# Patient Record
Sex: FEMALE | Race: WHITE | NOT HISPANIC OR LATINO | Employment: OTHER | URBAN - METROPOLITAN AREA
[De-identification: names, ages, dates, MRNs, and addresses within clinical notes are randomized per-mention and may not be internally consistent; named-entity substitution may affect disease eponyms.]

---

## 2020-12-17 DIAGNOSIS — Z01.818 PRE-OP EXAM: Primary | ICD-10-CM

## 2021-01-08 PROBLEM — N39.46 URGE AND STRESS INCONTINENCE: Status: ACTIVE | Noted: 2021-01-08

## 2021-02-23 PROBLEM — N39.41 URGE INCONTINENCE: Status: ACTIVE | Noted: 2021-02-23

## 2021-09-13 ENCOUNTER — HOSPITAL ENCOUNTER (OUTPATIENT)
Dept: RADIOLOGY | Facility: HOSPITAL | Age: 71
Discharge: HOME | End: 2021-09-13
Attending: FAMILY MEDICINE
Payer: MEDICARE

## 2021-09-13 ENCOUNTER — OFFICE VISIT (OUTPATIENT)
Dept: FAMILY MEDICINE | Facility: CLINIC | Age: 71
End: 2021-09-13
Payer: MEDICARE

## 2021-09-13 VITALS — DIASTOLIC BLOOD PRESSURE: 90 MMHG | SYSTOLIC BLOOD PRESSURE: 166 MMHG

## 2021-09-13 DIAGNOSIS — I10 ESSENTIAL HYPERTENSION: ICD-10-CM

## 2021-09-13 DIAGNOSIS — M25.572 ACUTE LEFT ANKLE PAIN: Primary | ICD-10-CM

## 2021-09-13 DIAGNOSIS — M25.572 ACUTE LEFT ANKLE PAIN: ICD-10-CM

## 2021-09-13 DIAGNOSIS — R73.03 PREDIABETES: ICD-10-CM

## 2021-09-13 DIAGNOSIS — G89.29 CHRONIC NECK PAIN: ICD-10-CM

## 2021-09-13 DIAGNOSIS — N39.46 URGE AND STRESS INCONTINENCE: ICD-10-CM

## 2021-09-13 DIAGNOSIS — Z11.59 NEED FOR HEPATITIS C SCREENING TEST: ICD-10-CM

## 2021-09-13 DIAGNOSIS — Z12.31 ENCOUNTER FOR SCREENING MAMMOGRAM FOR MALIGNANT NEOPLASM OF BREAST: ICD-10-CM

## 2021-09-13 DIAGNOSIS — Z78.0 POSTMENOPAUSAL: ICD-10-CM

## 2021-09-13 DIAGNOSIS — E03.9 HYPOTHYROIDISM, UNSPECIFIED TYPE: ICD-10-CM

## 2021-09-13 DIAGNOSIS — Z12.39 ENCOUNTER FOR SCREENING FOR MALIGNANT NEOPLASM OF BREAST, UNSPECIFIED SCREENING MODALITY: ICD-10-CM

## 2021-09-13 DIAGNOSIS — Z12.11 SCREENING FOR COLON CANCER: ICD-10-CM

## 2021-09-13 DIAGNOSIS — M54.2 CHRONIC NECK PAIN: ICD-10-CM

## 2021-09-13 DIAGNOSIS — G25.81 RLS (RESTLESS LEGS SYNDROME): ICD-10-CM

## 2021-09-13 DIAGNOSIS — J45.909 ASTHMA, UNSPECIFIED ASTHMA SEVERITY, UNSPECIFIED WHETHER COMPLICATED, UNSPECIFIED WHETHER PERSISTENT: ICD-10-CM

## 2021-09-13 PROCEDURE — 99204 PR OFFICE/OUTPT VISIT, NEW, LEVL IV, 45-59 MIN: ICD-10-PCS | Mod: S$PBB,,, | Performed by: FAMILY MEDICINE

## 2021-09-13 PROCEDURE — 73600 XR ANKLE 2 VIEW LEFT: ICD-10-PCS | Mod: 26,LT,, | Performed by: RADIOLOGY

## 2021-09-13 PROCEDURE — 72040 X-RAY EXAM NECK SPINE 2-3 VW: CPT | Mod: TC,PO

## 2021-09-13 PROCEDURE — 73620 X-RAY EXAM OF FOOT: CPT | Mod: 26,LT,, | Performed by: RADIOLOGY

## 2021-09-13 PROCEDURE — 73600 X-RAY EXAM OF ANKLE: CPT | Mod: TC,PO,LT

## 2021-09-13 PROCEDURE — 72040 XR CERVICAL SPINE AP LATERAL: ICD-10-PCS | Mod: 26,,, | Performed by: RADIOLOGY

## 2021-09-13 PROCEDURE — 99999 PR PBB SHADOW E&M-EST. PATIENT-LVL III: ICD-10-PCS | Mod: PBBFAC,,, | Performed by: FAMILY MEDICINE

## 2021-09-13 PROCEDURE — 73620 XR FOOT 2 VIEW LEFT: ICD-10-PCS | Mod: 26,LT,, | Performed by: RADIOLOGY

## 2021-09-13 PROCEDURE — 73600 X-RAY EXAM OF ANKLE: CPT | Mod: 26,LT,, | Performed by: RADIOLOGY

## 2021-09-13 PROCEDURE — 99213 OFFICE O/P EST LOW 20 MIN: CPT | Mod: PBBFAC,PO | Performed by: FAMILY MEDICINE

## 2021-09-13 PROCEDURE — 99999 PR PBB SHADOW E&M-EST. PATIENT-LVL III: CPT | Mod: PBBFAC,,, | Performed by: FAMILY MEDICINE

## 2021-09-13 PROCEDURE — 99204 OFFICE O/P NEW MOD 45 MIN: CPT | Mod: S$PBB,,, | Performed by: FAMILY MEDICINE

## 2021-09-13 PROCEDURE — 73620 X-RAY EXAM OF FOOT: CPT | Mod: TC,PO,LT

## 2021-09-13 PROCEDURE — 72040 X-RAY EXAM NECK SPINE 2-3 VW: CPT | Mod: 26,,, | Performed by: RADIOLOGY

## 2021-09-13 RX ORDER — ROPINIROLE 1 MG/1
1 TABLET, FILM COATED ORAL NIGHTLY
Qty: 90 TABLET | Refills: 1 | Status: SHIPPED | OUTPATIENT
Start: 2021-09-13 | End: 2022-01-09

## 2021-09-13 RX ORDER — LEVOTHYROXINE SODIUM 50 UG/1
50 TABLET ORAL
Qty: 90 TABLET | Refills: 1 | Status: SHIPPED | OUTPATIENT
Start: 2021-09-13 | End: 2022-01-08

## 2021-09-13 RX ORDER — METOPROLOL SUCCINATE 100 MG/1
100 TABLET, EXTENDED RELEASE ORAL DAILY
Qty: 90 TABLET | Refills: 1 | Status: SHIPPED | OUTPATIENT
Start: 2021-09-13 | End: 2022-01-08

## 2021-09-13 RX ORDER — OMEPRAZOLE 20 MG/1
20 CAPSULE, DELAYED RELEASE ORAL 2 TIMES DAILY
Qty: 180 CAPSULE | Refills: 3 | Status: SHIPPED | OUTPATIENT
Start: 2021-09-13 | End: 2022-06-10 | Stop reason: SDUPTHER

## 2021-09-13 RX ORDER — OLMESARTAN MEDOXOMIL 20 MG/1
20 TABLET ORAL DAILY
Qty: 30 TABLET | Refills: 1 | Status: SHIPPED | OUTPATIENT
Start: 2021-09-13 | End: 2021-10-15

## 2021-09-14 ENCOUNTER — PATIENT MESSAGE (OUTPATIENT)
Dept: FAMILY MEDICINE | Facility: CLINIC | Age: 71
End: 2021-09-14

## 2021-09-16 ENCOUNTER — HOSPITAL ENCOUNTER (OUTPATIENT)
Dept: RADIOLOGY | Facility: HOSPITAL | Age: 71
Discharge: HOME | End: 2021-09-16
Attending: FAMILY MEDICINE
Payer: MEDICARE

## 2021-09-16 VITALS — BODY MASS INDEX: 42.49 KG/M2 | WEIGHT: 225.06 LBS | HEIGHT: 61 IN

## 2021-09-16 DIAGNOSIS — Z12.31 ENCOUNTER FOR SCREENING MAMMOGRAM FOR MALIGNANT NEOPLASM OF BREAST: ICD-10-CM

## 2021-09-16 DIAGNOSIS — Z12.39 ENCOUNTER FOR SCREENING FOR MALIGNANT NEOPLASM OF BREAST, UNSPECIFIED SCREENING MODALITY: ICD-10-CM

## 2021-09-16 PROCEDURE — 77063 MAMMO DIGITAL SCREENING BILAT WITH TOMO: ICD-10-PCS | Mod: 26,,, | Performed by: RADIOLOGY

## 2021-09-16 PROCEDURE — 77067 SCR MAMMO BI INCL CAD: CPT | Mod: TC

## 2021-09-16 PROCEDURE — 77063 BREAST TOMOSYNTHESIS BI: CPT | Mod: 26,,, | Performed by: RADIOLOGY

## 2021-09-16 PROCEDURE — 77067 MAMMO DIGITAL SCREENING BILAT WITH TOMO: ICD-10-PCS | Mod: 26,,, | Performed by: RADIOLOGY

## 2021-09-16 PROCEDURE — 77067 SCR MAMMO BI INCL CAD: CPT | Mod: 26,,, | Performed by: RADIOLOGY

## 2021-09-23 ENCOUNTER — OFFICE VISIT (OUTPATIENT)
Dept: PODIATRY | Facility: CLINIC | Age: 71
End: 2021-09-23
Payer: MEDICARE

## 2021-09-23 VITALS — BODY MASS INDEX: 42.49 KG/M2 | HEIGHT: 61 IN | WEIGHT: 225.06 LBS

## 2021-09-23 DIAGNOSIS — R29.898 WEAKNESS OF LEFT LOWER EXTREMITY: ICD-10-CM

## 2021-09-23 DIAGNOSIS — S86.012A TRAUMATIC RUPTURE OF LEFT ACHILLES TENDON, INITIAL ENCOUNTER: Primary | ICD-10-CM

## 2021-09-23 PROCEDURE — 99999 PR PBB SHADOW E&M-EST. PATIENT-LVL III: CPT | Mod: PBBFAC,,, | Performed by: PODIATRIST

## 2021-09-23 PROCEDURE — 99999 PR PBB SHADOW E&M-EST. PATIENT-LVL III: ICD-10-PCS | Mod: PBBFAC,,, | Performed by: PODIATRIST

## 2021-09-23 PROCEDURE — 99204 PR OFFICE/OUTPT VISIT, NEW, LEVL IV, 45-59 MIN: ICD-10-PCS | Mod: S$PBB,,, | Performed by: PODIATRIST

## 2021-09-23 PROCEDURE — 99213 OFFICE O/P EST LOW 20 MIN: CPT | Mod: PBBFAC | Performed by: PODIATRIST

## 2021-09-23 PROCEDURE — 99204 OFFICE O/P NEW MOD 45 MIN: CPT | Mod: S$PBB,,, | Performed by: PODIATRIST

## 2021-09-24 ENCOUNTER — OFFICE VISIT (OUTPATIENT)
Dept: PAIN MEDICINE | Facility: CLINIC | Age: 71
End: 2021-09-24
Payer: MEDICARE

## 2021-09-24 VITALS — BODY MASS INDEX: 43.74 KG/M2 | WEIGHT: 231.5 LBS

## 2021-09-24 DIAGNOSIS — M47.812 CERVICAL SPONDYLOSIS: Primary | ICD-10-CM

## 2021-09-24 DIAGNOSIS — M79.18 MYOFASCIAL PAIN: ICD-10-CM

## 2021-09-24 DIAGNOSIS — M54.12 RADICULOPATHY, CERVICAL REGION: ICD-10-CM

## 2021-09-24 DIAGNOSIS — M79.2 NEUROPATHIC PAIN: ICD-10-CM

## 2021-09-24 DIAGNOSIS — M54.2 CHRONIC NECK PAIN: ICD-10-CM

## 2021-09-24 DIAGNOSIS — M47.812 FACET ARTHROPATHY, CERVICAL: ICD-10-CM

## 2021-09-24 DIAGNOSIS — G89.29 CHRONIC NECK PAIN: ICD-10-CM

## 2021-09-24 PROCEDURE — 99204 OFFICE O/P NEW MOD 45 MIN: CPT | Mod: S$PBB,,, | Performed by: ANESTHESIOLOGY

## 2021-09-24 PROCEDURE — 99213 OFFICE O/P EST LOW 20 MIN: CPT | Mod: PBBFAC | Performed by: ANESTHESIOLOGY

## 2021-09-24 PROCEDURE — 99999 PR PBB SHADOW E&M-EST. PATIENT-LVL III: ICD-10-PCS | Mod: PBBFAC,,, | Performed by: ANESTHESIOLOGY

## 2021-09-24 PROCEDURE — 99999 PR PBB SHADOW E&M-EST. PATIENT-LVL III: CPT | Mod: PBBFAC,,, | Performed by: ANESTHESIOLOGY

## 2021-09-24 PROCEDURE — 99204 PR OFFICE/OUTPT VISIT, NEW, LEVL IV, 45-59 MIN: ICD-10-PCS | Mod: S$PBB,,, | Performed by: ANESTHESIOLOGY

## 2021-09-24 RX ORDER — VALSARTAN 80 MG/1
80 TABLET ORAL DAILY
COMMUNITY
End: 2021-10-15

## 2021-10-05 ENCOUNTER — PATIENT MESSAGE (OUTPATIENT)
Dept: FAMILY MEDICINE | Facility: CLINIC | Age: 71
End: 2021-10-05

## 2021-10-15 RX ORDER — LISINOPRIL 20 MG/1
20 TABLET ORAL DAILY
Qty: 90 TABLET | Refills: 0 | Status: SHIPPED | OUTPATIENT
Start: 2021-10-15 | End: 2021-10-28

## 2021-10-19 ENCOUNTER — TELEPHONE (OUTPATIENT)
Dept: PAIN MEDICINE | Facility: CLINIC | Age: 71
End: 2021-10-19

## 2021-10-19 DIAGNOSIS — M54.12 RADICULOPATHY, CERVICAL REGION: Primary | ICD-10-CM

## 2021-10-20 ENCOUNTER — TELEPHONE (OUTPATIENT)
Dept: PAIN MEDICINE | Facility: CLINIC | Age: 71
End: 2021-10-20

## 2021-10-21 ENCOUNTER — TELEPHONE (OUTPATIENT)
Dept: PAIN MEDICINE | Facility: CLINIC | Age: 71
End: 2021-10-21

## 2021-10-21 ENCOUNTER — HOSPITAL ENCOUNTER (OUTPATIENT)
Dept: RADIOLOGY | Facility: HOSPITAL | Age: 71
Discharge: HOME | End: 2021-10-21
Attending: ANESTHESIOLOGY
Payer: MEDICARE

## 2021-10-21 DIAGNOSIS — M54.12 RADICULOPATHY, CERVICAL REGION: ICD-10-CM

## 2021-10-21 PROCEDURE — 74018 XR KUB: ICD-10-PCS | Mod: 26,,, | Performed by: RADIOLOGY

## 2021-10-21 PROCEDURE — 74018 RADEX ABDOMEN 1 VIEW: CPT | Mod: 26,,, | Performed by: RADIOLOGY

## 2021-10-21 PROCEDURE — 74018 RADEX ABDOMEN 1 VIEW: CPT | Mod: TC,PO

## 2021-10-22 ENCOUNTER — TELEPHONE (OUTPATIENT)
Dept: PAIN MEDICINE | Facility: CLINIC | Age: 71
End: 2021-10-22

## 2021-10-25 ENCOUNTER — HOSPITAL ENCOUNTER (OUTPATIENT)
Dept: RADIOLOGY | Facility: HOSPITAL | Age: 71
Discharge: HOME | End: 2021-10-25
Attending: ANESTHESIOLOGY
Payer: MEDICARE

## 2021-10-25 DIAGNOSIS — M54.12 RADICULOPATHY, CERVICAL REGION: ICD-10-CM

## 2021-10-25 PROCEDURE — 72141 MRI NECK SPINE W/O DYE: CPT | Mod: TC

## 2021-10-25 PROCEDURE — 72141 MRI CERVICAL SPINE WITHOUT CONTRAST: ICD-10-PCS | Mod: 26,,, | Performed by: RADIOLOGY

## 2021-10-25 PROCEDURE — 72141 MRI NECK SPINE W/O DYE: CPT | Mod: 26,,, | Performed by: RADIOLOGY

## 2021-10-28 ENCOUNTER — OFFICE VISIT (OUTPATIENT)
Dept: FAMILY MEDICINE | Facility: CLINIC | Age: 71
End: 2021-10-28
Payer: MEDICARE

## 2021-10-28 VITALS
BODY MASS INDEX: 43.19 KG/M2 | RESPIRATION RATE: 16 BRPM | HEART RATE: 80 BPM | SYSTOLIC BLOOD PRESSURE: 140 MMHG | WEIGHT: 228.75 LBS | OXYGEN SATURATION: 98 % | HEIGHT: 61 IN | TEMPERATURE: 98 F | DIASTOLIC BLOOD PRESSURE: 80 MMHG

## 2021-10-28 DIAGNOSIS — M85.80 OSTEOPENIA, UNSPECIFIED LOCATION: ICD-10-CM

## 2021-10-28 DIAGNOSIS — I10 ESSENTIAL HYPERTENSION: Primary | ICD-10-CM

## 2021-10-28 DIAGNOSIS — M54.2 CHRONIC NECK PAIN: ICD-10-CM

## 2021-10-28 DIAGNOSIS — G89.29 CHRONIC NECK PAIN: ICD-10-CM

## 2021-10-28 DIAGNOSIS — S86.012D TRAUMATIC RUPTURE OF LEFT ACHILLES TENDON, SUBSEQUENT ENCOUNTER: ICD-10-CM

## 2021-10-28 DIAGNOSIS — E03.9 HYPOTHYROIDISM, UNSPECIFIED TYPE: ICD-10-CM

## 2021-10-28 PROCEDURE — 99999 PR PBB SHADOW E&M-EST. PATIENT-LVL III: CPT | Mod: PBBFAC,,, | Performed by: FAMILY MEDICINE

## 2021-10-28 PROCEDURE — 99999 PR PBB SHADOW E&M-EST. PATIENT-LVL III: ICD-10-PCS | Mod: PBBFAC,,, | Performed by: FAMILY MEDICINE

## 2021-10-28 PROCEDURE — 99214 OFFICE O/P EST MOD 30 MIN: CPT | Mod: S$PBB,,, | Performed by: FAMILY MEDICINE

## 2021-10-28 PROCEDURE — 99213 OFFICE O/P EST LOW 20 MIN: CPT | Mod: PBBFAC,PO | Performed by: FAMILY MEDICINE

## 2021-10-28 PROCEDURE — 99214 PR OFFICE/OUTPT VISIT, EST, LEVL IV, 30-39 MIN: ICD-10-PCS | Mod: S$PBB,,, | Performed by: FAMILY MEDICINE

## 2021-10-28 RX ORDER — PROMETHAZINE HYDROCHLORIDE AND DEXTROMETHORPHAN HYDROBROMIDE 6.25; 15 MG/5ML; MG/5ML
5 SYRUP ORAL 3 TIMES DAILY PRN
Qty: 118 ML | Refills: 0 | Status: SHIPPED | OUTPATIENT
Start: 2021-10-28 | End: 2021-11-07

## 2021-10-28 RX ORDER — BENZONATATE 200 MG/1
200 CAPSULE ORAL 3 TIMES DAILY PRN
Qty: 20 CAPSULE | Refills: 0 | Status: SHIPPED | OUTPATIENT
Start: 2021-10-28 | End: 2021-11-07

## 2021-10-28 RX ORDER — OXYBUTYNIN CHLORIDE 10 MG/1
10 TABLET, EXTENDED RELEASE ORAL DAILY
COMMUNITY
Start: 2021-10-13

## 2021-11-26 ENCOUNTER — PATIENT OUTREACH (OUTPATIENT)
Dept: ADMINISTRATIVE | Facility: OTHER | Age: 71
End: 2021-11-26
Payer: MEDICARE

## 2021-12-01 ENCOUNTER — OFFICE VISIT (OUTPATIENT)
Dept: FAMILY MEDICINE | Facility: CLINIC | Age: 71
End: 2021-12-01
Payer: MEDICARE

## 2021-12-01 ENCOUNTER — TELEPHONE (OUTPATIENT)
Dept: RADIOLOGY | Facility: HOSPITAL | Age: 71
End: 2021-12-01
Payer: MEDICARE

## 2021-12-01 ENCOUNTER — OFFICE VISIT (OUTPATIENT)
Dept: PODIATRY | Facility: CLINIC | Age: 71
End: 2021-12-01
Payer: MEDICARE

## 2021-12-01 VITALS
RESPIRATION RATE: 16 BRPM | TEMPERATURE: 97 F | HEART RATE: 72 BPM | OXYGEN SATURATION: 97 % | BODY MASS INDEX: 42.75 KG/M2 | DIASTOLIC BLOOD PRESSURE: 100 MMHG | WEIGHT: 226.44 LBS | HEIGHT: 61 IN | SYSTOLIC BLOOD PRESSURE: 168 MMHG

## 2021-12-01 DIAGNOSIS — I10 ESSENTIAL HYPERTENSION: Primary | ICD-10-CM

## 2021-12-01 DIAGNOSIS — Z23 NEED FOR VACCINATION: ICD-10-CM

## 2021-12-01 DIAGNOSIS — R29.898 WEAKNESS OF LEFT LOWER EXTREMITY: ICD-10-CM

## 2021-12-01 DIAGNOSIS — M54.41 ACUTE RIGHT-SIDED LOW BACK PAIN WITH RIGHT-SIDED SCIATICA: ICD-10-CM

## 2021-12-01 DIAGNOSIS — S86.012A TRAUMATIC RUPTURE OF LEFT ACHILLES TENDON, INITIAL ENCOUNTER: Primary | ICD-10-CM

## 2021-12-01 PROCEDURE — 99999 PR PBB SHADOW E&M-EST. PATIENT-LVL III: CPT | Mod: PBBFAC,,, | Performed by: FAMILY MEDICINE

## 2021-12-01 PROCEDURE — 99999 PR PBB SHADOW E&M-EST. PATIENT-LVL III: ICD-10-PCS | Mod: PBBFAC,,, | Performed by: FAMILY MEDICINE

## 2021-12-01 PROCEDURE — 99214 PR OFFICE/OUTPT VISIT, EST, LEVL IV, 30-39 MIN: ICD-10-PCS | Mod: S$PBB,,, | Performed by: FAMILY MEDICINE

## 2021-12-01 PROCEDURE — 99214 PR OFFICE/OUTPT VISIT, EST, LEVL IV, 30-39 MIN: ICD-10-PCS | Mod: S$PBB,,, | Performed by: PODIATRIST

## 2021-12-01 PROCEDURE — 99999 PR PBB SHADOW E&M-EST. PATIENT-LVL III: ICD-10-PCS | Mod: PBBFAC,,, | Performed by: PODIATRIST

## 2021-12-01 PROCEDURE — G0008 ADMIN INFLUENZA VIRUS VAC: HCPCS | Mod: PBBFAC,PO

## 2021-12-01 PROCEDURE — 99213 OFFICE O/P EST LOW 20 MIN: CPT | Mod: PBBFAC,27,25 | Performed by: PODIATRIST

## 2021-12-01 PROCEDURE — 90732 PPSV23 VACC 2 YRS+ SUBQ/IM: CPT | Mod: PBBFAC,PO

## 2021-12-01 PROCEDURE — 99214 OFFICE O/P EST MOD 30 MIN: CPT | Mod: S$PBB,,, | Performed by: PODIATRIST

## 2021-12-01 PROCEDURE — 99999 PR PBB SHADOW E&M-EST. PATIENT-LVL III: CPT | Mod: PBBFAC,,, | Performed by: PODIATRIST

## 2021-12-01 PROCEDURE — 90694 VACC AIIV4 NO PRSRV 0.5ML IM: CPT | Mod: PBBFAC,PO

## 2021-12-01 PROCEDURE — 99214 OFFICE O/P EST MOD 30 MIN: CPT | Mod: S$PBB,,, | Performed by: FAMILY MEDICINE

## 2021-12-01 PROCEDURE — 99213 OFFICE O/P EST LOW 20 MIN: CPT | Mod: PBBFAC,PO,25 | Performed by: FAMILY MEDICINE

## 2021-12-01 RX ORDER — AMLODIPINE BESYLATE 5 MG/1
5 TABLET ORAL DAILY
Qty: 90 TABLET | Refills: 1 | Status: SHIPPED | OUTPATIENT
Start: 2021-12-01 | End: 2022-03-29

## 2021-12-01 RX ORDER — GABAPENTIN 300 MG/1
300 CAPSULE ORAL 3 TIMES DAILY
Qty: 90 CAPSULE | Refills: 1 | Status: SHIPPED | OUTPATIENT
Start: 2021-12-01 | End: 2021-12-15

## 2021-12-10 ENCOUNTER — HOSPITAL ENCOUNTER (OUTPATIENT)
Dept: RADIOLOGY | Facility: HOSPITAL | Age: 71
Discharge: HOME | End: 2021-12-10
Attending: PODIATRIST
Payer: MEDICARE

## 2021-12-10 DIAGNOSIS — S86.012A TRAUMATIC RUPTURE OF LEFT ACHILLES TENDON, INITIAL ENCOUNTER: ICD-10-CM

## 2021-12-10 PROCEDURE — 73721 MRI JNT OF LWR EXTRE W/O DYE: CPT | Mod: TC,LT

## 2021-12-13 ENCOUNTER — OFFICE VISIT (OUTPATIENT)
Dept: PODIATRY | Facility: CLINIC | Age: 71
End: 2021-12-13
Payer: MEDICARE

## 2021-12-13 DIAGNOSIS — S86.012D TRAUMATIC RUPTURE OF LEFT ACHILLES TENDON, SUBSEQUENT ENCOUNTER: Primary | ICD-10-CM

## 2021-12-13 DIAGNOSIS — R29.898 WEAKNESS OF LEFT LOWER EXTREMITY: ICD-10-CM

## 2021-12-13 PROCEDURE — 99213 PR OFFICE/OUTPT VISIT, EST, LEVL III, 20-29 MIN: ICD-10-PCS | Mod: S$PBB,,, | Performed by: PODIATRIST

## 2021-12-13 PROCEDURE — 99213 OFFICE O/P EST LOW 20 MIN: CPT | Mod: S$PBB,,, | Performed by: PODIATRIST

## 2021-12-13 PROCEDURE — 99999 PR PBB SHADOW E&M-EST. PATIENT-LVL II: ICD-10-PCS | Mod: PBBFAC,,, | Performed by: PODIATRIST

## 2021-12-13 PROCEDURE — 99212 OFFICE O/P EST SF 10 MIN: CPT | Mod: PBBFAC | Performed by: PODIATRIST

## 2021-12-13 PROCEDURE — 99999 PR PBB SHADOW E&M-EST. PATIENT-LVL II: CPT | Mod: PBBFAC,,, | Performed by: PODIATRIST

## 2021-12-14 ENCOUNTER — TELEPHONE (OUTPATIENT)
Dept: PAIN MEDICINE | Facility: CLINIC | Age: 71
End: 2021-12-14
Payer: MEDICARE
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
## 2021-12-15 ENCOUNTER — PATIENT MESSAGE (OUTPATIENT)
Dept: PAIN MEDICINE | Facility: CLINIC | Age: 71
End: 2021-12-15

## 2021-12-15 ENCOUNTER — OFFICE VISIT (OUTPATIENT)
Dept: PAIN MEDICINE | Facility: CLINIC | Age: 71
End: 2021-12-15
Payer: MEDICARE

## 2021-12-15 VITALS
BODY MASS INDEX: 42.16 KG/M2 | DIASTOLIC BLOOD PRESSURE: 80 MMHG | RESPIRATION RATE: 17 BRPM | HEIGHT: 61 IN | WEIGHT: 223.31 LBS | SYSTOLIC BLOOD PRESSURE: 183 MMHG | HEART RATE: 82 BPM

## 2021-12-15 DIAGNOSIS — M46.1 SACROILIITIS: ICD-10-CM

## 2021-12-15 DIAGNOSIS — M54.16 LUMBAR RADICULOPATHY: ICD-10-CM

## 2021-12-15 DIAGNOSIS — M54.12 CERVICAL RADICULOPATHY: Primary | ICD-10-CM

## 2021-12-15 DIAGNOSIS — M70.61 GREATER TROCHANTERIC BURSITIS OF RIGHT HIP: ICD-10-CM

## 2021-12-15 PROCEDURE — 99214 OFFICE O/P EST MOD 30 MIN: CPT | Mod: PBBFAC | Performed by: ANESTHESIOLOGY

## 2021-12-15 PROCEDURE — 99999 PR PBB SHADOW E&M-EST. PATIENT-LVL IV: ICD-10-PCS | Mod: PBBFAC,,, | Performed by: ANESTHESIOLOGY

## 2021-12-15 PROCEDURE — 99214 OFFICE O/P EST MOD 30 MIN: CPT | Mod: S$PBB,,, | Performed by: ANESTHESIOLOGY

## 2021-12-15 PROCEDURE — 99214 PR OFFICE/OUTPT VISIT, EST, LEVL IV, 30-39 MIN: ICD-10-PCS | Mod: S$PBB,,, | Performed by: ANESTHESIOLOGY

## 2021-12-15 PROCEDURE — 99999 PR PBB SHADOW E&M-EST. PATIENT-LVL IV: CPT | Mod: PBBFAC,,, | Performed by: ANESTHESIOLOGY

## 2021-12-15 RX ORDER — DULOXETIN HYDROCHLORIDE 30 MG/1
CAPSULE, DELAYED RELEASE ORAL
Qty: 53 CAPSULE | Refills: 1 | Status: SHIPPED | OUTPATIENT
Start: 2021-12-15 | End: 2022-04-12

## 2021-12-21 ENCOUNTER — PATIENT MESSAGE (OUTPATIENT)
Dept: PODIATRY | Facility: CLINIC | Age: 71
End: 2021-12-21
Payer: MEDICARE

## 2021-12-27 ENCOUNTER — CLINICAL SUPPORT (OUTPATIENT)
Dept: REHABILITATION | Facility: HOSPITAL | Age: 71
End: 2021-12-27
Attending: ANESTHESIOLOGY
Payer: MEDICARE

## 2021-12-27 DIAGNOSIS — M54.2 NECK PAIN: ICD-10-CM

## 2021-12-27 DIAGNOSIS — M54.12 CERVICAL RADICULOPATHY: ICD-10-CM

## 2021-12-27 PROCEDURE — 97161 PT EVAL LOW COMPLEX 20 MIN: CPT

## 2021-12-27 PROCEDURE — 97110 THERAPEUTIC EXERCISES: CPT

## 2021-12-28 ENCOUNTER — HOSPITAL ENCOUNTER (OUTPATIENT)
Facility: HOSPITAL | Age: 71
Discharge: HOME | End: 2021-12-28
Attending: ANESTHESIOLOGY | Admitting: ANESTHESIOLOGY
Payer: MEDICARE

## 2021-12-28 VITALS
HEART RATE: 54 BPM | TEMPERATURE: 98 F | RESPIRATION RATE: 18 BRPM | HEIGHT: 61 IN | DIASTOLIC BLOOD PRESSURE: 63 MMHG | BODY MASS INDEX: 41.37 KG/M2 | WEIGHT: 219.13 LBS | SYSTOLIC BLOOD PRESSURE: 132 MMHG | OXYGEN SATURATION: 96 %

## 2021-12-28 DIAGNOSIS — M54.12 CERVICAL RADICULOPATHY: ICD-10-CM

## 2021-12-28 PROCEDURE — 62321 PR INJ CERV/THORAC, W/GUIDANCE: ICD-10-PCS | Mod: ,,, | Performed by: ANESTHESIOLOGY

## 2021-12-28 PROCEDURE — 62321 NJX INTERLAMINAR CRV/THRC: CPT | Mod: ,,, | Performed by: ANESTHESIOLOGY

## 2021-12-28 PROCEDURE — 25500020 PHARM REV CODE 255: Performed by: ANESTHESIOLOGY

## 2021-12-28 PROCEDURE — 25000003 PHARM REV CODE 250: Performed by: ANESTHESIOLOGY

## 2021-12-28 PROCEDURE — 62321 NJX INTERLAMINAR CRV/THRC: CPT | Performed by: ANESTHESIOLOGY

## 2021-12-28 PROCEDURE — 63600175 PHARM REV CODE 636 W HCPCS: Performed by: ANESTHESIOLOGY

## 2021-12-28 RX ORDER — DEXAMETHASONE SODIUM PHOSPHATE 10 MG/ML
INJECTION INTRAMUSCULAR; INTRAVENOUS
Status: DISCONTINUED | OUTPATIENT
Start: 2021-12-28 | End: 2021-12-28 | Stop reason: HOSPADM

## 2021-12-28 RX ORDER — BUPIVACAINE HYDROCHLORIDE 2.5 MG/ML
INJECTION, SOLUTION EPIDURAL; INFILTRATION; INTRACAUDAL
Status: DISCONTINUED | OUTPATIENT
Start: 2021-12-28 | End: 2021-12-28 | Stop reason: HOSPADM

## 2021-12-28 RX ORDER — INDOMETHACIN 25 MG/1
CAPSULE ORAL
Status: DISCONTINUED | OUTPATIENT
Start: 2021-12-28 | End: 2021-12-28 | Stop reason: HOSPADM

## 2021-12-28 RX ORDER — MIDAZOLAM HYDROCHLORIDE 1 MG/ML
INJECTION, SOLUTION INTRAMUSCULAR; INTRAVENOUS
Status: DISCONTINUED | OUTPATIENT
Start: 2021-12-28 | End: 2021-12-28 | Stop reason: HOSPADM

## 2021-12-28 RX ORDER — FENTANYL CITRATE 50 UG/ML
INJECTION, SOLUTION INTRAMUSCULAR; INTRAVENOUS
Status: DISCONTINUED | OUTPATIENT
Start: 2021-12-28 | End: 2021-12-28 | Stop reason: HOSPADM

## 2022-01-06 ENCOUNTER — CLINICAL SUPPORT (OUTPATIENT)
Dept: REHABILITATION | Facility: HOSPITAL | Age: 72
End: 2022-01-06
Payer: MEDICARE

## 2022-01-06 DIAGNOSIS — M54.2 NECK PAIN: ICD-10-CM

## 2022-01-06 PROCEDURE — 97140 MANUAL THERAPY 1/> REGIONS: CPT | Mod: CQ

## 2022-01-06 PROCEDURE — 97110 THERAPEUTIC EXERCISES: CPT | Mod: CQ

## 2022-01-06 NOTE — TELEPHONE ENCOUNTER
No new care gaps identified.  Powered by [employer]. Reference number: [id number].   1/05/2022 11:35:40 PM CST

## 2022-01-06 NOTE — PROGRESS NOTES
[facility]   Physical Therapist Assistant Treatment Note     Name: Sana Crenshaw  Clinic Number: [id number]    Therapy Diagnosis:   Encounter Diagnosis   Name Primary?    Neck pain      Physician: Satish Segura MD    Visit Date: 1/6/2022   Physician Orders: PT Eval and Treat   Medical Diagnosis from Referral: M54.12 (ICD-10-CM) - Cervical radiculopathy  Evaluation Date: 12/27/2021  Authorization Period Expiration: 12/31/22  Plan of Care Expiration: 3/11/22    Visit # / Visits authorized: 1/20    FOTO: 1/3    PTA Visit #: 1/5     Time In: 4:00  Time Out: 4:55  Total Billable Time: 42 minutes    SUBJECTIVE     Pt reports: pain in the left side of her neck  She was compliant with home exercise program.  Response to previous treatment: no issues  Functional change: na at this time    Pain: 5/10  Location: left neck      OBJECTIVE     Objective Measures updated at progress report unless specified.     Treatment     Sana received the treatments listed below:      therapeutic exercises to develop strength, ROM, flexibility, posture and core stabilization for 29 minutes including:  Supine alt arms x20  Supine chin tuck w/cervical retraction over towel roll 2x10  Supine nodding over noodle x20  Seated UT/Levator stretches bilat---very small range noted  Standing rows red resistive cord #3 2x10  Standing shoulder extension YTB 2x10    manual therapy techniques:  for 13 minutes, including:  Suboccipital releases  STM to cervical paraspinals, UT, Levator, SCM  Cervical distraction    supervised modalities after being cleared for contradictions: --    hot pack for 10 minutes to neck.      Patient Education and Home Exercises     Home Exercises Provided and Patient Education Provided     Education provided:   - HEP review  - Postural alignment    Written Home Exercises Provided: Patient instructed to cont prior HEP. Exercises were reviewed and Sana was able to demonstrate them prior to the end of the  session.  Sana demonstrated good  understanding of the education provided. See EMR under Patient Instructions for exercises provided during therapy sessions    ASSESSMENT     Sana presented today with 5/10 neck pain predominantly on the left. She had moderate tension with moderate depth of palpation in the her cervical paraspinals, UT, levator and SCM mm. Pain is predominantly on the left. Her posture is with a forward head, forward shoulders, and thoracic kyphosis. Today focus was on neck mobility and upper quadrant opening. Sana demonstrated a positive response to her first session after eval as evidenced with her activity tolerance and no increased pain.     Sana Is progressing well towards her goals.   Pt prognosis is Fair.     Pt will continue to benefit from skilled outpatient physical therapy to address the deficits listed in the problem list box on initial evaluation, provide pt/family education and to maximize pt's level of independence in the home and community environment.     Pt's spiritual, cultural and educational needs considered and pt agreeable to plan of care and goals.     Anticipated barriers to physical therapy: multiple joint restrictions/impairments from previous surgeries    Goals:   STG's 2 weeks  1. Patient will be independent with 50% of HEP     LTG's 10 weeks  1. Patient will improve FOTO disability score  to 46 %  disability or less in order to improve overall QOL & return to PLOF   2. Patient will report an overall decrease in pain with ADL's and functional mobility  3.  Patient will increase strength by at least 1/2 muscle grade in affected musculature to improve functional mobility  4. Patient will improve L  cervical rotation ROM by at least 10 degrees to improve functional mobility and ADL's  5. Patient will be more aware of posture throughout the day to reduce stress  and maintain optimal alignment of the spine    PLAN   Plan of care Certification: 12/27/2021 to  3/11/22     Outpatient Physical Therapy 2 times weekly for 10 weeks to include the following interventions: Cervical/Lumbar Traction, Electrical Stimulation PRN, Manual Therapy, Moist Heat/ Ice, Patient Education, Self Care, Therapeutic Activities, Therapeutic Exercise and Ultrasound,       Mariah Hunt PTA

## 2022-01-06 NOTE — PRE-PROCEDURE INSTRUCTIONS
Spoke with patient regarding procedure scheduled on 1.12     Arrival time 0730     Has patient been sick with fever or on antibiotics within the last 7 days? No     Does the patient have any open wounds, sores or rashes? No     Does the patient have any recent fractures? no     Has patient received a vaccination within the last 7 days? No     Received the COVID vaccination? yes     Has the patient stopped all medications as directed? Na     Does patient have a pacemaker and or defibrillator? no     Does the patient have a ride to and from procedure and someone reliable to remain with patient? betzy      Is the patient diabetic? no     Does the patient have sleep apnea? Or use O2 at home? No and no      Is the patient receiving sedation? yes     Is the patient instructed to remain NPO beginning at midnight the night before their procedure? yes     Procedure location confirmed with patient? Yes     Covid- Denies signs/symptoms. Instructed to notify PAT/MD if any changes.

## 2022-01-07 ENCOUNTER — CLINICAL SUPPORT (OUTPATIENT)
Dept: REHABILITATION | Facility: HOSPITAL | Age: 72
End: 2022-01-07
Payer: MEDICARE

## 2022-01-07 DIAGNOSIS — M54.2 NECK PAIN: ICD-10-CM

## 2022-01-07 PROCEDURE — 97140 MANUAL THERAPY 1/> REGIONS: CPT | Mod: CQ

## 2022-01-07 PROCEDURE — 97110 THERAPEUTIC EXERCISES: CPT | Mod: CQ

## 2022-01-07 NOTE — PROGRESS NOTES
[facility] OUTPATIENT THERAPY AND WELLNESS   Physical Therapist Assistant Treatment Note     Name: Sana Crenshaw  Clinic Number: [id number]    Therapy Diagnosis:   Encounter Diagnosis   Name Primary?    Neck pain      Physician: Satish Segura MD    Visit Date: 1/7/2022   Physician Orders: PT Eval and Treat   Medical Diagnosis from Referral: M54.12 (ICD-10-CM) - Cervical radiculopathy  Evaluation Date: 12/27/2021  Authorization Period Expiration: 12/31/22  Plan of Care Expiration: 3/11/22    Visit # / Visits authorized: 2/20    FOTO: 1/3    PTA Visit #: 2/5     Time In: 3:45  Time Out: 4:35  Total Billable Time: 45 minutes    SUBJECTIVE     Pt reports: pain in the left side of her neck  She was compliant with home exercise program.  Response to previous treatment: no issues  Functional change: na at this time    Pain: 3-4/10  Location: left neck      OBJECTIVE     Objective Measures updated at progress report unless specified.     Treatment     Sana received the treatments listed below:      therapeutic exercises to develop strength, ROM, flexibility, posture and core stabilization for 30 minutes including:    Seated UBE 2/2  FW/BW for mobility  Supine alt arms x20  Supine chin tuck w/cervical retraction over towel roll 2x10  Supine nodding over noodle x20  Supine horizontal abd YTB 2x10  Side lying open books x10 ea; modified on the left due to shoulder limitations  Seated UT/Levator stretches bilat---very small range noted  Standing rows red resistive cord #3 2x10  Standing shoulder extension YTB 2x10    manual therapy techniques:  for 15 minutes, including:  Suboccipital releases  STM/IASTM to cervical paraspinals, UT, Levator, SCM  Cervical distraction    Patient provided verbal consent to IASTM with myofascial cupping   Static myofascial cupping followed by cupping w/gliding   Patient demonstrated appropriate response to IASTM with cupping. Mild to moderate erythema w/partial dissipation. Skin intact.        supervised modalities after being cleared for contradictions: --    hot pack for 0 minutes       Patient Education and Home Exercises     Home Exercises Provided and Patient Education Provided     Education provided:   - HEP review  - Postural alignment  - neck alignment while sleeping    Written Home Exercises Provided: Patient instructed to cont prior HEP. Exercises were reviewed and Sana was able to demonstrate them prior to the end of the session.  Sana demonstrated good  understanding of the education provided. See EMR under Patient Instructions for exercises provided during therapy sessions    ASSESSMENT     Sana presented today with 3-4/10 neck pain predominantly on the left. She had moderate tension with moderate depth of palpation in the her cervical paraspinals, UT, levator and SCM mm; L>R.  Point tenderness at the left sub occiptal region. Her posture is with a forward head, forward shoulders, and thoracic kyphosis. Education provided regarding neck alignment with sleeping as she is presently using 2 pillows which may be resulting in increased lateral flexion. Also she spends hours at a time working on her hobby with a forward flexed neck. These faulty positions are contributing to her neck pain. Today we again focused on neck mobility and upper quadrant opening. Addition of open books with modification on the left due to her shoulder restrictions. Will progress strengthening as tolerated going forward, however we need to continue with her overall mobility/flexibility as well. Sana demonstrated a positive response to today's session as evidenced with her activity tolerance and no increased pain.     Sana Is progressing well towards her goals.   Pt prognosis is Fair.     Pt will continue to benefit from skilled outpatient physical therapy to address the deficits listed in the problem list box on initial evaluation, provide pt/family education and to maximize pt's level of independence in the  home and community environment.     Pt's spiritual, cultural and educational needs considered and pt agreeable to plan of care and goals.     Anticipated barriers to physical therapy: multiple joint restrictions/impairments from previous surgeries    Goals:   STG's 2 weeks  1. Patient will be independent with 50% of HEP     LTG's 10 weeks  1. Patient will improve FOTO disability score  to 46 %  disability or less in order to improve overall QOL & return to PLOF   2. Patient will report an overall decrease in pain with ADL's and functional mobility  3.  Patient will increase strength by at least 1/2 muscle grade in affected musculature to improve functional mobility  4. Patient will improve L  cervical rotation ROM by at least 10 degrees to improve functional mobility and ADL's  5. Patient will be more aware of posture throughout the day to reduce stress  and maintain optimal alignment of the spine    PLAN   Plan of care Certification: 12/27/2021 to 3/11/22     Outpatient Physical Therapy 2 times weekly for 10 weeks to include the following interventions: Cervical/Lumbar Traction, Electrical Stimulation PRN, Manual Therapy, Moist Heat/ Ice, Patient Education, Self Care, Therapeutic Activities, Therapeutic Exercise and Ultrasound,       Mariah Hunt PTA

## 2022-01-08 RX ORDER — LEVOTHYROXINE SODIUM 50 UG/1
TABLET ORAL
Qty: 90 TABLET | Refills: 2 | Status: SHIPPED | OUTPATIENT
Start: 2022-01-08 | End: 2022-07-20

## 2022-01-08 RX ORDER — METOPROLOL SUCCINATE 100 MG/1
TABLET, EXTENDED RELEASE ORAL
Qty: 90 TABLET | Refills: 3 | Status: SHIPPED | OUTPATIENT
Start: 2022-01-08 | End: 2022-10-24

## 2022-01-08 NOTE — TELEPHONE ENCOUNTER
Refill Routing Note   Medication(s) are not appropriate for processing by [facility] for the following reason(s):      - Outside of protocol    [facility] action(s):  Approve  Route          --->Care Gap information included in message below if applicable.   Medication reconciliation completed: No   Automatic Epic Generated Protocol Data:    Orders Placed This Encounter    metoprolol succinate (TOPROL-XL) 100 MG 24 hr tablet    levothyroxine (SYNTHROID) 50 MCG tablet      Requested Prescriptions   Pending Prescriptions Disp Refills    rOPINIRole (REQUIP) 1 MG tablet [Pharmacy Med Name: ROPINIROLE  1MG  TAB] 90 tablet 3     Sig: TAKE 1 TABLET BY MOUTH IN  THE EVENING       There is no refill protocol information for this order      Signed Prescriptions Disp Refills    metoprolol succinate (TOPROL-XL) 100 MG 24 hr tablet 90 tablet 3     Sig: TAKE 1 TABLET BY MOUTH ONCE DAILY       Cardiovascular:  Beta Blockers Passed - 1/5/2022 11:35 PM        Passed - Patient is at least 18 years old        Passed - Last BP in normal range within 360 days     BP Readings from Last 1 Encounters:   12/28/21 132/63               Passed - Last Heart Rate in normal range within 360 days     Pulse Readings from Last 1 Encounters:   12/28/21 (!) 54              Passed - Valid encounter within last 15 months     Recent Visits  Date Type Provider Dept   12/01/21 Office Visit Stuart Gage MD [facility]   10/28/21 Office Visit Stuart Gage MD [facility]   Showing recent visits within past 720 days and meeting all other requirements  Future Appointments  No visits were found meeting these conditions.  Showing future appointments within next 150 days and meeting all other requirements      Future Appointments              In 5 days [facility] - Lab & Imaging, [place]    In 6 days YOAN Go[facility] - Therapy & Wellness, [place]    In 1 week YOAN Swain - Therapy &  Wellness, [place]    In 1 week Stuart Gage MD [facility], [facility]    In 1 week Mariah Hunt PTA [facility] - Therapy & Wellness, [place]    In 1 month MD Nenita Borden - Interventional Pain, [facility]                  levothyroxine (SYNTHROID) 50 MCG tablet 90 tablet 2     Sig: TAKE 1 TABLET BY MOUTH  BEFORE BREAKFAST       Endocrinology:  Hypothyroid Agents Failed - 1/5/2022 11:35 PM        Failed - T4 free within 1080 days     No results found for: EXTFREET4, T4FREE, FREET4, Q2OEGSGJZOFL, T4FT4           Passed - Patient is at least 18 years old        Passed - Valid encounter within last 15 months     Recent Visits  Date Type Provider Dept   12/01/21 Office Visit Stuart Gage MD [facility]   10/28/21 Office Visit Stuart Gage MD [facility]   Showing recent visits within past 720 days and meeting all other requirements  Future Appointments  No visits were found meeting these conditions.  Showing future appointments within next 150 days and meeting all other requirements      Future Appointments              In 5 days [facility] - Lab & Imaging, [place]    In 6 days Mariah Hunt PTA [facility] - Therapy & Wellness, [place]    In 1 week Amberly Whitlock PTA [facility] - Therapy [facility] Wellness, [place]    In 1 week Stuart Gage MD [facility], [facility]    In 1 week Mariah Hunt PTA [facility] - Therapy & Wellness, [place]    In 1 month MD Nenita Borden - Interventional Pain, [facility]                Passed - Manual Review: FT4 is not required if last TSH is WNL.        Passed - TSH in normal range and within 360 days     TSH   Date Value Ref Range Status   09/13/2021 2.169 0.400 - 4.000 uIU/mL Final                    Appointments  past 12m or future 3m with PCP    Date Provider   Last Visit   12/1/2021 Stuart Gage MD   Next Visit   1/20/2022 Stuart Gage MD   ED visits in  past 90 days: 0        Note composed:7:20 AM 01/08/2022

## 2022-01-09 RX ORDER — ROPINIROLE 1 MG/1
TABLET, FILM COATED ORAL
Qty: 90 TABLET | Refills: 0 | Status: SHIPPED | OUTPATIENT
Start: 2022-01-09 | End: 2022-03-29

## 2022-01-12 ENCOUNTER — PATIENT MESSAGE (OUTPATIENT)
Dept: FAMILY MEDICINE | Facility: CLINIC | Age: 72
End: 2022-01-12
Payer: MEDICARE

## 2022-01-12 ENCOUNTER — HOSPITAL ENCOUNTER (OUTPATIENT)
Facility: HOSPITAL | Age: 72
Discharge: HOME | End: 2022-01-12
Attending: ANESTHESIOLOGY | Admitting: ANESTHESIOLOGY
Payer: MEDICARE

## 2022-01-12 VITALS
BODY MASS INDEX: 40.99 KG/M2 | WEIGHT: 217.13 LBS | SYSTOLIC BLOOD PRESSURE: 164 MMHG | TEMPERATURE: 99 F | HEIGHT: 61 IN | DIASTOLIC BLOOD PRESSURE: 79 MMHG | HEART RATE: 70 BPM | RESPIRATION RATE: 15 BRPM | OXYGEN SATURATION: 97 %

## 2022-01-12 DIAGNOSIS — M70.61 GREATER TROCHANTERIC BURSITIS OF RIGHT HIP: ICD-10-CM

## 2022-01-12 DIAGNOSIS — M46.1 SACROILIITIS: ICD-10-CM

## 2022-01-12 DIAGNOSIS — M70.60 GREATER TROCHANTERIC BURSITIS: ICD-10-CM

## 2022-01-12 PROCEDURE — 27096 PR INJECTION,SACROILIAC JOINT: ICD-10-PCS | Mod: RT,,, | Performed by: ANESTHESIOLOGY

## 2022-01-12 PROCEDURE — 20610 DRAIN/INJ JOINT/BURSA W/O US: CPT | Mod: 59,RT,, | Performed by: ANESTHESIOLOGY

## 2022-01-12 PROCEDURE — 20610 DRAIN/INJ JOINT/BURSA W/O US: CPT | Performed by: ANESTHESIOLOGY

## 2022-01-12 PROCEDURE — 27096 INJECT SACROILIAC JOINT: CPT | Performed by: ANESTHESIOLOGY

## 2022-01-12 PROCEDURE — 25500020 PHARM REV CODE 255: Performed by: ANESTHESIOLOGY

## 2022-01-12 PROCEDURE — 63600175 PHARM REV CODE 636 W HCPCS: Performed by: ANESTHESIOLOGY

## 2022-01-12 PROCEDURE — 27096 INJECT SACROILIAC JOINT: CPT | Mod: RT,,, | Performed by: ANESTHESIOLOGY

## 2022-01-12 PROCEDURE — 20610 PR DRAIN/INJECT LARGE JOINT/BURSA: ICD-10-PCS | Mod: 59,RT,, | Performed by: ANESTHESIOLOGY

## 2022-01-12 PROCEDURE — 25000003 PHARM REV CODE 250: Performed by: ANESTHESIOLOGY

## 2022-01-12 RX ORDER — INDOMETHACIN 25 MG/1
CAPSULE ORAL
Status: DISCONTINUED | OUTPATIENT
Start: 2022-01-12 | End: 2022-01-12 | Stop reason: HOSPADM

## 2022-01-12 RX ORDER — ASPIRIN 81 MG/1
81 TABLET ORAL DAILY
COMMUNITY

## 2022-01-12 RX ORDER — TRIAMCINOLONE ACETONIDE 40 MG/ML
INJECTION, SUSPENSION INTRA-ARTICULAR; INTRAMUSCULAR
Status: DISCONTINUED | OUTPATIENT
Start: 2022-01-12 | End: 2022-01-12 | Stop reason: HOSPADM

## 2022-01-12 RX ORDER — BUPIVACAINE HYDROCHLORIDE 2.5 MG/ML
INJECTION, SOLUTION EPIDURAL; INFILTRATION; INTRACAUDAL
Status: DISCONTINUED | OUTPATIENT
Start: 2022-01-12 | End: 2022-01-12 | Stop reason: HOSPADM

## 2022-01-12 RX ORDER — FENTANYL CITRATE 50 UG/ML
INJECTION, SOLUTION INTRAMUSCULAR; INTRAVENOUS
Status: DISCONTINUED | OUTPATIENT
Start: 2022-01-12 | End: 2022-01-12 | Stop reason: HOSPADM

## 2022-01-12 RX ORDER — MIDAZOLAM HYDROCHLORIDE 1 MG/ML
INJECTION, SOLUTION INTRAMUSCULAR; INTRAVENOUS
Status: DISCONTINUED | OUTPATIENT
Start: 2022-01-12 | End: 2022-01-12 | Stop reason: HOSPADM

## 2022-01-12 NOTE — DISCHARGE INSTRUCTIONS

## 2022-01-12 NOTE — OP NOTE
Sana Crenshaw  71 y.o. female      Vitals:    01/12/22 0815   BP: 134/69   Pulse: 62   Resp: 16   Temp:        Procedure Date:@      INFORMED CONSENT: The procedure, risks, benefits and options were discussed with patient. There are no contraindications to the procedure. The patient expressed understanding and agreed to proceed. The personnel performing the procedure was discussed. I verify that I personally obtained consent prior to the start of the procedure and the signed consent can be found on the patient's chart.       Anesthesia:   Conscious sedation provided by M.D    The patient was monitored with continuous pulse oximetry, EKG, and intermittent blood pressure monitors.  The patient was hemodynamically stable throughout the entire process was responsive to voice, and breathing spontaneously.  Supplemental O2 was provided at 2L/min via nasal cannula.  Patient was comfortable for the duration of the procedure. (See nurse documentation and case log for sedation time)    There was a total of 1mg IV Midazolam and 50mcg Fentanyl titrated for the procedure    Pre Procedure diagnosis: Sacroiliitis [M46.1]  Greater trochanteric bursitis of right hip [M70.61]  Post-Procedure diagnosis: SAME      PROCEDURE:  1) Right greater trochanteric bursa injection    2) Right sacroiliac joint injection                            REASON FOR PROCEDURE:   Sacroiliitis [M46.1]  Greater trochanteric bursitis[M70.61]      MEDICATIONS INJECTED: 1mL 40mg/ml Kenalog and 4mL Bupivacaine 0.25% into each site    LOCAL ANESTHETIC USED: Xylocaine 1% 6ml     ESTIMATED BLOOD LOSS: None.   COMPLICATIONS: None.     TECHNIQUE:   Greater trochanteric bursa injection:  The area overlying the greater trochanteric bursa was identified using fluoroscopy, and the area overlying the skin was prepped and draped in usual sterile fashion. Local Xylocaine was injected by raising a wheel and going down to the periosteum using a 27-gauge hypodermic needle. A  5 inch 22-gauge spinal needle was introduce into the Right greater trochanteric bursa. Negative pressure applied to confirm no intravascular placement. Omnipaque was injected to confirm placement and to confirm that there was no vascular runoff. The medication was then injected slowly.  Displacement of the contrast after injection of the medication confirmed that the medication went into the area of the greater trochanteric bursa    Sacroiliac joint injection:   Laying in the prone position, the patient was prepped and draped in the usual sterile fashion using ChloraPrep and fenestrated drape.  The area was determined under fluoroscopy.  Local Xylocaine was injected by raising a wheel and going down to the periosteum using a 27-gauge hypodermic needle.  The 3.5 inch 22-gauge spinal needle was introduce into the Right sacroiliac joint.  Negative pressure applied to confirm no intravascular placement.  Omnipaque was injected to confirm placement and to confirm that there was no vascular runoff.  The medication was then injected slowly.  The patient tolerated the procedure well.                       The patient was monitored for approximately 30 minutes after the procedure. Patient was given post procedure and discharge instructions to follow at home. We will see the patient back in two weeks or the patient may call to inform of status. The patient was discharged in a stable condition

## 2022-01-12 NOTE — PLAN OF CARE
Rn entered the room with a wheelchair. Pt standing up in between the bed and the wall.. Pt started to lean to her right and the wall and eventually lowered herself to the floor. Pt denied any dizziness or concerns. Pt reports baseline R sided weakness and brace on her R leg. MD and Rns at bedside assisted pt to the wheelchair. VS checked and stable. No concerns

## 2022-01-12 NOTE — DISCHARGE SUMMARY
[facility] - Pain Mgmt 1st Fl  Discharge Note  Short Stay    Procedure(s) (LRB):  right GT bursa injection with RN IV sedation (Right)  right Sacroiliac Joint Injection with RN IV sedation (Right)    OUTCOME: Patient tolerated treatment/procedure well without complication and is now ready for discharge.    DISPOSITION: Home or Self Care    FINAL DIAGNOSIS:  <principal problem not specified>    FOLLOWUP: In clinic    DISCHARGE INSTRUCTIONS:  No discharge procedures on file.     TIME SPENT ON DISCHARGE: 15 minutes

## 2022-01-13 ENCOUNTER — HOSPITAL ENCOUNTER (OUTPATIENT)
Dept: RADIOLOGY | Facility: HOSPITAL | Age: 72
Discharge: HOME | End: 2022-01-13
Attending: ANESTHESIOLOGY
Payer: MEDICARE

## 2022-01-13 DIAGNOSIS — M54.16 LUMBAR RADICULOPATHY: ICD-10-CM

## 2022-01-13 PROCEDURE — 72148 MRI LUMBAR SPINE W/O DYE: CPT | Mod: TC

## 2022-01-14 ENCOUNTER — CLINICAL SUPPORT (OUTPATIENT)
Dept: REHABILITATION | Facility: HOSPITAL | Age: 72
End: 2022-01-14
Payer: MEDICARE

## 2022-01-14 DIAGNOSIS — M54.12 CERVICAL RADICULOPATHY: ICD-10-CM

## 2022-01-14 DIAGNOSIS — M54.2 NECK PAIN: Primary | ICD-10-CM

## 2022-01-14 PROCEDURE — 97140 MANUAL THERAPY 1/> REGIONS: CPT | Mod: CQ

## 2022-01-14 PROCEDURE — 97110 THERAPEUTIC EXERCISES: CPT | Mod: CQ

## 2022-01-14 NOTE — PROGRESS NOTES
[facility]   Physical Therapist Assistant Treatment Note     Name: Sana Crenshaw  Clinic Number: [id number]    Therapy Diagnosis:   Encounter Diagnoses   Name Primary?    Neck pain Yes    Cervical radiculopathy      Physician: Satish Segura MD    Visit Date: 1/14/2022   Physician Orders: PT Eval and Treat   Medical Diagnosis from Referral: M54.12 (ICD-10-CM) - Cervical radiculopathy  Evaluation Date: 12/27/2021  Authorization Period Expiration: 12/31/22  Plan of Care Expiration: 3/11/22    Visit # / Visits authorized: 3/20    FOTO: 1/3    PTA Visit #: 3/5     Time In: 3:45  Time Out: 4:55  Total Billable Time: 53 minutes    SUBJECTIVE     Pt reports: pain in the left side of her neck but a bit better  She was compliant with home exercise program.  Response to previous treatment: no issues  Functional change: na at this time    Pain: 3/10  Location: left neck      OBJECTIVE     Objective Measures updated at progress report unless specified.     Treatment     Sana received the treatments listed below:      therapeutic exercises to develop strength, ROM, flexibility, posture and core stabilization for 30 minutes including:    Seated UBE 5 min BW for mobility  Prone cervical extension 2x10  Prone I's, T's (unilateral)  x10 ea  Supine alt arms x20  Supine chin tuck w/cervical retraction over towel roll 2x10  Supine nodding over noodle x20  Supine horizontal shoulder abd RTB 2x10  Side lying open books x10 ea; modified on the left due to shoulder limitations--NP  MedX trunk rotations w/NO weight for range of motion today alternating x20  Seated UT/Levator stretches bilat---very small range noted x5 ea  Standing rows YTB 2x10  Standing shoulder extension for chest opening YTB 2x10    manual therapy techniques:  for 23 minutes, including:  Suboccipital releases  STM to cervical paraspinals, UT, Levator, SCM, pect, teres , subscapularis  Cervical distraction      supervised modalities after  being cleared for contradictions: --    hot pack for 10 minutes cervical/thoracic areas supine      Patient Education and Home Exercises     Home Exercises Provided and Patient Education Provided     Education provided:   - HEP review  - Postural alignment  - neck alignment while sleeping    Written Home Exercises Provided: Patient instructed to cont prior HEP. Exercises were reviewed and Sana was able to demonstrate them prior to the end of the session.  Sana demonstrated good  understanding of the education provided. See EMR under Patient Instructions for exercises provided during therapy sessions    ASSESSMENT     Sana presented today with 3/10 neck pain predominantly on the left. She had moderate tension with moderate depth of palpation in the her cervical paraspinals, UT, levator ,SCM and periscapular mm; L>R.  Point tenderness at the left sub occiptal region. Her posture is with a forward head, forward shoulders, and thoracic kyphosis. Today we again focused on neck mobility and upper quadrant opening, and more strengthening in her traps. Her left middle traps are very weak.  Will progress strengthening as tolerated going forward, however we need to continue with her overall mobility/flexibility as well. Sana demonstrated a positive response to today's session as evidenced with her activity tolerance and no increased pain.     Sana Is progressing well towards her goals.   Pt prognosis is Fair.     Pt will continue to benefit from skilled outpatient physical therapy to address the deficits listed in the problem list box on initial evaluation, provide pt/family education and to maximize pt's level of independence in the home and community environment.     Pt's spiritual, cultural and educational needs considered and pt agreeable to plan of care and goals.     Anticipated barriers to physical therapy: multiple joint restrictions/impairments from previous surgeries    Goals:   STG's 2 weeks  1. Patient will  be independent with 50% of HEP     LTG's 10 weeks  1. Patient will improve FOTO disability score  to 46 %  disability or less in order to improve overall QOL & return to PLOF   2. Patient will report an overall decrease in pain with ADL's and functional mobility  3.  Patient will increase strength by at least 1/2 muscle grade in affected musculature to improve functional mobility  4. Patient will improve L  cervical rotation ROM by at least 10 degrees to improve functional mobility and ADL's  5. Patient will be more aware of posture throughout the day to reduce stress  and maintain optimal alignment of the spine    PLAN   Plan of care Certification: 12/27/2021 to 3/11/22     Outpatient Physical Therapy 2 times weekly for 10 weeks to include the following interventions: Cervical/Lumbar Traction, Electrical Stimulation PRN, Manual Therapy, Moist Heat/ Ice, Patient Education, Self Care, Therapeutic Activities, Therapeutic Exercise and Ultrasound,       Mariah Hunt PTA

## 2022-01-21 ENCOUNTER — OFFICE VISIT (OUTPATIENT)
Dept: FAMILY MEDICINE | Facility: CLINIC | Age: 72
End: 2022-01-21
Payer: MEDICARE

## 2022-01-21 ENCOUNTER — CLINICAL SUPPORT (OUTPATIENT)
Dept: REHABILITATION | Facility: HOSPITAL | Age: 72
End: 2022-01-21
Payer: MEDICARE

## 2022-01-21 VITALS
HEART RATE: 80 BPM | WEIGHT: 213.38 LBS | OXYGEN SATURATION: 96 % | BODY MASS INDEX: 40.29 KG/M2 | SYSTOLIC BLOOD PRESSURE: 138 MMHG | TEMPERATURE: 99 F | HEIGHT: 61 IN | DIASTOLIC BLOOD PRESSURE: 83 MMHG

## 2022-01-21 DIAGNOSIS — M70.71 BURSITIS OF OTHER BURSA OF RIGHT HIP: ICD-10-CM

## 2022-01-21 DIAGNOSIS — M54.2 NECK PAIN: Primary | ICD-10-CM

## 2022-01-21 DIAGNOSIS — E03.9 HYPOTHYROIDISM, UNSPECIFIED TYPE: ICD-10-CM

## 2022-01-21 DIAGNOSIS — M46.1 SACROILIITIS: ICD-10-CM

## 2022-01-21 DIAGNOSIS — M54.16 LUMBAR RADICULOPATHY: ICD-10-CM

## 2022-01-21 DIAGNOSIS — S86.012D TRAUMATIC RUPTURE OF LEFT ACHILLES TENDON, SUBSEQUENT ENCOUNTER: ICD-10-CM

## 2022-01-21 DIAGNOSIS — M54.12 CERVICAL RADICULOPATHY: ICD-10-CM

## 2022-01-21 DIAGNOSIS — R73.03 PREDIABETES: ICD-10-CM

## 2022-01-21 DIAGNOSIS — I10 ESSENTIAL HYPERTENSION: ICD-10-CM

## 2022-01-21 DIAGNOSIS — Z48.02 ENCOUNTER FOR REMOVAL OF SUTURES: Primary | ICD-10-CM

## 2022-01-21 PROCEDURE — 99214 PR OFFICE/OUTPT VISIT, EST, LEVL IV, 30-39 MIN: ICD-10-PCS | Mod: S$PBB,,, | Performed by: FAMILY MEDICINE

## 2022-01-21 PROCEDURE — 99214 OFFICE O/P EST MOD 30 MIN: CPT | Mod: PBBFAC,PO | Performed by: FAMILY MEDICINE

## 2022-01-21 PROCEDURE — 99999 PR PBB SHADOW E&M-EST. PATIENT-LVL IV: ICD-10-PCS | Mod: PBBFAC,,, | Performed by: FAMILY MEDICINE

## 2022-01-21 PROCEDURE — 99999 PR PBB SHADOW E&M-EST. PATIENT-LVL IV: CPT | Mod: PBBFAC,,, | Performed by: FAMILY MEDICINE

## 2022-01-21 PROCEDURE — 97110 THERAPEUTIC EXERCISES: CPT | Mod: CQ

## 2022-01-21 PROCEDURE — 99214 OFFICE O/P EST MOD 30 MIN: CPT | Mod: S$PBB,,, | Performed by: FAMILY MEDICINE

## 2022-01-21 NOTE — PROGRESS NOTES
Subjective:       Patient ID: Sana Crenshaw is a 71 y.o. female.    Chief Complaint: Suture / Staple Removal      HPI Comments:       Current Outpatient Medications:     amLODIPine (NORVASC) 5 MG tablet, Take 1 tablet (5 mg total) by mouth once daily., Disp: 90 tablet, Rfl: 1    aspirin (ECOTRIN) 81 MG EC tablet, Take 81 mg by mouth once daily., Disp: , Rfl:     DULoxetine (CYMBALTA) 30 MG capsule, Take 1 capsule (30 mg total) by mouth once daily for 7 days, THEN 2 capsules (60 mg total) once daily for 23 days., Disp: 53 capsule, Rfl: 1    levothyroxine (SYNTHROID) 50 MCG tablet, TAKE 1 TABLET BY MOUTH  BEFORE BREAKFAST, Disp: 90 tablet, Rfl: 2    lisinopriL (PRINIVIL,ZESTRIL) 20 MG tablet, TAKE 1 TABLET BY MOUTH ONCE DAILY, Disp: 90 tablet, Rfl: 3    metoprolol succinate (TOPROL-XL) 100 MG 24 hr tablet, TAKE 1 TABLET BY MOUTH ONCE DAILY, Disp: 90 tablet, Rfl: 3    omeprazole (PRILOSEC) 20 MG capsule, Take 1 capsule (20 mg total) by mouth 2 (two) times a day., Disp: 180 capsule, Rfl: 3    oxybutynin (DITROPAN-XL) 10 MG 24 hr tablet, Take 10 mg by mouth once daily., Disp: , Rfl:     rOPINIRole (REQUIP) 1 MG tablet, TAKE 1 TABLET BY MOUTH IN  THE EVENING, Disp: 90 tablet, Rfl: 0  No current facility-administered medications for this visit.    Facility-Administered Medications Ordered in Other Visits:     lactated ringers infusion, , Intravenous, Continuous, Franky Hartman MD, Last Rate: 20 mL/hr at 01/08/21 0731, New Bag at 01/08/21 0925    lactated ringers infusion, , Intravenous, Continuous, Estevan Rodriguez MD, Stopped at 02/23/21 1645      Early follow-up.  Needs 3 stable removed from her scalp on the right side.  Got her feet tangled up and fell several days ago.  No loss of consciousness.  Small laceration in the right parietal region.  [facility]:  The  Three staples.  Was told to have them removed in 4-5 days.  Head CT was okay.  No other injuries then or now    Also follow-up for blood  "pressure medication adjustment.  Amlodipine was added.  Tolerating well.  Blood pressure readings improved.    SI joints and hip joint better after injections in both areas.  Finishing physical therapy which is also helping.    Significant left Achilles tendon rupture.  Wearing a brace.  Potential for gait interference.  She ambulates cautiously    Review of Systems   Constitutional: Negative for activity change, appetite change and fever.   HENT: Negative for sore throat.    Respiratory: Negative for cough and shortness of breath.    Cardiovascular: Negative for chest pain.   Gastrointestinal: Negative for abdominal pain, diarrhea and nausea.   Genitourinary: Negative for difficulty urinating.   Musculoskeletal: Negative for arthralgias and myalgias.   Neurological: Negative for dizziness and headaches.       Objective:      Vitals:    01/21/22 1105   BP: 138/83   Pulse: 80   Temp: 98.6 °F (37 °C)   TempSrc: Temporal   SpO2: 96%   Weight: 96.8 kg (213 lb 6.5 oz)   Height: 5' 1" (1.549 m)   PainSc:   3   PainLoc: Head     Physical Exam  Vitals and nursing note reviewed.   Constitutional:       General: She is not in acute distress.     Appearance: She is well-developed and well-nourished. She is not diaphoretic.   HENT:      Head: Normocephalic.     Neck:      Thyroid: No thyromegaly.   Cardiovascular:      Rate and Rhythm: Normal rate and regular rhythm.      Heart sounds: Normal heart sounds. No murmur heard.      Pulmonary:      Effort: Pulmonary effort is normal.      Breath sounds: Normal breath sounds. No wheezing or rales.   Abdominal:      General: There is no distension.      Palpations: Abdomen is soft.   Musculoskeletal:         General: No edema.      Cervical back: Neck supple.   Lymphadenopathy:      Cervical: No cervical adenopathy.   Skin:     General: Skin is warm and dry.   Neurological:      Mental Status: She is alert and oriented to person, place, and time.   Psychiatric:         Mood and " Affect: Mood and affect normal.         Behavior: Behavior normal.         Thought Content: Thought content normal.         Judgment: Judgment normal.         Assessment:       1. Encounter for removal of sutures    2. Essential hypertension    3. Prediabetes    4. Cervical radiculopathy    5. Lumbar radiculopathy    6. Bursitis of other bursa of right hip    7. Sacroiliitis    8. Hypothyroidism, unspecified type    9. Traumatic rupture of left Achilles tendon, subsequent encounter        Plan:   Encounter for removal of sutures  Comments:  Removal of 3 staples without complication.  Wound edges well approximated.  Care instructions given    Essential hypertension  Comments:  Controlled on current medications.  Tolerating amlodipine.  Follow-up in June    Prediabetes  Comments:  improved    Cervical radiculopathy  Comments:  Did not tolerate gabapentin.  Getting PT.  Status post FLORA C5/6    Lumbar radiculopathy  Comments:  Now on Cymbalta    Bursitis of other bursa of right hip  Comments:  Status post steroid injection    Sacroiliitis  Comments:  Status post steroid injection    Hypothyroidism, unspecified type  Comments:  Controlled on current medication    Traumatic rupture of left Achilles tendon, subsequent encounter  Comments:  Wearing brace.  Ambulates with some cautiousness

## 2022-01-21 NOTE — PROGRESS NOTES
[facility]   Physical Therapist Assistant Treatment Note     Name: Sana Crenshaw  Clinic Number: [id number]    Therapy Diagnosis:   Encounter Diagnosis   Name Primary?    Neck pain Yes     Physician: Satish Segura MD    Visit Date: 1/21/2022   Physician Orders: PT Eval and Treat   Medical Diagnosis from Referral: M54.12 (ICD-10-CM) - Cervical radiculopathy  Evaluation Date: 12/27/2021  Authorization Period Expiration: 12/31/22  Plan of Care Expiration: 3/11/22    Visit # / Visits authorized: 4/20    FOTO: 1/3    PTA Visit #: 4/5     Time In: 3:45  Time Out: 4:45  Total Billable Time: 53 minutes    SUBJECTIVE     Pt reports: a slight headache today as she had a fall Monday night and received staples in the back of her head. Her staples were just removed.   She was compliant with home exercise program.  Response to previous treatment: no issues  Functional change: increased cervical range of motion w/activities    Pain: 2/10  Location: left neck      OBJECTIVE     Objective Measures updated at progress report unless specified.     Treatment     Sana received the treatments listed below:      therapeutic exercises to develop strength, ROM, flexibility, posture and core stabilization for 39 minutes including:    Seated UBE 5 min BW for mobility  Supine alt arms x20  Supine chin tuck w/cervical retraction over towel roll 2x10  Supine nodding over noodle x20  Supine horizontal shoulder abd RTB 2x10  Supine w/neck in mild extension into chin tucks 2x10  Prone on forearms for cervical chin tuck/retraction  2x10  Prone I's, T's (unilateral for T's)  x10 ea  MedX trunk rotations w/NO weight for range of motion today alternating x20  Seated UT/Levator stretches L  / UT stretches on R---very small range noted x5 ea  Standing rows (high to low to engage more rhomboid) YTB 2x10  Standing shoulder extension for chest opening YTB 2x10    manual therapy techniques:  for 14 minutes,  including:  Suboccipital releases  STM to cervical paraspinals, UT, Levator, SCM, Scalenes  Cervical distraction      supervised modalities after being cleared for contradictions: --    hot pack for 0 minutes       Patient Education and Home Exercises     Home Exercises Provided and Patient Education Provided     Education provided:   - HEP review  - Postural alignment  - neck alignment while sleeping    Written Home Exercises Provided: Patient instructed to cont prior HEP. Exercises were reviewed and Sana was able to demonstrate them prior to the end of the session.  Sana demonstrated good  understanding of the education provided. See EMR under Patient Instructions for exercises provided during therapy sessions    ASSESSMENT     Sana presented today with 2/10 neck pain predominantly on the left which is a little improved from last week. She had a little less tension with moderate depth of palpation in the her cervical paraspinals,  levator ,SCM however her upper traps are with moderate tension.   Point tenderness continued at the left sub occiptal region. Her cervical posture is a bit improved overall. Today we again focused on neck mobility and upper quadrant opening, and strengthening in her traps, rhomboids, deep neck flexors.  Sana demonstrated a positive response to today's session as evidenced with her activity tolerance and no increased pain.     Sana Is progressing well towards her goals.   Pt prognosis is Fair.     Pt will continue to benefit from skilled outpatient physical therapy to address the deficits listed in the problem list box on initial evaluation, provide pt/family education and to maximize pt's level of independence in the home and community environment.     Pt's spiritual, cultural and educational needs considered and pt agreeable to plan of care and goals.     Anticipated barriers to physical therapy: multiple joint restrictions/impairments from previous surgeries    Goals:   STG's 2  weeks  1. Patient will be independent with 50% of HEP     LTG's 10 weeks  1. Patient will improve FOTO disability score  to 46 %  disability or less in order to improve overall QOL & return to PLOF   2. Patient will report an overall decrease in pain with ADL's and functional mobility  3.  Patient will increase strength by at least 1/2 muscle grade in affected musculature to improve functional mobility  4. Patient will improve L  cervical rotation ROM by at least 10 degrees to improve functional mobility and ADL's  5. Patient will be more aware of posture throughout the day to reduce stress  and maintain optimal alignment of the spine    PLAN   Plan of care Certification: 12/27/2021 to 3/11/22     Outpatient Physical Therapy 2 times weekly for 10 weeks to include the following interventions: Cervical/Lumbar Traction, Electrical Stimulation PRN, Manual Therapy, Moist Heat/ Ice, Patient Education, Self Care, Therapeutic Activities, Therapeutic Exercise and Ultrasound,       Mariah Hunt, YOAN

## 2022-01-26 ENCOUNTER — TELEPHONE (OUTPATIENT)
Dept: PAIN MEDICINE | Facility: CLINIC | Age: 72
End: 2022-01-26
Payer: MEDICARE

## 2022-01-26 NOTE — TELEPHONE ENCOUNTER
Reached out to patient to reschedule appointment because the provider will be out of clinic. Apt has been made . All questions answered.     Abdiel Sparks  Medical

## 2022-01-27 ENCOUNTER — CLINICAL SUPPORT (OUTPATIENT)
Dept: REHABILITATION | Facility: HOSPITAL | Age: 72
End: 2022-01-27
Payer: MEDICARE

## 2022-01-27 DIAGNOSIS — M54.2 NECK PAIN: ICD-10-CM

## 2022-01-27 PROCEDURE — 97110 THERAPEUTIC EXERCISES: CPT

## 2022-01-27 NOTE — PROGRESS NOTES
[facility]   Physical Therapist  Treatment Note     Name: Sana Crenshaw  Clinic Number: [id number]    Therapy Diagnosis:   Encounter Diagnosis   Name Primary?    Neck pain      Physician: Satish Segura MD    Visit Date: 1/27/2022   Physician Orders: PT Eval and Treat   Medical Diagnosis from Referral: M54.12 (ICD-10-CM) - Cervical radiculopathy  Evaluation Date: 12/27/2021  Authorization Period Expiration: 12/31/22  Plan of Care Expiration: 3/11/22    Visit # / Visits authorized: 5/20 + eval    FOTO: 2/3    PTA Visit #: 4/5     Time In: 3:15  Time Out: 4:00  Total Billable Time:40 minutes    SUBJECTIVE     Pt reports: neck injections has helped with neck pain/ she also had injections to back and R hip which have helped reduce her pain. Patient reports she is now able to sleep through the night. Patient reports she is feeling better overall.   She was compliant with home exercise program.  Response to previous treatment: no issues  Functional change: increased cervical range of motion w/activities    Pain: 3/10  Location: left neck      OBJECTIVE     L cervical improved to 45 degrees/ no change in strength in B UE's  Treatment     Sana received the treatments listed below:      therapeutic exercises to develop strength, ROM, flexibility, posture and core stabilization for 40 minutes including:  Posture education in sitting/ standing  Seated UBE 5 min BW for mobility  Rows 20# 2 x 15 reps  Wall slides with towel  Shoulder isometrics with small ball in all directions x 10 reps B  Seated UT/Levator stretches L  / UT stretches on R---very small range noted x5 ea  MMT of B shoulders/ ROM measurements      Continue to work on isometrics for shoulders and issue HEP    Deferred     Supine alt arms x20  Supine chin tuck w/cervical retraction over towel roll 2x10  Supine nodding over noodle x20  Supine horizontal shoulder abd RTB 2x10  Supine w/neck in mild extension into chin tucks 2x10  Prone  on forearms for cervical chin tuck/retraction  2x10  Prone I's, T's (unilateral for T's)  x10 ea  MedX trunk rotations w/NO weight for range of motion today alternating x20  Seated UT/Levator stretches L  / UT stretches on R---very small range noted x5 ea  Standing rows (high to low to engage more rhomboid) YTB 2x10  Standing shoulder extension for chest opening YTB 2x10    manual therapy techniques:  for 14 minutes, including:  Suboccipital releases  STM to cervical paraspinals, UT, Levator, SCM, Scalenes  Cervical distraction      supervised modalities after being cleared for contradictions: --    hot pack for 0 minutes       Patient Education and Home Exercises     Home Exercises Provided and Patient Education Provided     Education provided:   - HEP review  - Postural alignment  - neck alignment while sleeping    Written Home Exercises Provided: Patient instructed to cont prior HEP. Exercises were reviewed and Sana was able to demonstrate them prior to the end of the session.  Sana demonstrated good  understanding of the education provided. See EMR under Patient Instructions for exercises provided during therapy sessions    ASSESSMENT     Patient is responding well to treatment. She reports an overall decrease in pain and is now able to sleep through the night. Her cervical rotation to the L has improved to 45 degrees from 18 degrees on IE. Her foto score has improved as well. No change in shoulder strength. Patient has an old injury to the L shoulder. Patient has met STG #1, and LTG's #1,#2, #4 set on IE.    Sana Is progressing well towards her goals.   Pt prognosis is Fair.     Pt will continue to benefit from skilled outpatient physical therapy to address the deficits listed in the problem list box on initial evaluation, provide pt/family education and to maximize pt's level of independence in the home and community environment.     Pt's spiritual, cultural and educational needs considered and pt agreeable  to plan of care and goals.     Anticipated barriers to physical therapy: multiple joint restrictions/impairments from previous surgeries    Goals:   STG's 2 weeks   1. Patient will be independent with 50% of HEP MET 1/27/22     LTG's 10 weeks  1. Patient will improve FOTO disability score  to 46 %  disability or less in order to improve overall QOL & return to PLOF MET 1/27/22 37%  2. Patient will report an overall decrease in pain with ADL's and functional mobility Met 1/27/22  3.  Patient will increase strength by at least 1/2 muscle grade in affected musculature to improve functional mobility  4. Patient will improve L  cervical rotation ROM by at least 10 degrees to improve functional mobility and ADL's MET 1/27/22 45 degrees L  5. Patient will be more aware of posture throughout the day to reduce stress  and maintain optimal alignment of the spine    PLAN   Plan of care Certification: 12/27/2021 to 3/11/22     Outpatient Physical Therapy 2 times weekly for 10 weeks to include the following interventions: Cervical/Lumbar Traction, Electrical Stimulation PRN, Manual Therapy, Moist Heat/ Ice, Patient Education, Self Care, Therapeutic Activities, Therapeutic Exercise and Ultrasound,       Damaris Joyce, PT

## 2022-01-28 ENCOUNTER — CLINICAL SUPPORT (OUTPATIENT)
Dept: REHABILITATION | Facility: HOSPITAL | Age: 72
End: 2022-01-28
Payer: MEDICARE

## 2022-01-28 ENCOUNTER — DOCUMENTATION ONLY (OUTPATIENT)
Dept: REHABILITATION | Facility: HOSPITAL | Age: 72
End: 2022-01-28

## 2022-01-28 DIAGNOSIS — M54.2 NECK PAIN: ICD-10-CM

## 2022-01-28 PROCEDURE — 97110 THERAPEUTIC EXERCISES: CPT | Mod: KX,CQ

## 2022-01-28 PROCEDURE — 97140 MANUAL THERAPY 1/> REGIONS: CPT | Mod: KX,CQ

## 2022-01-28 NOTE — PROGRESS NOTES
PT/PTA met face to face to discuss pt's treatment plan and progress towards established goals. Pt will be seen by a physical therapist minimally every 6th visit or every 30 days.    Mariah Hunt PTA

## 2022-01-28 NOTE — PROGRESS NOTES
"[facility]   Physical Therapist Assistant Treatment Note     Name: Sana Crenshaw  Clinic Number: [id number]    Therapy Diagnosis:   Encounter Diagnosis   Name Primary?    Neck pain      Physician: Satish Segura MD    Visit Date: 1/28/2022   Physician Orders: PT Eval and Treat   Medical Diagnosis from Referral: M54.12 (ICD-10-CM) - Cervical radiculopathy  Evaluation Date: 12/27/2021  Authorization Period Expiration: 12/31/22  Plan of Care Expiration: 3/11/22    Visit # / Visits authorized: 6/20 + eval    FOTO: 2/3    PTA Visit #: 1/5     Time In: 3:50  Time Out: 5:00  Total Billable Time:53 minutes    SUBJECTIVE     Pt reports: "my left shoulder hurts from yesterday's exercises"   She was compliant with home exercise program.  Response to previous treatment: left shoulder pain  Functional change: increased cervical range of motion w/activities    Pain: 3/10 neck  2/10 right shoulder  6/10 left shoulder  Location: left neck  And shoulders    OBJECTIVE       Treatment     Sana received the treatments listed below:      therapeutic exercises to develop strength, ROM, flexibility, posture and core stabilization for 40 minutes including:    Seated UBE 5 min BW for mobility  Rows 20# x10  Rows 20# set high to low x10  High rows 20# x10  Shoulder extensions 10# 2x10  LUE slides on elevated back to high plinth 2x10  Shoulder isometrics with small ball in all directions x 10 reps B--moderate cues!  Seated UT/Levator stretches L  / UT stretches on R---very small range noted x5 ea  Supine cervical noodle nodding x20          Deferred:   Supine alt arms x20  Supine chin tuck w/cervical retraction over towel roll 2x10  Supine horizontal shoulder abd RTB 2x10  Supine w/neck in mild extension into chin tucks 2x10  Prone on forearms for cervical chin tuck/retraction  2x10  Prone I's, T's (unilateral for T's)  x10 ea  MedX trunk rotations w/NO weight for range of motion today alternating x20    manual " therapy techniques:  for 13 minutes, including:  Suboccipital releases  STM to cervical paraspinals, UT, Levator, SCM, Scalenes  Cervical distraction      supervised modalities after being cleared for contradictions: --    hot pack for 15 minutes to neck/left shoulder      Patient Education and Home Exercises     Home Exercises Provided and Patient Education Provided     Education provided:   - HEP review  - Postural alignment  - neck alignment while sleeping    Written Home Exercises Provided: Patient instructed to cont prior HEP. Exercises were reviewed and Sana was able to demonstrate them prior to the end of the session.  Sana demonstrated good  understanding of the education provided. See EMR under Patient Instructions for exercises provided during therapy sessions    ASSESSMENT     Sana presented to therapy today reporting left shoulder pain from isometric exercises performed last session. Noted patient requires moderate cueing with these exercises as she heavily engages her upper traps; she was able to self correct with the cueing both verbal and tactile. Increased tenderness in her left upper traps with minimal depth of palpation. Overall, Sana demonstrated a positive response to today's session as evidenced with her exercise performance and reported pain relief end of session.    Sana Is progressing well towards her goals.   Pt prognosis is Fair.     Pt will continue to benefit from skilled outpatient physical therapy to address the deficits listed in the problem list box on initial evaluation, provide pt/family education and to maximize pt's level of independence in the home and community environment.     Pt's spiritual, cultural and educational needs considered and pt agreeable to plan of care and goals.     Anticipated barriers to physical therapy: multiple joint restrictions/impairments from previous surgeries    Goals:   STG's 2 weeks   1. Patient will be independent with 50% of HEP MET  1/27/22     LTG's 10 weeks  1. Patient will improve FOTO disability score  to 46 %  disability or less in order to improve overall QOL & return to PLOF MET 1/27/22 37%  2. Patient will report an overall decrease in pain with ADL's and functional mobility Met 1/27/22  3.  Patient will increase strength by at least 1/2 muscle grade in affected musculature to improve functional mobility  4. Patient will improve L  cervical rotation ROM by at least 10 degrees to improve functional mobility and ADL's MET 1/27/22 45 degrees L  5. Patient will be more aware of posture throughout the day to reduce stress  and maintain optimal alignment of the spine    PLAN   Plan of care Certification: 12/27/2021 to 3/11/22     Outpatient Physical Therapy 2 times weekly for 10 weeks to include the following interventions: Cervical/Lumbar Traction, Electrical Stimulation PRN, Manual Therapy, Moist Heat/ Ice, Patient Education, Self Care, Therapeutic Activities, Therapeutic Exercise and Ultrasound,       Mariah Hunt, YOAN

## 2022-01-31 ENCOUNTER — CLINICAL SUPPORT (OUTPATIENT)
Dept: REHABILITATION | Facility: HOSPITAL | Age: 72
End: 2022-01-31
Payer: MEDICARE

## 2022-01-31 DIAGNOSIS — M54.2 NECK PAIN: ICD-10-CM

## 2022-01-31 PROCEDURE — 97140 MANUAL THERAPY 1/> REGIONS: CPT | Mod: KX,CQ

## 2022-01-31 PROCEDURE — 97110 THERAPEUTIC EXERCISES: CPT | Mod: KX,CQ

## 2022-01-31 NOTE — PROGRESS NOTES
"[facility]   Physical Therapist Assistant Treatment Note     Name: Sana Crenshaw  Clinic Number: [id number]    Therapy Diagnosis:   Encounter Diagnosis   Name Primary?    Neck pain      Physician: Satish Segura MD    Visit Date: 1/31/2022   Physician Orders: PT Eval and Treat   Medical Diagnosis from Referral: M54.12 (ICD-10-CM) - Cervical radiculopathy  Evaluation Date: 12/27/2021  Authorization Period Expiration: 12/31/22  Plan of Care Expiration: 3/11/22    Visit # / Visits authorized: 7/20 + eval    FOTO: 2/3    PTA Visit #: 2/5     Time In: 1:00  Time Out: 2:00  Total Billable Time: 45 minutes    SUBJECTIVE     Pt reports: shoulder is better and less sore but still present   She was compliant with home exercise program.  Response to previous treatment: pain relief   Functional change: increased cervical range of motion w/activities    Pain: 2/10 neck  1/10 right shoulder  3/10 left shoulder  Location: left neck / shoulders    OBJECTIVE       Treatment     Sana received the treatments listed below:      therapeutic exercises to develop strength, ROM, flexibility, posture and core stabilization for 36 minutes including:    Seated UBE 5 min BW for mobility  Supine cervical noodle nodding x20  Side lying L shoulder abduction to 90 2# 2x10  Side lying L screw drivers 2x5  Prone I's 1#, T's   2x10 ea  MedX trunk rotations w/NO weight for range of motion today alternating x20  "W" rows 20#  2x10  Shoulder extensions 10# 2x10      Deferred:   Supine alt arms x20  Supine chin tuck w/cervical retraction over towel roll 2x10  Supine horizontal shoulder abd RTB 2x10  Supine w/neck in mild extension into chin tucks 2x10  Prone on forearms for cervical chin tuck/retraction  2x10  Shoulder isometrics with small ball in all directions x 10 reps B--moderate cues!  Seated UT/Levator stretches L  / UT stretches on R---very small range noted x5 ea        manual therapy techniques:  For 9 minutes, " including:  Suboccipital releases  STM to cervical paraspinals, UT, Levator, SCM, Scalenes  Cervical distraction      supervised modalities after being cleared for contradictions: --    hot pack for 15 minutes to neck/left shoulder      Patient Education and Home Exercises     Home Exercises Provided and Patient Education Provided     Education provided:   - HEP review  - Postural alignment  - neck alignment while sleeping    Written Home Exercises Provided: Patient instructed to cont prior HEP. Exercises were reviewed and Sana was able to demonstrate them prior to the end of the session.  Sana demonstrated good  understanding of the education provided. See EMR under Patient Instructions for exercises provided during therapy sessions    ASSESSMENT     Sana presented to therapy today reporting improved pain from previous session. Increased tenderness in her left upper traps with minimal depth of palpation. Continued with strengthening exercises with min to moderate cueing as she tends to fire her upper traps frequently to compensate.  Overall, Sana demonstrated a positive response to today's session as evidenced with her exercise performance and reported pain relief end of session.    Sana Is progressing well towards her goals.   Pt prognosis is Fair.     Pt will continue to benefit from skilled outpatient physical therapy to address the deficits listed in the problem list box on initial evaluation, provide pt/family education and to maximize pt's level of independence in the home and community environment.     Pt's spiritual, cultural and educational needs considered and pt agreeable to plan of care and goals.     Anticipated barriers to physical therapy: multiple joint restrictions/impairments from previous surgeries    Goals:   STG's 2 weeks   1. Patient will be independent with 50% of HEP MET 1/27/22     LTG's 10 weeks  1. Patient will improve FOTO disability score  to 46 %  disability or less in order to  improve overall QOL & return to PLOF MET 1/27/22 37%  2. Patient will report an overall decrease in pain with ADL's and functional mobility Met 1/27/22  3.  Patient will increase strength by at least 1/2 muscle grade in affected musculature to improve functional mobility  4. Patient will improve L  cervical rotation ROM by at least 10 degrees to improve functional mobility and ADL's MET 1/27/22 45 degrees L  5. Patient will be more aware of posture throughout the day to reduce stress  and maintain optimal alignment of the spine    PLAN   Plan of care Certification: 12/27/2021 to 3/11/22     Outpatient Physical Therapy 2 times weekly for 10 weeks to include the following interventions: Cervical/Lumbar Traction, Electrical Stimulation PRN, Manual Therapy, Moist Heat/ Ice, Patient Education, Self Care, Therapeutic Activities, Therapeutic Exercise and Ultrasound,       Mariah Hunt PTA

## 2022-02-04 ENCOUNTER — CLINICAL SUPPORT (OUTPATIENT)
Dept: REHABILITATION | Facility: HOSPITAL | Age: 72
End: 2022-02-04
Payer: MEDICARE

## 2022-02-04 DIAGNOSIS — M54.2 NECK PAIN: ICD-10-CM

## 2022-02-04 PROCEDURE — 97110 THERAPEUTIC EXERCISES: CPT | Mod: KX,CQ

## 2022-02-04 NOTE — PROGRESS NOTES
"[facility]   Physical Therapist Assistant Treatment Note     Name: Sana Crenshaw  Clinic Number: [id number]    Therapy Diagnosis:   Encounter Diagnosis   Name Primary?    Neck pain      Physician: Satish Segura MD    Visit Date: 2/4/2022   Physician Orders: PT Eval and Treat   Medical Diagnosis from Referral: M54.12 (ICD-10-CM) - Cervical radiculopathy  Evaluation Date: 12/27/2021  Authorization Period Expiration: 12/31/22  Plan of Care Expiration: 3/11/22    Visit # / Visits authorized: 8/20 + eval    FOTO: 2/3    PTA Visit #: 3/5     Time In: 3:00  Time Out: 3:53  Total Billable Time: 53 minutes    SUBJECTIVE     Pt reports: feeling good today; having no pain   She was compliant with home exercise program.  Response to previous treatment: pain relief   Functional change: increased cervical range of motion w/activities    Pain: 0/10 neck  0/10 right shoulder  0/10 left shoulder      OBJECTIVE       Treatment     Sana received the treatments listed below:      therapeutic exercises to develop strength, ROM, flexibility, posture and core stabilization for 53 minutes including:    Seated UBE 5 min BW for mobility  Supine cervical noodle nodding x20  Supine alternating arms x20  Side lying L shoulder abduction to 90 2# 2x10  Side lying L screw drivers x10  B Supine arm circles at 90 CW and CCW x10 ea direction:  L 0# /  R 2#  Prone I's 1# 2x10  Prone T's 1#  3x5  MedX trunk rotations 20# x10 ea side  "W" rows 20#  2x10  Shoulder extensions from mid range of flexion 10# 2x10  Standing bicep curls 2# 2x10      Deferred:     Supine chin tuck w/cervical retraction over towel roll 2x10  Supine horizontal shoulder abd RTB 2x10  Supine w/neck in mild extension into chin tucks 2x10  Prone on forearms for cervical chin tuck/retraction  2x10  Shoulder isometrics with small ball in all directions x 10 reps B--moderate cues!  Seated UT/Levator stretches L  / UT stretches on R---very small range " noted x5 ea    manual therapy techniques:  For 0 minutes, including: --    supervised modalities after being cleared for contradictions: --    hot pack for 0 minutes to --      Patient Education and Home Exercises     Home Exercises Provided and Patient Education Provided     Education provided:   - HEP review  - Postural alignment  - neck alignment while sleeping    Written Home Exercises Provided: Patient instructed to cont prior HEP. Exercises were reviewed and Sana was able to demonstrate them prior to the end of the session.  Sana demonstrated good  understanding of the education provided. See EMR under Patient Instructions for exercises provided during therapy sessions    ASSESSMENT     Sana presented to therapy today without complaints of pain. Continued with strengthening exercises with min to moderate cueing as she tends to fire her upper traps frequently to compensate. Sana was able to progress some of her activities today due to increased strength in UE's.  Overall, Sana demonstrated a positive response to today's session as evidenced with her exercise performance/progression and no reports of pain.     Sana Is progressing well towards her goals.   Pt prognosis is Fair.     Pt will continue to benefit from skilled outpatient physical therapy to address the deficits listed in the problem list box on initial evaluation, provide pt/family education and to maximize pt's level of independence in the home and community environment.     Pt's spiritual, cultural and educational needs considered and pt agreeable to plan of care and goals.     Anticipated barriers to physical therapy: multiple joint restrictions/impairments from previous surgeries    Goals:   STG's 2 weeks   1. Patient will be independent with 50% of HEP MET 1/27/22     LTG's 10 weeks  1. Patient will improve FOTO disability score  to 46 %  disability or less in order to improve overall QOL & return to PLOF MET 1/27/22 37%  2. Patient will  report an overall decrease in pain with ADL's and functional mobility Met 1/27/22  3.  Patient will increase strength by at least 1/2 muscle grade in affected musculature to improve functional mobility  4. Patient will improve L  cervical rotation ROM by at least 10 degrees to improve functional mobility and ADL's MET 1/27/22 45 degrees L  5. Patient will be more aware of posture throughout the day to reduce stress  and maintain optimal alignment of the spine    PLAN   Plan of care Certification: 12/27/2021 to 3/11/22     Outpatient Physical Therapy 2 times weekly for 10 weeks to include the following interventions: Cervical/Lumbar Traction, Electrical Stimulation PRN, Manual Therapy, Moist Heat/ Ice, Patient Education, Self Care, Therapeutic Activities, Therapeutic Exercise and Ultrasound,       Mariah Hunt, YOAN

## 2022-02-07 ENCOUNTER — CLINICAL SUPPORT (OUTPATIENT)
Dept: REHABILITATION | Facility: HOSPITAL | Age: 72
End: 2022-02-07
Payer: MEDICARE

## 2022-02-07 ENCOUNTER — DOCUMENTATION ONLY (OUTPATIENT)
Dept: REHABILITATION | Facility: HOSPITAL | Age: 72
End: 2022-02-07
Payer: MEDICARE

## 2022-02-07 DIAGNOSIS — M54.2 NECK PAIN: ICD-10-CM

## 2022-02-07 PROCEDURE — 97110 THERAPEUTIC EXERCISES: CPT | Mod: CQ

## 2022-02-07 NOTE — PROGRESS NOTES
"[facility]   Physical Therapist Assistant Treatment Note     Name: Sana Crenshaw  Clinic Number: [id number]    Therapy Diagnosis:   Encounter Diagnosis   Name Primary?    Neck pain      Physician: Satish Segura MD    Visit Date: 2/7/2022   Physician Orders: PT Eval and Treat   Medical Diagnosis from Referral: M54.12 (ICD-10-CM) - Cervical radiculopathy  Evaluation Date: 12/27/2021  Authorization Period Expiration: 12/31/22  Plan of Care Expiration: 3/11/22    Visit # / Visits authorized: 9/20 + eval    FOTO: 2/3    PTA Visit #: 4/5     Time In: 5:25  Time Out: 6:10  Total Billable Time: 45 minutes    SUBJECTIVE     Pt reports: she has constant pain in left shoulder but very mild. It has decreased since beginning therapy.   She was compliant with home exercise program.  Response to previous treatment: pain relief   Functional change: increased cervical range of motion w/activities    Pain: 0/10 neck  0/10 right shoulder  3/10 left shoulder    OBJECTIVE       Treatment     Sana received the treatments listed below:      therapeutic exercises to develop strength, ROM, flexibility, posture and core stabilization for 40 minutes including:    Seated UBE 5 min BW for mobility  Supine cervical noodle nodding x20  Supine alternating arms x20  Side lying L shoulder abduction to 90 2# 2x10  Side lying L screw drivers x20  Open books x10  Prone T's 1#  2x5 fatigued quickly  Prone I's 1# 2x10  B Supine arm circles at 90 CW and CCW x10 ea direction:  L 1# /  R 2#  Supine horizontal shoulder abd RTB 2x10  "W" rows 20#  2x10  Shoulder extensions from mid range of flexion 20# 2x10    Deferred:   Standing bicep curls 2# 2x10  MedX trunk rotations 20# x10 ea side  Supine chin tuck w/cervical retraction over towel roll 2x10  Supine w/neck in mild extension into chin tucks 2x10  Prone on forearms for cervical chin tuck/retraction  2x10  Shoulder isometrics with small ball in all directions x 10 reps " B--moderate cues!  Seated UT/Levator stretches L  / UT stretches on R---very small range noted x5 ea    manual therapy techniques:  For 5 minutes, including: STM to right deltoid    supervised modalities after being cleared for contradictions: --    hot pack for 0 minutes to --      Patient Education and Home Exercises     Home Exercises Provided and Patient Education Provided     Education provided:   - HEP review  - Postural alignment    Written Home Exercises Provided: Patient instructed to cont prior HEP. Exercises were reviewed and Sana was able to demonstrate them prior to the end of the session.  Sana demonstrated good  understanding of the education provided. See EMR under Patient Instructions for exercises provided during therapy sessions    ASSESSMENT     Sana presented to therapy today with mild pain in left shoulder. Patient has mild muscle tension noted in left shoulder - STM with good tolerance. Patient continued shoulder strengthening and postural stabilization exercises with mild tactile cuing for proper technique. Patient fatigued quickly with prone horizontal abduction therefore limited on reps due to upper trap compensation. She will benefit from continued skilled therapy to improve scapular stability, posture and functional mobility    Sana Is progressing well towards her goals.   Pt prognosis is Fair.     Pt will continue to benefit from skilled outpatient physical therapy to address the deficits listed in the problem list box on initial evaluation, provide pt/family education and to maximize pt's level of independence in the home and community environment.     Pt's spiritual, cultural and educational needs considered and pt agreeable to plan of care and goals.     Anticipated barriers to physical therapy: multiple joint restrictions/impairments from previous surgeries    Goals:   STG's 2 weeks   1. Patient will be independent with 50% of HEP MET 1/27/22     LTG's 10 weeks  1. Patient will  improve FOTO disability score  to 46 %  disability or less in order to improve overall QOL & return to PLOF MET 1/27/22 37%  2. Patient will report an overall decrease in pain with ADL's and functional mobility Met 1/27/22  3.  Patient will increase strength by at least 1/2 muscle grade in affected musculature to improve functional mobility  4. Patient will improve L  cervical rotation ROM by at least 10 degrees to improve functional mobility and ADL's MET 1/27/22 45 degrees L  5. Patient will be more aware of posture throughout the day to reduce stress  and maintain optimal alignment of the spine    PLAN   Plan of care Certification: 12/27/2021 to 3/11/22     Outpatient Physical Therapy 2 times weekly for 10 weeks to include the following interventions: Cervical/Lumbar Traction, Electrical Stimulation PRN, Manual Therapy, Moist Heat/ Ice, Patient Education, Self Care, Therapeutic Activities, Therapeutic Exercise and Ultrasound,       Amberly Whitlock, PTA

## 2022-02-07 NOTE — PROGRESS NOTES
PT/PTA met face to face to discuss pt's treatment plan and progress towards established goals. Pt will be seen by a physical therapist minimally every 6th visit or every 30 days.    Amberly Whitlock PTA

## 2022-02-11 ENCOUNTER — CLINICAL SUPPORT (OUTPATIENT)
Dept: REHABILITATION | Facility: HOSPITAL | Age: 72
End: 2022-02-11
Payer: MEDICARE

## 2022-02-11 DIAGNOSIS — M54.2 NECK PAIN: Primary | ICD-10-CM

## 2022-02-11 PROCEDURE — 97140 MANUAL THERAPY 1/> REGIONS: CPT | Mod: CQ

## 2022-02-11 PROCEDURE — 97110 THERAPEUTIC EXERCISES: CPT | Mod: CQ

## 2022-02-11 NOTE — PROGRESS NOTES
"[facility]   Physical Therapist Assistant Treatment Note     Name: Sana Crenshaw  Clinic Number: [id number]    Therapy Diagnosis:   Encounter Diagnosis   Name Primary?    Neck pain Yes     Physician: Satish Segura MD    Visit Date: 2/11/2022   Physician Orders: PT Eval and Treat   Medical Diagnosis from Referral: M54.12 (ICD-10-CM) - Cervical radiculopathy  Evaluation Date: 12/27/2021  Authorization Period Expiration: 12/31/22  Plan of Care Expiration: 3/11/22    Visit # / Visits authorized: 10/20 + eval    FOTO: 2/3    PTA Visit #: 5/5     Time In: 12:45  Time Out: 11:55  Total Billable Time: 54 minutes    SUBJECTIVE     Pt reports: low pain today   She was compliant with home exercise program.  Response to previous treatment: pain relief   Functional change: increased cervical range of motion w/activities    Pain: 2/10 neck  0/10 right shoulder  3/10 left shoulder    OBJECTIVE       Treatment     Sana received the treatments listed below:      therapeutic exercises to develop strength, ROM, flexibility, posture and core stabilization for 44 minutes including:    Seated UBE 5 min BW for mobility  Supine cervical noodle nodding x20  Supine alternating arms 1# 2x10--cues on the right  Side lying shoulder abduction to 90 degrees 2# x10 on L / 2x5 on R  B Supine ER w/arm at 45 degrees of abduction 1# 2x10  Prone T's 1#  2x10  Prone I's 1# 2x10  B Supine arm circles at 90 CW and CCW x10 ea direction:  L 1# /  R 2#  Supine horizontal shoulder abd RTB 2x10  "W" rows 20#  2x10  Pulley shoulder extensions from mid range of flexion to hips 20# 2x10      manual therapy techniques:  For 10 minutes, including:  Cervical distraction  STM to L pects, cervical paraspinals    supervised modalities after being cleared for contradictions: --    hot pack for 10 minutes to neck and left anterior shoulder/pect      Patient Education and Home Exercises     Home Exercises Provided and Patient Education " Provided     Education provided:   - HEP review  - Postural alignment    Written Home Exercises Provided: Patient instructed to cont prior HEP. Exercises were reviewed and Sana was able to demonstrate them prior to the end of the session.  Sana demonstrated good  understanding of the education provided. See EMR under Patient Instructions for exercises provided during therapy sessions    ASSESSMENT     Sana presented to therapy today with reports of  mild pain in left shoulder and neck. She actually was quite tender in her left pects rather than the shoulder joint.   Patient continued shoulder strengthening and postural stabilization exercises with mild tactile cuing for proper technique.She continues to have a tendency to compensate with her upper traps . Her left shoulder will have limitations due to 20 yr history of impairment / poor scapulohumeral rhythm however we are getting some strength in her available range . She will benefit from continued skilled therapy to improve scapular stability, posture and functional mobility    Sana Is progressing well towards her goals.   Pt prognosis is Fair.     Pt will continue to benefit from skilled outpatient physical therapy to address the deficits listed in the problem list box on initial evaluation, provide pt/family education and to maximize pt's level of independence in the home and community environment.     Pt's spiritual, cultural and educational needs considered and pt agreeable to plan of care and goals.     Anticipated barriers to physical therapy: multiple joint restrictions/impairments from previous surgeries    Goals:   STG's 2 weeks   1. Patient will be independent with 50% of HEP MET 1/27/22     LTG's 10 weeks  1. Patient will improve FOTO disability score  to 46 %  disability or less in order to improve overall QOL & return to PLOF MET 1/27/22 37%  2. Patient will report an overall decrease in pain with ADL's and functional mobility Met 1/27/22  3.   Patient will increase strength by at least 1/2 muscle grade in affected musculature to improve functional mobility  4. Patient will improve L  cervical rotation ROM by at least 10 degrees to improve functional mobility and ADL's MET 1/27/22 45 degrees L  5. Patient will be more aware of posture throughout the day to reduce stress  and maintain optimal alignment of the spine    PLAN   Plan of care Certification: 12/27/2021 to 3/11/22     Outpatient Physical Therapy 2 times weekly for 10 weeks to include the following interventions: Cervical/Lumbar Traction, Electrical Stimulation PRN, Manual Therapy, Moist Heat/ Ice, Patient Education, Self Care, Therapeutic Activities, Therapeutic Exercise and Ultrasound,       Mariah Hunt, YAON

## 2022-02-15 ENCOUNTER — CLINICAL SUPPORT (OUTPATIENT)
Dept: REHABILITATION | Facility: HOSPITAL | Age: 72
End: 2022-02-15
Payer: MEDICARE

## 2022-02-15 DIAGNOSIS — M54.2 NECK PAIN: ICD-10-CM

## 2022-02-15 PROCEDURE — 97110 THERAPEUTIC EXERCISES: CPT

## 2022-02-15 NOTE — PROGRESS NOTES
[facility]   Physical Therapist  Treatment Note   Progress note    Name: Sana Crenshaw  Clinic Number: [id number]    Therapy Diagnosis:   Encounter Diagnosis   Name Primary?    Neck pain      Physician: Satish Segura MD    Visit Date: 2/15/2022   Physician Orders: PT Eval and Treat   Medical Diagnosis from Referral: M54.12 (ICD-10-CM) - Cervical radiculopathy  Evaluation Date: 12/27/2021  Authorization Period Expiration: 12/31/22  Plan of Care Expiration: 3/11/22    Visit # / Visits authorized: 11/20 + eval    FOTO: 3/3    PTA Visit #: 0/5    Time In: 11:47  Time Out: 12:31  Total Billable Time:44 minutes    SUBJECTIVE     Pt reports: low pain today   She was compliant with home exercise program.  Response to previous treatment: pain relief   Functional change: increased cervical range of motion w/activities    Pain: 3/10 neck  0/10 right shoulder  2/10 left shoulder    OBJECTIVE   MMT 2/15/22 shoulder flexion R 3/5 (no change since IE), L 2+/5 (no change), shoulder abduction  R 3/5 (no change), L2+/5 - 3-/5 in available ROM/ can not hold position at 90 degrees (2+/5 on IE), R IR 3+/ 5 (3/5 on IE), L IR 3+/5 (no change), ER R 3/5 (no change), L 3+/5 ( no change)    Treatment     Sana received the treatments listed below:      therapeutic exercises to develop strength, ROM, flexibility, posture and core stabilization for 44 minutes including:    Seated UBE 5 min BW for mobility  MMT/ goals and progress discuss and POC  Shoulder isometrics with ball in all directions 1 x 20 reps B/ Handout issued 2/15/22  Scapula retraction G TB 2 x 15 reps  Modify wall push ups 4 x 5 reps  Wall slides 2 x 10 reps      Deferred    Supine cervical noodle nodding x20  Supine alternating arms 1# 2x10--cues on the right  Side lying shoulder abduction to 90 degrees 2# x10 on L / 2x5 on R  B Supine ER w/arm at 45 degrees of abduction 1# 2x10  Prone T's 1#  2x10  Prone I's 1# 2x10  B Supine arm circles at 90  "CW and CCW x10 ea direction:  L 1# /  R 2#  Supine horizontal shoulder abd RTB 2x10  "W" rows 20#  2x10  Pulley shoulder extensions from mid range of flexion to hips 20# 2x10      manual therapy techniques:  For 0 minutes, including:  Cervical distraction  STM to L pects, cervical paraspinals    supervised modalities after being cleared for contradictions: --    hot pack for 0 minutes to neck and left anterior shoulder/pect      Patient Education and Home Exercises     Home Exercises Provided and Patient Education Provided     Education provided:   - HEP review  - Postural alignment  -Shoulder isometrics with ball issued    Written Home Exercises Provided: Patient instructed to cont prior HEP. Exercises were reviewed and Sana was able to demonstrate them prior to the end of the session.  Sana demonstrated good  understanding of the education provided. See EMR under Patient Instructions for exercises provided during therapy sessions    ASSESSMENT   MMT 2/15/22 shoulder flexion R 3/5 (no change since IE), L 2+/5 (no change), shoulder abduction  R 3/5 (no change), L2+/5 - 3-/5 in available ROM/ can not hold position at 90 degrees (2+/5 on IE), R IR 3+/ 5 (3/5 on IE), L IR 3+/5 (no change), ER R 3/5 (no change), L 3+/5 ( no change).   Patient continued shoulder strengthening and postural stabilization exercises with mild tactile cuing for proper technique.She continues to have a tendency to compensate with her upper traps . Her left shoulder will have limitations due to 20 yr history of impairment / poor scapulohumeral rhythm however we are getting some strength in her available range. PT discussed with patient goals, POC, and progress. Recommend continued therapy for 1 -2 weeks to prepare patient for d/c home with HEP. Patient was in agreement of POC and happy with her progress in therapy.     Sana Is progressing well towards her goals.   Pt prognosis is Fair.     Pt will continue to benefit from skilled outpatient " physical therapy to address the deficits listed in the problem list box on initial evaluation, provide pt/family education and to maximize pt's level of independence in the home and community environment.     Pt's spiritual, cultural and educational needs considered and pt agreeable to plan of care and goals.     Anticipated barriers to physical therapy: multiple joint restrictions/impairments from previous surgeries    Goals:   STG's 2 weeks   1. Patient will be independent with 50% of HEP MET 1/27/22     LTG's 10 weeks  1. Patient will improve FOTO disability score  to 46 %  disability or less in order to improve overall QOL & return to PLOF MET 1/27/22 37%/  44% 2/15/22  2. Patient will report an overall decrease in pain with ADL's and functional mobility Met 1/27/22  3.  Patient will increase strength by at least 1/2 muscle grade in affected musculature to improve functional mobility Ongoing  4. Patient will improve L  cervical rotation ROM by at least 10 degrees to improve functional mobility and ADL's MET 1/27/22 45 degrees L  5. Patient will be more aware of posture throughout the day to reduce stress  and maintain optimal alignment of the spine Progressing    PLAN   Plan of care Certification: 12/27/2021 to 3/11/22     Outpatient Physical Therapy 2 times weekly for 10 weeks to include the following interventions: Cervical/Lumbar Traction, Electrical Stimulation PRN, Manual Therapy, Moist Heat/ Ice, Patient Education, Self Care, Therapeutic Activities, Therapeutic Exercise and Ultrasound,       Damaris Joyce, PT

## 2022-02-18 ENCOUNTER — DOCUMENTATION ONLY (OUTPATIENT)
Dept: REHABILITATION | Facility: HOSPITAL | Age: 72
End: 2022-02-18

## 2022-02-18 ENCOUNTER — CLINICAL SUPPORT (OUTPATIENT)
Dept: REHABILITATION | Facility: HOSPITAL | Age: 72
End: 2022-02-18
Payer: MEDICARE

## 2022-02-18 DIAGNOSIS — M54.2 NECK PAIN: Primary | ICD-10-CM

## 2022-02-18 PROCEDURE — 97140 MANUAL THERAPY 1/> REGIONS: CPT | Mod: CQ

## 2022-02-18 PROCEDURE — 97110 THERAPEUTIC EXERCISES: CPT | Mod: CQ

## 2022-02-18 NOTE — PROGRESS NOTES
[facility]   Physical Therapist Assistant Treatment Note       Name: Sana Crenshaw  Clinic Number: [id number]    Therapy Diagnosis:   Encounter Diagnosis   Name Primary?    Neck pain Yes     Physician: Satish Segura MD    Visit Date: 2/18/2022   Physician Orders: PT Eval and Treat   Medical Diagnosis from Referral: M54.12 (ICD-10-CM) - Cervical radiculopathy  Evaluation Date: 12/27/2021  Authorization Period Expiration: 12/31/22  Plan of Care Expiration: 3/11/22    Visit # / Visits authorized: 12/20 + eval    FOTO: 3/3    PTA Visit #: 1/5    Time In: 12:45  Time Out: 1:51  Total Billable Time:45 minutes    SUBJECTIVE     Pt reports: neck feels sore from picking up a heavy box this morning   She was compliant with home exercise program.  Response to previous treatment: no issues  Functional change: increased cervical range of motion w/activities    Pain: 4/10 neck  0/10 right shoulder  3/10 left shoulder    OBJECTIVE   2/15/22:  MMT 2/15/22 shoulder flexion R 3/5 (no change since IE), L 2+/5 (no change), shoulder abduction  R 3/5 (no change), L2+/5 - 3-/5 in available ROM/ can not hold position at 90 degrees (2+/5 on IE), R IR 3+/ 5 (3/5 on IE), L IR 3+/5 (no change), ER R 3/5 (no change), L 3+/5 ( no change)    Treatment     Sana received the treatments listed below:      therapeutic exercises to develop strength, ROM, flexibility, posture and core stabilization for 35 minutes including:    Seated UBE 5 min BW for mobility  Prone T's 1#  2x10  Prone I's 1# 2x10  Side lying shoulder abduction to 90 degrees 3# 2x5 on L / 1# 2x5 on R  Supine arm circles at 90 CW and CCW x10 ea direction:  L 2# /  R 2#  Supine 90 shoulder flexion position for horizontal abd isometrics YTB looped around wrists 2x10  Seated assisted UT stretches B x5 ea side  Seated cervical rotation w/assist of hand at jaw to increase range x5 ea side  Wall slides in pillow case (limited range due to L shoulder  "impairment) x 10 reps      Deferred:  Supine cervical noodle nodding x20  Supine alternating arms 2# 2x10 L  0# R  B Supine ER w/arm at 45 degrees of abduction 1# 2x10  Supine horizontal shoulder abd RTB 2x10  "W" rows 20#  2x10  Pulley shoulder extensions from mid range of flexion to hips 20# 2x10      manual therapy techniques:  For 10 minutes, including:  STM to cervical paraspinals, L upper traps and levator with METS    supervised modalities after being cleared for contradictions: --    hot pack for 10 minutes to neck      Patient Education and Home Exercises     Home Exercises Provided and Patient Education Provided     Education provided:   - HEP review  - Postural alignment      Written Home Exercises Provided: Patient instructed to cont prior HEP. Exercises were reviewed and Sana was able to demonstrate them prior to the end of the session.  Sana demonstrated good  understanding of the education provided. See EMR under Patient Instructions for exercises provided during therapy sessions    ASSESSMENT   MMT 2/15/22 shoulder flexion R 3/5 (no change since IE), L 2+/5 (no change), shoulder abduction  R 3/5 (no change), L2+/5 - 3-/5 in available ROM/ can not hold position at 90 degrees (2+/5 on IE), R IR 3+/ 5 (3/5 on IE), L IR 3+/5 (no change), ER R 3/5 (no change), L 3+/5 ( no change).        Sana presented to therapy today with reports of some increased pain in her neck on the left side. She reported lifting a heavy box this morning and that is when the pain increased. There is no change in her left shoulder today with the pain about the same at a 3/10.  Patient continued shoulder strengthening and postural stabilization exercises with mild tactile cuing for proper technique.She continues to have a tendency to compensate with her upper traps . Her left shoulder will have limitations due to 20 yr history of impairment / poor scapulohumeral rhythm however we are getting some strength in her available range . " She will benefit from continued skilled therapy to improve scapular stability, posture and functional mobility     Sana Is progressing well towards her goals.   Pt prognosis is Fair.     Pt will continue to benefit from skilled outpatient physical therapy to address the deficits listed in the problem list box on initial evaluation, provide pt/family education and to maximize pt's level of independence in the home and community environment.     Pt's spiritual, cultural and educational needs considered and pt agreeable to plan of care and goals.     Anticipated barriers to physical therapy: multiple joint restrictions/impairments from previous surgeries    Goals:   STG's 2 weeks   1. Patient will be independent with 50% of HEP MET 1/27/22     LTG's 10 weeks  1. Patient will improve FOTO disability score  to 46 %  disability or less in order to improve overall QOL & return to PLOF MET 1/27/22 37%/  44% 2/15/22  2. Patient will report an overall decrease in pain with ADL's and functional mobility Met 1/27/22  3.  Patient will increase strength by at least 1/2 muscle grade in affected musculature to improve functional mobility Ongoing  4. Patient will improve L  cervical rotation ROM by at least 10 degrees to improve functional mobility and ADL's MET 1/27/22 45 degrees L  5. Patient will be more aware of posture throughout the day to reduce stress  and maintain optimal alignment of the spine Progressing    PLAN   Plan of care Certification: 12/27/2021 to 3/11/22     Outpatient Physical Therapy 2 times weekly for 10 weeks to include the following interventions: Cervical/Lumbar Traction, Electrical Stimulation PRN, Manual Therapy, Moist Heat/ Ice, Patient Education, Self Care, Therapeutic Activities, Therapeutic Exercise and Ultrasound,       Mariah Hunt, YOAN

## 2022-02-21 ENCOUNTER — CLINICAL SUPPORT (OUTPATIENT)
Dept: REHABILITATION | Facility: HOSPITAL | Age: 72
End: 2022-02-21
Payer: MEDICARE

## 2022-02-21 DIAGNOSIS — M54.2 NECK PAIN: Primary | ICD-10-CM

## 2022-02-21 PROCEDURE — 97140 MANUAL THERAPY 1/> REGIONS: CPT | Mod: CQ

## 2022-02-21 PROCEDURE — 97110 THERAPEUTIC EXERCISES: CPT | Mod: CQ

## 2022-02-21 NOTE — PROGRESS NOTES
[facility]   Physical Therapist Assistant Treatment Note       Name: Sana Crenshaw  Clinic Number: [id number]    Therapy Diagnosis:   Encounter Diagnosis   Name Primary?    Neck pain Yes     Physician: Satish Segura MD    Visit Date: 2/21/2022   Physician Orders: PT Eval and Treat   Medical Diagnosis from Referral: M54.12 (ICD-10-CM) - Cervical radiculopathy  Evaluation Date: 12/27/2021  Authorization Period Expiration: 12/31/22  Plan of Care Expiration: 3/11/22    Visit # / Visits authorized: 13/20 + eval    FOTO: 3/3    PTA Visit #: 2/5    Time In: 4:00  Time Out: 4:59  Total Billable Time: 55 minutes    SUBJECTIVE     Pt reports: no shoulder pain today and mild neck pain   She was compliant with home exercise program.  Response to previous treatment: no issues  Functional change: increased cervical range of motion w/activities    Pain: 2/10 neck  0/10 right shoulder  0/10 left shoulder    OBJECTIVE   2/15/22:  MMT 2/15/22 shoulder flexion R 3/5 (no change since IE), L 2+/5 (no change), shoulder abduction  R 3/5 (no change), L2+/5 - 3-/5 in available ROM/ can not hold position at 90 degrees (2+/5 on IE), R IR 3+/ 5 (3/5 on IE), L IR 3+/5 (no change), ER R 3/5 (no change), L 3+/5 ( no change)    Treatment     Sana received the treatments listed below:      therapeutic exercises to develop strength, ROM, flexibility, posture and core stabilization for 43 minutes including:    Seated UBE 5 min BW for mobility  MedX trunk rotation 20# x10 ea side  Pulley shoulder extensions from mid range of flexion to hips 20# 2x10  Prone unilat T's 2# 3x5 R  / 1# 2x10 L  Prone unilat  I's 2# 3x5 ea arm  Side lying shoulder abduction to 90 degrees 3# 3x5 on L / 2# 3x5 on R  Supine arm circles at 90 CW and CCW x10 ea direction:  L 2# /  R 2#  Supine 90 shoulder flexion position for horizontal abd isometrics YTB looped around wrists 2x10  Seated lateral neck flexion B x5 ea side  Seated cervical  "rotation w/assist of hand at jaw to increase range x5 ea side        Deferred:  Supine cervical noodle nodding x20  Supine alternating arms 2# 2x10 L  0# R  B Supine ER w/arm at 45 degrees of abduction 1# 2x10  Supine horizontal shoulder abd RTB 2x10  "W" rows 20#  2x10  Wall slides in pillow case (limited range due to L shoulder impairment) x 10 reps      manual therapy techniques:  For 12 minutes, including:  IASTM w/myofascial cupping to L cervical paraspinals, upper traps, levator    Patient provided verbal consent to IASTM with myofascial cupping   Static myofascial cupping followed by cupping w/gliding   Patient demonstrated appropriate response to IASTM with cupping. Moderate erythema w/dissipation. Skin intact.       supervised modalities after being cleared for contradictions: --    hot pack for 0 minutes       Patient Education and Home Exercises     Home Exercises Provided and Patient Education Provided     Education provided:   - HEP review  - Postural alignment      Written Home Exercises Provided: Patient instructed to cont prior HEP. Exercises were reviewed and Sana was able to demonstrate them prior to the end of the session.  Sana demonstrated good  understanding of the education provided. See EMR under Patient Instructions for exercises provided during therapy sessions    ASSESSMENT   MMT 2/15/22 shoulder flexion R 3/5 (no change since IE), L 2+/5 (no change), shoulder abduction  R 3/5 (no change), L2+/5 - 3-/5 in available ROM/ can not hold position at 90 degrees (2+/5 on IE), R IR 3+/ 5 (3/5 on IE), L IR 3+/5 (no change), ER R 3/5 (no change), L 3+/5 ( no change).        Sana presented to therapy today reporting no shoulder pain and minimal neck pain. She had some increased tension in her left upper traps and levator; myofascial cupping w/gliding performed to decrease the tension.  Patient continued shoulder strengthening and postural stabilization exercises with mild tactile cuing for proper " technique.She continues to have a tendency to compensate with her upper traps but this has improved. Her left shoulder will have limitations due to 20 yr history of impairment / poor scapulohumeral rhythm. Today she was able to increase her resistance with some of her exercises with improved stability, however she had to perform them supine and sidelying for added stabilzation . She will benefit from continued skilled therapy to improve scapular stability, posture and functional mobility     Sana Is progressing well towards her goals.   Pt prognosis is Fair.     Pt will continue to benefit from skilled outpatient physical therapy to address the deficits listed in the problem list box on initial evaluation, provide pt/family education and to maximize pt's level of independence in the home and community environment.     Pt's spiritual, cultural and educational needs considered and pt agreeable to plan of care and goals.     Anticipated barriers to physical therapy: multiple joint restrictions/impairments from previous surgeries    Goals:   STG's 2 weeks   1. Patient will be independent with 50% of HEP MET 1/27/22     LTG's 10 weeks  1. Patient will improve FOTO disability score  to 46 %  disability or less in order to improve overall QOL & return to PLOF MET 1/27/22 37%/  44% 2/15/22  2. Patient will report an overall decrease in pain with ADL's and functional mobility Met 1/27/22  3.  Patient will increase strength by at least 1/2 muscle grade in affected musculature to improve functional mobility Ongoing  4. Patient will improve L  cervical rotation ROM by at least 10 degrees to improve functional mobility and ADL's MET 1/27/22 45 degrees L  5. Patient will be more aware of posture throughout the day to reduce stress  and maintain optimal alignment of the spine Progressing    PLAN   Plan of care Certification: 12/27/2021 to 3/11/22     Outpatient Physical Therapy 2 times weekly for 10 weeks to include the following  interventions: Cervical/Lumbar Traction, Electrical Stimulation PRN, Manual Therapy, Moist Heat/ Ice, Patient Education, Self Care, Therapeutic Activities, Therapeutic Exercise and Ultrasound,       Mariah Hunt PTA

## 2022-02-25 ENCOUNTER — CLINICAL SUPPORT (OUTPATIENT)
Dept: REHABILITATION | Facility: HOSPITAL | Age: 72
End: 2022-02-25
Payer: MEDICARE

## 2022-02-25 DIAGNOSIS — M54.2 NECK PAIN: Primary | ICD-10-CM

## 2022-02-25 PROCEDURE — 97110 THERAPEUTIC EXERCISES: CPT

## 2022-02-25 NOTE — PROGRESS NOTES
[facility]   Physical Therapist  Treatment Note       Name: Sana Crenshaw  Clinic Number: [id number]    Therapy Diagnosis:   Encounter Diagnosis   Name Primary?    Neck pain Yes     Physician: Satish Segura MD    Visit Date: 2/25/2022   Physician Orders: PT Eval and Treat   Medical Diagnosis from Referral: M54.12 (ICD-10-CM) - Cervical radiculopathy  Evaluation Date: 12/27/2021  Authorization Period Expiration: 12/31/22  Plan of Care Expiration: 3/11/22    Visit # / Visits authorized: 14/20 + eval    FOTO: 3/3    PTA Visit #: 0/5    Time In: 11:01  Time Out: 11:43  Total Billable Time: 42 minutes    SUBJECTIVE     Pt reports: no shoulder pain today and mild neck pain   She was compliant with home exercise program.  Response to previous treatment: no issues  Functional change: increased cervical range of motion w/activities    Pain: 2/10 neck  0/10 right shoulder  1/10 left shoulder    OBJECTIVE   2/15/22:  MMT 2/15/22 shoulder flexion R 3/5 (no change since IE), L 2+/5 (no change), shoulder abduction  R 3/5 (no change), L2+/5 - 3-/5 in available ROM/ can not hold position at 90 degrees (2+/5 on IE), R IR 3+/ 5 (3/5 on IE), L IR 3+/5 (no change), ER R 3/5 (no change), L 3+/5 ( no change)    Treatment     Sana received the treatments listed below:      therapeutic exercises to develop strength, ROM, flexibility, posture and core stabilization for 42 minutes including:  Reviewed HEP shoulder isometrics  Seated UBE 5 min BW for mobility  Scapula clocks with yellow TB side to side B/ diagonals L UE only  Scapula retraction G TB 2 x 15 reps (G TB issued for home use today)  Modify wall push ups 4 x 5 reps  Wall slides 2 x 10 reps  Sitting shoulder flexion with cane 2 x 10 reps with cuing for upright posture and decrease shoulder hiking/ shoulder abduction 2 x 10 reps B  Sitting shoulder ER/ windshield wipers with sliders  Standing shoulder rocking/ EMIGDIO 2 x 10 reps for scapula  "activation  Supine stretch 3 x 30 secs        deferred    MedX trunk rotation 20# x10 ea side  Pulley shoulder extensions from mid range of flexion to hips 20# 2x10  Prone unilat T's 2# 3x5 R  / 1# 2x10 L  Prone unilat  I's 2# 3x5 ea arm  Side lying shoulder abduction to 90 degrees 3# 3x5 on L / 2# 3x5 on R  Supine arm circles at 90 CW and CCW x10 ea direction:  L 2# /  R 2#  Supine 90 shoulder flexion position for horizontal abd isometrics YTB looped around wrists 2x10  Seated lateral neck flexion B x5 ea side  Seated cervical rotation w/assist of hand at jaw to increase range x5 ea side        Deferred:  Supine cervical noodle nodding x20  Supine alternating arms 2# 2x10 L  0# R  B Supine ER w/arm at 45 degrees of abduction 1# 2x10  Supine horizontal shoulder abd RTB 2x10  "W" rows 20#  2x10  Wall slides in pillow case (limited range due to L shoulder impairment) x 10 reps      manual therapy techniques:  For 0 minutes, including:  IASTM w/myofascial cupping to L cervical paraspinals, upper traps, levator    Patient provided verbal consent to IASTM with myofascial cupping   Static myofascial cupping followed by cupping w/gliding   Patient demonstrated appropriate response to IASTM with cupping. Moderate erythema w/dissipation. Skin intact.       supervised modalities after being cleared for contradictions: --    hot pack for 0 minutes       Patient Education and Home Exercises     Home Exercises Provided and Patient Education Provided     Education provided:   - HEP review  - Postural alignment  -stretches for home  -G TB issued for scapula retraction 2/25/22      Written Home Exercises Provided: Patient instructed to cont prior HEP. Exercises were reviewed and Sana was able to demonstrate them prior to the end of the session.  Sana demonstrated good  understanding of the education provided. See EMR under Patient Instructions for exercises provided during therapy sessions    ASSESSMENT     Sana presented to " therapy today reporting no shoulder pain and minimal neck pain.  Patient continued shoulder strengthening and postural stabilization exercises with mild tactile cuing for proper technique.She continues to have a tendency to compensate with her upper traps but this has improved. Her left shoulder will have limitations due to 20 yr history of impairment / poor scapulohumeral rhythm. Scapula strengthening performed on wall and table today. She tolerated treatment well with no complaints of increase pain. She is progressing towards d/c and becoming independent with HEP. She will benefit from continued skilled therapy to improve scapular stability, posture, and functional mobility.      Snaa Is progressing well towards her goals.   Pt prognosis is Fair.     Pt will continue to benefit from skilled outpatient physical therapy to address the deficits listed in the problem list box on initial evaluation, provide pt/family education and to maximize pt's level of independence in the home and community environment.     Pt's spiritual, cultural and educational needs considered and pt agreeable to plan of care and goals.     Anticipated barriers to physical therapy: multiple joint restrictions/impairments from previous surgeries    Goals:   STG's 2 weeks   1. Patient will be independent with 50% of HEP MET 1/27/22     LTG's 10 weeks  1. Patient will improve FOTO disability score  to 46 %  disability or less in order to improve overall QOL & return to PLOF MET 1/27/22 37%/  44% 2/15/22  2. Patient will report an overall decrease in pain with ADL's and functional mobility Met 1/27/22  3.  Patient will increase strength by at least 1/2 muscle grade in affected musculature to improve functional mobility Ongoing  4. Patient will improve L  cervical rotation ROM by at least 10 degrees to improve functional mobility and ADL's MET 1/27/22 45 degrees L  5. Patient will be more aware of posture throughout the day to reduce stress  and  maintain optimal alignment of the spine Progressing    PLAN   Plan of care Certification: 12/27/2021 to 3/11/22     Outpatient Physical Therapy 2 times weekly for 10 weeks to include the following interventions: Cervical/Lumbar Traction, Electrical Stimulation PRN, Manual Therapy, Moist Heat/ Ice, Patient Education, Self Care, Therapeutic Activities, Therapeutic Exercise and Ultrasound,       Damaris Joyce, PT

## 2022-02-28 ENCOUNTER — CLINICAL SUPPORT (OUTPATIENT)
Dept: REHABILITATION | Facility: HOSPITAL | Age: 72
End: 2022-02-28
Payer: MEDICARE

## 2022-02-28 DIAGNOSIS — M54.2 NECK PAIN: Primary | ICD-10-CM

## 2022-02-28 PROCEDURE — 97110 THERAPEUTIC EXERCISES: CPT | Mod: CQ

## 2022-02-28 NOTE — PROGRESS NOTES
"[facility]   Physical Therapist Assistant Treatment Note       Name: Sana Crenshaw  Clinic Number: [id number]    Therapy Diagnosis:   Encounter Diagnosis   Name Primary?    Neck pain Yes     Physician: Satish Segura MD    Visit Date: 2/28/2022   Physician Orders: PT Eval and Treat   Medical Diagnosis from Referral: M54.12 (ICD-10-CM) - Cervical radiculopathy  Evaluation Date: 12/27/2021  Authorization Period Expiration: 12/31/22  Plan of Care Expiration: 3/11/22    Visit # / Visits authorized: 15/20 + eval    FOTO: 3/3    PTA Visit #: 1/5    Time In: 1:45  Time Out: 2:30  Total Billable Time: 45 minutes    SUBJECTIVE     Pt reports: no pain just soreness in L shoulder   She was compliant with home exercise program.  Response to previous treatment: sore  Functional change: increased cervical range of motion w/activities    Pain: 0/10 neck  0/10 right shoulder  1/10 left shoulder--soreness    OBJECTIVE   2/15/22:  MMT 2/15/22 shoulder flexion R 3/5 (no change since IE), L 2+/5 (no change), shoulder abduction  R 3/5 (no change), L2+/5 - 3-/5 in available ROM/ can not hold position at 90 degrees (2+/5 on IE), R IR 3+/ 5 (3/5 on IE), L IR 3+/5 (no change), ER R 3/5 (no change), L 3+/5 ( no change)    Treatment     Sana received the treatments listed below:      therapeutic exercises to develop strength, ROM, flexibility, posture and core stabilization for 45 minutes including:    Seated UBE 5 min BW for mobility  Standing lat stretch at bar 10" x5  Inclinde modified push ups 1/2 range 2x8  scap stabilization w/ball rolls on wall at approx 80 degrees CW/CCW x10 ea  MedX trunk rotation 20# x10 ea side  Shoulder hi ho YTB 2 way x8 ea/side  Supine shoulder flexion 2# L and 1# R -alternating 2x12  Supine 90 shoulder flexion position for horizontal abd isometrics YTB looped ---around wrists 2x10  Supine arm circles at 90 CW and CCW x10 ea direction:  L 2# /  R 2#  Matrix chest press 10# " "2x10      DEFERRED:  Pulley shoulder extensions from mid range of flexion to hips 20# 2x10  Prone unilat T's 2# 3x5 R  / 1# 2x10 L  Prone unilat  I's 2# 3x5 ea arm  Side lying shoulder abduction to 90 degrees 3# 3x5 on L / 2# 3x5 on R  Seated lateral neck flexion B x5 ea side  Seated cervical rotation w/assist of hand at jaw to increase range x5 ea side  Supine cervical noodle nodding x20  B Supine ER w/arm at 45 degrees of abduction 1# 2x10  Supine horizontal shoulder abd RTB 2x10  "W" rows 20#  2x10        manual therapy techniques:  For 0 minutes, including:  IASTM w/myofascial cupping to L cervical paraspinals, upper traps, levator    Patient provided verbal consent to IASTM with myofascial cupping   Static myofascial cupping followed by cupping w/gliding   Patient demonstrated appropriate response to IASTM with cupping. Moderate erythema w/dissipation. Skin intact.       supervised modalities after being cleared for contradictions: --    hot pack for 0 minutes       Patient Education and Home Exercises     Home Exercises Provided and Patient Education Provided     Education provided:   - HEP review  - Postural alignment  -stretches for home  -G TB issued for scapula retraction 2/25/22      Written Home Exercises Provided: Patient instructed to cont prior HEP. Exercises were reviewed and Sana was able to demonstrate them prior to the end of the session.  Sana demonstrated good  understanding of the education provided. See EMR under Patient Instructions for exercises provided during therapy sessions    ASSESSMENT     Sana presented to therapy today reporting no pain , just soreness.  Patient continued shoulder strengthening and postural stabilization exercises with verbal and tactile cues required due to significantly impaired scapulohumeral rhythm. She is doing well in supine and then in standing if range of motion is limited for muscle engagement without compensation. Sana demonstrated a positive response " to today's session as evidenced with her exercise tolerance and no reported pain.      Sana Is progressing well towards her goals.   Pt prognosis is Fair.     Pt will continue to benefit from skilled outpatient physical therapy to address the deficits listed in the problem list box on initial evaluation, provide pt/family education and to maximize pt's level of independence in the home and community environment.     Pt's spiritual, cultural and educational needs considered and pt agreeable to plan of care and goals.     Anticipated barriers to physical therapy: multiple joint restrictions/impairments from previous surgeries    Goals:   STG's 2 weeks   1. Patient will be independent with 50% of HEP MET 1/27/22     LTG's 10 weeks  1. Patient will improve FOTO disability score  to 46 %  disability or less in order to improve overall QOL & return to PLOF MET 1/27/22 37%/  44% 2/15/22  2. Patient will report an overall decrease in pain with ADL's and functional mobility Met 1/27/22  3.  Patient will increase strength by at least 1/2 muscle grade in affected musculature to improve functional mobility Ongoing  4. Patient will improve L  cervical rotation ROM by at least 10 degrees to improve functional mobility and ADL's MET 1/27/22 45 degrees L  5. Patient will be more aware of posture throughout the day to reduce stress  and maintain optimal alignment of the spine Progressing    PLAN   Plan of care Certification: 12/27/2021 to 3/11/22     Outpatient Physical Therapy 2 times weekly for 10 weeks to include the following interventions: Cervical/Lumbar Traction, Electrical Stimulation PRN, Manual Therapy, Moist Heat/ Ice, Patient Education, Self Care, Therapeutic Activities, Therapeutic Exercise and Ultrasound,       Mariah Hunt PTA

## 2022-03-04 ENCOUNTER — CLINICAL SUPPORT (OUTPATIENT)
Dept: REHABILITATION | Facility: HOSPITAL | Age: 72
End: 2022-03-04
Payer: MEDICARE

## 2022-03-04 DIAGNOSIS — M54.2 NECK PAIN: Primary | ICD-10-CM

## 2022-03-04 PROCEDURE — 97110 THERAPEUTIC EXERCISES: CPT | Mod: CQ

## 2022-03-04 NOTE — PROGRESS NOTES
"[facility]   Physical Therapist Assistant Treatment Note       Name: Sana Crenshaw  Clinic Number: [id number]    Therapy Diagnosis:   Encounter Diagnosis   Name Primary?    Neck pain Yes     Physician: Satish Segura MD    Visit Date: 3/4/2022   Physician Orders: PT Eval and Treat   Medical Diagnosis from Referral: M54.12 (ICD-10-CM) - Cervical radiculopathy  Evaluation Date: 12/27/2021  Authorization Period Expiration: 12/31/22  Plan of Care Expiration: 3/11/22    Visit # / Visits authorized: 16/20 + eval    FOTO: 3/3    PTA Visit #: 2/5    Time In: 12:45  Time Out: 1:32  Total Billable Time: 45 minutes    SUBJECTIVE     Pt reports: no pain just soreness in L shoulder   She was compliant with home exercise program.  Response to previous treatment: sore  Functional change: increased cervical range of motion and increased UE strength w/activities    Pain: 0/10 neck  0/10 right shoulder  1/10 left shoulder--soreness    OBJECTIVE   2/15/22:  MMT 2/15/22 shoulder flexion R 3/5 (no change since IE), L 2+/5 (no change), shoulder abduction  R 3/5 (no change), L2+/5 - 3-/5 in available ROM/ can not hold position at 90 degrees (2+/5 on IE), R IR 3+/ 5 (3/5 on IE), L IR 3+/5 (no change), ER R 3/5 (no change), L 3+/5 ( no change)    Treatment     Sana received the treatments listed below:      therapeutic exercises to develop strength, ROM, flexibility, posture and core stabilization for 45 minutes including:    Seated UBE 5 min BW for mobility  Standing lat stretch at bar 10" x5--NP  Inclinde modified push ups on the wall 2x10  scap stabilization w/ball on wall at approx 80 degrees CW/CCW x10 ea  MedX trunk rotation 20# x10 ea side  Shoulder hi ho YTB 2 way x8 ea/side  Supine shoulder flexion 2# L and 1# R -alternating 2x12  Supine 90 shoulder flexion position for horizontal abd isometrics YTB looped ---around wrists 2x10  Supine arm circles at 90 CW and CCW x10 ea direction:  L 2# /  R " "2#  Supine cervical noodle nodding x20  Matrix chest press 10# 2x10  Seated triceps press YTB 2x10 ea  Seated shoulder extension YTB 2x10   Seated modified face pulls YTB 2x10  (almost marker 18)      DEFERRED:  Pulley shoulder extensions from mid range of flexion to hips 20# 2x10  Prone unilat T's 2# 3x5 R  / 1# 2x10 L  Prone unilat  I's 2# 3x5 ea arm  Side lying shoulder abduction to 90 degrees 3# 3x5 on L / 2# 3x5 on R  Seated lateral neck flexion B x5 ea side  Seated cervical rotation w/assist of hand at jaw to increase range x5 ea side  Supine cervical noodle nodding x20  B Supine ER w/arm at 45 degrees of abduction 1# 2x10  Supine horizontal shoulder abd RTB 2x10  "W" rows 20#  2x10        manual therapy techniques:  For 0 minutes, including:  IASTM w/myofascial cupping to L cervical paraspinals, upper traps, levator    Patient provided verbal consent to IASTM with myofascial cupping   Static myofascial cupping followed by cupping w/gliding   Patient demonstrated appropriate response to IASTM with cupping. Moderate erythema w/dissipation. Skin intact.       supervised modalities after being cleared for contradictions: --    hot pack for 0 minutes       Patient Education and Home Exercises     Home Exercises Provided and Patient Education Provided     Education provided:   - HEP review  - Postural alignment          Written Home Exercises Provided: Patient instructed to cont prior HEP. Exercises were reviewed and Sana was able to demonstrate them prior to the end of the session.  Sana demonstrated good  understanding of the education provided. See EMR under Patient Instructions for exercises provided during therapy sessions    ASSESSMENT     Sana presented to therapy today reporting no pain , just soreness.  Patient continued shoulder strengthening and postural stabilization exercises with verbal and tactile cues required due to significantly impaired scapulohumeral rhythm. She is doing well in supine and " then in standing if range of motion is modified for muscle engagement without compensation. Addition of some new exercises with minimal tactile cueing required to inhibit upper trap activity.  Sana demonstrated a positive response to today's session as evidenced with her exercise tolerance and no reported pain.      Sana Is progressing well towards her goals.   Pt prognosis is Fair.     Pt will continue to benefit from skilled outpatient physical therapy to address the deficits listed in the problem list box on initial evaluation, provide pt/family education and to maximize pt's level of independence in the home and community environment.     Pt's spiritual, cultural and educational needs considered and pt agreeable to plan of care and goals.     Anticipated barriers to physical therapy: multiple joint restrictions/impairments from previous surgeries    Goals:   STG's 2 weeks   1. Patient will be independent with 50% of HEP MET 1/27/22     LTG's 10 weeks  1. Patient will improve FOTO disability score  to 46 %  disability or less in order to improve overall QOL & return to PLOF MET 1/27/22 37%/  44% 2/15/22  2. Patient will report an overall decrease in pain with ADL's and functional mobility Met 1/27/22  3.  Patient will increase strength by at least 1/2 muscle grade in affected musculature to improve functional mobility Ongoing  4. Patient will improve L  cervical rotation ROM by at least 10 degrees to improve functional mobility and ADL's MET 1/27/22 45 degrees L  5. Patient will be more aware of posture throughout the day to reduce stress  and maintain optimal alignment of the spine Progressing    PLAN   Plan of care Certification: 12/27/2021 to 3/11/22     Outpatient Physical Therapy 2 times weekly for 10 weeks to include the following interventions: Cervical/Lumbar Traction, Electrical Stimulation PRN, Manual Therapy, Moist Heat/ Ice, Patient Education, Self Care, Therapeutic Activities, Therapeutic Exercise  and Ultrasound,       Mariah Hunt, YOAN

## 2022-03-07 ENCOUNTER — CLINICAL SUPPORT (OUTPATIENT)
Dept: REHABILITATION | Facility: HOSPITAL | Age: 72
End: 2022-03-07
Payer: MEDICARE

## 2022-03-07 DIAGNOSIS — M54.2 NECK PAIN: Primary | ICD-10-CM

## 2022-03-07 PROCEDURE — 97110 THERAPEUTIC EXERCISES: CPT | Mod: CQ

## 2022-03-07 NOTE — PROGRESS NOTES
"[facility]   Physical Therapist Assistant Treatment Note       Name: Sana Crenshaw  Clinic Number: [id number]    Therapy Diagnosis:   Encounter Diagnosis   Name Primary?    Neck pain Yes     Physician: Satish Segura MD    Visit Date: 3/7/2022   Physician Orders: PT Eval and Treat   Medical Diagnosis from Referral: M54.12 (ICD-10-CM) - Cervical radiculopathy  Evaluation Date: 12/27/2021  Authorization Period Expiration: 12/31/22  Plan of Care Expiration: 3/11/22    Visit # / Visits authorized: 17/20 + eval    FOTO: 3/3    PTA Visit #: 3/5    Time In: 1:43  Time Out: 2:39  Total Billable Time: 53 minutes    SUBJECTIVE     Pt reports: no significant pain   She was compliant with home exercise program.  Response to previous treatment: sore  Functional change: increased cervical range of motion and increased UE strength w/activities    Pain: 0/10 neck  0/10 right shoulder  1/10 left shoulder--soreness    OBJECTIVE   2/15/22:  MMT 2/15/22 shoulder flexion R 3/5 (no change since IE), L 2+/5 (no change), shoulder abduction  R 3/5 (no change), L2+/5 - 3-/5 in available ROM/ can not hold position at 90 degrees (2+/5 on IE), R IR 3+/ 5 (3/5 on IE), L IR 3+/5 (no change), ER R 3/5 (no change), L 3+/5 ( no change)    Treatment     Sana received the treatments listed below:      therapeutic exercises to develop strength, ROM, flexibility, posture and core stabilization for 53 minutes including:    Seated UBE 5 min BW for mobility  Standing lat stretch at bar 10" x5--NP   Inclinde modified push ups on the wall 2x10  scap stabilization w/ball on wall at approx 80 degrees CW/CCW x10 ea  Seated triceps press YTB 2x10 ea--tactile assist  Seated shoulder extension YTB 2x10   Seated modified face pulls YTB 2x10  (set to almost marker 18)  MedX trunk rotation 20# x10 ea side  Matrix chest press 10# 2x10  Supine shoulder flexion 2# L and 1# R -alternating 2x12  Supine 90 shoulder flexion position for " "horizontal abd isometrics YTB looped ---around wrists 2x10  Supine arm circles at 90 CW and CCW x10 ea direction:  L 2# /  R 2#  Supine hug a tree 3x5 L 2# /  R 1#  Supine cervical noodle nodding x20  Supine DNF 6" x6      DEFERRED:  Pulley shoulder extensions from mid range of flexion to hips 20# 2x10   Prone unilat T's 2# 3x5 R  / 1# 2x10 L  Prone unilat  I's 2# 3x5 ea arm  Side lying shoulder abduction to 90 degrees 3# 3x5 on L / 2# 3x5 on R  Seated lateral neck flexion B x5 ea side  Seated cervical rotation w/assist of hand at jaw to increase range x5 ea side  B Supine ER w/arm at 45 degrees of abduction 1# 2x10  Supine horizontal shoulder abd RTB 2x10  "W" rows 20#  2x10  Shoulder hi ho YTB 2 way x8 ea/side      manual therapy techniques:  For 0 minutes, including:  ---    Patient provided verbal consent to IASTM with myofascial cupping   Static myofascial cupping followed by cupping w/gliding   Patient demonstrated appropriate response to IASTM with cupping. Moderate erythema w/dissipation. Skin intact.       supervised modalities after being cleared for contradictions: --    hot pack for 0 minutes       Patient Education and Home Exercises     Home Exercises Provided and Patient Education Provided     Education provided:   - HEP review for pending discharge; all bands issued  - Postural alignment      Written Home Exercises Provided: Patient instructed to cont prior HEP. Exercises were reviewed and Sana was able to demonstrate them prior to the end of the session.  Sana demonstrated good  understanding of the education provided. See EMR under Patient Instructions for exercises provided during therapy sessions    ASSESSMENT     Sana presented to therapy today reporting no pain , just soreness mainly in the left shoulder.  Patient continued shoulder strengthening and postural stabilization exercises with less verbal and tactile cues required due to increased strength and reduced compensation. Tactile cues " however remained for tricep work.  She is doing well in supine ;  in standing range of motion is modified for her wall work for muscle engagement without compensation.   Sana demonstrated a positive response to today's session as evidenced with her exercise tolerance/progression  and no reported pain.  Discharge pending next visit.     Sana Is progressing well towards her goals.   Pt prognosis is Fair.     Pt will continue to benefit from skilled outpatient physical therapy to address the deficits listed in the problem list box on initial evaluation, provide pt/family education and to maximize pt's level of independence in the home and community environment.     Pt's spiritual, cultural and educational needs considered and pt agreeable to plan of care and goals.     Anticipated barriers to physical therapy: multiple joint restrictions/impairments from previous surgeries    Goals:   STG's 2 weeks   1. Patient will be independent with 50% of HEP MET 1/27/22     LTG's 10 weeks  1. Patient will improve FOTO disability score  to 46 %  disability or less in order to improve overall QOL & return to PLOF MET 1/27/22 37%/  44% 2/15/22  2. Patient will report an overall decrease in pain with ADL's and functional mobility Met 1/27/22  3.  Patient will increase strength by at least 1/2 muscle grade in affected musculature to improve functional mobility Ongoing  4. Patient will improve L  cervical rotation ROM by at least 10 degrees to improve functional mobility and ADL's MET 1/27/22 45 degrees L  5. Patient will be more aware of posture throughout the day to reduce stress  and maintain optimal alignment of the spine Progressing    PLAN   Plan of care Certification: 12/27/2021 to 3/11/22     Outpatient Physical Therapy 2 times weekly for 10 weeks to include the following interventions: Cervical/Lumbar Traction, Electrical Stimulation PRN, Manual Therapy, Moist Heat/ Ice, Patient Education, Self Care, Therapeutic Activities,  Therapeutic Exercise and Ultrasound,       Mariah Hunt, PTA

## 2022-03-11 ENCOUNTER — CLINICAL SUPPORT (OUTPATIENT)
Dept: REHABILITATION | Facility: HOSPITAL | Age: 72
End: 2022-03-11
Payer: MEDICARE

## 2022-03-11 DIAGNOSIS — M54.2 NECK PAIN: Primary | ICD-10-CM

## 2022-03-11 PROCEDURE — 97110 THERAPEUTIC EXERCISES: CPT

## 2022-03-11 NOTE — PROGRESS NOTES
[facility]   Physical Therapist  Treatment Note / D/C summary      Name: Sana Crenshaw  Clinic Number: [id number]    Therapy Diagnosis:   Encounter Diagnosis   Name Primary?    Neck pain Yes     Physician: Satish Segura MD    Visit Date: 3/11/2022   Physician Orders: PT Eval and Treat   Medical Diagnosis from Referral: M54.12 (ICD-10-CM) - Cervical radiculopathy  Evaluation Date: 12/27/2021  Authorization Period Expiration: 12/31/22  Plan of Care Expiration: 3/11/22    Visit # / Visits authorized: 18/20 + eval    FOTO: 3/3    PTA Visit #: 3/5    Time In: 1:00  Time Out: 1:35  Total Billable Time: 35 minutes    SUBJECTIVE     Pt reports: no significant pain   She was compliant with home exercise program.  Response to previous treatment: sore  Functional change: increased cervical range of motion and increased UE strength w/activities    Pain: 0/10 neck  0/10 right shoulder  0/10 left shoulder--soreness    OBJECTIVE   3/11/22  MMT 2/15/22 shoulder flexion R 3/5 (no change since IE), L 2+/5 (no change), shoulder abduction  R 3/5 (no change), L2+/5 - 3-/5 in available ROM/ can not hold position at 90 degrees (2+/5 on IE), R IR 4/ 5 (3/5 on IE), L IR 4/5 (no change), ER R 3/5 (no change), L 3+/5 ( no change)    Treatment     Sana received the treatments listed below:      therapeutic exercises to develop strength, ROM, flexibility, posture and core stabilization for 35 minutes including:  MMT  Seated UBE 5 min BW for mobility  Reviewed HEP shoulder isometrics/ HEP overall reviewed  Seated UBE 5 min BW for mobility  Scapula clocks with yellow TB side to side B/ diagonals L UE only  Scapula retraction G TB 2 x 15 reps (G TB issued for home use today)   Shoulder extension 3# 2 x 15 reps  Modify wall push ups 4 x 5 reps  Wall slides 2 x 10 reps  Sitting shoulder flexion with cane 2 x 10 reps with cuing for upright posture and decrease shoulder hiking/ shoulder abduction 2 x 10 reps B  Sitting  "shoulder ER/ windshield wipers with sliders 2 x 15 reps  Standing shoulder rocking s/s & f/b for scapula activation 1' each way      deferred    Standing lat stretch at bar 10" x5--NP   Inclinde modified push ups on the wall 2x10  scap stabilization w/ball on wall at approx 80 degrees CW/CCW x10 ea  Seated triceps press YTB 2x10 ea--tactile assist  Seated shoulder extension YTB 2x10   Seated modified face pulls YTB 2x10  (set to almost marker 18)  MedX trunk rotation 20# x10 ea side  Matrix chest press 10# 2x10  Supine shoulder flexion 2# L and 1# R -alternating 2x12  Supine 90 shoulder flexion position for horizontal abd isometrics YTB looped ---around wrists 2x10  Supine arm circles at 90 CW and CCW x10 ea direction:  L 2# /  R 2#  Supine hug a tree 3x5 L 2# /  R 1#  Supine cervical noodle nodding x20  Supine DNF 6" x6      DEFERRED:  Pulley shoulder extensions from mid range of flexion to hips 20# 2x10   Prone unilat T's 2# 3x5 R  / 1# 2x10 L  Prone unilat  I's 2# 3x5 ea arm  Side lying shoulder abduction to 90 degrees 3# 3x5 on L / 2# 3x5 on R  Seated lateral neck flexion B x5 ea side  Seated cervical rotation w/assist of hand at jaw to increase range x5 ea side  B Supine ER w/arm at 45 degrees of abduction 1# 2x10  Supine horizontal shoulder abd RTB 2x10  "W" rows 20#  2x10  Shoulder hi ho YTB 2 way x8 ea/side      manual therapy techniques:  For 0 minutes, including:  ---    Patient provided verbal consent to IASTM with myofascial cupping   Static myofascial cupping followed by cupping w/gliding   Patient demonstrated appropriate response to IASTM with cupping. Moderate erythema w/dissipation. Skin intact.       supervised modalities after being cleared for contradictions: --    hot pack for 0 minutes       Patient Education and Home Exercises     Home Exercises Provided and Patient Education Provided     Education provided:   - HEP review for pending discharge; all bands issued  - Postural alignment  - " progress reviewed and the importance to continue with HEP/ G TB issued      Written Home Exercises Provided: Patient instructed to cont prior HEP. Exercises were reviewed and Sana was able to demonstrate them prior to the end of the session.  Sana demonstrated good  understanding of the education provided. See EMR under Patient Instructions for exercises provided during therapy sessions    ASSESSMENT     Sana presented to therapy today reporting no pain , just soreness mainly in the left shoulder.  Patient has responded well to treatment. She is no longer experiencing pain in neck. MMT 2/15/22 shoulder flexion R 3/5 (no change since IE), L 2+/5 (no change), shoulder abduction  R 3/5 (no change), L2+/5 - 3-/5 in available ROM/ can not hold position at 90 degrees (2+/5 on IE), R IR 4/ 5 (3/5 on IE), L IR 4/5 (no change), ER R 3/5 (no change), L 3+/5 ( no change).  Minimal strength changes due to multiple joint restrictions/impairments from previous surgeries.  She has met all goals set on IE except for LTG #3. She is independent with HEP. She is being d/c from PT services and patient is in agreement.       Goals:   STG's 2 weeks   1. Patient will be independent with 50% of HEP MET 1/27/22     LTG's 10 weeks  1. Patient will improve FOTO disability score  to 46 %  disability or less in order to improve overall QOL & return to PLOF MET 1/27/22 37%/  44% 2/15/22  2. Patient will report an overall decrease in pain with ADL's and functional mobility Met 1/27/22  3.  Patient will increase strength by at least 1/2 muscle grade in affected musculature to improve functional mobility Partially met  4. Patient will improve L  cervical rotation ROM by at least 10 degrees to improve functional mobility and ADL's MET 1/27/22 45 degrees L  5. Patient will be more aware of posture throughout the day to reduce stress  and maintain optimal alignment of the spine Met 3/11/22    PLAN   D/C from PT services      Damaris Joyce, PT

## 2022-03-27 DIAGNOSIS — I10 ESSENTIAL HYPERTENSION: ICD-10-CM

## 2022-03-28 NOTE — TELEPHONE ENCOUNTER
No new care gaps identified.  Powered by [employer]. Reference number: [id number].   3/27/2022 9:41:24 PM CDT

## 2022-03-29 RX ORDER — ROPINIROLE 1 MG/1
TABLET, FILM COATED ORAL
Qty: 90 TABLET | Refills: 0 | Status: SHIPPED | OUTPATIENT
Start: 2022-03-29 | End: 2022-06-10 | Stop reason: SDUPTHER

## 2022-03-29 RX ORDER — AMLODIPINE BESYLATE 5 MG/1
TABLET ORAL
Qty: 90 TABLET | Refills: 3 | Status: SHIPPED | OUTPATIENT
Start: 2022-03-29 | End: 2023-01-16

## 2022-04-05 DIAGNOSIS — Z71.89 COMPLEX CARE COORDINATION: ICD-10-CM

## 2022-04-11 ENCOUNTER — PATIENT OUTREACH (OUTPATIENT)
Dept: ADMINISTRATIVE | Facility: OTHER | Age: 72
End: 2022-04-11
Payer: MEDICARE

## 2022-04-11 ENCOUNTER — TELEPHONE (OUTPATIENT)
Dept: PAIN MEDICINE | Facility: CLINIC | Age: 72
End: 2022-04-11
Payer: MEDICARE

## 2022-04-11 NOTE — PROGRESS NOTES
Health Maintenance Due   Topic Date Due    Shingles Vaccine (1 of 2) Never done    COVID-19 Vaccine (3 - Booster for Pfizer series) 02/13/2022     Updates were requested from care everywhere.  Chart was reviewed for overdue Proactive [facility] Encounters (EMIGDIO) topics (CRS, Breast Cancer Screening, Eye exam)  Health Maintenance has been updated.  [facility] immunization registry triggered.  Immunizations were reconciled.

## 2022-04-12 ENCOUNTER — OFFICE VISIT (OUTPATIENT)
Dept: PAIN MEDICINE | Facility: CLINIC | Age: 72
End: 2022-04-12
Payer: MEDICARE

## 2022-04-12 ENCOUNTER — TELEPHONE (OUTPATIENT)
Dept: PAIN MEDICINE | Facility: CLINIC | Age: 72
End: 2022-04-12

## 2022-04-12 VITALS
DIASTOLIC BLOOD PRESSURE: 82 MMHG | SYSTOLIC BLOOD PRESSURE: 129 MMHG | WEIGHT: 218.69 LBS | BODY MASS INDEX: 41.29 KG/M2 | RESPIRATION RATE: 17 BRPM | HEART RATE: 86 BPM | HEIGHT: 61 IN

## 2022-04-12 DIAGNOSIS — M70.60 GREATER TROCHANTERIC BURSITIS, UNSPECIFIED LATERALITY: Primary | ICD-10-CM

## 2022-04-12 DIAGNOSIS — M47.812 CERVICAL SPONDYLOSIS: ICD-10-CM

## 2022-04-12 DIAGNOSIS — M47.812 FACET ARTHROPATHY, CERVICAL: ICD-10-CM

## 2022-04-12 DIAGNOSIS — M79.18 MYOFASCIAL PAIN: ICD-10-CM

## 2022-04-12 DIAGNOSIS — M54.12 CERVICAL RADICULOPATHY: ICD-10-CM

## 2022-04-12 DIAGNOSIS — M46.1 SACROILIITIS: ICD-10-CM

## 2022-04-12 PROCEDURE — 99999 PR PBB SHADOW E&M-EST. PATIENT-LVL IV: CPT | Mod: PBBFAC,,, | Performed by: ANESTHESIOLOGY

## 2022-04-12 PROCEDURE — 99999 PR PBB SHADOW E&M-EST. PATIENT-LVL IV: ICD-10-PCS | Mod: PBBFAC,,, | Performed by: ANESTHESIOLOGY

## 2022-04-12 PROCEDURE — 99214 PR OFFICE/OUTPT VISIT, EST, LEVL IV, 30-39 MIN: ICD-10-PCS | Mod: S$PBB,,, | Performed by: ANESTHESIOLOGY

## 2022-04-12 PROCEDURE — 99214 OFFICE O/P EST MOD 30 MIN: CPT | Mod: PBBFAC | Performed by: ANESTHESIOLOGY

## 2022-04-12 PROCEDURE — 99214 OFFICE O/P EST MOD 30 MIN: CPT | Mod: S$PBB,,, | Performed by: ANESTHESIOLOGY

## 2022-04-12 RX ORDER — DULOXETIN HYDROCHLORIDE 30 MG/1
60 CAPSULE, DELAYED RELEASE ORAL DAILY
Qty: 90 CAPSULE | Refills: 0 | Status: SHIPPED | OUTPATIENT
Start: 2022-04-12 | End: 2022-05-31 | Stop reason: SDUPTHER
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
## 2022-04-12 NOTE — PATIENT INSTRUCTIONS
General Neck and Back Pain    Both neck and back pain are usually caused by injury to the muscles or ligaments of the spine. Sometimes the disks that separate each bone of the spine may cause pain by pressing on a nearby nerve. Back and neck pain may appear after a sudden twisting or bending force (such as in a car accident), or sometimes after a simple awkward movement. In either case, muscle spasm is often present and adds to the pain.  Acute neck and back pain usually gets better in 1 to 2 weeks. Pain related to disk disease, arthritis in the spinal joints or spinal stenosis (narrowing of the spinal canal) can become chronic and last for months or years.  Back and neck pain are common problems. Most people feel better in 1 or 2 weeks, and most of the rest in 1 to 2 months. Most people can remain active.  People experience and describe pain differently.  Pain can be sharp, stabbing, shooting, aching, cramping, or burning  Movement, standing, bending, lifting, sitting, or walking may worsen the pain  Pain can be localized to one spot or area, or it can be more generalized  Pain can spread or radiate upwards, downwards, to the front, or go down your arms  Muscle spasm may occur.  Most of the time mechanical problems with the muscles or spine cause the pain. it is usually caused by an injury, whether known or not, to the muscles or ligaments. While illnesses can cause back pain, it is usually not caused by a serious illness. Pain is usually related to physical activity, whether sports, exercise, work, or normal activity. Sometimes it can occur without an identifiable cause. This can happen simply by stretching or moving wrong, without noting pain at the time. Other causes include:  Overexertion, lifting, pushing, pulling incorrectly or too aggressively.  Sudden twisting, bending or stretching from an accident (car or fall), or accidental movement.  Poor posture  Poor conditioning, lack of regular exercise  Spinal  disc disease or arthritis  Stress  Pregnancy, or illness like appendicitis, bladder or kidney infection, pelvic infections   Home care  For neck pain: Use a comfortable pillow that supports the head and keeps the spine in a neutral position. The position of the head should not be tilted forward or backward.  When in bed, try to find a position of comfort. A firm mattress is best. Try lying flat on your back with pillows under your knees. You can also try lying on your side with your knees bent up towards your chest and a pillow between your knees.  At first, do not try to stretch out the sore spots. If there is a strain, it is not like the good soreness you get after exercising without an injury. In this case, stretching may make it worse.  Avoid prolonged sitting, long car rides or travel. This puts more stress on the lower back than standing or walking.  During the first 24 to 72 hours after an injury, apply an ice pack to the painful area for 20 minutes and then remove it for 20 minutes over a period of 60 to 90 minutes or several times a day.   You can alternate ice and heat therapies. Talk with your healthcare provider about the best treatment for your back or neck pain. As a safety precaution, do not use a heating pad at bedtime. Sleeping with a heating pad can lead to skin burns or tissue damage.  Therapeutic massage can help relax the back and neck muscles without stretching them.  Be aware of safe lifting methods and do not lift anything over 15 pounds until all the pain is gone.  Medications  Talk to your healthcare provider before using medicine, especially if you have other medical problems or are taking other medicines.  You may use over-the-counter medicine to control pain, unless another pain medicine was prescribed. If you have chronic conditions like diabetes, liver or kidney disease, stomach ulcers,  gastrointestinal bleeding, or are taking blood thinner medicines.  Be careful if you are given pain  medicines, narcotics, or medicine for muscle spasm. They can cause drowsiness, and can affect your coordination, reflexes, and judgment. Do not drive or operate heavy machinery.  Follow-up care  Follow up with your healthcare provider, or as advised. Physical therapy or further tests may be needed.  If X-rays were taken, you will be notified of any new findings that may affect your care.  Call 911  Seek emergency medical care if any of the following occur:  Trouble breathing  Confusion  Very drowsy or trouble awakening  Fainting or loss of consciousness  Rapid or very slow heart rate  Loss of bowel or bladder control  When to seek medical advice  Call your healthcare provider right away if any of these occur:  Pain becomes worse or spreads into your arms or legs  Weakness, numbness or pain in one or both arms or legs  Numbness in the groin area  Difficulty walking  Fever of 100.4ºF (38ºC) or higher, or as directed by your healthcare provider  Date Last Reviewed: 7/1/2016  © 2693-5642 [employer]. [address] [place] [address]. All rights reserved. This information is not intended as a substitute for professional medical care. Always follow your healthcare professional's instructions.       Understanding Cervical Radiculopathy    Cervical radiculopathy is irritation or inflammation of a nerve root in the neck. It causes neck pain and other symptoms that may spread into the chest or down the arm. To understand this condition, it helps to understand the parts of the spine:  Vertebrae. These are bones that stack to form the spine. The cervical spine contains the 7 vertebrae in the neck.  Disks. These are soft pads of tissue between the vertebrae. They act as shock absorbers for the spine.  The spinal canal. This is a tunnel formed within the stacked vertebrae. The spinal cord runs through this canal.  Nerves. These branch off the spinal cord. As they leave the spinal canal, nerves pass through  openings between the vertebrae. The nerve root is the part of the nerve that is closest to the spinal cord.   With cervical radiculopathy, nerve roots in the neck become irritated. This leads to pain and symptoms that can travel to the nerves that go from the spinal cord down the arms and into the torso.  What causes cervical radiculopathy?  Aging, injury, poor posture, and other issues can lead to problems in the neck. These problems may then irritate nerve roots. These include:  Damage to a disk in the cervical spine. The damaged disk may then press on nearby nerve roots.  Degeneration from wear and tear, and aging. This can lead to narrowing (stenosis) of the openings between the vertebrae. The narrowed openings press on nerve roots as they leave the spinal canal.  An unstable spine. This is when a vertebra slips forward. It can then press on a nerve root.  There are other, less common causes of pressure on nerves in the neck. These include infection, cysts, and tumors.  Symptoms of cervical radiculopathy  These include:  Neck pain  Pain, numbness, tingling, or weakness that travels down the arm  Loss of neck movement  Muscle spasms  Treatment for cervical radiculopathy  In most cases, your healthcare provider will first try treatments that help relieve symptoms. These may include:  Prescription or over-the-counter pain medicines. These help relieve pain and swelling.  Cold packs. These help reduce pain.  Resting. This involves avoiding positions and activities that increase pain.  Neck brace (cervical collar). This can help relieve inflammation and pain.  Physical therapy, including exercises and stretches. This can help decrease pain and increase movement and function.  Shots of medicinesaround the nerve roots. This is done to help relieve symptoms for a time.  In some cases, your healthcare provider may advise surgery to fix the underlying problem. This depends on the cause, the symptoms, and how long the pain  has lasted.  Possible complications  Over time, an irritated and inflamed nerve may become damaged. This may lead to long-lasting (permanent) numbness or weakness. If symptoms change suddenly or get worse, be sure to let your healthcare provider know.     When to call your healthcare provider  Call your healthcare provider right away if you have any of these:  New pain or pain that gets worse  New or increasing weakness, numbness, or tingling in your arm or hand  Bowel or bladder changes   Date Last Reviewed: 3/10/2016  © 2409-1335 [employer]. [address] [place] [address]. All rights reserved. This information is not intended as a substitute for professional medical care. Always follow your healthcare professional's instructions.       Exercises: Neck Isometrics  To start, sit in a chair with your feet flat on the floor. Your weight should be slightly forward so that youre balanced evenly on your buttocks. Relax your shoulders and keep your head level. Using a chair with arms may help you keep your balance.       Press your palm against your forehead. Resist with your neck muscles. Hold for 10 seconds. Relax. Repeat 5 times.  Do the exercise again, pressing on the side of your head. Repeat 5 times. Switch sides.  Do the exercise again, pressing on the back of your head. Repeat 5 times.  For your safety, check with your healthcare provider before starting an exercise program.   Date Last Reviewed: 8/16/2015 © 2000-2017 [employer]. [address] [place] [address]. All rights reserved. This information is not intended as a substitute for professional medical care. Always follow your healthcare professional's instructions.

## 2022-04-12 NOTE — TELEPHONE ENCOUNTER
Dr. Too Crenshaw was seen in office today with complaints of severe neck pain. Dr. Segura would like to perform cervical medial branch block, and Sana Crenshaw would like to proceed. Dr. Segura is requesting for clearance to hold ASA 5 days prior to procedure. Patient Sana Crenshaw can resume medication following the procedure.     Please advise so we may proceed.     Thank you,   Ashlie GAO
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
## 2022-04-15 ENCOUNTER — PATIENT MESSAGE (OUTPATIENT)
Dept: PAIN MEDICINE | Facility: CLINIC | Age: 72
End: 2022-04-15
Payer: MEDICARE

## 2022-04-21 NOTE — PRE-PROCEDURE INSTRUCTIONS
Spoke with patient regarding procedure scheduled on 4.27    Arrival time 0720    Has patient been sick with fever or on antibiotics within the last 7 days? No    Does the patient have any open wounds, sores or rashes? No    Does the patient have any recent fractures? no    Has patient received a vaccination within the last 7 days? No    Received the COVID vaccination? yes    Has the patient stopped all medications as directed? Hold asa 5 days prior to procedure. Cardiac clearance obtained from dr roberts on 4.13    Does patient have a pacemaker and or defibrillator? no    Does the patient have a ride to and from procedure and someone reliable to remain with patient? betzy    Is the patient diabetic? no    Does the patient have sleep apnea? Or use O2 at home? No and no     Is the patient receiving sedation? yes    Is the patient instructed to remain NPO beginning at midnight the night before their procedure? yes    Procedure location confirmed with patient? Yes    Covid- Denies signs/symptoms. Instructed to notify PAT/MD if any changes.

## 2022-04-27 ENCOUNTER — HOSPITAL ENCOUNTER (OUTPATIENT)
Facility: HOSPITAL | Age: 72
Discharge: HOME | End: 2022-04-27
Attending: ANESTHESIOLOGY | Admitting: ANESTHESIOLOGY
Payer: MEDICARE

## 2022-04-27 VITALS
SYSTOLIC BLOOD PRESSURE: 120 MMHG | TEMPERATURE: 98 F | HEIGHT: 61 IN | DIASTOLIC BLOOD PRESSURE: 80 MMHG | OXYGEN SATURATION: 97 % | HEART RATE: 60 BPM | WEIGHT: 215.63 LBS | BODY MASS INDEX: 40.71 KG/M2 | RESPIRATION RATE: 16 BRPM

## 2022-04-27 DIAGNOSIS — M47.812 CERVICAL SPONDYLOSIS: ICD-10-CM

## 2022-04-27 PROCEDURE — 64490 INJ PARAVERT F JNT C/T 1 LEV: CPT | Performed by: ANESTHESIOLOGY

## 2022-04-27 PROCEDURE — 63600175 PHARM REV CODE 636 W HCPCS: Performed by: ANESTHESIOLOGY

## 2022-04-27 PROCEDURE — 64490 PR INJ DX/THER AGNT PARAVERT FACET JOINT,IMG GUIDE,CERV/THORAC, 1ST LEVEL: ICD-10-PCS | Mod: LT,,, | Performed by: ANESTHESIOLOGY

## 2022-04-27 PROCEDURE — 64490 INJ PARAVERT F JNT C/T 1 LEV: CPT | Mod: LT,,, | Performed by: ANESTHESIOLOGY

## 2022-04-27 PROCEDURE — 64491 INJ PARAVERT F JNT C/T 2 LEV: CPT | Mod: LT,,, | Performed by: ANESTHESIOLOGY

## 2022-04-27 PROCEDURE — 25000003 PHARM REV CODE 250: Performed by: ANESTHESIOLOGY

## 2022-04-27 PROCEDURE — 64491 INJ PARAVERT F JNT C/T 2 LEV: CPT | Performed by: ANESTHESIOLOGY

## 2022-04-27 PROCEDURE — 64491 PR INJ DX/THER AGNT PARAVERT FACET JOINT,IMG GUIDE,CERV/THORAC, 2ND LEVEL: ICD-10-PCS | Mod: LT,,, | Performed by: ANESTHESIOLOGY

## 2022-04-27 RX ORDER — DEXAMETHASONE SODIUM PHOSPHATE 10 MG/ML
INJECTION INTRAMUSCULAR; INTRAVENOUS
Status: DISCONTINUED | OUTPATIENT
Start: 2022-04-27 | End: 2022-04-27 | Stop reason: HOSPADM

## 2022-04-27 RX ORDER — BUPIVACAINE HYDROCHLORIDE 5 MG/ML
INJECTION, SOLUTION EPIDURAL; INTRACAUDAL
Status: DISCONTINUED | OUTPATIENT
Start: 2022-04-27 | End: 2022-04-27 | Stop reason: HOSPADM

## 2022-04-27 RX ORDER — MIDAZOLAM HYDROCHLORIDE 1 MG/ML
INJECTION, SOLUTION INTRAMUSCULAR; INTRAVENOUS
Status: DISCONTINUED | OUTPATIENT
Start: 2022-04-27 | End: 2022-04-27 | Stop reason: HOSPADM

## 2022-04-27 RX ORDER — FENTANYL CITRATE 50 UG/ML
INJECTION, SOLUTION INTRAMUSCULAR; INTRAVENOUS
Status: DISCONTINUED | OUTPATIENT
Start: 2022-04-27 | End: 2022-04-27 | Stop reason: HOSPADM

## 2022-04-27 RX ORDER — SODIUM BICARBONATE 1 MEQ/ML
SYRINGE (ML) INTRAVENOUS
Status: DISCONTINUED | OUTPATIENT
Start: 2022-04-27 | End: 2022-04-27 | Stop reason: HOSPADM

## 2022-04-27 NOTE — OP NOTE
CERVICAL Medial Branch Block Under Fluoroscopy  Time-out taken to identify patient and procedure side prior to starting the procedure.        Date of Procedure: 4/27/22                                                           PROCEDURE:  left medial branch block at the transverse processes of C4, C5, C6    Pre Procedure Diagnosis:  Cervical spondylosis [M47.812]  Post Procedure Diagnosis: Same    PHYSICIAN: Satish Segura MD    ASSISTANTS: None    Anesthesia:   Conscious sedation provided by M.D    The patient was monitored with continuous pulse oximetry, EKG, and intermittent blood pressure monitors.  The patient was hemodynamically stable throughout the entire process was responsive to voice, and breathing spontaneously.  Supplemental O2 was provided at 2L/min via nasal cannula.  Patient was comfortable for the duration of the procedure. (See nurse documentation and case log for sedation time)    There was a total of 1 mg IV Midazolam and 50mcg Fentanyl titrated for the procedure      MEDICATIONS INJECTED:  0.25% Bupivicaine total 1.5mL  LOCAL ANESTHETIC USED:   Xylocaine 1% 1mL / site    SEDATION MEDICATIONS: None    ESTIMATED BLOOD LOSS:  None.    COMPLICATIONS:  None.    TECHNIQUE:   Laying in a left lateral decubitus  position, the patient was prepped and draped in the usual sterile fashion using ChloraPrep and fenestrated drape.  The level was determined under fluoroscopic guidance.  Local anesthetic was given by going down to the hub of the 27-gauge 1.25in needle and raising a wheel.  A 22-gauge 3.5inch needle was introduced to the anatomic local of the medial branch at each of the stated above levels using fluoroscopy. Then after negative aspiration, a total of 1.5 cc of .25% bupivacaine and 10 mg dexamethasone was injected in divided doses at the three levels. The patient tolerated the procedure well.     The patient was monitored after the procedure.  Patient was given post procedure and discharge  instructions to follow at home.  We will see the patient back in two weeks or the patient may call to inform of status. The patient was discharged in a stable condition.    Satish Segura  2

## 2022-04-27 NOTE — DISCHARGE SUMMARY
[facility] - Pain Mgmt 1st Fl  Discharge Note  Short Stay    Procedure(s) (LRB):  Left C4-6 MBB with RN IV sedation (Left)    OUTCOME: Patient tolerated treatment/procedure well without complication and is now ready for discharge.    DISPOSITION: Home or Self Care    FINAL DIAGNOSIS:  <principal problem not specified>    FOLLOWUP: In clinic    DISCHARGE INSTRUCTIONS:  No discharge procedures on file.     TIME SPENT ON DISCHARGE: 15 minutes

## 2022-04-27 NOTE — DISCHARGE INSTRUCTIONS

## 2022-04-27 NOTE — INTERVAL H&P NOTE
The patient has been examined and the H&P has been reviewed:    I concur with the findings and no changes have occurred since H&P was written.    Procedure risks, benefits and alternative options discussed and understood by patient/family.          Active Hospital Problems    Diagnosis  POA    Cervical spondylosis [M47.812]  Yes      Resolved Hospital Problems   No resolved problems to display.

## 2022-04-30 ENCOUNTER — EXTERNAL CHRONIC CARE MANAGEMENT (OUTPATIENT)
Dept: PRIMARY CARE CLINIC | Facility: CLINIC | Age: 72
End: 2022-04-30
Payer: MEDICARE

## 2022-04-30 PROCEDURE — 99439 CHRNC CARE MGMT STAF EA ADDL: CPT | Mod: S$PBB,,, | Performed by: FAMILY MEDICINE

## 2022-04-30 PROCEDURE — 99490 CHRNC CARE MGMT STAFF 1ST 20: CPT | Mod: PBBFAC,PO | Performed by: FAMILY MEDICINE

## 2022-04-30 PROCEDURE — 99439 CHRNC CARE MGMT STAF EA ADDL: CPT | Mod: PBBFAC,PO | Performed by: FAMILY MEDICINE

## 2022-04-30 PROCEDURE — 99439 PR CHRONIC CARE MGMT, EA ADDTL 20 MIN: ICD-10-PCS | Mod: S$PBB,,, | Performed by: FAMILY MEDICINE

## 2022-04-30 PROCEDURE — 99490 CHRNC CARE MGMT STAFF 1ST 20: CPT | Mod: S$PBB,,, | Performed by: FAMILY MEDICINE

## 2022-04-30 PROCEDURE — 99490 PR CHRONIC CARE MGMT, 1ST 20 MIN: ICD-10-PCS | Mod: S$PBB,,, | Performed by: FAMILY MEDICINE

## 2022-05-31 ENCOUNTER — OFFICE VISIT (OUTPATIENT)
Dept: PAIN MEDICINE | Facility: CLINIC | Age: 72
End: 2022-05-31
Payer: MEDICARE

## 2022-05-31 ENCOUNTER — EXTERNAL CHRONIC CARE MANAGEMENT (OUTPATIENT)
Dept: PRIMARY CARE CLINIC | Facility: CLINIC | Age: 72
End: 2022-05-31
Payer: MEDICARE

## 2022-05-31 VITALS
HEART RATE: 66 BPM | SYSTOLIC BLOOD PRESSURE: 152 MMHG | BODY MASS INDEX: 41.12 KG/M2 | WEIGHT: 217.81 LBS | DIASTOLIC BLOOD PRESSURE: 84 MMHG | HEIGHT: 61 IN

## 2022-05-31 DIAGNOSIS — M54.12 CERVICAL RADICULOPATHY: Primary | ICD-10-CM

## 2022-05-31 PROCEDURE — 99213 OFFICE O/P EST LOW 20 MIN: CPT | Mod: PBBFAC | Performed by: PHYSICIAN ASSISTANT

## 2022-05-31 PROCEDURE — 99490 CHRNC CARE MGMT STAFF 1ST 20: CPT | Mod: S$PBB,,, | Performed by: FAMILY MEDICINE

## 2022-05-31 PROCEDURE — 99999 PR PBB SHADOW E&M-EST. PATIENT-LVL III: CPT | Mod: PBBFAC,,, | Performed by: PHYSICIAN ASSISTANT

## 2022-05-31 PROCEDURE — 99490 PR CHRONIC CARE MGMT, 1ST 20 MIN: ICD-10-PCS | Mod: S$PBB,,, | Performed by: FAMILY MEDICINE

## 2022-05-31 PROCEDURE — 99999 PR PBB SHADOW E&M-EST. PATIENT-LVL III: ICD-10-PCS | Mod: PBBFAC,,, | Performed by: PHYSICIAN ASSISTANT

## 2022-05-31 PROCEDURE — 99213 PR OFFICE/OUTPT VISIT, EST, LEVL III, 20-29 MIN: ICD-10-PCS | Mod: S$PBB,,, | Performed by: PHYSICIAN ASSISTANT

## 2022-05-31 PROCEDURE — 99490 CHRNC CARE MGMT STAFF 1ST 20: CPT | Mod: PBBFAC,25,PO | Performed by: FAMILY MEDICINE

## 2022-05-31 PROCEDURE — 99213 OFFICE O/P EST LOW 20 MIN: CPT | Mod: S$PBB,,, | Performed by: PHYSICIAN ASSISTANT

## 2022-05-31 RX ORDER — DULOXETIN HYDROCHLORIDE 30 MG/1
30 CAPSULE, DELAYED RELEASE ORAL DAILY
Qty: 90 CAPSULE | Refills: 1 | Status: SHIPPED | OUTPATIENT
Start: 2022-05-31 | End: 2022-10-13
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
## 2022-06-09 PROBLEM — E66.01 CLASS 2 SEVERE OBESITY DUE TO EXCESS CALORIES WITH SERIOUS COMORBIDITY AND BODY MASS INDEX (BMI) OF 38.0 TO 38.9 IN ADULT: Status: ACTIVE | Noted: 2022-06-09

## 2022-06-09 PROBLEM — E66.812 CLASS 2 SEVERE OBESITY DUE TO EXCESS CALORIES WITH SERIOUS COMORBIDITY AND BODY MASS INDEX (BMI) OF 38.0 TO 38.9 IN ADULT: Status: ACTIVE | Noted: 2022-06-09

## 2022-06-10 ENCOUNTER — OFFICE VISIT (OUTPATIENT)
Dept: FAMILY MEDICINE | Facility: CLINIC | Age: 72
End: 2022-06-10
Payer: MEDICARE

## 2022-06-10 ENCOUNTER — LAB VISIT (OUTPATIENT)
Dept: LAB | Facility: HOSPITAL | Age: 72
End: 2022-06-10
Attending: FAMILY MEDICINE
Payer: MEDICARE

## 2022-06-10 VITALS
HEIGHT: 61 IN | SYSTOLIC BLOOD PRESSURE: 136 MMHG | BODY MASS INDEX: 41.34 KG/M2 | WEIGHT: 218.94 LBS | OXYGEN SATURATION: 98 % | TEMPERATURE: 97 F | DIASTOLIC BLOOD PRESSURE: 84 MMHG | HEART RATE: 74 BPM

## 2022-06-10 DIAGNOSIS — R73.03 PREDIABETES: ICD-10-CM

## 2022-06-10 DIAGNOSIS — E03.9 HYPOTHYROIDISM, UNSPECIFIED TYPE: ICD-10-CM

## 2022-06-10 DIAGNOSIS — H35.9 RETINAL DISEASE: ICD-10-CM

## 2022-06-10 DIAGNOSIS — M54.12 CERVICAL RADICULOPATHY: ICD-10-CM

## 2022-06-10 DIAGNOSIS — I10 ESSENTIAL HYPERTENSION: ICD-10-CM

## 2022-06-10 DIAGNOSIS — K21.9 GASTROESOPHAGEAL REFLUX DISEASE, UNSPECIFIED WHETHER ESOPHAGITIS PRESENT: Primary | ICD-10-CM

## 2022-06-10 DIAGNOSIS — Z96.653 STATUS POST TOTAL BILATERAL KNEE REPLACEMENT: ICD-10-CM

## 2022-06-10 DIAGNOSIS — E66.01 SEVERE OBESITY WITH BODY MASS INDEX (BMI) OF 36.0 TO 36.9 WITH SERIOUS COMORBIDITY: ICD-10-CM

## 2022-06-10 DIAGNOSIS — Z13.6 SCREENING FOR ISCHEMIC HEART DISEASE: ICD-10-CM

## 2022-06-10 DIAGNOSIS — Z00.00 ANNUAL PHYSICAL EXAM: ICD-10-CM

## 2022-06-10 LAB
ALBUMIN SERPL BCP-MCNC: 3.6 G/DL (ref 3.5–5.2)
ALP SERPL-CCNC: 97 U/L (ref 55–135)
ALT SERPL W/O P-5'-P-CCNC: 19 U/L (ref 10–44)
ANION GAP SERPL CALC-SCNC: 14 MMOL/L (ref 8–16)
AST SERPL-CCNC: 26 U/L (ref 10–40)
BASOPHILS # BLD AUTO: 0.08 K/UL (ref 0–0.2)
BASOPHILS NFR BLD: 1.2 % (ref 0–1.9)
BILIRUB SERPL-MCNC: 0.6 MG/DL (ref 0.1–1)
BUN SERPL-MCNC: 12 MG/DL (ref 8–23)
CALCIUM SERPL-MCNC: 9.6 MG/DL (ref 8.7–10.5)
CHLORIDE SERPL-SCNC: 105 MMOL/L (ref 95–110)
CHOLEST SERPL-MCNC: 176 MG/DL (ref 120–199)
CHOLEST/HDLC SERPL: 3 {RATIO} (ref 2–5)
CO2 SERPL-SCNC: 23 MMOL/L (ref 23–29)
CREAT SERPL-MCNC: 0.8 MG/DL (ref 0.5–1.4)
DIFFERENTIAL METHOD: NORMAL
EOSINOPHIL # BLD AUTO: 0.3 K/UL (ref 0–0.5)
EOSINOPHIL NFR BLD: 5 % (ref 0–8)
ERYTHROCYTE [DISTWIDTH] IN BLOOD BY AUTOMATED COUNT: 12.5 % (ref 11.5–14.5)
EST. GFR  (AFRICAN AMERICAN): >60 ML/MIN/1.73 M^2
EST. GFR  (NON AFRICAN AMERICAN): >60 ML/MIN/1.73 M^2
ESTIMATED AVG GLUCOSE: 117 MG/DL (ref 68–131)
GLUCOSE SERPL-MCNC: 108 MG/DL (ref 70–110)
HBA1C MFR BLD: 5.7 % (ref 4–5.6)
HCT VFR BLD AUTO: 46.9 % (ref 37–48.5)
HDLC SERPL-MCNC: 58 MG/DL (ref 40–75)
HDLC SERPL: 33 % (ref 20–50)
HGB BLD-MCNC: 15.1 G/DL (ref 12–16)
IMM GRANULOCYTES # BLD AUTO: 0.02 K/UL (ref 0–0.04)
IMM GRANULOCYTES NFR BLD AUTO: 0.3 % (ref 0–0.5)
LDLC SERPL CALC-MCNC: 93.2 MG/DL (ref 63–159)
LYMPHOCYTES # BLD AUTO: 1.6 K/UL (ref 1–4.8)
LYMPHOCYTES NFR BLD: 23.4 % (ref 18–48)
MCH RBC QN AUTO: 29.7 PG (ref 27–31)
MCHC RBC AUTO-ENTMCNC: 32.2 G/DL (ref 32–36)
MCV RBC AUTO: 92 FL (ref 82–98)
MONOCYTES # BLD AUTO: 0.5 K/UL (ref 0.3–1)
MONOCYTES NFR BLD: 6.7 % (ref 4–15)
NEUTROPHILS # BLD AUTO: 4.3 K/UL (ref 1.8–7.7)
NEUTROPHILS NFR BLD: 63.4 % (ref 38–73)
NONHDLC SERPL-MCNC: 118 MG/DL
NRBC BLD-RTO: 0 /100 WBC
PLATELET # BLD AUTO: 227 K/UL (ref 150–450)
PMV BLD AUTO: 11.2 FL (ref 9.2–12.9)
POTASSIUM SERPL-SCNC: 3.8 MMOL/L (ref 3.5–5.1)
PROT SERPL-MCNC: 6.3 G/DL (ref 6–8.4)
RBC # BLD AUTO: 5.08 M/UL (ref 4–5.4)
SODIUM SERPL-SCNC: 142 MMOL/L (ref 136–145)
TRIGL SERPL-MCNC: 124 MG/DL (ref 30–150)
TSH SERPL DL<=0.005 MIU/L-ACNC: 2.44 UIU/ML (ref 0.4–4)
WBC # BLD AUTO: 6.84 K/UL (ref 3.9–12.7)

## 2022-06-10 PROCEDURE — 85025 COMPLETE CBC W/AUTO DIFF WBC: CPT | Performed by: FAMILY MEDICINE

## 2022-06-10 PROCEDURE — 99215 OFFICE O/P EST HI 40 MIN: CPT | Mod: S$PBB,,, | Performed by: FAMILY MEDICINE

## 2022-06-10 PROCEDURE — 99999 PR PBB SHADOW E&M-EST. PATIENT-LVL V: CPT | Mod: PBBFAC,,, | Performed by: FAMILY MEDICINE

## 2022-06-10 PROCEDURE — 84443 ASSAY THYROID STIM HORMONE: CPT | Performed by: FAMILY MEDICINE

## 2022-06-10 PROCEDURE — 80053 COMPREHEN METABOLIC PANEL: CPT | Performed by: FAMILY MEDICINE

## 2022-06-10 PROCEDURE — 99215 PR OFFICE/OUTPT VISIT, EST, LEVL V, 40-54 MIN: ICD-10-PCS | Mod: S$PBB,,, | Performed by: FAMILY MEDICINE

## 2022-06-10 PROCEDURE — 99999 PR PBB SHADOW E&M-EST. PATIENT-LVL V: ICD-10-PCS | Mod: PBBFAC,,, | Performed by: FAMILY MEDICINE

## 2022-06-10 PROCEDURE — 80061 LIPID PANEL: CPT | Performed by: FAMILY MEDICINE

## 2022-06-10 PROCEDURE — 83036 HEMOGLOBIN GLYCOSYLATED A1C: CPT | Performed by: FAMILY MEDICINE

## 2022-06-10 PROCEDURE — 99215 OFFICE O/P EST HI 40 MIN: CPT | Mod: PBBFAC,PO | Performed by: FAMILY MEDICINE

## 2022-06-10 PROCEDURE — 36415 COLL VENOUS BLD VENIPUNCTURE: CPT | Mod: PO | Performed by: FAMILY MEDICINE

## 2022-06-10 RX ORDER — ROPINIROLE 1 MG/1
1 TABLET, FILM COATED ORAL NIGHTLY
Qty: 90 TABLET | Refills: 0 | Status: SHIPPED | OUTPATIENT
Start: 2022-06-10 | End: 2022-08-12

## 2022-06-10 RX ORDER — OMEPRAZOLE 20 MG/1
20 CAPSULE, DELAYED RELEASE ORAL 2 TIMES DAILY
Qty: 180 CAPSULE | Refills: 3 | Status: SHIPPED | OUTPATIENT
Start: 2022-06-10 | End: 2023-04-10

## 2022-06-10 NOTE — PROGRESS NOTES
Subjective:       Patient ID: Sana Crenshaw is a 71 y.o. female.    Chief Complaint: Hypertension      HPI Comments:       Current Outpatient Medications:     amLODIPine (NORVASC) 5 MG tablet, TAKE 1 TABLET BY MOUTH ONCE DAILY, Disp: 90 tablet, Rfl: 3    aspirin (ECOTRIN) 81 MG EC tablet, Take 81 mg by mouth once daily., Disp: , Rfl:     DULoxetine (CYMBALTA) 30 MG capsule, Take 1 capsule (30 mg total) by mouth once daily., Disp: 90 capsule, Rfl: 1    levothyroxine (SYNTHROID) 50 MCG tablet, TAKE 1 TABLET BY MOUTH  BEFORE BREAKFAST, Disp: 90 tablet, Rfl: 2    lisinopriL (PRINIVIL,ZESTRIL) 20 MG tablet, TAKE 1 TABLET BY MOUTH ONCE DAILY, Disp: 90 tablet, Rfl: 3    metoprolol succinate (TOPROL-XL) 100 MG 24 hr tablet, TAKE 1 TABLET BY MOUTH ONCE DAILY, Disp: 90 tablet, Rfl: 3    omeprazole (PRILOSEC) 20 MG capsule, Take 1 capsule (20 mg total) by mouth 2 (two) times a day., Disp: 180 capsule, Rfl: 3    oxybutynin (DITROPAN-XL) 10 MG 24 hr tablet, Take 10 mg by mouth once daily., Disp: , Rfl:     rOPINIRole (REQUIP) 1 MG tablet, Take 1 tablet (1 mg total) by mouth every evening., Disp: 90 tablet, Rfl: 0  No current facility-administered medications for this visit.    Facility-Administered Medications Ordered in Other Visits:     lactated ringers infusion, , Intravenous, Continuous, Franky Hartman MD, Last Rate: 20 mL/hr at 01/08/21 0731, New Bag at 01/08/21 0925    lactated ringers infusion, , Intravenous, Continuous, Estevan Rodriguez MD, Stopped at 02/23/21 1641      Five month follow-up.    Still with fair amount of neck discomfort.  Shot did not help.  Cymbalta only tolerated at 30 mg a day.    Has been on omeprazole for years for GERD.  Has to take it b.i.d. oral she has breakthrough symptoms.  Does not recall ever having an EGD.  Will 7 to GI    Finished all her physical therapy    Never saw orthopedist in follow-up for knee replacements, bilateral, done in [place].  Doing well but would like  "to see Orthopedics    Tingling in her right 5th finger for 1 week.    History of retinal surgery.  Would like to see a retinal specialist.    Review of Systems   Constitutional: Negative for activity change, appetite change and fever.   HENT: Negative for sore throat.    Respiratory: Negative for cough and shortness of breath.    Cardiovascular: Negative for chest pain.   Gastrointestinal: Negative for abdominal pain, diarrhea and nausea.   Genitourinary: Negative for difficulty urinating.   Musculoskeletal: Positive for neck pain. Negative for arthralgias and myalgias.   Neurological: Positive for numbness. Negative for dizziness and headaches.       Objective:      Vitals:    06/10/22 1057   BP: 136/84   Pulse: 74   Temp: 97.4 °F (36.3 °C)   SpO2: 98%   Weight: 99.3 kg (218 lb 14.7 oz)   Height: 5' 1" (1.549 m)   PainSc: 10-Worst pain ever   PainLoc: Neck     Physical Exam  Vitals and nursing note reviewed.   Constitutional:       General: She is not in acute distress.     Appearance: She is well-developed. She is not diaphoretic.   HENT:      Head: Normocephalic.      Mouth/Throat:      Pharynx: No oropharyngeal exudate.   Neck:      Thyroid: No thyromegaly.   Cardiovascular:      Rate and Rhythm: Normal rate and regular rhythm.      Pulses:           Radial pulses are 2+ on the right side and 2+ on the left side.      Heart sounds: Normal heart sounds. No murmur heard.  Pulmonary:      Effort: Pulmonary effort is normal.      Breath sounds: Normal breath sounds. No wheezing or rales.   Abdominal:      General: There is no distension.      Palpations: Abdomen is soft.   Musculoskeletal:      Cervical back: Neck supple.   Lymphadenopathy:      Cervical: No cervical adenopathy.   Skin:     General: Skin is warm and dry.   Neurological:      Mental Status: She is alert and oriented to person, place, and time.      Comments: Right hand 5th digit neurovascular exam intact   Psychiatric:         Behavior: Behavior " normal.         Thought Content: Thought content normal.         Judgment: Judgment normal.         Assessment:       1. Gastroesophageal reflux disease, unspecified whether esophagitis present    2. Severe obesity with body mass index (BMI) of 36.0 to 36.9 with serious comorbidity    3. Essential hypertension    4. Prediabetes    5. Cervical radiculopathy    6. Status post total bilateral knee replacement    7. Hypothyroidism, unspecified type    8. Annual physical exam    9. Screening for ischemic heart disease    10. Retinal disease        Plan:   Gastroesophageal reflux disease, unspecified whether esophagitis present  Comments:  High-dose PPI therapy long-term.  No history of EGD.  GI consult  Orders:  -     Ambulatory referral/consult to Gastroenterology; Future; Expected date: 06/17/2022    Severe obesity with body mass index (BMI) of 36.0 to 36.9 with serious comorbidity  Comments:  Weight continues to increase    Essential hypertension  Comments:  Controlled  Orders:  -     Comprehensive Metabolic Panel; Future; Expected date: 06/10/2022  -     CBC Auto Differential; Future; Expected date: 06/10/2022    Prediabetes  Comments:  A1c  Orders:  -     Hemoglobin A1C; Future; Expected date: 06/10/2022    Cervical radiculopathy  Comments:  s/p Left MBB.  Not much improvement    Status post total bilateral knee replacement  Comments:  Orthopedic consult  Orders:  -     Ambulatory referral/consult to Orthopedics; Future; Expected date: 06/17/2022    Hypothyroidism, unspecified type  Comments:  TSH  Orders:  -     TSH; Future; Expected date: 06/10/2022    Annual physical exam    Screening for ischemic heart disease  Comments:  Lipid profile  Orders:  -     Lipid Panel; Future; Expected date: 06/10/2022    Retinal disease  Comments:  Consult retinal specialist  Orders:  -     Ambulatory referral/consult to Ophthalmology; Future; Expected date: 06/17/2022    Other orders  -     rOPINIRole (REQUIP) 1 MG tablet; Take 1  tablet (1 mg total) by mouth every evening.  Dispense: 90 tablet; Refill: 0  -     omeprazole (PRILOSEC) 20 MG capsule; Take 1 capsule (20 mg total) by mouth 2 (two) times a day.  Dispense: 180 capsule; Refill: 3

## 2022-06-30 ENCOUNTER — EXTERNAL CHRONIC CARE MANAGEMENT (OUTPATIENT)
Dept: PRIMARY CARE CLINIC | Facility: CLINIC | Age: 72
End: 2022-06-30
Payer: MEDICARE

## 2022-06-30 PROCEDURE — 99490 CHRNC CARE MGMT STAFF 1ST 20: CPT | Mod: PBBFAC,PO | Performed by: FAMILY MEDICINE

## 2022-06-30 PROCEDURE — 99490 PR CHRONIC CARE MGMT, 1ST 20 MIN: ICD-10-PCS | Mod: S$PBB,,, | Performed by: FAMILY MEDICINE

## 2022-06-30 PROCEDURE — 99490 CHRNC CARE MGMT STAFF 1ST 20: CPT | Mod: S$PBB,,, | Performed by: FAMILY MEDICINE

## 2022-07-15 DIAGNOSIS — Z96.653 HISTORY OF BILATERAL KNEE REPLACEMENT: Primary | ICD-10-CM

## 2022-07-20 ENCOUNTER — PES CALL (OUTPATIENT)
Dept: ADMINISTRATIVE | Facility: CLINIC | Age: 72
End: 2022-07-20
Payer: MEDICARE

## 2022-07-22 ENCOUNTER — OFFICE VISIT (OUTPATIENT)
Dept: INTERNAL MEDICINE | Facility: CLINIC | Age: 72
End: 2022-07-22
Payer: MEDICARE

## 2022-07-22 VITALS
WEIGHT: 216.69 LBS | HEART RATE: 79 BPM | DIASTOLIC BLOOD PRESSURE: 84 MMHG | TEMPERATURE: 97 F | OXYGEN SATURATION: 98 % | HEIGHT: 61 IN | RESPIRATION RATE: 16 BRPM | BODY MASS INDEX: 40.91 KG/M2 | SYSTOLIC BLOOD PRESSURE: 132 MMHG

## 2022-07-22 DIAGNOSIS — R11.0 NAUSEA: ICD-10-CM

## 2022-07-22 DIAGNOSIS — N39.0 URINARY TRACT INFECTION WITHOUT HEMATURIA, SITE UNSPECIFIED: Primary | ICD-10-CM

## 2022-07-22 LAB
BACTERIA #/AREA URNS HPF: ABNORMAL /HPF
BILIRUB UR QL STRIP: NEGATIVE
CLARITY UR: CLEAR
COLOR UR: YELLOW
GLUCOSE UR QL STRIP: NEGATIVE
HGB UR QL STRIP: ABNORMAL
KETONES UR QL STRIP: NEGATIVE
LEUKOCYTE ESTERASE UR QL STRIP: ABNORMAL
MICROSCOPIC COMMENT: ABNORMAL
NITRITE UR QL STRIP: NEGATIVE
PH UR STRIP: 6 [PH] (ref 5–8)
PROT UR QL STRIP: NEGATIVE
RBC #/AREA URNS HPF: 0 /HPF (ref 0–4)
SP GR UR STRIP: 1.02 (ref 1–1.03)
SQUAMOUS #/AREA URNS HPF: 2 /HPF
URN SPEC COLLECT METH UR: ABNORMAL
UROBILINOGEN UR STRIP-ACNC: NEGATIVE EU/DL
WBC #/AREA URNS HPF: 2 /HPF (ref 0–5)
WBC CLUMPS URNS QL MICRO: ABNORMAL

## 2022-07-22 PROCEDURE — 87086 URINE CULTURE/COLONY COUNT: CPT | Performed by: PHYSICIAN ASSISTANT

## 2022-07-22 PROCEDURE — 99999 PR PBB SHADOW E&M-EST. PATIENT-LVL V: ICD-10-PCS | Mod: PBBFAC,,, | Performed by: PHYSICIAN ASSISTANT

## 2022-07-22 PROCEDURE — 99215 OFFICE O/P EST HI 40 MIN: CPT | Mod: PBBFAC,25 | Performed by: PHYSICIAN ASSISTANT

## 2022-07-22 PROCEDURE — 87186 SC STD MICRODIL/AGAR DIL: CPT | Performed by: PHYSICIAN ASSISTANT

## 2022-07-22 PROCEDURE — 99214 PR OFFICE/OUTPT VISIT, EST, LEVL IV, 30-39 MIN: ICD-10-PCS | Mod: S$PBB,,, | Performed by: PHYSICIAN ASSISTANT

## 2022-07-22 PROCEDURE — 87088 URINE BACTERIA CULTURE: CPT | Performed by: PHYSICIAN ASSISTANT

## 2022-07-22 PROCEDURE — 99999 PR PBB SHADOW E&M-EST. PATIENT-LVL V: CPT | Mod: PBBFAC,,, | Performed by: PHYSICIAN ASSISTANT

## 2022-07-22 PROCEDURE — 99284 EMERGENCY DEPT VISIT MOD MDM: CPT | Mod: 25,27

## 2022-07-22 PROCEDURE — 81000 URINALYSIS NONAUTO W/SCOPE: CPT | Performed by: PHYSICIAN ASSISTANT

## 2022-07-22 PROCEDURE — 87077 CULTURE AEROBIC IDENTIFY: CPT | Performed by: PHYSICIAN ASSISTANT

## 2022-07-22 PROCEDURE — 99214 OFFICE O/P EST MOD 30 MIN: CPT | Mod: S$PBB,,, | Performed by: PHYSICIAN ASSISTANT

## 2022-07-22 PROCEDURE — 12013 RPR F/E/E/N/L/M 2.6-5.0 CM: CPT

## 2022-07-22 RX ORDER — SULFAMETHOXAZOLE AND TRIMETHOPRIM 800; 160 MG/1; MG/1
1 TABLET ORAL 2 TIMES DAILY
Qty: 14 TABLET | Refills: 0 | Status: SHIPPED | OUTPATIENT
Start: 2022-07-22 | End: 2022-07-29

## 2022-07-22 RX ORDER — ONDANSETRON 4 MG/1
4 TABLET, ORALLY DISINTEGRATING ORAL EVERY 8 HOURS PRN
Qty: 10 TABLET | Refills: 0 | Status: SHIPPED | OUTPATIENT
Start: 2022-07-22 | End: 2022-10-10

## 2022-07-22 NOTE — PROGRESS NOTES
"Subjective:      Patient ID: Sana Crenshaw is a 71 y.o. female.    Chief Complaint: Urinary Tract Infection and Emesis    Patient is new to me, being seen today for possible UTI.     Urinary Tract Infection   This is a new problem. The current episode started in the past 7 days (3 days). Associated symptoms include frequency and vomiting. Pertinent negatives include no chills, flank pain, hematuria, nausea, constipation or rash. Treatments tried: cranberry tablets. H/o kidney infection 4yrs ago     Review of Systems   Constitutional: Negative for chills, diaphoresis and fever.   HENT: Negative for congestion, rhinorrhea and sore throat.    Respiratory: Negative for cough, shortness of breath and wheezing.    Gastrointestinal: Positive for vomiting. Negative for abdominal pain, constipation, diarrhea and nausea.   Genitourinary: Positive for difficulty urinating, dysuria and frequency. Negative for flank pain and hematuria.        H/o OAB with nerve stimulator  Incontinence since UTI symptoms started  Followed by Urologist in [place] annually    Skin: Negative for rash.   Neurological: Negative for dizziness, light-headedness and headaches.       Objective:   /84   Pulse 79   Temp 97.3 °F (36.3 °C)   Resp 16   Ht 5' 1" (1.549 m)   Wt 98.3 kg (216 lb 11.4 oz)   SpO2 98%   BMI 40.95 kg/m²   Physical Exam  Constitutional:       General: She is not in acute distress.     Appearance: Normal appearance. She is well-developed. She is not ill-appearing.   HENT:      Head: Normocephalic and atraumatic.   Cardiovascular:      Rate and Rhythm: Normal rate and regular rhythm.      Heart sounds: Normal heart sounds. No murmur heard.  Pulmonary:      Effort: Pulmonary effort is normal. No respiratory distress.      Breath sounds: Normal breath sounds. No decreased breath sounds.   Abdominal:      General: Bowel sounds are normal.      Palpations: Abdomen is soft.      Tenderness: There is no abdominal tenderness. " There is no right CVA tenderness or left CVA tenderness.   Musculoskeletal:      Right lower leg: No edema.      Left lower leg: No edema.   Skin:     General: Skin is warm and dry.      Findings: No rash.   Psychiatric:         Speech: Speech normal.         Behavior: Behavior normal.         Thought Content: Thought content normal.       Assessment:      1. Urinary tract infection without hematuria, site unspecified    2. Nausea       Plan:   Urinary tract infection without hematuria, site unspecified  -     Urinalysis  -     Urine culture  -     ondansetron (ZOFRAN-ODT) 4 MG TbDL; Take 1 tablet (4 mg total) by mouth every 8 (eight) hours as needed (nausea).  Dispense: 10 tablet; Refill: 0    Nausea  -     sulfamethoxazole-trimethoprim 800-160mg (BACTRIM DS) 800-160 mg Tab; Take 1 tablet by mouth 2 (two) times daily. for 7 days  Dispense: 14 tablet; Refill: 0    Other orders  -     Urinalysis Microscopic      Discussed worsening signs/symptoms and when to return to clinic or go to ED.   Patient expresses understanding and agrees with treatment plan.

## 2022-07-23 ENCOUNTER — HOSPITAL ENCOUNTER (EMERGENCY)
Facility: HOSPITAL | Age: 72
Discharge: HOME | End: 2022-07-23
Attending: EMERGENCY MEDICINE
Payer: MEDICARE

## 2022-07-23 VITALS
BODY MASS INDEX: 41.16 KG/M2 | HEART RATE: 70 BPM | SYSTOLIC BLOOD PRESSURE: 130 MMHG | DIASTOLIC BLOOD PRESSURE: 79 MMHG | HEIGHT: 61 IN | TEMPERATURE: 98 F | WEIGHT: 218 LBS | OXYGEN SATURATION: 99 % | RESPIRATION RATE: 16 BRPM

## 2022-07-23 DIAGNOSIS — S01.81XA LACERATION OF FOREHEAD, INITIAL ENCOUNTER: Primary | ICD-10-CM

## 2022-07-23 DIAGNOSIS — S09.90XA TRAUMATIC INJURY OF HEAD, INITIAL ENCOUNTER: ICD-10-CM
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
## 2022-07-23 PROCEDURE — 63600175 PHARM REV CODE 636 W HCPCS: Performed by: EMERGENCY MEDICINE

## 2022-07-23 PROCEDURE — 12013 RPR F/E/E/N/L/M 2.6-5.0 CM: CPT

## 2022-07-23 PROCEDURE — 90715 TDAP VACCINE 7 YRS/> IM: CPT | Performed by: EMERGENCY MEDICINE

## 2022-07-23 PROCEDURE — 90471 IMMUNIZATION ADMIN: CPT | Performed by: EMERGENCY MEDICINE

## 2022-07-23 PROCEDURE — 99284 EMERGENCY DEPT VISIT MOD MDM: CPT | Mod: 25

## 2022-07-23 PROCEDURE — 25000003 PHARM REV CODE 250: Performed by: EMERGENCY MEDICINE

## 2022-07-23 RX ORDER — LIDOCAINE HYDROCHLORIDE AND EPINEPHRINE 20; 10 MG/ML; UG/ML
1 INJECTION, SOLUTION INFILTRATION; PERINEURAL
Status: COMPLETED | OUTPATIENT
Start: 2022-07-23 | End: 2022-07-23

## 2022-07-23 RX ORDER — MUPIROCIN 20 MG/G
OINTMENT TOPICAL
Status: COMPLETED | OUTPATIENT
Start: 2022-07-23 | End: 2022-07-23

## 2022-07-23 RX ADMIN — TETANUS TOXOID, REDUCED DIPHTHERIA TOXOID AND ACELLULAR PERTUSSIS VACCINE, ADSORBED 0.5 ML: 5; 2.5; 8; 8; 2.5 SUSPENSION INTRAMUSCULAR at 12:07

## 2022-07-23 RX ADMIN — MUPIROCIN: 20 OINTMENT TOPICAL at 12:07

## 2022-07-23 RX ADMIN — LIDOCAINE HYDROCHLORIDE AND EPINEPHRINE 1 ML: 20; 10 INJECTION, SOLUTION INFILTRATION; PERINEURAL at 12:07

## 2022-07-23 NOTE — FIRST PROVIDER EVALUATION
"Medical screening exam completed.  I have conducted a focused provider triage encounter, findings are as follows:    Brief history of present illness:  Patient complains of forehead laceration and head injury after falling tonight    Vitals:    07/22/22 2143   BP: 128/73   BP Location: Right arm   Patient Position: Sitting   Pulse: 78   Resp: 14   Temp: 98.7 °F (37.1 °C)   TempSrc: Oral   SpO2: 98%   Weight: 98.9 kg (218 lb)   Height: 5' 1" (1.549 m)       Pertinent physical exam:  Forehead lac    Brief workup plan:  Imaging, lac repair    Preliminary workup initiated; this workup will be continued and followed by the physician or advanced practice provider that is assigned to the patient when roomed.  "

## 2022-07-23 NOTE — ED PROVIDER NOTES
SCRIBE #1 NOTE: I, Kevin Martinez, am scribing for, and in the presence of, Margie Romo MD. I have scribed the entire note.       History     Chief Complaint   Patient presents with    Laceration     Pt saw PCP today and dx with UTI. On ABX and ASA. Pt fell at home, struck floor, and has 5 cm lac to forehead. Denies LOC. CO HA     Review of patient's allergies indicates:   Allergen Reactions    Gabapentin Nausea And Vomiting     Causes vertigo          History of Present Illness     HPI    7/23/2022, 12:29 AM  History obtained from the patient      History of Present Illness: Sana Crenshaw is a 71 y.o. female patient with a PMHx of HTN who presents to the Emergency Department for evaluation of a laceration which onset suddenly pta. Pt states he was dx with a UTI today and was Rx abx. She reports she fell at home and hit her head on her concrete floor. She denies any LOC and notes she takes ASA. Symptoms are constant and moderate in severity. No mitigating or exacerbating factors reported. No associated sxs reported. Patient denies any fever, chills, weakness, numbness, n/v/d, HA, dizziness, lightheadedness, and all other sxs at this time. No prior Tx reported. No further complaints or concerns at this time.       Arrival mode: Personal vehicle    PCP: Stuart Gage MD        Past Medical History:  Past Medical History:   Diagnosis Date    Asthma     Hypertension     Thyroid disease        Past Surgical History:  Past Surgical History:   Procedure Laterality Date    ANKLE FUSION Right     BREAST SURGERY      BUNIONECTOMY Right     CATARACT EXTRACTION W/  INTRAOCULAR LENS IMPLANT      EPIDURAL STEROID INJECTION INTO CERVICAL SPINE N/A 12/28/2021    Procedure: C5/6 IL FLORA with left paramedian approach with RN IV sedation;  Surgeon: Satish Segura MD;  [facility];  Service: Pain Management;  Laterality: N/A;    EYE SURGERY      HYSTERECTOMY      IMPLANTATION OF PERMANENT SACRAL NERVE  STIMULATOR N/A 1/8/2021    Procedure: INSERTION, NEUROSTIMULATOR, PERMANENT, SACRAL;  Surgeon: Onofre Fallon MD;  Location: [facility];  Service: Urology;  Laterality: N/A;    INJECTION OF ANESTHETIC AGENT AROUND MEDIAL BRANCH NERVES INNERVATING CERVICAL FACET JOINT Left 4/27/2022    Procedure: Left C4-6 MBB with RN IV sedation;  Surgeon: Satish Segura MD;  Location: [facility];  Service: Pain Management;  Laterality: Left;    INJECTION OF ANESTHETIC AGENT INTO SACROILIAC JOINT Right 1/12/2022    Procedure: right Sacroiliac Joint Injection with RN IV sedation;  Surgeon: Satish Segura MD;  Location: [facility];  Service: Pain Management;  Laterality: Right;    INJECTION OF JOINT Right 1/12/2022    Procedure: right GT bursa injection with RN IV sedation;  Surgeon: Satish Segura MD;  Location: [facility];  Service: Pain Management;  Laterality: Right;    JOINT REPLACEMENT Bilateral     knees    RETINAL DETACHMENT SURGERY      REVISION OF PROCEDURE INVOLVING SACRAL NEUROSTIMULATOR DEVICE N/A 2/23/2021    Procedure: REVISION, NEUROSTIMULATOR, SACRAL;  Surgeon: Onofre Fallon MD;  Location: [facility];  Service: Urology;  Laterality: N/A;    SHOULDER ARTHROSCOPY Right     SHOULDER ARTHROSCOPY W/ ROTATOR CUFF REPAIR Left     x 2    TOTAL REDUCTION MAMMOPLASTY Bilateral 2008         Family History:  Family History   Problem Relation Age of Onset    Hypertension Mother     Kidney disease Mother     Heart disease Father     Heart attack Father 80       Social History:  Social History     Tobacco Use    Smoking status: Never Smoker    Smokeless tobacco: Never Used   Substance and Sexual Activity    Alcohol use: Yes     Comment: Very rarely    Drug use: Never    Sexual activity: Not on file        Review of Systems     Review of Systems   Constitutional: Negative for chills and fever.   HENT: Negative for sore throat.    Respiratory: Negative for shortness of breath.    Cardiovascular: Negative  for chest pain.   Gastrointestinal: Negative for nausea.   Genitourinary: Negative for dysuria.   Musculoskeletal: Negative for back pain.   Skin: Negative for rash.        (+) Laceration to R forehead   Neurological: Negative for dizziness, syncope, weakness, numbness and headaches.   Hematological: Does not bruise/bleed easily.   Psychiatric/Behavioral: Negative for confusion.   All other systems reviewed and are negative.     Physical Exam     Initial Vitals [07/22/22 2143]   BP Pulse Resp Temp SpO2   128/73 78 14 98.7 °F (37.1 °C) 98 %      MAP       --          Physical Exam   Nursing Notes and Vital Signs Reviewed.  Constitutional: Patient is in no acute distress. Well-developed and well-nourished.  Head: 4 cm laceration on right forehead. Normocephalic.  Eyes: PERRL. EOM intact. Conjunctivae are not pale. No scleral icterus.  ENT: Mucous membranes are moist. Oropharynx is clear and symmetric.    Neck: Supple. Full ROM. No lymphadenopathy.  Cardiovascular: Regular rate. Regular rhythm. No murmurs, rubs, or gallops. Distal pulses are 2+ and symmetric.  Pulmonary/Chest: No respiratory distress. Clear to auscultation bilaterally. No wheezing or rales.  Abdominal: Soft and non-distended.  There is no tenderness.  No rebound, guarding, or rigidity. Good bowel sounds.  Genitourinary: No CVA tenderness  Musculoskeletal: Moves all extremities. No obvious deformities. No edema. Ankle brace present on L leg.  Skin: Warm and dry.  Neurological:  Alert, awake, and appropriate.  Normal speech.  No acute focal neurological deficits are appreciated.  Psychiatric: Normal affect. Good eye contact. Appropriate in content.     ED Course   Lac Repair    Date/Time: 7/23/2022 12:37 AM  Performed by: Margie Romo MD  Authorized by: Margie Romo MD     Consent:     Consent obtained:  Written    Consent given by:  Patient    Risks, benefits, and alternatives were discussed: yes      Risks discussed:  Poor cosmetic result and poor  "wound healing  Universal protocol:     Procedure explained and questions answered to patient or proxy's satisfaction: yes      Relevant documents present and verified: yes      Test results available: yes      Imaging studies available: yes      Required blood products, implants, devices, and special equipment available: yes      Patient identity confirmed:  Verbally with patient, hospital-assigned identification number and arm band  Anesthesia:     Anesthesia method:  Local infiltration    Local anesthetic:  Lidocaine 2% WITH epi  Laceration details:     Location:  Face    Face location:  Forehead    Length (cm):  4  Pre-procedure details:     Preparation:  Patient was prepped and draped in usual sterile fashion and imaging obtained to evaluate for foreign bodies  Exploration:     Limited defect created (wound extended): no      Hemostasis achieved with:  Epinephrine  Treatment:     Debridement:  None    Undermining:  None  Skin repair:     Repair method:  Sutures    Suture size:  4-0    Suture material:  Nylon    Suture technique:  Simple interrupted    Number of sutures:  9  Approximation:     Approximation:  Close  Repair type:     Repair type:  Simple  Post-procedure details:     Dressing:  Sterile dressing    Procedure completion:  Tolerated well, no immediate complications      ED Vital Signs:  Vitals:    07/22/22 2143 07/23/22 0130   BP: 128/73 130/79   Pulse: 78 70   Resp: 14 16   Temp: 98.7 °F (37.1 °C) 98.3 °F (36.8 °C)   TempSrc: Oral    SpO2: 98% 99%   Weight: 98.9 kg (218 lb)    Height: 5' 1" (1.549 m)        Abnormal Lab Results:  Labs Reviewed - No data to display     All Lab Results:  None    Imaging Results:  Imaging Results          CT Head Without Contrast (Final result)  Result time 07/22/22 22:35:43    Final result by Betzaida Martines MD (07/22/22 22:35:43)                 Impression:      No acute abnormality.    Atrophy and chronic white matter changes.    All CT scans   are performed using " dose optimization techniques including the following: automated exposure control; adjustment of the mA and/or kV; use of iterative reconstruction technique.  Dose modulation was employed for ALARA by means of: Automated exposure control; adjustment of the mA and/or kV according to patient size (this includes techniques or standardized protocols for targeted exams where dose is matched to indication/reason for exam; i.e. extremities or head); and/or use of iterative reconstructive technique.      Electronically signed by: Conrad Huston  Date:    07/22/2022  Time:    22:35             Narrative:    EXAMINATION:  CT HEAD WITHOUT CONTRAST    CLINICAL HISTORY:  Head trauma, coagulopathy (Age 19-64y);    TECHNIQUE:  Low dose axial CT images obtained throughout the head without intravenous contrast. Sagittal and coronal reconstructions were performed.    COMPARISON:  None.    FINDINGS:  Atrophy and chronic white matter changes.  No extra-axial blood or fluid collections.    No parenchymal mass, hemorrhage, edema or major vascular distribution infarct.    Skull/extracranial contents (limited evaluation): No fracture. Mastoid air cells and paranasal sinuses are essentially clear.                                 The Emergency Provider reviewed the vital signs and test results, which are outlined above.     ED Discussion     1:15 AM: Reassessed pt at this time. Discussed with pt all pertinent ED information and results. Discussed pt dx and plan of tx. Gave pt all f/u and return to the ED instructions. All questions and concerns were addressed at this time. Pt expresses understanding of information and instructions, and is comfortable with plan to discharge. Pt is stable for discharge.    I discussed with patient and/or family/caretaker that evaluation in the ED does not suggest any emergent or life threatening medical conditions requiring immediate intervention beyond what was provided in the ED, and I believe patient is safe  for discharge.  Regardless, an unremarkable evaluation in the ED does not preclude the development or presence of a serious of life threatening condition. As such, patient was instructed to return immediately for any worsening or change in current symptoms.         Medical Decision Making:   Clinical Tests:   Radiological Study: Ordered and Reviewed           ED Medication(s):  Medications   LIDOcaine 2%/EPINEPHrine 1:100,000 injection 1 mL (1 mL Subcutaneous Given 7/23/22 0046)   Tdap (BOOSTRIX) vaccine injection 0.5 mL (0.5 mLs Intramuscular Given 7/23/22 0044)   mupirocin 2 % ointment ( Topical (Top) Given 7/23/22 0045)       Discharge Medication List as of 7/23/2022  1:16 AM           Follow-up Information     Stuart Gage MD In 3 days.    Specialty: Family Medicine  Contact information:  [address] [place] [id number]  [phone]             [facility] - Emergency Dept..    Specialty: Emergency Medicine  Why: As needed, If symptoms worsen  Contact information:  [address] [place] [id number] [phone]                           Scribe Attestation:   Scribe #1: I performed the above scribed service and the documentation accurately describes the services I performed. I attest to the accuracy of the note.     Attending:   Physician Attestation Statement for Scribe #1: I, Margie Romo MD, personally performed the services described in this documentation, as scribed by Kevin Martinez, in my presence, and it is both accurate and complete.           Clinical Impression       ICD-10-CM ICD-9-CM   1. Laceration of forehead, initial encounter  S01.81XA 873.42   2. Traumatic injury of head, initial encounter  S09.90XA 959.01   3. Fall, initial encounter  W19.XXXA E888.9       Disposition:   Disposition: Discharged  Condition: Stable         Margie Romo MD  07/23/22 0516

## 2022-07-25 ENCOUNTER — TELEPHONE (OUTPATIENT)
Dept: PAIN MEDICINE | Facility: CLINIC | Age: 72
End: 2022-07-25
Payer: MEDICARE

## 2022-07-25 LAB — BACTERIA UR CULT: ABNORMAL

## 2022-07-26 ENCOUNTER — OFFICE VISIT (OUTPATIENT)
Dept: INTERNAL MEDICINE | Facility: CLINIC | Age: 72
End: 2022-07-26
Payer: MEDICARE

## 2022-07-26 VITALS
DIASTOLIC BLOOD PRESSURE: 62 MMHG | OXYGEN SATURATION: 95 % | HEART RATE: 83 BPM | WEIGHT: 216.94 LBS | BODY MASS INDEX: 39.92 KG/M2 | HEIGHT: 62 IN | SYSTOLIC BLOOD PRESSURE: 100 MMHG

## 2022-07-26 DIAGNOSIS — Z00.00 ENCOUNTER FOR PREVENTIVE HEALTH EXAMINATION: Primary | ICD-10-CM

## 2022-07-26 DIAGNOSIS — I10 HYPERTENSION, UNSPECIFIED TYPE: ICD-10-CM

## 2022-07-26 DIAGNOSIS — M85.80 OSTEOPENIA, UNSPECIFIED LOCATION: ICD-10-CM

## 2022-07-26 DIAGNOSIS — M46.1 SACROILIITIS: ICD-10-CM

## 2022-07-26 DIAGNOSIS — R73.03 PREDIABETES: ICD-10-CM

## 2022-07-26 DIAGNOSIS — E66.01 SEVERE OBESITY (BMI 35.0-39.9) WITH COMORBIDITY: ICD-10-CM

## 2022-07-26 DIAGNOSIS — R26.9 ABNORMALITY OF GAIT AND MOBILITY: ICD-10-CM

## 2022-07-26 DIAGNOSIS — E03.9 HYPOTHYROIDISM, UNSPECIFIED TYPE: ICD-10-CM

## 2022-07-26 DIAGNOSIS — R32 URINARY INCONTINENCE, UNSPECIFIED TYPE: ICD-10-CM

## 2022-07-26 PROCEDURE — 99499 UNLISTED E&M SERVICE: CPT | Mod: S$PBB,,, | Performed by: NURSE PRACTITIONER

## 2022-07-26 PROCEDURE — G0439 PR MEDICARE ANNUAL WELLNESS SUBSEQUENT VISIT: ICD-10-PCS | Mod: ,,, | Performed by: NURSE PRACTITIONER

## 2022-07-26 PROCEDURE — 99214 OFFICE O/P EST MOD 30 MIN: CPT | Mod: PBBFAC | Performed by: NURSE PRACTITIONER

## 2022-07-26 PROCEDURE — 99999 PR PBB SHADOW E&M-EST. PATIENT-LVL IV: CPT | Mod: PBBFAC,,, | Performed by: NURSE PRACTITIONER

## 2022-07-26 PROCEDURE — G0439 PPPS, SUBSEQ VISIT: HCPCS | Mod: ,,, | Performed by: NURSE PRACTITIONER

## 2022-07-26 PROCEDURE — 99499 RISK ADDL DX/OHS AUDIT: ICD-10-PCS | Mod: S$PBB,,, | Performed by: NURSE PRACTITIONER

## 2022-07-26 PROCEDURE — 99999 PR PBB SHADOW E&M-EST. PATIENT-LVL IV: ICD-10-PCS | Mod: PBBFAC,,, | Performed by: NURSE PRACTITIONER

## 2022-07-26 NOTE — PATIENT INSTRUCTIONS
Counseling and Referral of Other Preventative  (Italic type indicates deductible and co-insurance are waived)    Patient Name: Sana Crenshaw  Today's Date: 7/26/2022    Health Maintenance       Date Due Completion Date    Shingles Vaccine (1 of 2) Never done ---    COVID-19 Vaccine (3 - Booster for Pfizer series) 02/13/2022 9/13/2021    Mammogram 09/16/2022 9/16/2021    Influenza Vaccine (1) 09/01/2022 12/1/2021    DEXA Scan 09/16/2024 9/16/2021    Lipid Panel 06/10/2027 6/10/2022    Colorectal Cancer Screening 11/09/2030 11/9/2020    TETANUS VACCINE 07/23/2032 7/23/2022        No orders of the defined types were placed in this encounter.    The following information is provided to all patients.  This information is to help you find resources for any of the problems found today that may be affecting your health:                Living healthy guide: www.[facility].[place].[facility]      Understanding Diabetes: www.diabetes.org      Eating healthy: www.cdc.gov/healthyweight      [facility] home safety checklist: www.cdc.gov/steadi/patient.html      Agency on Aging: www.goea.[place].[facility]      Alcoholics anonymous (AA): www.aa.org      Physical Activity: www.reggie.nih.gov/gi4tjqe      Tobacco use: www.quitwithusla.org

## 2022-07-26 NOTE — PROGRESS NOTES
"  Sana Crenshaw presented for a  Medicare AWV and comprehensive Health Risk Assessment today. The following components were reviewed and updated:    · Medical history  · Family History  · Social history  · Allergies and Current Medications  · Health Risk Assessment  · Health Maintenance  · Care Team         ** See Completed Assessments for Annual Wellness Visit within the encounter summary.**         The following assessments were completed:  · Living Situation  · CAGE  · Depression Screening  · Timed Get Up and Go  · Whisper Test  · Cognitive Function Screening  · Nutrition Screening  · ADL Screening  · PAQ Screening        Vitals:    07/26/22 1351   BP: 100/62   BP Location: Left arm   Patient Position: Sitting   Pulse: 83   SpO2: 95%   Weight: 98.4 kg (216 lb 14.9 oz)   Height: 5' 2" (1.575 m)     Body mass index is 39.68 kg/m².  Physical Exam  Vitals and nursing note reviewed.   Constitutional:       Appearance: She is well-developed.   HENT:      Head: Normocephalic.   Cardiovascular:      Rate and Rhythm: Normal rate and regular rhythm.      Heart sounds: Normal heart sounds.   Pulmonary:      Effort: Pulmonary effort is normal. No respiratory distress.      Breath sounds: Normal breath sounds.   Abdominal:      Palpations: Abdomen is soft. There is no mass.      Tenderness: There is no abdominal tenderness.   Musculoskeletal:         General: Normal range of motion.   Skin:     General: Skin is warm and dry.   Neurological:      Mental Status: She is alert and oriented to person, place, and time.      Motor: No abnormal muscle tone.   Psychiatric:         Speech: Speech normal.         Behavior: Behavior normal.               Diagnoses and health risks identified today and associated recommendations/orders:    1. Encounter for preventive health examination  Reports she is at her respiratory baseline    Discussed locations to receive COVID booster  Discussed receiving Shingrix at pharmacy.      Reports head pain " is improving post fall. Has apt this Wednesday per pt for stitch removal.      2. Hypertension, unspecified type  Continue current treatment plan as previously prescribed with your  pcp     3. Hypothyroidism, unspecified type  Stable and controlled. Continue current treatment plan as previously prescribed with your PCP.     4. Prediabetes  a1c 5.7  Continue current treatment plan as previously prescribed with your  pcp     5. Osteopenia, unspecified location  dexa 9/21  Continue current treatment plan as previously prescribed with your  pcp     6. Sacroiliitis  Continue current treatment plan as previously prescribed with your  Pain management.     7. Severe obesity (BMI 35.0-39.9) with comorbidity  Encouraged healthy diet and exercise as tolerated to help bring BMI into normal range.    Continue current treatment plan as previously prescribed with your  pcp     8. Urinary incontinence, unspecified type  Continue current treatment plan as previously prescribed with your  pcp     9. Abnormality of gait and mobility  normal timed get up and go test. Reports 2+ falls in the last 12 months.    Fall precautions reviewed with patient. Advised to follow up with PCP for further recommendations. Patient expressed understanding.       10. Reports numbness in 5th digit of right hand  Has recently discussed with pcp per pt    Reports a decrease in food intake due to antibiotic use. She knows to follow up with PCP if does not return to baseline when antibiotics are completed.         Provided Sana with a 5-10 year written screening schedule and personal prevention plan. Recommendations were developed using the USPSTF age appropriate recommendations. Education, counseling, and referrals were provided as needed. After Visit Summary printed and given to patient which includes a list of additional screenings\tests needed.    Follow up in about 1 year (around 7/26/2023) for awv.    Elvia Lindsay NP  I offered to discuss advanced  care planning, including how to pick a person who would make decisions for you if you were unable to make them for yourself, called a health care power of , and what kind of decisions you might make such as use of life sustaining treatments such as ventilators and tube feeding when faced with a life limiting illness recorded on a living will that they will need to know. (How you want to be cared for as you near the end of your natural life)     X Patient is interested in learning more about how to make advanced directives.  I provided them paperwork and offered to discuss this with them.

## 2022-07-27 ENCOUNTER — OFFICE VISIT (OUTPATIENT)
Dept: FAMILY MEDICINE | Facility: CLINIC | Age: 72
End: 2022-07-27
Payer: MEDICARE

## 2022-07-27 VITALS
TEMPERATURE: 98 F | OXYGEN SATURATION: 97 % | BODY MASS INDEX: 39.84 KG/M2 | HEIGHT: 62 IN | HEART RATE: 71 BPM | SYSTOLIC BLOOD PRESSURE: 104 MMHG | WEIGHT: 216.5 LBS | DIASTOLIC BLOOD PRESSURE: 60 MMHG

## 2022-07-27 DIAGNOSIS — N30.00 ACUTE CYSTITIS WITHOUT HEMATURIA: ICD-10-CM

## 2022-07-27 DIAGNOSIS — E03.9 HYPOTHYROIDISM, UNSPECIFIED TYPE: ICD-10-CM

## 2022-07-27 DIAGNOSIS — E66.01 SEVERE OBESITY WITH BODY MASS INDEX (BMI) OF 36.0 TO 36.9 WITH SERIOUS COMORBIDITY: ICD-10-CM

## 2022-07-27 DIAGNOSIS — R20.0 FINGER NUMBNESS: ICD-10-CM

## 2022-07-27 DIAGNOSIS — R73.03 PREDIABETES: ICD-10-CM

## 2022-07-27 DIAGNOSIS — S01.81XD: Primary | ICD-10-CM

## 2022-07-27 DIAGNOSIS — I10 ESSENTIAL HYPERTENSION: ICD-10-CM

## 2022-07-27 PROCEDURE — 99214 OFFICE O/P EST MOD 30 MIN: CPT | Mod: S$PBB,,, | Performed by: FAMILY MEDICINE

## 2022-07-27 PROCEDURE — 99214 PR OFFICE/OUTPT VISIT, EST, LEVL IV, 30-39 MIN: ICD-10-PCS | Mod: S$PBB,,, | Performed by: FAMILY MEDICINE

## 2022-07-27 PROCEDURE — 99999 PR PBB SHADOW E&M-EST. PATIENT-LVL III: CPT | Mod: PBBFAC,,, | Performed by: FAMILY MEDICINE

## 2022-07-27 PROCEDURE — 99999 PR PBB SHADOW E&M-EST. PATIENT-LVL III: ICD-10-PCS | Mod: PBBFAC,,, | Performed by: FAMILY MEDICINE

## 2022-07-27 PROCEDURE — 99213 OFFICE O/P EST LOW 20 MIN: CPT | Mod: PBBFAC,PO | Performed by: FAMILY MEDICINE

## 2022-07-27 NOTE — PROGRESS NOTES
Subjective:       Patient ID: Sana Crenshaw is a 71 y.o. female.    Chief Complaint: No chief complaint on file.      HPI Comments:       Current Outpatient Medications:     amLODIPine (NORVASC) 5 MG tablet, TAKE 1 TABLET BY MOUTH ONCE DAILY, Disp: 90 tablet, Rfl: 3    aspirin (ECOTRIN) 81 MG EC tablet, Take 81 mg by mouth once daily., Disp: , Rfl:     DULoxetine (CYMBALTA) 30 MG capsule, Take 1 capsule (30 mg total) by mouth once daily., Disp: 90 capsule, Rfl: 1    levothyroxine (SYNTHROID) 50 MCG tablet, TAKE 1 TABLET BY MOUTH  BEFORE BREAKFAST, Disp: 90 tablet, Rfl: 3    lisinopriL (PRINIVIL,ZESTRIL) 20 MG tablet, TAKE 1 TABLET BY MOUTH ONCE DAILY, Disp: 90 tablet, Rfl: 3    metoprolol succinate (TOPROL-XL) 100 MG 24 hr tablet, TAKE 1 TABLET BY MOUTH ONCE DAILY, Disp: 90 tablet, Rfl: 3    omeprazole (PRILOSEC) 20 MG capsule, Take 1 capsule (20 mg total) by mouth 2 (two) times a day., Disp: 180 capsule, Rfl: 3    ondansetron (ZOFRAN-ODT) 4 MG TbDL, Take 1 tablet (4 mg total) by mouth every 8 (eight) hours as needed (nausea)., Disp: 10 tablet, Rfl: 0    oxybutynin (DITROPAN-XL) 10 MG 24 hr tablet, Take 10 mg by mouth once daily., Disp: , Rfl:     rOPINIRole (REQUIP) 1 MG tablet, Take 1 tablet (1 mg total) by mouth every evening., Disp: 90 tablet, Rfl: 0    sulfamethoxazole-trimethoprim 800-160mg (BACTRIM DS) 800-160 mg Tab, Take 1 tablet by mouth 2 (two) times daily. for 7 days, Disp: 14 tablet, Rfl: 0  No current facility-administered medications for this visit.    Facility-Administered Medications Ordered in Other Visits:     lactated ringers infusion, , Intravenous, Continuous, Franky Hartman MD, Last Rate: 20 mL/hr at 01/08/21 0731, New Bag at 01/08/21 0925    lactated ringers infusion, , Intravenous, Continuous, Estevan Rodriguez MD, Stopped at 02/23/21 1645      Legs gave out on her and she was making the bed 3 days ago.  Fell and hit her forehead, causing a laceration requiring 9  "sutures.  Was told to come back in 3 DAYS for suture removal.  No problems with the wound including bleeding or drainage.  Not much pain.  No headache.    Says her legs use to give out on her like this in the past.  Nothing recent.  Has not occurred since Sunday.  Does have chronic knee pain for which she is going to be seeing Orthopedics in the fall.  The    Also complains of ongoing numbness in the 5th finger on the right hand.  From the D IP joint out.  This is just started when I saw her last month.  Fairly constant    Treated for UTI recently.  E coli.  Pansensitive.  On Bactrim.  Seems to be getting better.  Thinks the antibiotics of affected appetite and may have we can her to cause her to fall cut her head.    A retinal specialist nearby will see soon.    [facility] for [facility].    Review of Systems   Constitutional: Negative for activity change, appetite change and fever.   HENT: Negative for sore throat.    Respiratory: Negative for cough and shortness of breath.    Cardiovascular: Negative for chest pain.   Gastrointestinal: Negative for abdominal pain, diarrhea and nausea.   Genitourinary: Negative for difficulty urinating.   Musculoskeletal: Negative for arthralgias and myalgias.   Skin: Positive for wound.   Neurological: Negative for dizziness and headaches.       Objective:      Vitals:    07/27/22 1259   BP: 104/60   Pulse: 71   Temp: 97.8 °F (36.6 °C)   SpO2: 97%   Weight: 98.2 kg (216 lb 7.9 oz)   Height: 5' 2" (1.575 m)     Physical Exam  Vitals and nursing note reviewed.   Constitutional:       General: She is not in acute distress.     Appearance: She is well-developed. She is not diaphoretic.   HENT:      Head: Normocephalic.      Comments: Right forehead laceration with 9 interrupted sutures.  Very little granulation tissue appreciated in wound edges.     Mouth/Throat:      Pharynx: No oropharyngeal exudate.   Neck:      Thyroid: No thyromegaly.   Cardiovascular:      Rate and Rhythm: " Normal rate and regular rhythm.      Heart sounds: Normal heart sounds. No murmur heard.  Pulmonary:      Effort: Pulmonary effort is normal.      Breath sounds: Normal breath sounds. No wheezing or rales.   Abdominal:      General: There is no distension.      Palpations: Abdomen is soft.   Musculoskeletal:      Cervical back: Neck supple.   Lymphadenopathy:      Cervical: No cervical adenopathy.   Skin:     General: Skin is warm and dry.   Neurological:      Mental Status: She is alert and oriented to person, place, and time.   Psychiatric:         Behavior: Behavior normal.         Thought Content: Thought content normal.         Judgment: Judgment normal.         Assessment:       1. Laceration of face with complication, subsequent encounter    2. Finger numbness    3. Severe obesity with body mass index (BMI) of 36.0 to 36.9 with serious comorbidity    4. Essential hypertension    5. Prediabetes    6. Hypothyroidism, unspecified type    7. Acute cystitis without hematuria        Plan:   Laceration of face with complication, subsequent encounter  Comments:  To soon for suture removal.  Follow-up for this on 08/01    Finger numbness  Comments:  Hand orthopedic consult  Orders:  -     Ambulatory referral/consult to Orthopedics; Future; Expected date: 08/03/2022    Severe obesity with body mass index (BMI) of 36.0 to 36.9 with serious comorbidity  Comments:  Lost several lb since last visit    Essential hypertension  Comments:  Controlled    Prediabetes  Comments:  A1c 5.7    Hypothyroidism, unspecified type  Comments:  Euthyroid at current dose    Acute cystitis without hematuria  Comments:  Symptoms improving on Bactrim

## 2022-07-31 ENCOUNTER — EXTERNAL CHRONIC CARE MANAGEMENT (OUTPATIENT)
Dept: PRIMARY CARE CLINIC | Facility: CLINIC | Age: 72
End: 2022-07-31
Payer: MEDICARE

## 2022-07-31 PROCEDURE — 99490 PR CHRONIC CARE MGMT, 1ST 20 MIN: ICD-10-PCS | Mod: S$PBB,,, | Performed by: FAMILY MEDICINE

## 2022-07-31 PROCEDURE — 99490 CHRNC CARE MGMT STAFF 1ST 20: CPT | Mod: S$PBB,,, | Performed by: FAMILY MEDICINE

## 2022-07-31 PROCEDURE — 99490 CHRNC CARE MGMT STAFF 1ST 20: CPT | Mod: PBBFAC,PO | Performed by: FAMILY MEDICINE

## 2022-08-01 ENCOUNTER — OFFICE VISIT (OUTPATIENT)
Dept: FAMILY MEDICINE | Facility: CLINIC | Age: 72
End: 2022-08-01
Payer: MEDICARE

## 2022-08-01 VITALS
HEIGHT: 62 IN | SYSTOLIC BLOOD PRESSURE: 102 MMHG | WEIGHT: 216.25 LBS | TEMPERATURE: 98 F | HEART RATE: 72 BPM | DIASTOLIC BLOOD PRESSURE: 66 MMHG | OXYGEN SATURATION: 98 % | BODY MASS INDEX: 39.79 KG/M2

## 2022-08-01 DIAGNOSIS — S01.81XD: ICD-10-CM

## 2022-08-01 DIAGNOSIS — I10 ESSENTIAL HYPERTENSION: ICD-10-CM

## 2022-08-01 DIAGNOSIS — K21.9 GASTROESOPHAGEAL REFLUX DISEASE, UNSPECIFIED WHETHER ESOPHAGITIS PRESENT: ICD-10-CM

## 2022-08-01 DIAGNOSIS — Z48.02 ENCOUNTER FOR REMOVAL OF SUTURES: Primary | ICD-10-CM

## 2022-08-01 DIAGNOSIS — Z12.31 ENCOUNTER FOR SCREENING MAMMOGRAM FOR MALIGNANT NEOPLASM OF BREAST: Chronic | ICD-10-CM

## 2022-08-01 PROCEDURE — 99213 OFFICE O/P EST LOW 20 MIN: CPT | Mod: S$PBB,,, | Performed by: FAMILY MEDICINE

## 2022-08-01 PROCEDURE — 99999 PR PBB SHADOW E&M-EST. PATIENT-LVL III: CPT | Mod: PBBFAC,,, | Performed by: FAMILY MEDICINE

## 2022-08-01 PROCEDURE — 99213 PR OFFICE/OUTPT VISIT, EST, LEVL III, 20-29 MIN: ICD-10-PCS | Mod: S$PBB,,, | Performed by: FAMILY MEDICINE

## 2022-08-01 PROCEDURE — 99999 PR PBB SHADOW E&M-EST. PATIENT-LVL III: ICD-10-PCS | Mod: PBBFAC,,, | Performed by: FAMILY MEDICINE

## 2022-08-01 PROCEDURE — 99213 OFFICE O/P EST LOW 20 MIN: CPT | Mod: PBBFAC,PO | Performed by: FAMILY MEDICINE

## 2022-08-01 NOTE — PROGRESS NOTES
Subjective:       Patient ID: Sana Crenshaw is a 71 y.o. female.    Chief Complaint: Suture / Staple Removal      HPI Comments:       Current Outpatient Medications:     amLODIPine (NORVASC) 5 MG tablet, TAKE 1 TABLET BY MOUTH ONCE DAILY, Disp: 90 tablet, Rfl: 3    aspirin (ECOTRIN) 81 MG EC tablet, Take 81 mg by mouth once daily., Disp: , Rfl:     DULoxetine (CYMBALTA) 30 MG capsule, Take 1 capsule (30 mg total) by mouth once daily., Disp: 90 capsule, Rfl: 1    levothyroxine (SYNTHROID) 50 MCG tablet, TAKE 1 TABLET BY MOUTH  BEFORE BREAKFAST, Disp: 90 tablet, Rfl: 3    metoprolol succinate (TOPROL-XL) 100 MG 24 hr tablet, TAKE 1 TABLET BY MOUTH ONCE DAILY, Disp: 90 tablet, Rfl: 3    omeprazole (PRILOSEC) 20 MG capsule, Take 1 capsule (20 mg total) by mouth 2 (two) times a day., Disp: 180 capsule, Rfl: 3    ondansetron (ZOFRAN-ODT) 4 MG TbDL, Take 1 tablet (4 mg total) by mouth every 8 (eight) hours as needed (nausea)., Disp: 10 tablet, Rfl: 0    oxybutynin (DITROPAN-XL) 10 MG 24 hr tablet, Take 10 mg by mouth once daily., Disp: , Rfl:     rOPINIRole (REQUIP) 1 MG tablet, Take 1 tablet (1 mg total) by mouth every evening., Disp: 90 tablet, Rfl: 0    lisinopriL (PRINIVIL,ZESTRIL) 20 MG tablet, Take 1 tablet (20 mg total) by mouth once daily., Disp: 90 tablet, Rfl: 3  No current facility-administered medications for this visit.    Facility-Administered Medications Ordered in Other Visits:     lactated ringers infusion, , Intravenous, Continuous, Franky Hartman MD, Last Rate: 20 mL/hr at 01/08/21 0731, New Bag at 01/08/21 0925    lactated ringers infusion, , Intravenous, Continuous, Estevan Rodriguez MD, Stopped at 02/23/21 1645      Here for sutures to be removed from forehead laceration.  No problems, including pain, bleeding, drainage    Review of Systems   Constitutional: Negative for activity change, appetite change and fever.   HENT: Negative for sore throat.    Respiratory: Negative for  "cough and shortness of breath.    Cardiovascular: Negative for chest pain.   Gastrointestinal: Negative for abdominal pain, diarrhea and nausea.   Genitourinary: Negative for difficulty urinating.   Musculoskeletal: Negative for arthralgias and myalgias.   Skin: Positive for wound.   Neurological: Negative for dizziness and headaches.       Objective:      Vitals:    08/01/22 1419   BP: 102/66   Pulse: 72   Temp: 98.1 °F (36.7 °C)   TempSrc: Temporal   SpO2: 98%   Weight: 98.1 kg (216 lb 4.3 oz)   Height: 5' 2" (1.575 m)   PainSc: 0-No pain     Physical Exam  Constitutional:       Appearance: She is not ill-appearing.   HENT:      Head: Normocephalic.      Comments: Ninety interrupted sutures removed without difficulty.  Good cosmetic result.  Wound edges well approximated.  Steri-Strips applied  Pulmonary:      Effort: Pulmonary effort is normal. No respiratory distress.   Musculoskeletal:      Cervical back: Neck supple.   Neurological:      Mental Status: She is alert and oriented to person, place, and time.   Psychiatric:         Mood and Affect: Mood normal.         Behavior: Behavior normal.         Thought Content: Thought content normal.         Judgment: Judgment normal.       she is   Assessment:       1. Encounter for removal of sutures    2. Laceration of face with complication, subsequent encounter    3. Essential hypertension    4. Encounter for screening mammogram for malignant neoplasm of breast     5. Gastroesophageal reflux disease, unspecified whether esophagitis present        Plan:   Encounter for removal of sutures  Comments:  removal without difficulty.  Good cosmetic result    Laceration of face with complication, subsequent encounter  Comments:  No complications    Essential hypertension  Comments:  Controlled    Encounter for screening mammogram for malignant neoplasm of breast   Comments:  Mammogram ordered  Orders:  -     Mammo Digital Screening Bilat w/ Mathieu; Future; Expected date: " 08/01/2022    Gastroesophageal reflux disease, unspecified whether esophagitis present  Comments:  sees GI in 2 weeks

## 2022-08-12 DIAGNOSIS — M79.641 BILATERAL HAND PAIN: Primary | ICD-10-CM

## 2022-08-12 DIAGNOSIS — M79.642 BILATERAL HAND PAIN: Primary | ICD-10-CM

## 2022-08-12 RX ORDER — ROPINIROLE 1 MG/1
TABLET, FILM COATED ORAL
Qty: 90 TABLET | Refills: 0 | Status: SHIPPED | OUTPATIENT
Start: 2022-08-12 | End: 2022-10-24

## 2022-08-12 RX ORDER — LISINOPRIL 20 MG/1
20 TABLET ORAL DAILY
Qty: 90 TABLET | Refills: 3 | Status: SHIPPED | OUTPATIENT
Start: 2022-08-12 | End: 2023-12-21 | Stop reason: SDUPTHER

## 2022-08-12 NOTE — TELEPHONE ENCOUNTER
No new care gaps identified.  [facility] Embedded Care Gaps. Reference number: [id number]. 8/12/2022   10:22:31 AM MONICAT

## 2022-08-12 NOTE — TELEPHONE ENCOUNTER
Refill Routing Note   Medication(s) are not appropriate for processing by [facility] for the following reason(s):      - Outside of protocol    [facility] action(s):  Route  Approve       Medication Therapy Plan: non-delegated  Medication reconciliation completed: No     Appointments  past 12m or future 3m with PCP    Date Provider   Last Visit   8/1/2022 Stuart Gage MD   Next Visit   12/12/2022 Stuart Gage MD   ED visits in past 90 days: 1        Note composed:12:25 PM 08/12/2022

## 2022-08-31 ENCOUNTER — EXTERNAL CHRONIC CARE MANAGEMENT (OUTPATIENT)
Dept: PRIMARY CARE CLINIC | Facility: CLINIC | Age: 72
End: 2022-08-31
Payer: MEDICARE

## 2022-08-31 PROCEDURE — 99439 PR CHRONIC CARE MGMT, EA ADDTL 20 MIN: ICD-10-PCS | Mod: S$PBB,,, | Performed by: FAMILY MEDICINE

## 2022-08-31 PROCEDURE — 99490 PR CHRONIC CARE MGMT, 1ST 20 MIN: ICD-10-PCS | Mod: S$PBB,,, | Performed by: FAMILY MEDICINE

## 2022-08-31 PROCEDURE — 99490 CHRNC CARE MGMT STAFF 1ST 20: CPT | Mod: PBBFAC,PO | Performed by: FAMILY MEDICINE

## 2022-08-31 PROCEDURE — 99439 CHRNC CARE MGMT STAF EA ADDL: CPT | Mod: S$PBB,,, | Performed by: FAMILY MEDICINE

## 2022-08-31 PROCEDURE — 99490 CHRNC CARE MGMT STAFF 1ST 20: CPT | Mod: S$PBB,,, | Performed by: FAMILY MEDICINE

## 2022-08-31 PROCEDURE — 99439 CHRNC CARE MGMT STAF EA ADDL: CPT | Mod: PBBFAC,PO | Performed by: FAMILY MEDICINE

## 2022-09-30 ENCOUNTER — EXTERNAL CHRONIC CARE MANAGEMENT (OUTPATIENT)
Dept: PRIMARY CARE CLINIC | Facility: CLINIC | Age: 72
End: 2022-09-30
Payer: MEDICARE

## 2022-09-30 PROCEDURE — 99490 CHRNC CARE MGMT STAFF 1ST 20: CPT | Mod: S$PBB,,, | Performed by: FAMILY MEDICINE

## 2022-09-30 PROCEDURE — 99490 CHRNC CARE MGMT STAFF 1ST 20: CPT | Mod: PBBFAC,PO | Performed by: FAMILY MEDICINE

## 2022-09-30 PROCEDURE — 99490 PR CHRONIC CARE MGMT, 1ST 20 MIN: ICD-10-PCS | Mod: S$PBB,,, | Performed by: FAMILY MEDICINE

## 2022-10-10 ENCOUNTER — OFFICE VISIT (OUTPATIENT)
Dept: ORTHOPEDICS | Facility: CLINIC | Age: 72
End: 2022-10-10
Payer: MEDICARE

## 2022-10-10 ENCOUNTER — HOSPITAL ENCOUNTER (OUTPATIENT)
Dept: RADIOLOGY | Facility: HOSPITAL | Age: 72
Discharge: HOME | End: 2022-10-10
Attending: ORTHOPAEDIC SURGERY
Payer: MEDICARE

## 2022-10-10 VITALS — BODY MASS INDEX: 39.75 KG/M2 | HEIGHT: 62 IN | WEIGHT: 216 LBS

## 2022-10-10 DIAGNOSIS — Z96.653 STATUS POST TOTAL BILATERAL KNEE REPLACEMENT: ICD-10-CM

## 2022-10-10 DIAGNOSIS — Z96.653 HISTORY OF BILATERAL KNEE REPLACEMENT: ICD-10-CM

## 2022-10-10 DIAGNOSIS — M23.52 CHRONIC KNEE INSTABILITY, LEFT: ICD-10-CM

## 2022-10-10 DIAGNOSIS — M23.51 CHRONIC KNEE INSTABILITY, RIGHT: ICD-10-CM

## 2022-10-10 DIAGNOSIS — M79.642 LEFT HAND PAIN: Primary | ICD-10-CM

## 2022-10-10 DIAGNOSIS — Z96.653 HISTORY OF TOTAL BILATERAL KNEE REPLACEMENT: Primary | ICD-10-CM

## 2022-10-10 PROCEDURE — 99999 PR PBB SHADOW E&M-EST. PATIENT-LVL III: CPT | Mod: PBBFAC,,, | Performed by: ORTHOPAEDIC SURGERY

## 2022-10-10 PROCEDURE — 73564 XR KNEE ORTHO BILAT WITH FLEXION: ICD-10-PCS | Mod: 26,50,, | Performed by: RADIOLOGY

## 2022-10-10 PROCEDURE — 99999 PR PBB SHADOW E&M-EST. PATIENT-LVL III: ICD-10-PCS | Mod: PBBFAC,,, | Performed by: ORTHOPAEDIC SURGERY

## 2022-10-10 PROCEDURE — 73564 X-RAY EXAM KNEE 4 OR MORE: CPT | Mod: TC,50

## 2022-10-10 PROCEDURE — 99204 PR OFFICE/OUTPT VISIT, NEW, LEVL IV, 45-59 MIN: ICD-10-PCS | Mod: S$PBB,,, | Performed by: ORTHOPAEDIC SURGERY

## 2022-10-10 PROCEDURE — 99213 OFFICE O/P EST LOW 20 MIN: CPT | Mod: PBBFAC | Performed by: ORTHOPAEDIC SURGERY

## 2022-10-10 PROCEDURE — 73564 X-RAY EXAM KNEE 4 OR MORE: CPT | Mod: 26,50,, | Performed by: RADIOLOGY

## 2022-10-10 PROCEDURE — 99204 OFFICE O/P NEW MOD 45 MIN: CPT | Mod: S$PBB,,, | Performed by: ORTHOPAEDIC SURGERY

## 2022-10-10 NOTE — PATIENT INSTRUCTIONS
X-rays of both of her total knees the right knee is basically a posterior cruciate ligament sacrificing knee and the metal is still attached to the bone without any problems and you kneecap is still doing well  The x-ray on the left knee I am not too sure if the posterior cruciate ligament is sec if I stone not however also the metal is not coming loose from the bones  There is a little widening on the x-ray when you stand telling me you have maybe ligament instability maybe early plastic wear  You not having too much problems in you knees they are not warm or hot , occasionally the right knee gives out on you   I would like to see you in 1 year with repeat x-ray and if you have more problems prior to that please call us to return sooner  In the future you may if you having more instability have what we call revision of the knees and that means if they still having that plastic for that knee then we can not change the plastic for you    As far as her shoulders you have inability to lift her left shoulder due to old rotator cuff tear and probably now you have what we call cuff arthropathy.  You could benefit in the future from what we call or reverse total shoulder replacement.  If you decide that you 1 a you shoulders evaluated will send you to see Dr. Galloway

## 2022-10-10 NOTE — PROGRESS NOTES
Subjective:     Patient ID: Sana Crenshaw is a 71 y.o. female.    Chief Complaint: Pain and Swelling of the Right Knee and Pain and Swelling of the Left Knee    HPI:  10/07/2022   Evaluation of bilateral total knee replacement   71-year-old stated that she had left TKA in 2001 followed by right TKA 2003 in [facility] by Dr. Mann.  She wants her knees evaluated because she has not had does evaluated in a while.  She just moved down from [place] to be with her family here we which moved down here for work.  She is living with her daughter and her grandchildren at this time.  She does have some shoulder issues but she wants to wait till later for have those evaluated.  Gives history that injured them at work working for the [employer] [place] and there was a delay in approval of surgery for rotator cuff tear on the left and when she went for the surgery was too late was not repairable.  She has hard time lifting her arm up.  I offered that she go see Dr. Johnson head she wants to hold off at this time   She is wearing a brace on the left foot given to her  Her pains are 3/10 occasional giving way on the right knee but otherwise she is ambulating without assistive devices.  She does have quite a bit of problems in the back    Denies any fever or chills or shortness of breath difficulty with chewing or swallowing loss of bowel bladder control blurry vision double vision loss sense smell taste    Past Medical History:   Diagnosis Date    Asthma     Hypertension     Thyroid disease      Past Surgical History:   Procedure Laterality Date    ANKLE FUSION Right     BREAST SURGERY      BUNIONECTOMY Right     CATARACT EXTRACTION W/  INTRAOCULAR LENS IMPLANT      EPIDURAL STEROID INJECTION INTO CERVICAL SPINE N/A 12/28/2021    Procedure: C5/6 IL FLORA with left paramedian approach with RN IV sedation;  Surgeon: Satish Segura MD;  Location: [facility];  Service: Pain Management;  Laterality: N/A;    EYE SURGERY       HYSTERECTOMY      IMPLANTATION OF PERMANENT SACRAL NERVE STIMULATOR N/A 1/8/2021    Procedure: INSERTION, NEUROSTIMULATOR, PERMANENT, SACRAL;  Surgeon: Onofre Fallon MD;  Location: [facility];  Service: Urology;  Laterality: N/A;    INJECTION OF ANESTHETIC AGENT AROUND MEDIAL BRANCH NERVES INNERVATING CERVICAL FACET JOINT Left 4/27/2022    Procedure: Left C4-6 MBB with RN IV sedation;  Surgeon: Satish Segura MD;  Location: [facility];  Service: Pain Management;  Laterality: Left;    INJECTION OF ANESTHETIC AGENT INTO SACROILIAC JOINT Right 1/12/2022    Procedure: right Sacroiliac Joint Injection with RN IV sedation;  Surgeon: Satish Segura MD;  Location: [facility];  Service: Pain Management;  Laterality: Right;    INJECTION OF JOINT Right 1/12/2022    Procedure: right GT bursa injection with RN IV sedation;  Surgeon: Satish Segura MD;  Location: [facility];  Service: Pain Management;  Laterality: Right;    JOINT REPLACEMENT Bilateral     knees    RETINAL DETACHMENT SURGERY      REVISION OF PROCEDURE INVOLVING SACRAL NEUROSTIMULATOR DEVICE N/A 2/23/2021    Procedure: REVISION, NEUROSTIMULATOR, SACRAL;  Surgeon: Onofre Fallon MD;  Location: [facility];  Service: Urology;  Laterality: N/A;    SHOULDER ARTHROSCOPY Right     SHOULDER ARTHROSCOPY W/ ROTATOR CUFF REPAIR Left     x 2    TOTAL REDUCTION MAMMOPLASTY Bilateral 2008     Family History   Problem Relation Age of Onset    Hypertension Mother     Kidney disease Mother     Heart disease Father     Heart attack Father 80     Social History     Socioeconomic History    Marital status:    Tobacco Use    Smoking status: Never    Smokeless tobacco: Never   Substance and Sexual Activity    Alcohol use: Not Currently     Comment: Very rarely    Drug use: Never     Social Determinants of Health     Financial Resource Strain: Low Risk     Difficulty of Paying Living Expenses: Not hard at all   Food Insecurity: No Food Insecurity    Worried About Running  Out of Food in the Last Year: Never true    Ran Out of Food in the Last Year: Never true   Transportation Needs: No Transportation Needs    Lack of Transportation (Medical): No    Lack of Transportation (Non-Medical): No   Physical Activity: Inactive    Days of Exercise per Week: 0 days    Minutes of Exercise per Session: 0 min   Stress: No Stress Concern Present    Feeling of Stress : Not at all   Social Connections: Socially Isolated    Frequency of Communication with Friends and Family: More than three times a week    Frequency of Social Gatherings with Friends and Family: More than three times a week    Attends Orthodox Services: Never    Active Member of Clubs or Organizations: No    Attends Club or Organization Meetings: Never    Marital Status:    Housing Stability: Low Risk     Unable to Pay for Housing in the Last Year: No    Number of Places Lived in the Last Year: 1    Unstable Housing in the Last Year: No     Medication List with Changes/Refills   Current Medications    AMLODIPINE (NORVASC) 5 MG TABLET    TAKE 1 TABLET BY MOUTH ONCE DAILY    ASPIRIN (ECOTRIN) 81 MG EC TABLET    Take 81 mg by mouth once daily.    DULOXETINE (CYMBALTA) 30 MG CAPSULE    Take 1 capsule (30 mg total) by mouth once daily.    LEVOTHYROXINE (SYNTHROID) 50 MCG TABLET    TAKE 1 TABLET BY MOUTH  BEFORE BREAKFAST    LISINOPRIL (PRINIVIL,ZESTRIL) 20 MG TABLET    Take 1 tablet (20 mg total) by mouth once daily.    METOPROLOL SUCCINATE (TOPROL-XL) 100 MG 24 HR TABLET    TAKE 1 TABLET BY MOUTH ONCE DAILY    OMEPRAZOLE (PRILOSEC) 20 MG CAPSULE    Take 1 capsule (20 mg total) by mouth 2 (two) times a day.    OXYBUTYNIN (DITROPAN-XL) 10 MG 24 HR TABLET    Take 10 mg by mouth once daily.    ROPINIROLE (REQUIP) 1 MG TABLET    TAKE 1 TABLET BY MOUTH IN  THE EVENING   Discontinued Medications    ONDANSETRON (ZOFRAN-ODT) 4 MG TBDL    Take 1 tablet (4 mg total) by mouth every 8 (eight) hours as needed (nausea).     Review of patient's  allergies indicates:   Allergen Reactions    Gabapentin Nausea And Vomiting     Causes vertigo      Review of Systems   Constitutional: Negative for decreased appetite.   HENT:  Negative for tinnitus.    Eyes:  Negative for double vision.   Cardiovascular:  Negative for chest pain.   Respiratory:  Negative for wheezing.    Hematologic/Lymphatic: Negative for bleeding problem.   Skin:  Negative for dry skin.   Musculoskeletal:  Positive for arthritis, back pain and muscle weakness. Negative for gout, neck pain and stiffness.   Gastrointestinal:  Negative for abdominal pain.   Genitourinary:  Negative for bladder incontinence.   Neurological:  Negative for numbness, paresthesias and sensory change.   Psychiatric/Behavioral:  Negative for altered mental status.      Objective:   Body mass index is 39.51 kg/m².  There were no vitals filed for this visit.       General    Constitutional: She is oriented to person, place, and time. She appears well-developed.   HENT:   Head: Atraumatic.   Eyes: EOM are normal.   Pulmonary/Chest: Effort normal.   Neurological: She is alert and oriented to person, place, and time.   Psychiatric: Judgment normal.         Ambulating without assistive devices   She has a waddling gait   Bilateral hips passive motion no pain in the groin good range of motion   Bilateral on hip flexors and abductors and adductors were slightly weak at 5-/5   Right knee surgical incision healed well.  Active motion 0-120 degrees.  Slightly unstable to valgus stressing in extension and 1+ Lachman.  No defect in the patella or quadriceps tendon.  No swelling.  Slight clicking noise from ballottement of the knee.  The knee is not warm and not erythematous  Left knee surgical incision healed well.  Active motion 0-125.  Slight instability to varus and valgus stressing in extension specially on the medial side.  She has 1+ Lachman.  No defect in the patella or quadriceps tendon.  Clicking and noise to range of motion  .  The knee is not warm or erythematous  Calves are soft   She has a brace around the left lower extremity and wearing shoes could not do further evaluation of the ankle and foot    Relevant imaging results reviewed and interpreted by me, discussed with the patient and / or family today     X-ray 10/07/2022 bilateral knees showing bilateral TKAs right side cruciate sacrificing/Biomet knee and the left knee cruciate sparing Biomet knee.  There is widening at the joint line consistent possible ligament instability versus poly wear.  Patella is midline bilaterally and resurfaced  Assessment:     Encounter Diagnoses   Name Primary?    Status post total bilateral knee replacement     History of total bilateral knee replacement Yes    Chronic knee instability, left     Chronic knee instability, right         Plan:   History of total bilateral knee replacement    Status post total bilateral knee replacement  Comments:  Orthopedic consult  Orders:  -     Ambulatory referral/consult to Orthopedics    Chronic knee instability, left    Chronic knee instability, right       Patient Instructions   X-rays of both of her total knees the right knee is basically a posterior cruciate ligament sacrificing knee and the metal is still attached to the bone without any problems and you kneecap is still doing well  The x-ray on the left knee I am not too sure if the posterior cruciate ligament is sec if I stone not however also the metal is not coming loose from the bones  There is a little widening on the x-ray when you stand telling me you have maybe ligament instability maybe early plastic wear  You not having too much problems in you knees they are not warm or hot , occasionally the right knee gives out on you   I would like to see you in 1 year with repeat x-ray and if you have more problems prior to that please call us to return sooner  In the future you may if you having more instability have what we call revision of the knees and  that means if they still having that plastic for that knee then we can not change the plastic for you    As far as her shoulders you have inability to lift her left shoulder due to old rotator cuff tear and probably now you have what we call cuff arthropathy.  You could benefit in the future from what we call or reverse total shoulder replacement.  If you decide that you 1 a you shoulders evaluated will send you to see Dr. Galloway        Disclaimer: This note was prepared using a voice recognition system and is likely to have sound alike errors within the text.

## 2022-10-19 ENCOUNTER — OFFICE VISIT (OUTPATIENT)
Dept: ORTHOPEDICS | Facility: CLINIC | Age: 72
End: 2022-10-19
Payer: MEDICARE

## 2022-10-19 ENCOUNTER — HOSPITAL ENCOUNTER (OUTPATIENT)
Dept: RADIOLOGY | Facility: HOSPITAL | Age: 72
Discharge: HOME | End: 2022-10-19
Attending: ORTHOPAEDIC SURGERY
Payer: MEDICARE

## 2022-10-19 VITALS — WEIGHT: 216.06 LBS | BODY MASS INDEX: 39.76 KG/M2 | HEIGHT: 62 IN

## 2022-10-19 DIAGNOSIS — G56.21 CUBITAL TUNNEL SYNDROME ON RIGHT: Primary | ICD-10-CM

## 2022-10-19 DIAGNOSIS — R20.0 NUMBNESS AND TINGLING IN BOTH HANDS: Primary | ICD-10-CM

## 2022-10-19 DIAGNOSIS — M79.641 RIGHT HAND PAIN: Primary | ICD-10-CM

## 2022-10-19 DIAGNOSIS — R20.2 NUMBNESS AND TINGLING IN BOTH HANDS: Primary | ICD-10-CM

## 2022-10-19 DIAGNOSIS — M79.641 RIGHT HAND PAIN: ICD-10-CM

## 2022-10-19 DIAGNOSIS — M79.642 LEFT HAND PAIN: ICD-10-CM

## 2022-10-19 PROCEDURE — 99999 PR PBB SHADOW E&M-EST. PATIENT-LVL III: ICD-10-PCS | Mod: PBBFAC,,, | Performed by: ORTHOPAEDIC SURGERY

## 2022-10-19 PROCEDURE — 99203 PR OFFICE/OUTPT VISIT, NEW, LEVL III, 30-44 MIN: ICD-10-PCS | Mod: S$PBB,,, | Performed by: ORTHOPAEDIC SURGERY

## 2022-10-19 PROCEDURE — 99999 PR PBB SHADOW E&M-EST. PATIENT-LVL III: CPT | Mod: PBBFAC,,, | Performed by: ORTHOPAEDIC SURGERY

## 2022-10-19 PROCEDURE — 73130 X-RAY EXAM OF HAND: CPT | Mod: 26,RT,, | Performed by: RADIOLOGY

## 2022-10-19 PROCEDURE — 99213 OFFICE O/P EST LOW 20 MIN: CPT | Mod: PBBFAC | Performed by: ORTHOPAEDIC SURGERY

## 2022-10-19 PROCEDURE — 99203 OFFICE O/P NEW LOW 30 MIN: CPT | Mod: S$PBB,,, | Performed by: ORTHOPAEDIC SURGERY

## 2022-10-19 PROCEDURE — 73130 XR HAND COMPLETE 3 VIEW RIGHT: ICD-10-PCS | Mod: 26,RT,, | Performed by: RADIOLOGY

## 2022-10-19 PROCEDURE — 73130 X-RAY EXAM OF HAND: CPT | Mod: TC,RT

## 2022-10-19 NOTE — PROGRESS NOTES
Subjective:     Patient ID: Sana Crenshaw is a 71 y.o. female.    Chief Complaint: Numbness of the Right Hand      HPI:  The patient is a 71-year-old female with numbness in the ulnar nerve distribution of the right hand.  She has had a previous carpal tunnel release about 15 years ago.  She has not had nerve conduction studies.    Past Medical History:   Diagnosis Date    Asthma     Hypertension     Thyroid disease      Past Surgical History:   Procedure Laterality Date    ANKLE FUSION Right     BREAST SURGERY      BUNIONECTOMY Right     CATARACT EXTRACTION W/  INTRAOCULAR LENS IMPLANT      EPIDURAL STEROID INJECTION INTO CERVICAL SPINE N/A 12/28/2021    Procedure: C5/6 IL FLORA with left paramedian approach with RN IV sedation;  Surgeon: Satish Segura MD;  Location: [facility];  Service: Pain Management;  Laterality: N/A;    EYE SURGERY      HYSTERECTOMY      IMPLANTATION OF PERMANENT SACRAL NERVE STIMULATOR N/A 1/8/2021    Procedure: INSERTION, NEUROSTIMULATOR, PERMANENT, SACRAL;  Surgeon: Onofre Fallon MD;  Location: [facility];  Service: Urology;  Laterality: N/A;    INJECTION OF ANESTHETIC AGENT AROUND MEDIAL BRANCH NERVES INNERVATING CERVICAL FACET JOINT Left 4/27/2022    Procedure: Left C4-6 MBB with RN IV sedation;  Surgeon: Satish Segura MD;  Location: [facility];  Service: Pain Management;  Laterality: Left;    INJECTION OF ANESTHETIC AGENT INTO SACROILIAC JOINT Right 1/12/2022    Procedure: right Sacroiliac Joint Injection with RN IV sedation;  Surgeon: Satish Segura MD;  Location: [facility];  Service: Pain Management;  Laterality: Right;    INJECTION OF JOINT Right 1/12/2022    Procedure: right GT bursa injection with RN IV sedation;  Surgeon: Satish Segura MD;  Location: [facility];  Service: Pain Management;  Laterality: Right;    JOINT REPLACEMENT Bilateral     knees    RETINAL DETACHMENT SURGERY      REVISION OF PROCEDURE INVOLVING SACRAL NEUROSTIMULATOR DEVICE N/A 2/23/2021     Procedure: REVISION, NEUROSTIMULATOR, SACRAL;  Surgeon: Onofre Fallon MD;  Location: [facility];  Service: Urology;  Laterality: N/A;    SHOULDER ARTHROSCOPY Right     SHOULDER ARTHROSCOPY W/ ROTATOR CUFF REPAIR Left     x 2    TOTAL REDUCTION MAMMOPLASTY Bilateral 2008     Family History   Problem Relation Age of Onset    Hypertension Mother     Kidney disease Mother     Heart disease Father     Heart attack Father 80     Social History     Socioeconomic History    Marital status:    Tobacco Use    Smoking status: Never    Smokeless tobacco: Never   Substance and Sexual Activity    Alcohol use: Not Currently     Comment: Very rarely    Drug use: Never     Social Determinants of Health     Financial Resource Strain: Low Risk     Difficulty of Paying Living Expenses: Not hard at all   Food Insecurity: No Food Insecurity    Worried About Running Out of Food in the Last Year: Never true    Ran Out of Food in the Last Year: Never true   Transportation Needs: No Transportation Needs    Lack of Transportation (Medical): No    Lack of Transportation (Non-Medical): No   Physical Activity: Inactive    Days of Exercise per Week: 0 days    Minutes of Exercise per Session: 0 min   Stress: No Stress Concern Present    Feeling of Stress : Not at all   Social Connections: Socially Isolated    Frequency of Communication with Friends and Family: More than three times a week    Frequency of Social Gatherings with Friends and Family: More than three times a week    Attends Sabianist Services: Never    Active Member of Clubs or Organizations: No    Attends Club or Organization Meetings: Never    Marital Status:    Housing Stability: Low Risk     Unable to Pay for Housing in the Last Year: No    Number of Places Lived in the Last Year: 1    Unstable Housing in the Last Year: No     Medication List with Changes/Refills   Current Medications    AMLODIPINE (NORVASC) 5 MG TABLET    TAKE 1 TABLET BY MOUTH ONCE DAILY     ASPIRIN (ECOTRIN) 81 MG EC TABLET    Take 81 mg by mouth once daily.    DULOXETINE (CYMBALTA) 30 MG CAPSULE    TAKE 1 CAPSULE BY MOUTH  DAILY    LEVOTHYROXINE (SYNTHROID) 50 MCG TABLET    TAKE 1 TABLET BY MOUTH  BEFORE BREAKFAST    LISINOPRIL (PRINIVIL,ZESTRIL) 20 MG TABLET    Take 1 tablet (20 mg total) by mouth once daily.    METOPROLOL SUCCINATE (TOPROL-XL) 100 MG 24 HR TABLET    TAKE 1 TABLET BY MOUTH ONCE DAILY    OMEPRAZOLE (PRILOSEC) 20 MG CAPSULE    Take 1 capsule (20 mg total) by mouth 2 (two) times a day.    OXYBUTYNIN (DITROPAN-XL) 10 MG 24 HR TABLET    Take 10 mg by mouth once daily.    ROPINIROLE (REQUIP) 1 MG TABLET    TAKE 1 TABLET BY MOUTH IN  THE EVENING     Review of patient's allergies indicates:   Allergen Reactions    Gabapentin Nausea And Vomiting     Causes vertigo      Review of Systems   Constitutional: Negative for malaise/fatigue.   HENT:  Negative for hearing loss.    Eyes:  Negative for double vision and visual disturbance.   Cardiovascular:  Negative for chest pain.   Respiratory:  Negative for shortness of breath.    Endocrine: Negative for cold intolerance.   Hematologic/Lymphatic: Does not bruise/bleed easily.   Skin:  Negative for poor wound healing and suspicious lesions.   Musculoskeletal:  Positive for back pain and neck pain. Negative for gout, joint pain and joint swelling.   Gastrointestinal:  Negative for nausea and vomiting.   Genitourinary:  Positive for bladder incontinence. Negative for dysuria.   Neurological:  Positive for numbness, paresthesias and sensory change.   Psychiatric/Behavioral:  Negative for depression, memory loss and substance abuse. The patient is not nervous/anxious.    Allergic/Immunologic: Negative for persistent infections.     Objective:   Body mass index is 39.52 kg/m².  There were no vitals filed for this visit.             General    Constitutional: She is oriented to person, place, and time. She appears well-developed and well-nourished. No  distress.   HENT:   Head: Normocephalic.   Eyes: EOM are normal.   Pulmonary/Chest: Effort normal.   Neurological: She is oriented to person, place, and time.   Psychiatric: She has a normal mood and affect.             Right Hand/Wrist Exam     Inspection   Scars: Wrist - present Hand -  present  Effusion: Wrist - absent Hand -  absent    Pain   Wrist - The patient exhibits pain of the ulnar nerve.    Tests   Cubital Tunnel Compression Test: positive    Atrophy   Thenar:  negative  Intrinsic:  negative    Other     Neuorologic Exam    Median Distribution: normal  Ulnar Distribution: normal  Radial Distribution: abnormal    Comments:  The patient has well-healed carpal tunnel scar.  She has numbness of the small finger and ulnar half of the ring finger.  There is no intrinsic atrophy noted.      Right Elbow Exam     Tests   Tinel's sign (cubital tunnel): positive          Vascular Exam       Capillary Refill  Right Hand: normal capillary refill        Relevant imaging results reviewed and interpreted by me, discussed with the patient and / or family today radiographs right hand showed osteoarthritic change particularly about the distal interphalangeal joints of multiple fingers as well as the basal joint of the thumb  Assessment:     Encounter Diagnosis   Name Primary?    Cubital tunnel syndrome on right Yes        Plan:       The patient is sent for nerve conduction studies right upper extremity              Disclaimer: This note was prepared using a voice recognition system and is likely to have sound alike errors within the text.

## 2022-10-26 ENCOUNTER — OFFICE VISIT (OUTPATIENT)
Dept: GASTROENTEROLOGY | Facility: CLINIC | Age: 72
End: 2022-10-26
Payer: MEDICARE

## 2022-10-26 VITALS
HEIGHT: 62 IN | WEIGHT: 226.19 LBS | DIASTOLIC BLOOD PRESSURE: 80 MMHG | OXYGEN SATURATION: 99 % | HEART RATE: 67 BPM | BODY MASS INDEX: 41.62 KG/M2 | SYSTOLIC BLOOD PRESSURE: 130 MMHG

## 2022-10-26 DIAGNOSIS — K21.9 GASTROESOPHAGEAL REFLUX DISEASE, UNSPECIFIED WHETHER ESOPHAGITIS PRESENT: Primary | ICD-10-CM

## 2022-10-26 PROCEDURE — 99999 PR PBB SHADOW E&M-EST. PATIENT-LVL III: ICD-10-PCS | Mod: PBBFAC,,, | Performed by: PHYSICIAN ASSISTANT

## 2022-10-26 PROCEDURE — 99999 PR PBB SHADOW E&M-EST. PATIENT-LVL III: CPT | Mod: PBBFAC,,, | Performed by: PHYSICIAN ASSISTANT

## 2022-10-26 PROCEDURE — 99214 OFFICE O/P EST MOD 30 MIN: CPT | Mod: S$PBB,,, | Performed by: PHYSICIAN ASSISTANT

## 2022-10-26 PROCEDURE — 99214 PR OFFICE/OUTPT VISIT, EST, LEVL IV, 30-39 MIN: ICD-10-PCS | Mod: S$PBB,,, | Performed by: PHYSICIAN ASSISTANT

## 2022-10-26 PROCEDURE — 99213 OFFICE O/P EST LOW 20 MIN: CPT | Mod: PBBFAC | Performed by: PHYSICIAN ASSISTANT

## 2022-10-26 NOTE — PROGRESS NOTES
Clinic Consult:  [facility] Consultation Note    Reason for Consult:  The encounter diagnosis was Gastroesophageal reflux disease, unspecified whether esophagitis present.    PCP: Stuart Gage   [address] [facility] [id number]    CC: Gastroesophageal Reflux      HPI:  This is a 71 y.o. female here for evaluation of the above. Patient is referred by PCP to discuss reflux management. Patient states she has struggled with reflux for years. Has been taking Prilosec for about 20 years. She is taking 20mg BID which works well for her. As long as she takes the medication, her symptoms are well controlled. If she misses doses, symptoms will return. She has not had a scope. Denies family history of GI issues other than her father having a bleeding ulcer. She has normal kidney function and her last bone density was WNL. Denies chest pain, SOB, chronic cough, nausea, vomiting, abnormal weight loss, dysphagia, appetite changes.      Review of Systems   Constitutional:  Negative for activity change, appetite change, chills, diaphoresis, fatigue, fever and unexpected weight change.   HENT:  Negative for trouble swallowing and voice change.    Respiratory:  Negative for cough and shortness of breath.    Cardiovascular:  Negative for chest pain.   Gastrointestinal:  Negative for abdominal distention, abdominal pain, anal bleeding, blood in stool, constipation, diarrhea, nausea and vomiting.   Musculoskeletal:  Negative for back pain.   Skin:  Negative for color change and pallor.   Neurological:  Negative for dizziness and weakness.   Psychiatric/Behavioral:  Negative for dysphoric mood. The patient is not nervous/anxious.       Medical History:   Past Medical History:   Diagnosis Date    Asthma     Hypertension     Thyroid disease        Surgical History:   Past Surgical History:   Procedure Laterality Date    ANKLE FUSION Right     BREAST SURGERY      BUNIONECTOMY Right     CATARACT EXTRACTION W/   INTRAOCULAR LENS IMPLANT      EPIDURAL STEROID INJECTION INTO CERVICAL SPINE N/A 12/28/2021    Procedure: C5/6 IL FLORA with left paramedian approach with RN IV sedation;  Surgeon: Satish Segura MD;  Location: [facility];  Service: Pain Management;  Laterality: N/A;    EYE SURGERY      HYSTERECTOMY      IMPLANTATION OF PERMANENT SACRAL NERVE STIMULATOR N/A 1/8/2021    Procedure: INSERTION, NEUROSTIMULATOR, PERMANENT, SACRAL;  Surgeon: Onofre Fallon MD;  Location: [facility];  Service: Urology;  Laterality: N/A;    INJECTION OF ANESTHETIC AGENT AROUND MEDIAL BRANCH NERVES INNERVATING CERVICAL FACET JOINT Left 4/27/2022    Procedure: Left C4-6 MBB with RN IV sedation;  Surgeon: Satish Segura MD;  Location: [facility];  Service: Pain Management;  Laterality: Left;    INJECTION OF ANESTHETIC AGENT INTO SACROILIAC JOINT Right 1/12/2022    Procedure: right Sacroiliac Joint Injection with RN IV sedation;  Surgeon: Satish Segura MD;  Location: [facility];  Service: Pain Management;  Laterality: Right;    INJECTION OF JOINT Right 1/12/2022    Procedure: right GT bursa injection with RN IV sedation;  Surgeon: Satish Segura MD;  Location: [facility];  Service: Pain Management;  Laterality: Right;    JOINT REPLACEMENT Bilateral     knees    RETINAL DETACHMENT SURGERY      REVISION OF PROCEDURE INVOLVING SACRAL NEUROSTIMULATOR DEVICE N/A 2/23/2021    Procedure: REVISION, NEUROSTIMULATOR, SACRAL;  Surgeon: Onofre Fallon MD;  Location: [facility];  Service: Urology;  Laterality: N/A;    SHOULDER ARTHROSCOPY Right     SHOULDER ARTHROSCOPY W/ ROTATOR CUFF REPAIR Left     x 2    TOTAL REDUCTION MAMMOPLASTY Bilateral 2008       Family History:    Family History   Problem Relation Age of Onset    Hypertension Mother     Kidney disease Mother     Heart disease Father     Heart attack Father 80       Social History:   Social History     Socioeconomic History    Marital status:    Tobacco Use    Smoking status:  Never    Smokeless tobacco: Never   Substance and Sexual Activity    Alcohol use: Not Currently     Comment: Very rarely    Drug use: Never     Social Determinants of Health     Financial Resource Strain: Low Risk     Difficulty of Paying Living Expenses: Not hard at all   Food Insecurity: No Food Insecurity    Worried About Running Out of Food in the Last Year: Never true    Ran Out of Food in the Last Year: Never true   Transportation Needs: No Transportation Needs    Lack of Transportation (Medical): No    Lack of Transportation (Non-Medical): No   Physical Activity: Inactive    Days of Exercise per Week: 0 days    Minutes of Exercise per Session: 0 min   Stress: No Stress Concern Present    Feeling of Stress : Not at all   Social Connections: Socially Isolated    Frequency of Communication with Friends and Family: More than three times a week    Frequency of Social Gatherings with Friends and Family: More than three times a week    Attends Druze Services: Never    Active Member of Clubs or Organizations: No    Attends Club or Organization Meetings: Never    Marital Status:    Housing Stability: Low Risk     Unable to Pay for Housing in the Last Year: No    Number of Places Lived in the Last Year: 1    Unstable Housing in the Last Year: No       Allergies:   Review of patient's allergies indicates:   Allergen Reactions    Gabapentin Nausea And Vomiting     Causes vertigo        Home Medications:   Current Outpatient Medications on File Prior to Visit   Medication Sig Dispense Refill    amLODIPine (NORVASC) 5 MG tablet TAKE 1 TABLET BY MOUTH ONCE DAILY 90 tablet 3    aspirin (ECOTRIN) 81 MG EC tablet Take 81 mg by mouth once daily.      DULoxetine (CYMBALTA) 30 MG capsule TAKE 1 CAPSULE BY MOUTH  DAILY 90 capsule 3    levothyroxine (SYNTHROID) 50 MCG tablet TAKE 1 TABLET BY MOUTH  BEFORE BREAKFAST 90 tablet 3    lisinopriL (PRINIVIL,ZESTRIL) 20 MG tablet Take 1 tablet (20 mg total) by mouth once  "daily. 90 tablet 3    metoprolol succinate (TOPROL-XL) 100 MG 24 hr tablet Take 1 tablet (100 mg total) by mouth once daily. 90 tablet 3    omeprazole (PRILOSEC) 20 MG capsule Take 1 capsule (20 mg total) by mouth 2 (two) times a day. 180 capsule 3    oxybutynin (DITROPAN-XL) 10 MG 24 hr tablet Take 10 mg by mouth once daily.      rOPINIRole (REQUIP) 1 MG tablet Take 1 tablet (1 mg total) by mouth every evening. 90 tablet 0     Current Facility-Administered Medications on File Prior to Visit   Medication Dose Route Frequency Provider Last Rate Last Admin    lactated ringers infusion   Intravenous Continuous Franky Hartman MD 20 mL/hr at 01/08/21 0731 New Bag at 01/08/21 0925    lactated ringers infusion   Intravenous Continuous Estevan Rodriguez MD   Stopped at 02/23/21 1645       Physical Exam:  /80   Pulse 67   Ht 5' 2" (1.575 m)   Wt 102.6 kg (226 lb 3.1 oz)   SpO2 99%   BMI 41.37 kg/m²   Body mass index is 41.37 kg/m².  Physical Exam  Constitutional:       General: She is not in acute distress.     Appearance: Normal appearance. She is not ill-appearing, toxic-appearing or diaphoretic.   HENT:      Head: Normocephalic and atraumatic.   Eyes:      General: No scleral icterus.     Extraocular Movements: Extraocular movements intact.   Cardiovascular:      Rate and Rhythm: Normal rate and regular rhythm.   Pulmonary:      Effort: Pulmonary effort is normal. No respiratory distress.      Breath sounds: Normal breath sounds.   Abdominal:      General: Bowel sounds are normal. There is no distension.      Palpations: Abdomen is soft. There is no mass.      Tenderness: There is no abdominal tenderness. There is no guarding.   Musculoskeletal:         General: Normal range of motion.      Cervical back: Normal range of motion.   Skin:     General: Skin is warm and dry.      Coloration: Skin is not jaundiced or pale.   Neurological:      Mental Status: She is alert and oriented to person, place, and " time.         Labs: Pertinent labs reviewed.  Endoscopy: --  CRC Screening: cologuard 2019 negative.  Anticoagulation: Aspirin 81    Assessment:  1. Gastroesophageal reflux disease, unspecified whether esophagitis present         Recommendations:  -Continue with current Prilosec dosing of 20mg BID. Discussed changing to 40mg daily, but she prefers BID dosing since it is working well for her. If she stops therapy, symptoms return.   -Instructed to follow GERD diet and take medication as prescribed. If she begins with breakthrough symptoms, will need EGD.   -Discussed colonoscopy, as her last scope was years ago with poor prep. Cologuard negative. She will think about it.    Gastroesophageal reflux disease, unspecified whether esophagitis present      No follow-ups on file.    Thank you so much for allowing me to participate in the care of Sana Ennis PA-C

## 2022-10-31 ENCOUNTER — EXTERNAL CHRONIC CARE MANAGEMENT (OUTPATIENT)
Dept: PRIMARY CARE CLINIC | Facility: CLINIC | Age: 72
End: 2022-10-31
Payer: MEDICARE

## 2022-10-31 PROCEDURE — 99490 PR CHRONIC CARE MGMT, 1ST 20 MIN: ICD-10-PCS | Mod: S$PBB,,, | Performed by: FAMILY MEDICINE

## 2022-10-31 PROCEDURE — 99490 CHRNC CARE MGMT STAFF 1ST 20: CPT | Mod: PBBFAC,PO | Performed by: FAMILY MEDICINE

## 2022-10-31 PROCEDURE — 99490 CHRNC CARE MGMT STAFF 1ST 20: CPT | Mod: S$PBB,,, | Performed by: FAMILY MEDICINE

## 2022-11-02 ENCOUNTER — TELEPHONE (OUTPATIENT)
Dept: PAIN MEDICINE | Facility: CLINIC | Age: 72
End: 2022-11-02
Payer: MEDICARE

## 2022-11-03 ENCOUNTER — OFFICE VISIT (OUTPATIENT)
Dept: PAIN MEDICINE | Facility: CLINIC | Age: 72
End: 2022-11-03
Payer: MEDICARE

## 2022-11-03 ENCOUNTER — TELEPHONE (OUTPATIENT)
Dept: NEUROSURGERY | Facility: CLINIC | Age: 72
End: 2022-11-03
Payer: MEDICARE

## 2022-11-03 VITALS
RESPIRATION RATE: 17 BRPM | WEIGHT: 225.06 LBS | HEART RATE: 61 BPM | HEIGHT: 62 IN | BODY MASS INDEX: 41.42 KG/M2 | SYSTOLIC BLOOD PRESSURE: 137 MMHG | DIASTOLIC BLOOD PRESSURE: 85 MMHG

## 2022-11-03 DIAGNOSIS — M54.12 CERVICAL RADICULOPATHY: Primary | ICD-10-CM

## 2022-11-03 PROCEDURE — 99214 OFFICE O/P EST MOD 30 MIN: CPT | Mod: PBBFAC | Performed by: PHYSICIAN ASSISTANT

## 2022-11-03 PROCEDURE — 99214 OFFICE O/P EST MOD 30 MIN: CPT | Mod: S$PBB,,, | Performed by: PHYSICIAN ASSISTANT

## 2022-11-03 PROCEDURE — 99214 PR OFFICE/OUTPT VISIT, EST, LEVL IV, 30-39 MIN: ICD-10-PCS | Mod: S$PBB,,, | Performed by: PHYSICIAN ASSISTANT

## 2022-11-03 PROCEDURE — 99999 PR PBB SHADOW E&M-EST. PATIENT-LVL IV: ICD-10-PCS | Mod: PBBFAC,,, | Performed by: PHYSICIAN ASSISTANT

## 2022-11-03 PROCEDURE — 99999 PR PBB SHADOW E&M-EST. PATIENT-LVL IV: CPT | Mod: PBBFAC,,, | Performed by: PHYSICIAN ASSISTANT

## 2022-11-03 NOTE — TELEPHONE ENCOUNTER
Contacted patient regarding referral with Neurosurgery. Patient states no surgeries within last 2 years, nor was pain/injury caused by any accident that would be involved in any type of future litigation.    Elizabeth Sneed RN
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
## 2022-11-04 ENCOUNTER — HOSPITAL ENCOUNTER (OUTPATIENT)
Dept: RADIOLOGY | Facility: HOSPITAL | Age: 72
Discharge: HOME | End: 2022-11-04
Attending: FAMILY MEDICINE
Payer: MEDICARE

## 2022-11-04 DIAGNOSIS — Z12.31 ENCOUNTER FOR SCREENING MAMMOGRAM FOR MALIGNANT NEOPLASM OF BREAST: Chronic | ICD-10-CM

## 2022-11-04 DIAGNOSIS — Z12.31 ENCOUNTER FOR SCREENING MAMMOGRAM FOR MALIGNANT NEOPLASM OF BREAST: ICD-10-CM

## 2022-11-04 PROCEDURE — 77067 MAMMO DIGITAL SCREENING BILAT WITH TOMO: ICD-10-PCS | Mod: 26,,, | Performed by: RADIOLOGY

## 2022-11-04 PROCEDURE — 77063 BREAST TOMOSYNTHESIS BI: CPT | Mod: TC

## 2022-11-04 PROCEDURE — 77067 SCR MAMMO BI INCL CAD: CPT | Mod: 26,,, | Performed by: RADIOLOGY

## 2022-11-04 PROCEDURE — 77063 MAMMO DIGITAL SCREENING BILAT WITH TOMO: ICD-10-PCS | Mod: 26,,, | Performed by: RADIOLOGY

## 2022-11-04 PROCEDURE — 77063 BREAST TOMOSYNTHESIS BI: CPT | Mod: 26,,, | Performed by: RADIOLOGY

## 2022-11-14 ENCOUNTER — HOSPITAL ENCOUNTER (OUTPATIENT)
Dept: RADIOLOGY | Facility: HOSPITAL | Age: 72
Discharge: HOME | End: 2022-11-14
Attending: FAMILY MEDICINE
Payer: MEDICARE

## 2022-11-14 DIAGNOSIS — R92.8 ABNORMAL MAMMOGRAM: ICD-10-CM

## 2022-11-14 PROCEDURE — 76642 ULTRASOUND BREAST LIMITED: CPT | Mod: TC,RT

## 2022-11-14 PROCEDURE — 77065 DX MAMMO INCL CAD UNI: CPT | Mod: TC,RT

## 2022-11-14 PROCEDURE — 77061 MAMMO DIGITAL DIAGNOSTIC RIGHT WITH TOMO: ICD-10-PCS | Mod: 26,RT,, | Performed by: RADIOLOGY

## 2022-11-14 PROCEDURE — 76642 US BREAST RIGHT LIMITED: ICD-10-PCS | Mod: 26,RT,, | Performed by: RADIOLOGY

## 2022-11-14 PROCEDURE — 77065 MAMMO DIGITAL DIAGNOSTIC RIGHT WITH TOMO: ICD-10-PCS | Mod: 26,RT,, | Performed by: RADIOLOGY

## 2022-11-14 PROCEDURE — 77061 BREAST TOMOSYNTHESIS UNI: CPT | Mod: 26,RT,, | Performed by: RADIOLOGY

## 2022-11-14 PROCEDURE — 77065 DX MAMMO INCL CAD UNI: CPT | Mod: 26,RT,, | Performed by: RADIOLOGY

## 2022-11-14 PROCEDURE — 76642 ULTRASOUND BREAST LIMITED: CPT | Mod: 26,RT,, | Performed by: RADIOLOGY

## 2022-11-17 ENCOUNTER — OFFICE VISIT (OUTPATIENT)
Dept: PHYSICAL MEDICINE AND REHAB | Facility: CLINIC | Age: 72
End: 2022-11-17
Payer: MEDICARE

## 2022-11-17 VITALS
RESPIRATION RATE: 14 BRPM | DIASTOLIC BLOOD PRESSURE: 84 MMHG | HEART RATE: 65 BPM | WEIGHT: 225 LBS | BODY MASS INDEX: 41.41 KG/M2 | SYSTOLIC BLOOD PRESSURE: 161 MMHG | HEIGHT: 62 IN

## 2022-11-17 DIAGNOSIS — G56.03 BILATERAL CARPAL TUNNEL SYNDROME: ICD-10-CM

## 2022-11-17 DIAGNOSIS — M54.12 CERVICAL RADICULOPATHY: Primary | ICD-10-CM

## 2022-11-17 DIAGNOSIS — G56.21 CUBITAL TUNNEL SYNDROME, RIGHT: ICD-10-CM

## 2022-11-17 PROCEDURE — 95912 PR NERVE CONDUCTION STUDY; 11 -12 STUDIES: ICD-10-PCS | Mod: 26,S$PBB,, | Performed by: PHYSICAL MEDICINE & REHABILITATION

## 2022-11-17 PROCEDURE — 99499 UNLISTED E&M SERVICE: CPT | Mod: S$PBB,,, | Performed by: PHYSICAL MEDICINE & REHABILITATION

## 2022-11-17 PROCEDURE — 95912 NRV CNDJ TEST 11-12 STUDIES: CPT | Mod: PBBFAC | Performed by: PHYSICAL MEDICINE & REHABILITATION

## 2022-11-17 PROCEDURE — 99999 PR PBB SHADOW E&M-EST. PATIENT-LVL IV: CPT | Mod: PBBFAC,,, | Performed by: PHYSICAL MEDICINE & REHABILITATION

## 2022-11-17 PROCEDURE — 99999 PR PBB SHADOW E&M-EST. PATIENT-LVL IV: ICD-10-PCS | Mod: PBBFAC,,, | Performed by: PHYSICAL MEDICINE & REHABILITATION

## 2022-11-17 PROCEDURE — 95912 NRV CNDJ TEST 11-12 STUDIES: CPT | Mod: 26,S$PBB,, | Performed by: PHYSICAL MEDICINE & REHABILITATION

## 2022-11-17 PROCEDURE — 99499 NO LOS: ICD-10-PCS | Mod: S$PBB,,, | Performed by: PHYSICAL MEDICINE & REHABILITATION

## 2022-11-17 PROCEDURE — 95886 MUSC TEST DONE W/N TEST COMP: CPT | Mod: PBBFAC | Performed by: PHYSICAL MEDICINE & REHABILITATION

## 2022-11-17 PROCEDURE — 95886 PR EMG COMPLETE, W/ NERVE CONDUCTION STUDIES, 5+ MUSCLES: ICD-10-PCS | Mod: 26,S$PBB,, | Performed by: PHYSICAL MEDICINE & REHABILITATION

## 2022-11-17 PROCEDURE — 95886 MUSC TEST DONE W/N TEST COMP: CPT | Mod: 26,S$PBB,, | Performed by: PHYSICAL MEDICINE & REHABILITATION

## 2022-11-17 PROCEDURE — 99214 OFFICE O/P EST MOD 30 MIN: CPT | Mod: PBBFAC,25 | Performed by: PHYSICAL MEDICINE & REHABILITATION

## 2022-11-17 NOTE — PROGRESS NOTES
[facility]   [id number] [facility]  [place] [address]  Phone: [phone]        Full Name: Sana Crenshaw YOB: 1950  Patient ID: [id number]      Visit Date: 11/17/2022 13:03  Age: 72 Years 0 Months Old  Examining Physician: Torrie Stephens M.D.  Referring Physician:   Reason for Referral: ue pain            Chief Complaint   Patient presents with    Numbness     Rt hand       HPI: This is a 72 y.o.  female being seen in clinic today for evaluation of chronic right hand numbness, tingling, and weakness. With increased hand usage and at times of rest she can experience symptoms. Position change, rubbing/shaking only provides minimal relief. 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    History obtained from patient    Past family, medical, social, and surgical history reviewed in chart    Review of Systems:     General- denies lethargy, weight change, fever, chills  Head/neck- denies swallowing difficulties  ENT- denies hearing changes  Cardiovascular-denies chest pain  Pulmonary- denies shortness of breath  GI- denies constipation or bowel incontinence  - denies bladder incontinence  Skin- denies wounds or rashes  Musculoskeletal- + weakness, + pain  Neurologic- + numbness and tingling  Psychiatric- denies depressive or psychotic features, denies anxiety  Lymphatic-denies swelling  Endocrine- denies hypoglycemic symptoms/DM history  All other pertinent systems negative     Physical Examination:  General: Well developed, well nourished female, NAD  HEENT:NCAT EOMI bilaterally   Pulmonary:Normal respirations    Spinal Examination: CERVICAL  Active ROM is within normal limits.  Inspection: No deformity of spinal alignment.  Palpation: No vertebral tenderness to percussion.      Musculoskeletal Tests:  Phalen: eg  Elbow compression (ulnar): neg  Tinels at wrist: neg    Bilateral Upper and Lower Extremities:  Pulses are 2+ at radial  bilaterally.  Shoulder/Elbow/Wrist/Hand ROM wnl except limited ROM at shoulders  Hip/Knee/Ankle ROM   Bilateral Extremities show normal capillary refill.  No signs of cyanosis, rubor, edema, skin changes, or dysvascular changes of appendages.  Nails appear intact.    Neurological Exam:  Cranial Nerves:  II-XII grossly intact    Manual Muscle Testing: (Motor 5=normal)  5/5 strength bilateral upper extremities except 3+/5 sab    No focal atrophy is noted of either upper or lower extremity.    Bilateral Reflexes:  Salazar's response is absent bilaterally.  Sensation: tested to light touch  - intact in arms except dec at fingertips-worse at right 4th. 5th digits    Gait: Narrow base and good arm swing.      Entire procedure explained to patient prior to proceeding.  Verbal consent obtained        SNC      Nerve / Sites Rec. Site Onset Lat Peak Lat Amp Segments Distance Velocity     ms ms µV  mm m/s   R Median - Digit II (Antidromic)      Wrist Dig II 3.1 4.0 19.2 Wrist - Dig  45   L Median - Digit II (Antidromic)      Wrist Dig II 2.6 3.1 21.6 Wrist - Dig  54   R Ulnar - Digit V (Antidromic)      Wrist Dig V 2.0 3.3 6.0 Wrist - Dig V 140 69   L Ulnar - Digit V (Antidromic)      Wrist Dig V 2.4 3.1 20.0 Wrist - Dig V 140 57   R Radial - Anatomical snuff box (Forearm)      Forearm Wrist 1.9 2.3 16.5 Forearm - Wrist 100 52   L Radial - Anatomical snuff box (Forearm)      Forearm Wrist 1.9 2.3 19.2 Forearm - Wrist 100 52       CSI      Nerve / Sites Rec. Site Peak Lat NP Amp Segments Peak Diff     ms µV  ms   L Median - CSI      Median Thumb 2.8 8.9 Median - Radial 0.3      Radial Thumb 2.4 20.9 Median - Ulnar 0.2      Median Ring 3.4 21.2 Median palm - Ulnar palm 0.5      Ulnar Ring 3.3 5.4        Median palm Wrist 1.9 86.2        Ulnar palm Wrist 1.4 8.9        CSI    CSI 1.0       MNC      Nerve / Sites Muscle Latency Amplitude Duration Rel Amp Segments Distance Lat Diff Velocity     ms mV ms %  mm ms m/s   R  Median - APB      Wrist APB 4.0 6.6 6.3 100 Wrist - APB 80        Elbow APB 8.0 6.5 6.5 98.8 Elbow - Wrist 200 4.0 51   L Median - APB      Wrist APB 3.2 8.6 6.2 100 Wrist - APB 80        Elbow APB 7.2 8.0 6.6 92.4 Elbow - Wrist 200 4.0 50   R Ulnar - ADM      Wrist ADM 2.7 7.0 5.5 100 Wrist - ADM 80        B.Elbow ADM 6.3 7.1 6.0 102 B.Elbow - Wrist 200 3.6 56      A.Elbow ADM 9.0 6.3 6.3 88.2 A.Elbow - B.Elbow 110 2.8 40         A.Elbow - Wrist  6.4    L Ulnar - ADM      Wrist ADM 2.7 7.9 4.9 100 Wrist - ADM 80        B.Elbow ADM 6.0 7.8 5.3 98.2 B.Elbow - Wrist 190 3.3 58      A.Elbow ADM 7.7 6.6 5.7 84.2 A.Elbow - B.Elbow 90 1.7 52         A.Elbow - Wrist  5.0        EMG         EMG Summary Table     Spontaneous MUAP Recruitment   Muscle IA Fib PSW Fasc Other Dur. Dur Amp Dur Polys Pattern Effort   R. First dorsal interosseous Incr 2+ 3+ None .   N Sl Incr 1+ Reduced Max   R. Abductor pollicis brevis Incr None None None .   N N 1+ sl red Max   R. Flexor carpi ulnaris N None None None .   N N 1+ sl red Max   R. Triceps brachii N None None None .   N N N N Max   R. Biceps brachii N None None None .   N N N N Max   R. Deltoid N None None None .   N N 1+ Reduced Max   L. First dorsal interosseous Incr 1+ 1+ None .   N N 1+ Reduced Max   L. Deltoid N None None None .   N N 1+ Reduced Max   L. Triceps brachii Incr None None None .   N N N Reduced Max   L. Biceps brachii N None None None .   N N N N Max   L. Pronator teres Incr None 1+ None .   N N 1+ sl red Max                                          INTERPRETATION  -Bilateral median motor nerve conduction study showed normal latency, amplitude, and conduction velocity  -Bilateral median sensory nerve conduction study showed prolonged peak latency on the right and normal amplitude  -Bilateral ulnar motor nerve conduction study showed normal latency, amplitude, and dec conduction velocity across the right elbow  -Bilateral ulnar sensory nerve conduction study showed  normal peak latency and dec amplitude on the right when compared to the left  -Bilateral radial sensory nerve conduction study showed normal peak latency and amplitude  -left combined sensory index was significant  -Needle EMG examination performed to above mentioned muscles     IMPRESSION  ABNORMAL study  2. 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  There is an acute on chronic radiculopathy of the bilateral C8, T1 nerve roots, a subacute on chronic radiculopathy of the left C7 nerve root, and a chronic radiculopathy of the bilateral C5 nerve roots    PLAN  Discussed in detail for greater than 30 minutes about diagnosis and treatment plan    1. Follow up with referring provider: Dr. Bhavin Roque*  2. Handouts on radic, CTS, cubital tunnel provided. Pt also has upcoming appt with NSG  3. This study is good for one year. If symptoms worsen or do not improve, please re-consult.    Torrie Stephens M.D.  Physical Medicine and Rehab

## 2022-11-29 ENCOUNTER — OFFICE VISIT (OUTPATIENT)
Dept: ORTHOPEDICS | Facility: CLINIC | Age: 72
End: 2022-11-29
Payer: MEDICARE

## 2022-11-29 VITALS — HEIGHT: 62 IN | WEIGHT: 225.06 LBS | BODY MASS INDEX: 41.42 KG/M2

## 2022-11-29 DIAGNOSIS — M54.12 CERVICAL RADICULOPATHY: ICD-10-CM

## 2022-11-29 DIAGNOSIS — G56.03 CARPAL TUNNEL SYNDROME, BILATERAL: ICD-10-CM

## 2022-11-29 DIAGNOSIS — G56.21 CUBITAL TUNNEL SYNDROME ON RIGHT: Primary | ICD-10-CM

## 2022-11-29 PROCEDURE — 99999 PR PBB SHADOW E&M-EST. PATIENT-LVL III: ICD-10-PCS | Mod: PBBFAC,,, | Performed by: ORTHOPAEDIC SURGERY

## 2022-11-29 PROCEDURE — 99999 PR PBB SHADOW E&M-EST. PATIENT-LVL III: CPT | Mod: PBBFAC,,, | Performed by: ORTHOPAEDIC SURGERY

## 2022-11-29 PROCEDURE — 99213 OFFICE O/P EST LOW 20 MIN: CPT | Mod: PBBFAC | Performed by: ORTHOPAEDIC SURGERY

## 2022-11-29 PROCEDURE — 99213 OFFICE O/P EST LOW 20 MIN: CPT | Mod: S$PBB,,, | Performed by: ORTHOPAEDIC SURGERY

## 2022-11-29 PROCEDURE — 99213 PR OFFICE/OUTPT VISIT, EST, LEVL III, 20-29 MIN: ICD-10-PCS | Mod: S$PBB,,, | Performed by: ORTHOPAEDIC SURGERY

## 2022-11-29 NOTE — PROGRESS NOTES
Subjective:     Patient ID: Sana Crenshaw is a 72 y.o. female.    Chief Complaint: Numbness and Pain of the Right Wrist, Pain of the Left Wrist, and Wrist Pain (Dick CT EMG/ results)      HPI:  The patient is a 72-year-old female with nerve conduction studies that confirm cervical radiculopathy right cubital tunnel syndrome and bilateral carpal tunnel syndrome.  Her symptoms are mostly ulnar nerve about the right arm.  She wishes to avoid surgery.  She is not tried splinting.  She was given a cubital tunnel brace today    Past Medical History:   Diagnosis Date    Asthma     Hypertension     Thyroid disease      Past Surgical History:   Procedure Laterality Date    ANKLE FUSION Right     BREAST SURGERY      BUNIONECTOMY Right     CATARACT EXTRACTION W/  INTRAOCULAR LENS IMPLANT      EPIDURAL STEROID INJECTION INTO CERVICAL SPINE N/A 12/28/2021    Procedure: C5/6 IL FLORA with left paramedian approach with RN IV sedation;  Surgeon: Satish Segura MD;  Location: [facility] PAIN T;  Service: Pain Management;  Laterality: N/A;    EYE SURGERY      HYSTERECTOMY      IMPLANTATION OF PERMANENT SACRAL NERVE STIMULATOR N/A 1/8/2021    Procedure: INSERTION, NEUROSTIMULATOR, PERMANENT, SACRAL;  Surgeon: Onofre Fallon MD;  Location: [facility] OR;  Service: Urology;  Laterality: N/A;    INJECTION OF ANESTHETIC AGENT AROUND MEDIAL BRANCH NERVES INNERVATING CERVICAL FACET JOINT Left 4/27/2022    Procedure: Left C4-6 MBB with RN IV sedation;  Surgeon: Satish Segura MD;  Location: [facility] PAIN MGT;  Service: Pain Management;  Laterality: Left;    INJECTION OF ANESTHETIC AGENT INTO SACROILIAC JOINT Right 1/12/2022    Procedure: right Sacroiliac Joint Injection with RN IV sedation;  Surgeon: Satish Segura MD;  Location: [facility] PAIN MGT;  Service: Pain Management;  Laterality: Right;    INJECTION OF JOINT Right 1/12/2022    Procedure: right GT bursa injection with RN IV sedation;  Surgeon: Satish Segura MD;  Location: [facility] PAIN MGT;  Service:  Pain Management;  Laterality: Right;    JOINT REPLACEMENT Bilateral     knees    RETINAL DETACHMENT SURGERY      REVISION OF PROCEDURE INVOLVING SACRAL NEUROSTIMULATOR DEVICE N/A 2/23/2021    Procedure: REVISION, NEUROSTIMULATOR, SACRAL;  Surgeon: Onofre Fallon MD;  Location: [facility];  Service: Urology;  Laterality: N/A;    SHOULDER ARTHROSCOPY Right     SHOULDER ARTHROSCOPY W/ ROTATOR CUFF REPAIR Left     x 2    TOTAL REDUCTION MAMMOPLASTY Bilateral 2008     Family History   Problem Relation Age of Onset    Hypertension Mother     Kidney disease Mother     Heart disease Father     Heart attack Father 80     Social History     Socioeconomic History    Marital status:    Tobacco Use    Smoking status: Never    Smokeless tobacco: Never   Substance and Sexual Activity    Alcohol use: Not Currently     Comment: Very rarely    Drug use: Never     Social Determinants of Health     Financial Resource Strain: Low Risk     Difficulty of Paying Living Expenses: Not hard at all   Food Insecurity: No Food Insecurity    Worried About Running Out of Food in the Last Year: Never true    Ran Out of Food in the Last Year: Never true   Transportation Needs: No Transportation Needs    Lack of Transportation (Medical): No    Lack of Transportation (Non-Medical): No   Physical Activity: Inactive    Days of Exercise per Week: 0 days    Minutes of Exercise per Session: 0 min   Stress: No Stress Concern Present    Feeling of Stress : Not at all   Social Connections: Socially Isolated    Frequency of Communication with Friends and Family: More than three times a week    Frequency of Social Gatherings with Friends and Family: More than three times a week    Attends Jain Services: Never    Active Member of Clubs or Organizations: No    Attends Club or Organization Meetings: Never    Marital Status:    Housing Stability: Low Risk     Unable to Pay for Housing in the Last Year: No    Number of Places Lived in the Last  Year: 1    Unstable Housing in the Last Year: No     Medication List with Changes/Refills   Current Medications    AMLODIPINE (NORVASC) 5 MG TABLET    TAKE 1 TABLET BY MOUTH ONCE DAILY    ASPIRIN (ECOTRIN) 81 MG EC TABLET    Take 81 mg by mouth once daily.    DULOXETINE (CYMBALTA) 30 MG CAPSULE    TAKE 1 CAPSULE BY MOUTH  DAILY    LEVOTHYROXINE (SYNTHROID) 50 MCG TABLET    TAKE 1 TABLET BY MOUTH  BEFORE BREAKFAST    LISINOPRIL (PRINIVIL,ZESTRIL) 20 MG TABLET    Take 1 tablet (20 mg total) by mouth once daily.    METOPROLOL SUCCINATE (TOPROL-XL) 100 MG 24 HR TABLET    Take 1 tablet (100 mg total) by mouth once daily.    OMEPRAZOLE (PRILOSEC) 20 MG CAPSULE    Take 1 capsule (20 mg total) by mouth 2 (two) times a day.    OXYBUTYNIN (DITROPAN-XL) 10 MG 24 HR TABLET    Take 10 mg by mouth once daily.    ROPINIROLE (REQUIP) 1 MG TABLET    Take 1 tablet (1 mg total) by mouth every evening.     Review of patient's allergies indicates:   Allergen Reactions    Gabapentin Nausea And Vomiting     Causes vertigo      Review of Systems   Constitutional: Negative for malaise/fatigue.   HENT:  Negative for hearing loss.    Eyes:  Negative for double vision and visual disturbance.   Cardiovascular:  Negative for chest pain.   Respiratory:  Negative for shortness of breath.    Endocrine: Negative for cold intolerance.   Hematologic/Lymphatic: Does not bruise/bleed easily.   Skin:  Negative for poor wound healing and suspicious lesions.   Musculoskeletal:  Positive for neck pain. Negative for gout, joint pain and joint swelling.   Gastrointestinal:  Negative for nausea and vomiting.   Genitourinary:  Positive for bladder incontinence. Negative for dysuria.   Neurological:  Positive for numbness, paresthesias and sensory change.   Psychiatric/Behavioral:  Negative for depression, memory loss and substance abuse. The patient is not nervous/anxious.    Allergic/Immunologic: Negative for persistent infections.     Objective:   Body mass  index is 41.17 kg/m².  There were no vitals filed for this visit.             General    Constitutional: She is oriented to person, place, and time. She appears well-developed and well-nourished. No distress.   HENT:   Head: Normocephalic.   Eyes: EOM are normal.   Pulmonary/Chest: Effort normal.   Neurological: She is oriented to person, place, and time.   Psychiatric: She has a normal mood and affect.             Right Hand/Wrist Exam     Inspection   Scars: Wrist - absent Hand -  absent  Effusion: Wrist - absent Hand -  absent    Pain   Wrist - The patient exhibits pain of the flexor/pronator group and ulnar nerve.    Tests   Phalens sign: positive  Tinel's sign (median nerve): positive  Carpal Tunnel Compression Test: positive  Cubital Tunnel Compression Test: positive    Atrophy   Thenar:  negative  Intrinsic:  negative    Other     Neuorologic Exam    Median Distribution: abnormal  Ulnar Distribution: normal  Radial Distribution: abnormal    Comments:  The patient has a positive Tinel and positive Phalen sign.  There is no thenar atrophy noted.  She has a positive Tinel sign over the ulnar nerve right elbow.  There is no intrinsic atrophy noted.      Left Hand/Wrist Exam     Inspection   Scars: Wrist - absent Hand -  absent  Effusion: Wrist - absent Hand -  absent    Pain   Wrist - The patient exhibits pain of the flexor/pronator group.    Tests   Phalens sign: positive  Tinel's sign (median nerve): positive  Carpal Tunnel Compression Test: positive    Atrophy  Thenar:  Negative  Intrinsic: negative    Other     Sensory Exam  Median Distribution: abnormal  Ulnar Distribution: normal  Radial Distribution: normal    Comments:  The patient has a positive Tinel and positive Phalen sign.  There is no thenar atrophy noted.      Right Elbow Exam     Tests   Tinel's sign (cubital tunnel): positive          Vascular Exam       Capillary Refill  Right Hand: normal capillary refill  Left Hand: normal capillary  refill     radiographs were not obtained today  Assessment:   Bilateral carpal tunnel syndrome   Right cubital tunnel syndrome   Cervical radiculopathy    Plan:     Apparently the neurosurgery service is following her neck.  She wishes to avoid surgery she was given a right cubital tunnel brace at her request.  She will call me if she wishes proceed with a right cubital tunnel release and possible anterior transposition ulnar nerve.  Her symptoms seem to be more ulnar than median nerve                Disclaimer: This note was prepared using a voice recognition system and is likely to have sound alike errors within the text.

## 2022-11-30 ENCOUNTER — EXTERNAL CHRONIC CARE MANAGEMENT (OUTPATIENT)
Dept: PRIMARY CARE CLINIC | Facility: CLINIC | Age: 72
End: 2022-11-30
Payer: MEDICARE

## 2022-11-30 PROCEDURE — 99490 CHRNC CARE MGMT STAFF 1ST 20: CPT | Mod: PBBFAC,PO | Performed by: FAMILY MEDICINE

## 2022-11-30 PROCEDURE — 99490 CHRNC CARE MGMT STAFF 1ST 20: CPT | Mod: S$PBB,,, | Performed by: FAMILY MEDICINE

## 2022-11-30 PROCEDURE — 99490 PR CHRONIC CARE MGMT, 1ST 20 MIN: ICD-10-PCS | Mod: S$PBB,,, | Performed by: FAMILY MEDICINE

## 2022-12-05 ENCOUNTER — OFFICE VISIT (OUTPATIENT)
Dept: FAMILY MEDICINE | Facility: CLINIC | Age: 72
End: 2022-12-05
Payer: MEDICARE

## 2022-12-05 VITALS
DIASTOLIC BLOOD PRESSURE: 78 MMHG | SYSTOLIC BLOOD PRESSURE: 130 MMHG | WEIGHT: 220.44 LBS | HEART RATE: 71 BPM | TEMPERATURE: 97 F | BODY MASS INDEX: 40.57 KG/M2 | HEIGHT: 62 IN | OXYGEN SATURATION: 96 %

## 2022-12-05 DIAGNOSIS — M54.12 CERVICAL RADICULOPATHY: ICD-10-CM

## 2022-12-05 DIAGNOSIS — E66.01 SEVERE OBESITY WITH BODY MASS INDEX (BMI) OF 36.0 TO 36.9 WITH SERIOUS COMORBIDITY: ICD-10-CM

## 2022-12-05 DIAGNOSIS — M85.80 OSTEOPENIA, UNSPECIFIED LOCATION: ICD-10-CM

## 2022-12-05 DIAGNOSIS — I10 ESSENTIAL HYPERTENSION: ICD-10-CM

## 2022-12-05 DIAGNOSIS — R20.0 FINGER NUMBNESS: ICD-10-CM

## 2022-12-05 DIAGNOSIS — R73.03 PREDIABETES: ICD-10-CM

## 2022-12-05 DIAGNOSIS — E03.9 HYPOTHYROIDISM, UNSPECIFIED TYPE: ICD-10-CM

## 2022-12-05 DIAGNOSIS — J20.9 ACUTE BRONCHITIS, UNSPECIFIED ORGANISM: Primary | ICD-10-CM

## 2022-12-05 PROCEDURE — 99999 PR PBB SHADOW E&M-EST. PATIENT-LVL III: ICD-10-PCS | Mod: PBBFAC,,, | Performed by: FAMILY MEDICINE

## 2022-12-05 PROCEDURE — 99214 PR OFFICE/OUTPT VISIT, EST, LEVL IV, 30-39 MIN: ICD-10-PCS | Mod: S$PBB,,, | Performed by: FAMILY MEDICINE

## 2022-12-05 PROCEDURE — 99213 OFFICE O/P EST LOW 20 MIN: CPT | Mod: PBBFAC,PO | Performed by: FAMILY MEDICINE

## 2022-12-05 PROCEDURE — 99214 OFFICE O/P EST MOD 30 MIN: CPT | Mod: S$PBB,,, | Performed by: FAMILY MEDICINE

## 2022-12-05 PROCEDURE — 99999 PR PBB SHADOW E&M-EST. PATIENT-LVL III: CPT | Mod: PBBFAC,,, | Performed by: FAMILY MEDICINE

## 2022-12-05 RX ORDER — PROMETHAZINE HYDROCHLORIDE AND DEXTROMETHORPHAN HYDROBROMIDE 6.25; 15 MG/5ML; MG/5ML
5 SYRUP ORAL 3 TIMES DAILY PRN
Qty: 118 ML | Refills: 0 | Status: SHIPPED | OUTPATIENT
Start: 2022-12-05 | End: 2022-12-15

## 2022-12-05 RX ORDER — AZITHROMYCIN 250 MG/1
TABLET, FILM COATED ORAL
Qty: 6 TABLET | Refills: 0 | Status: SHIPPED | OUTPATIENT
Start: 2022-12-05 | End: 2022-12-10

## 2022-12-05 RX ORDER — PREDNISONE 20 MG/1
20 TABLET ORAL DAILY
Qty: 3 TABLET | Refills: 0 | Status: SHIPPED | OUTPATIENT
Start: 2022-12-05 | End: 2023-03-14

## 2022-12-05 RX ORDER — ALBUTEROL SULFATE 90 UG/1
2 AEROSOL, METERED RESPIRATORY (INHALATION) EVERY 6 HOURS PRN
Qty: 18 G | Refills: 3 | Status: SHIPPED | OUTPATIENT
Start: 2022-12-05 | End: 2023-08-05 | Stop reason: SDUPTHER

## 2022-12-05 NOTE — PROGRESS NOTES
Subjective:       Patient ID: Sana Crenshaw is a 72 y.o. female.    Chief Complaint: Follow-up      HPI Comments:       Current Outpatient Medications:     amLODIPine (NORVASC) 5 MG tablet, TAKE 1 TABLET BY MOUTH ONCE DAILY, Disp: 90 tablet, Rfl: 3    aspirin (ECOTRIN) 81 MG EC tablet, Take 81 mg by mouth once daily., Disp: , Rfl:     DULoxetine (CYMBALTA) 30 MG capsule, TAKE 1 CAPSULE BY MOUTH  DAILY, Disp: 90 capsule, Rfl: 3    levothyroxine (SYNTHROID) 50 MCG tablet, TAKE 1 TABLET BY MOUTH  BEFORE BREAKFAST, Disp: 90 tablet, Rfl: 3    lisinopriL (PRINIVIL,ZESTRIL) 20 MG tablet, Take 1 tablet (20 mg total) by mouth once daily., Disp: 90 tablet, Rfl: 3    metoprolol succinate (TOPROL-XL) 100 MG 24 hr tablet, Take 1 tablet (100 mg total) by mouth once daily., Disp: 90 tablet, Rfl: 3    omeprazole (PRILOSEC) 20 MG capsule, Take 1 capsule (20 mg total) by mouth 2 (two) times a day., Disp: 180 capsule, Rfl: 3    oxybutynin (DITROPAN-XL) 10 MG 24 hr tablet, Take 10 mg by mouth once daily., Disp: , Rfl:     rOPINIRole (REQUIP) 1 MG tablet, Take 1 tablet (1 mg total) by mouth every evening., Disp: 90 tablet, Rfl: 0    albuterol (PROVENTIL/VENTOLIN HFA) 90 mcg/actuation inhaler, Inhale 2 puffs into the lungs every 6 (six) hours as needed for Wheezing. Dispense with spacer., Disp: 18 g, Rfl: 3    azithromycin (Z-DILSHAD) 250 MG tablet, Take 2 tablets by mouth on day 1; Take 1 tablet by mouth on days 2-5, Disp: 6 tablet, Rfl: 0    predniSONE (DELTASONE) 20 MG tablet, Take 1 tablet (20 mg total) by mouth once daily., Disp: 3 tablet, Rfl: 0    promethazine-dextromethorphan (PROMETHAZINE-DM) 6.25-15 mg/5 mL Syrp, Take 5 mLs by mouth 3 (three) times daily as needed., Disp: 118 mL, Rfl: 0  No current facility-administered medications for this visit.    Facility-Administered Medications Ordered in Other Visits:     lactated ringers infusion, , Intravenous, Continuous, Franky Hartman MD, Last Rate: 20 mL/hr at 01/08/21 0731, New  "Bag at 01/08/21 0906    lactated ringers infusion, , Intravenous, Continuous, Estevan Rodriguez MD, Stopped at 02/23/21 4958     Six-month follow-up.    Wearing elbow brace for right cubital tunnel syndrome.  Plans to have surgery at some point.    Reviewed her use of Requip.  Longstanding use for restless legs syndrome.  Very effective.      Pain management referred her to neuro surgery for her cervical radiculopathy.  She will see them soon.      PPI doing better on b.I.d. dosing.      Calcium and vitamin-D daily.      Two week history of cough.  Sputum turning green last 2 or 3 days.  Occasional fever in the beginning.  Some shortness of breath and wheezing.  Knows how to use an albuterol inhaler.  Nonsmoker.  COVID negative at home.  Using Delsym so far    Review of Systems    Objective:      Vitals:    12/05/22 1427   BP: 130/78   Pulse: 71   Temp: 97.3 °F (36.3 °C)   SpO2: 96%   Weight: 100 kg (220 lb 7.4 oz)   Height: 5' 2" (1.575 m)   PainSc:   7     Physical Exam  Vitals and nursing note reviewed.   Constitutional:       General: She is not in acute distress.     Appearance: She is well-developed. She is ill-appearing. She is not diaphoretic.   HENT:      Head: Normocephalic.      Nose: Mucosal edema and rhinorrhea present.      Mouth/Throat:      Pharynx: Pharyngeal swelling present. No oropharyngeal exudate or posterior oropharyngeal erythema.   Neck:      Thyroid: No thyromegaly.   Cardiovascular:      Rate and Rhythm: Normal rate and regular rhythm.      Heart sounds: Normal heart sounds. No murmur heard.  Pulmonary:      Effort: Pulmonary effort is normal. No tachypnea, accessory muscle usage or respiratory distress.      Breath sounds: Wheezing present. No rales.   Abdominal:      General: There is no distension.      Palpations: Abdomen is soft.   Musculoskeletal:      Cervical back: Neck supple.   Lymphadenopathy:      Cervical: No cervical adenopathy.   Skin:     General: Skin is warm and dry. "   Neurological:      Mental Status: She is alert and oriented to person, place, and time.   Psychiatric:         Behavior: Behavior normal.         Thought Content: Thought content normal.         Judgment: Judgment normal.       Assessment:       1. Acute bronchitis, unspecified organism    2. Essential hypertension    3. Severe obesity with body mass index (BMI) of 36.0 to 36.9 with serious comorbidity    4. Finger numbness    5. Prediabetes    6. Hypothyroidism, unspecified type    7. Cervical radiculopathy    8. Osteopenia, unspecified location          Plan:   Acute bronchitis, unspecified organism  Comments:   Z-Dilshad, albuterol, prednisone.  Follow-up precautions given    Essential hypertension  Comments:   controlled    Severe obesity with body mass index (BMI) of 36.0 to 36.9 with serious comorbidity  Comments:   physical activity limited by General  problems with mobility    Finger numbness  Comments:   plans to have cubital release surgery soon    Prediabetes  Comments:   A1c 5.7    Hypothyroidism, unspecified type  Comments:   euthyroid on current dose    Cervical radiculopathy  Comments:   recently referred to neuro surgery    Osteopenia, unspecified location  Comments:   takes calcium and vitamin-D every day    Other orders  -     albuterol (PROVENTIL/VENTOLIN HFA) 90 mcg/actuation inhaler; Inhale 2 puffs into the lungs every 6 (six) hours as needed for Wheezing. Dispense with spacer.  Dispense: 18 g; Refill: 3  -     promethazine-dextromethorphan (PROMETHAZINE-DM) 6.25-15 mg/5 mL Syrp; Take 5 mLs by mouth 3 (three) times daily as needed.  Dispense: 118 mL; Refill: 0  -     predniSONE (DELTASONE) 20 MG tablet; Take 1 tablet (20 mg total) by mouth once daily.  Dispense: 3 tablet; Refill: 0  -     azithromycin (Z-DILSHAD) 250 MG tablet; Take 2 tablets by mouth on day 1; Take 1 tablet by mouth on days 2-5  Dispense: 6 tablet; Refill: 0

## 2022-12-08 ENCOUNTER — OFFICE VISIT (OUTPATIENT)
Dept: NEUROSURGERY | Facility: CLINIC | Age: 72
End: 2022-12-08
Payer: MEDICARE

## 2022-12-08 VITALS
DIASTOLIC BLOOD PRESSURE: 84 MMHG | WEIGHT: 220.44 LBS | RESPIRATION RATE: 18 BRPM | HEIGHT: 62 IN | HEART RATE: 68 BPM | BODY MASS INDEX: 40.57 KG/M2 | SYSTOLIC BLOOD PRESSURE: 135 MMHG

## 2022-12-08 DIAGNOSIS — M48.02 SPINAL STENOSIS, CERVICAL REGION: ICD-10-CM

## 2022-12-08 DIAGNOSIS — M54.12 CERVICAL RADICULOPATHY: ICD-10-CM

## 2022-12-08 DIAGNOSIS — M54.2 CERVICALGIA: Primary | ICD-10-CM

## 2022-12-08 PROCEDURE — 99214 OFFICE O/P EST MOD 30 MIN: CPT | Mod: S$PBB,,, | Performed by: PHYSICIAN ASSISTANT

## 2022-12-08 PROCEDURE — 99999 PR PBB SHADOW E&M-EST. PATIENT-LVL V: ICD-10-PCS | Mod: PBBFAC,,, | Performed by: PHYSICIAN ASSISTANT

## 2022-12-08 PROCEDURE — 99215 OFFICE O/P EST HI 40 MIN: CPT | Mod: PBBFAC,PO | Performed by: PHYSICIAN ASSISTANT

## 2022-12-08 PROCEDURE — 99999 PR PBB SHADOW E&M-EST. PATIENT-LVL V: CPT | Mod: PBBFAC,,, | Performed by: PHYSICIAN ASSISTANT

## 2022-12-08 PROCEDURE — 99214 PR OFFICE/OUTPT VISIT, EST, LEVL IV, 30-39 MIN: ICD-10-PCS | Mod: S$PBB,,, | Performed by: PHYSICIAN ASSISTANT

## 2022-12-22 ENCOUNTER — HOSPITAL ENCOUNTER (OUTPATIENT)
Dept: RADIOLOGY | Facility: HOSPITAL | Age: 72
Discharge: HOME | End: 2022-12-22
Attending: PHYSICIAN ASSISTANT
Payer: MEDICARE

## 2022-12-22 DIAGNOSIS — M54.12 CERVICAL RADICULOPATHY: ICD-10-CM

## 2022-12-22 DIAGNOSIS — M54.2 CERVICALGIA: ICD-10-CM

## 2022-12-22 DIAGNOSIS — M48.02 SPINAL STENOSIS, CERVICAL REGION: ICD-10-CM

## 2022-12-22 PROCEDURE — 72141 MRI NECK SPINE W/O DYE: CPT | Mod: TC

## 2022-12-22 PROCEDURE — 72040 X-RAY EXAM NECK SPINE 2-3 VW: CPT | Mod: TC

## 2022-12-31 ENCOUNTER — EXTERNAL CHRONIC CARE MANAGEMENT (OUTPATIENT)
Dept: PRIMARY CARE CLINIC | Facility: CLINIC | Age: 72
End: 2022-12-31
Payer: MEDICARE

## 2022-12-31 PROCEDURE — 99490 CHRNC CARE MGMT STAFF 1ST 20: CPT | Mod: S$PBB,,, | Performed by: FAMILY MEDICINE

## 2022-12-31 PROCEDURE — 99490 PR CHRONIC CARE MGMT, 1ST 20 MIN: ICD-10-PCS | Mod: S$PBB,,, | Performed by: FAMILY MEDICINE

## 2022-12-31 PROCEDURE — 99490 CHRNC CARE MGMT STAFF 1ST 20: CPT | Mod: PBBFAC,PO | Performed by: FAMILY MEDICINE

## 2023-01-03 ENCOUNTER — PATIENT MESSAGE (OUTPATIENT)
Dept: FAMILY MEDICINE | Facility: CLINIC | Age: 73
End: 2023-01-03
Payer: MEDICARE

## 2023-01-05 ENCOUNTER — OFFICE VISIT (OUTPATIENT)
Dept: NEUROSURGERY | Facility: CLINIC | Age: 73
End: 2023-01-05
Payer: MEDICARE

## 2023-01-05 VITALS
SYSTOLIC BLOOD PRESSURE: 144 MMHG | DIASTOLIC BLOOD PRESSURE: 79 MMHG | HEART RATE: 56 BPM | RESPIRATION RATE: 18 BRPM | HEIGHT: 62 IN | WEIGHT: 220.44 LBS | BODY MASS INDEX: 40.57 KG/M2

## 2023-01-05 DIAGNOSIS — M50.30 DEGENERATIVE DISC DISEASE, CERVICAL: Primary | ICD-10-CM

## 2023-01-05 DIAGNOSIS — T14.8XXA MUSCULOLIGAMENTOUS STRAIN: ICD-10-CM

## 2023-01-05 DIAGNOSIS — G56.23 ENTRAPMENT OF BOTH ULNAR NERVES: ICD-10-CM

## 2023-01-05 DIAGNOSIS — G56.03 BILATERAL CARPAL TUNNEL SYNDROME: ICD-10-CM

## 2023-01-05 PROCEDURE — 99214 OFFICE O/P EST MOD 30 MIN: CPT | Mod: S$PBB,,, | Performed by: NEUROLOGICAL SURGERY

## 2023-01-05 PROCEDURE — 99214 PR OFFICE/OUTPT VISIT, EST, LEVL IV, 30-39 MIN: ICD-10-PCS | Mod: S$PBB,,, | Performed by: NEUROLOGICAL SURGERY

## 2023-01-05 PROCEDURE — 99214 OFFICE O/P EST MOD 30 MIN: CPT | Mod: PBBFAC,PO | Performed by: NEUROLOGICAL SURGERY

## 2023-01-05 PROCEDURE — 99999 PR PBB SHADOW E&M-EST. PATIENT-LVL IV: ICD-10-PCS | Mod: PBBFAC,,, | Performed by: NEUROLOGICAL SURGERY

## 2023-01-05 PROCEDURE — 99999 PR PBB SHADOW E&M-EST. PATIENT-LVL IV: CPT | Mod: PBBFAC,,, | Performed by: NEUROLOGICAL SURGERY

## 2023-01-05 RX ORDER — TIZANIDINE 4 MG/1
4 TABLET ORAL EVERY 6 HOURS PRN
Qty: 60 TABLET | Refills: 0 | Status: SHIPPED | OUTPATIENT
Start: 2023-01-05 | End: 2023-02-17 | Stop reason: SDUPTHER
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
## 2023-01-31 ENCOUNTER — EXTERNAL CHRONIC CARE MANAGEMENT (OUTPATIENT)
Dept: PRIMARY CARE CLINIC | Facility: CLINIC | Age: 73
End: 2023-01-31
Payer: MEDICARE

## 2023-01-31 PROCEDURE — 99490 PR CHRONIC CARE MGMT, 1ST 20 MIN: ICD-10-PCS | Mod: S$PBB,,, | Performed by: FAMILY MEDICINE

## 2023-01-31 PROCEDURE — 99490 CHRNC CARE MGMT STAFF 1ST 20: CPT | Mod: PBBFAC,PO | Performed by: FAMILY MEDICINE

## 2023-01-31 PROCEDURE — 99490 CHRNC CARE MGMT STAFF 1ST 20: CPT | Mod: S$PBB,,, | Performed by: FAMILY MEDICINE

## 2023-02-12 ENCOUNTER — PATIENT MESSAGE (OUTPATIENT)
Dept: NEUROSURGERY | Facility: CLINIC | Age: 73
End: 2023-02-12
Payer: MEDICARE

## 2023-02-12 DIAGNOSIS — M50.30 DEGENERATIVE DISC DISEASE, CERVICAL: ICD-10-CM

## 2023-02-17 ENCOUNTER — PATIENT MESSAGE (OUTPATIENT)
Dept: NEUROSURGERY | Facility: CLINIC | Age: 73
End: 2023-02-17
Payer: MEDICARE

## 2023-02-17 RX ORDER — TIZANIDINE 4 MG/1
4 TABLET ORAL EVERY 8 HOURS PRN
Qty: 270 TABLET | Refills: 1 | Status: SHIPPED | OUTPATIENT
Start: 2023-02-17 | End: 2023-07-28 | Stop reason: SDUPTHER

## 2023-02-28 ENCOUNTER — EXTERNAL CHRONIC CARE MANAGEMENT (OUTPATIENT)
Dept: PRIMARY CARE CLINIC | Facility: CLINIC | Age: 73
End: 2023-02-28
Payer: MEDICARE

## 2023-02-28 PROCEDURE — 99490 PR CHRONIC CARE MGMT, 1ST 20 MIN: ICD-10-PCS | Mod: S$PBB,,, | Performed by: FAMILY MEDICINE

## 2023-02-28 PROCEDURE — 99490 CHRNC CARE MGMT STAFF 1ST 20: CPT | Mod: PBBFAC,PO | Performed by: FAMILY MEDICINE

## 2023-02-28 PROCEDURE — 99490 CHRNC CARE MGMT STAFF 1ST 20: CPT | Mod: S$PBB,,, | Performed by: FAMILY MEDICINE

## 2023-03-14 ENCOUNTER — PATIENT MESSAGE (OUTPATIENT)
Dept: NEUROSURGERY | Facility: CLINIC | Age: 73
End: 2023-03-14
Payer: MEDICARE

## 2023-03-14 ENCOUNTER — OFFICE VISIT (OUTPATIENT)
Dept: ORTHOPEDICS | Facility: CLINIC | Age: 73
End: 2023-03-14
Payer: MEDICARE

## 2023-03-14 VITALS — BODY MASS INDEX: 36.8 KG/M2 | HEIGHT: 62 IN | WEIGHT: 200 LBS

## 2023-03-14 DIAGNOSIS — G56.21 CUBITAL TUNNEL SYNDROME ON RIGHT: Primary | ICD-10-CM

## 2023-03-14 DIAGNOSIS — M54.12 CERVICAL RADICULOPATHY: ICD-10-CM

## 2023-03-14 DIAGNOSIS — G56.03 CARPAL TUNNEL SYNDROME, BILATERAL: ICD-10-CM

## 2023-03-14 PROCEDURE — 99213 OFFICE O/P EST LOW 20 MIN: CPT | Mod: PBBFAC | Performed by: ORTHOPAEDIC SURGERY

## 2023-03-14 PROCEDURE — 99999 PR PBB SHADOW E&M-EST. PATIENT-LVL III: ICD-10-PCS | Mod: PBBFAC,,, | Performed by: ORTHOPAEDIC SURGERY

## 2023-03-14 PROCEDURE — 99214 OFFICE O/P EST MOD 30 MIN: CPT | Mod: S$PBB,,, | Performed by: ORTHOPAEDIC SURGERY

## 2023-03-14 PROCEDURE — 99999 PR PBB SHADOW E&M-EST. PATIENT-LVL III: CPT | Mod: PBBFAC,,, | Performed by: ORTHOPAEDIC SURGERY

## 2023-03-14 PROCEDURE — 99214 PR OFFICE/OUTPT VISIT, EST, LEVL IV, 30-39 MIN: ICD-10-PCS | Mod: S$PBB,,, | Performed by: ORTHOPAEDIC SURGERY

## 2023-03-14 NOTE — PROGRESS NOTES
Subjective:     Patient ID: Sana Crenshaw is a 72 y.o. female.    Chief Complaint: Pain of the Right Hand and Pain of the Left Hand      HPI:  The patient is a 72-year-old female who has numbness of the ring and small finger for the past several months.  She had nerve studies that confirmed a right cubital tunnel bilateral carpal tunnel cervical radiculopathy bilateral C8-T1 and C5 and a left C7 cervical radiculopathy.  She had a right carpal tunnel release about 15 years ago.  Her symptoms are not carpal tunnel.  She is had neck injections without improvement.  She wishes to have a right cubital tunnel release and possible anterior transposition ulnar nerve    Past Medical History:   Diagnosis Date    Asthma     Hypertension     Thyroid disease      Past Surgical History:   Procedure Laterality Date    ANKLE FUSION Right     BREAST SURGERY      BUNIONECTOMY Right     CATARACT EXTRACTION W/  INTRAOCULAR LENS IMPLANT      EPIDURAL STEROID INJECTION INTO CERVICAL SPINE N/A 12/28/2021    Procedure: C5/6 IL FLORA with left paramedian approach with RN IV sedation;  Surgeon: Satish Segura MD;  Location: [facility];  Service: Pain Management;  Laterality: N/A;    EYE SURGERY      HYSTERECTOMY      IMPLANTATION OF PERMANENT SACRAL NERVE STIMULATOR N/A 1/8/2021    Procedure: INSERTION, NEUROSTIMULATOR, PERMANENT, SACRAL;  Surgeon: Onofre Fallon MD;  [facility];  Service: Urology;  Laterality: N/A;    INJECTION OF ANESTHETIC AGENT AROUND MEDIAL BRANCH NERVES INNERVATING CERVICAL FACET JOINT Left 4/27/2022    Procedure: Left C4-6 MBB with RN IV sedation;  Surgeon: Satish Segura MD;  Location: [facility];  Service: Pain Management;  Laterality: Left;    INJECTION OF ANESTHETIC AGENT INTO SACROILIAC JOINT Right 1/12/2022    Procedure: right Sacroiliac Joint Injection with RN IV sedation;  Surgeon: Satish Segura MD;  Location: [facility];  Service: Pain Management;  Laterality: Right;    INJECTION OF JOINT  Right 1/12/2022    Procedure: right GT bursa injection with RN IV sedation;  Surgeon: Satish Segura MD;  Location: [facility]IN [facility];  Service: Pain Management;  Laterality: Right;    JOINT REPLACEMENT Bilateral     knees    RETINAL DETACHMENT SURGERY      REVISION OF PROCEDURE INVOLVING SACRAL NEUROSTIMULATOR DEVICE N/A 2/23/2021    Procedure: REVISION, NEUROSTIMULATOR, SACRAL;  Surgeon: Onofre Fallon MD;  [facility] OR;  Service: Urology;  Laterality: N/A;    SHOULDER ARTHROSCOPY Right     SHOULDER ARTHROSCOPY W/ ROTATOR CUFF REPAIR Left     x 2    TOTAL REDUCTION MAMMOPLASTY Bilateral 2008     Family History   Problem Relation Age of Onset    Hypertension Mother     Kidney disease Mother     Heart disease Father     Heart attack Father 80     Social History     Socioeconomic History    Marital status:    Tobacco Use    Smoking status: Never    Smokeless tobacco: Never   Substance and Sexual Activity    Alcohol use: Not Currently     Comment: Very rarely    Drug use: Never    Sexual activity: Not Currently     Social Determinants of Health     Financial Resource Strain: Low Risk     Difficulty of Paying Living Expenses: Not hard at all   Food Insecurity: No Food Insecurity    Worried About Running Out of Food in the Last Year: Never true    Ran Out of Food in the Last Year: Never true   Transportation Needs: No Transportation Needs    Lack of Transportation (Medical): No    Lack of Transportation (Non-Medical): No   Physical Activity: Inactive    Days of Exercise per Week: 0 days    Minutes of Exercise per Session: 0 min   Stress: No Stress Concern Present    Feeling of Stress : Not at all   Social Connections: Socially Isolated    Frequency of Communication with Friends and Family: More than three times a week    Frequency of Social Gatherings with Friends and Family: More than three times a week    Attends [employer] Services: Never    Active Member of Clubs or Organizations: No    Attends Club or  Organization Meetings: Never    Marital Status:    Housing Stability: Low Risk     Unable to Pay for Housing in the Last Year: No    Number of Places Lived in the Last Year: 1    Unstable Housing in the Last Year: No     Medication List with Changes/Refills   Current Medications    ALBUTEROL (PROVENTIL/VENTOLIN HFA) 90 MCG/ACTUATION INHALER    Inhale 2 puffs into the lungs every 6 (six) hours as needed for Wheezing. Dispense with spacer.    AMLODIPINE (NORVASC) 5 MG TABLET    TAKE 1 TABLET BY MOUTH ONCE DAILY    ASPIRIN (ECOTRIN) 81 MG EC TABLET    Take 81 mg by mouth once daily.    DULOXETINE (CYMBALTA) 30 MG CAPSULE    TAKE 1 CAPSULE BY MOUTH  DAILY    LEVOTHYROXINE (SYNTHROID) 50 MCG TABLET    TAKE 1 TABLET BY MOUTH  BEFORE BREAKFAST    LISINOPRIL (PRINIVIL,ZESTRIL) 20 MG TABLET    Take 1 tablet (20 mg total) by mouth once daily.    METOPROLOL SUCCINATE (TOPROL-XL) 100 MG 24 HR TABLET    Take 1 tablet (100 mg total) by mouth once daily.    OMEPRAZOLE (PRILOSEC) 20 MG CAPSULE    Take 1 capsule (20 mg total) by mouth 2 (two) times a day.    OXYBUTYNIN (DITROPAN-XL) 10 MG 24 HR TABLET    Take 10 mg by mouth once daily.    ROPINIROLE (REQUIP) 1 MG TABLET    TAKE 1 TABLET BY MOUTH IN  THE EVENING    TIZANIDINE (ZANAFLEX) 4 MG TABLET    Take 1 tablet (4 mg total) by mouth every 8 (eight) hours as needed (muscle spasms).   Discontinued Medications    PREDNISONE (DELTASONE) 20 MG TABLET    Take 1 tablet (20 mg total) by mouth once daily.     Review of patient's allergies indicates:   Allergen Reactions    Gabapentin Nausea And Vomiting     Causes vertigo      Review of Systems   Constitutional: Negative for malaise/fatigue.   HENT:  Negative for hearing loss.    Eyes:  Negative for double vision and visual disturbance.   Cardiovascular:  Negative for chest pain.   Respiratory:  Negative for shortness of breath.    Endocrine: Negative for cold intolerance.   Hematologic/Lymphatic: Does not bruise/bleed easily.    Skin:  Negative for poor wound healing and suspicious lesions.   Musculoskeletal:  Positive for neck pain. Negative for gout, joint pain and joint swelling.   Gastrointestinal:  Negative for nausea and vomiting.   Genitourinary:  Positive for bladder incontinence. Negative for dysuria.   Neurological:  Positive for numbness, paresthesias and sensory change.   Psychiatric/Behavioral:  Negative for depression, memory loss and substance abuse. The patient is not nervous/anxious.    Allergic/Immunologic: Negative for persistent infections.     Objective:   Body mass index is 36.58 kg/m².  There were no vitals filed for this visit.             General    Constitutional: She is oriented to person, place, and time. She appears well-developed and well-nourished. No distress.   HENT:   Head: Normocephalic.   Mouth/Throat: Oropharynx is clear and moist.   Eyes: EOM are normal.   Cardiovascular:  Normal rate.            Pulmonary/Chest: Effort normal.   Abdominal: Soft.   Neurological: She is alert and oriented to person, place, and time. No cranial nerve deficit.   Psychiatric: She has a normal mood and affect.             Right Hand/Wrist Exam     Inspection   Scars: Wrist - present Hand -  present  Effusion: Wrist - absent Hand -  absent    Pain   Wrist - The patient exhibits pain of the ulnar nerve.    Tests   Cubital Tunnel Compression Test: positive    Atrophy   Thenar:  negative  1st Dorsal Interosseous: positive    Other     Neuorologic Exam    Median Distribution: normal  Ulnar Distribution: normal  Radial Distribution: abnormal    Comments:  The patient has a well-healed carpal tunnel scar.  Her symptoms are in the ulnar nerve distribution.  She does have a positive Tinel sign over the ulnar nerve right elbow.      Right Elbow Exam     Tests   Tinel's sign (cubital tunnel): positive          Vascular Exam       Capillary Refill  Right Hand: normal capillary refill        Relevant imaging results reviewed and  interpreted by me, discussed with the patient and / or family today MRI cervical spine showed multilevel spinal stenosis  Assessment:     Encounter Diagnoses   Name Primary?    Cubital tunnel syndrome on right Yes    Cervical radiculopathy     Carpal tunnel syndrome, bilateral         Plan:     The patient was told that part of her symptoms came from her neck and part from her right elbow.  I told her it is impossible to say how much of each is responsible for symptoms.  She has tried cervical injections without improvement.  She was counseled regarding a right cubital tunnel release.  Risk complications and alternatives were discussed including the risk of infection, anesthetic risk, injury to nerves and vessels, loss of motion, and possible need for additional surgeries were discussed.  She seems to understand and agree that surgery.  All questions were answered.  I told her she would probably have some residual symptoms from her neck that would not be improved after surgery                Disclaimer: This note was prepared using a voice recognition system and is likely to have sound alike errors within the text.

## 2023-03-14 NOTE — H&P (VIEW-ONLY)
Subjective:     Patient ID: Sana Crenshaw is a 72 y.o. female.    Chief Complaint: Pain of the Right Hand and Pain of the Left Hand      HPI:  The patient is a 72-year-old female who has numbness of the ring and small finger for the past several months.  She had nerve studies that confirmed a right cubital tunnel bilateral carpal tunnel cervical radiculopathy bilateral C8-T1 and C5 and a left C7 cervical radiculopathy.  She had a right carpal tunnel release about 15 years ago.  Her symptoms are not carpal tunnel.  She is had neck injections without improvement.  She wishes to have a right cubital tunnel release and possible anterior transposition ulnar nerve    Past Medical History:   Diagnosis Date    Asthma     Hypertension     Thyroid disease      Past Surgical History:   Procedure Laterality Date    ANKLE FUSION Right     BREAST SURGERY      BUNIONECTOMY Right     CATARACT EXTRACTION W/  INTRAOCULAR LENS IMPLANT      EPIDURAL STEROID INJECTION INTO CERVICAL SPINE N/A 12/28/2021    Procedure: C5/6 IL FLORA with left paramedian approach with RN IV sedation;  Surgeon: Satish Segura MD;  Location: [facility];  Service: Pain Management;  Laterality: N/A;    EYE SURGERY      HYSTERECTOMY      IMPLANTATION OF PERMANENT SACRAL NERVE STIMULATOR N/A 1/8/2021    Procedure: INSERTION, NEUROSTIMULATOR, PERMANENT, SACRAL;  Surgeon: Onofre Fallon MD;  [facility];  Service: Urology;  Laterality: N/A;    INJECTION OF ANESTHETIC AGENT AROUND MEDIAL BRANCH NERVES INNERVATING CERVICAL FACET JOINT Left 4/27/2022    Procedure: Left C4-6 MBB with RN IV sedation;  Surgeon: Satish Segura MD;  Location: [facility];  Service: Pain Management;  Laterality: Left;    INJECTION OF ANESTHETIC AGENT INTO SACROILIAC JOINT Right 1/12/2022    Procedure: right Sacroiliac Joint Injection with RN IV sedation;  Surgeon: Satish Segura MD;  Location: [facility];  Service: Pain Management;  Laterality: Right;    INJECTION OF JOINT  Right 1/12/2022    Procedure: right GT bursa injection with RN IV sedation;  Surgeon: Satish Segura MD;  Location: [facility]IN [facility];  Service: Pain Management;  Laterality: Right;    JOINT REPLACEMENT Bilateral     knees    RETINAL DETACHMENT SURGERY      REVISION OF PROCEDURE INVOLVING SACRAL NEUROSTIMULATOR DEVICE N/A 2/23/2021    Procedure: REVISION, NEUROSTIMULATOR, SACRAL;  Surgeon: Onofre Fallon MD;  Location: [facility] OR;  Service: Urology;  Laterality: N/A;    SHOULDER ARTHROSCOPY Right     SHOULDER ARTHROSCOPY W/ ROTATOR CUFF REPAIR Left     x 2    TOTAL REDUCTION MAMMOPLASTY Bilateral 2008     Family History   Problem Relation Age of Onset    Hypertension Mother     Kidney disease Mother     Heart disease Father     Heart attack Father 80     Social History     Socioeconomic History    Marital status:    Tobacco Use    Smoking status: Never    Smokeless tobacco: Never   Substance and Sexual Activity    Alcohol use: Not Currently     Comment: Very rarely    Drug use: Never    Sexual activity: Not Currently     Social Determinants of Health     Financial Resource Strain: Low Risk     Difficulty of Paying Living Expenses: Not hard at all   Food Insecurity: No Food Insecurity    Worried About Running Out of Food in the Last Year: Never true    Ran Out of Food in the Last Year: Never true   Transportation Needs: No Transportation Needs    Lack of Transportation (Medical): No    Lack of Transportation (Non-Medical): No   Physical Activity: Inactive    Days of Exercise per Week: 0 days    Minutes of Exercise per Session: 0 min   Stress: No Stress Concern Present    Feeling of Stress : Not at all   Social Connections: Socially Isolated    Frequency of Communication with Friends and Family: More than three times a week    Frequency of Social Gatherings with Friends and Family: More than three times a week    Attends [employer] Services: Never    Active Member of Clubs or Organizations: No    Attends Club or  Organization Meetings: Never    Marital Status:    Housing Stability: Low Risk     Unable to Pay for Housing in the Last Year: No    Number of Places Lived in the Last Year: 1    Unstable Housing in the Last Year: No     Medication List with Changes/Refills   Current Medications    ALBUTEROL (PROVENTIL/VENTOLIN HFA) 90 MCG/ACTUATION INHALER    Inhale 2 puffs into the lungs every 6 (six) hours as needed for Wheezing. Dispense with spacer.    AMLODIPINE (NORVASC) 5 MG TABLET    TAKE 1 TABLET BY MOUTH ONCE DAILY    ASPIRIN (ECOTRIN) 81 MG EC TABLET    Take 81 mg by mouth once daily.    DULOXETINE (CYMBALTA) 30 MG CAPSULE    TAKE 1 CAPSULE BY MOUTH  DAILY    LEVOTHYROXINE (SYNTHROID) 50 MCG TABLET    TAKE 1 TABLET BY MOUTH  BEFORE BREAKFAST    LISINOPRIL (PRINIVIL,ZESTRIL) 20 MG TABLET    Take 1 tablet (20 mg total) by mouth once daily.    METOPROLOL SUCCINATE (TOPROL-XL) 100 MG 24 HR TABLET    Take 1 tablet (100 mg total) by mouth once daily.    OMEPRAZOLE (PRILOSEC) 20 MG CAPSULE    Take 1 capsule (20 mg total) by mouth 2 (two) times a day.    OXYBUTYNIN (DITROPAN-XL) 10 MG 24 HR TABLET    Take 10 mg by mouth once daily.    ROPINIROLE (REQUIP) 1 MG TABLET    TAKE 1 TABLET BY MOUTH IN  THE EVENING    TIZANIDINE (ZANAFLEX) 4 MG TABLET    Take 1 tablet (4 mg total) by mouth every 8 (eight) hours as needed (muscle spasms).   Discontinued Medications    PREDNISONE (DELTASONE) 20 MG TABLET    Take 1 tablet (20 mg total) by mouth once daily.     Review of patient's allergies indicates:   Allergen Reactions    Gabapentin Nausea And Vomiting     Causes vertigo      Review of Systems   Constitutional: Negative for malaise/fatigue.   HENT:  Negative for hearing loss.    Eyes:  Negative for double vision and visual disturbance.   Cardiovascular:  Negative for chest pain.   Respiratory:  Negative for shortness of breath.    Endocrine: Negative for cold intolerance.   Hematologic/Lymphatic: Does not bruise/bleed easily.    Skin:  Negative for poor wound healing and suspicious lesions.   Musculoskeletal:  Positive for neck pain. Negative for gout, joint pain and joint swelling.   Gastrointestinal:  Negative for nausea and vomiting.   Genitourinary:  Positive for bladder incontinence. Negative for dysuria.   Neurological:  Positive for numbness, paresthesias and sensory change.   Psychiatric/Behavioral:  Negative for depression, memory loss and substance abuse. The patient is not nervous/anxious.    Allergic/Immunologic: Negative for persistent infections.     Objective:   Body mass index is 36.58 kg/m².  There were no vitals filed for this visit.             General    Constitutional: She is oriented to person, place, and time. She appears well-developed and well-nourished. No distress.   HENT:   Head: Normocephalic.   Mouth/Throat: Oropharynx is clear and moist.   Eyes: EOM are normal.   Cardiovascular:  Normal rate.            Pulmonary/Chest: Effort normal.   Abdominal: Soft.   Neurological: She is alert and oriented to person, place, and time. No cranial nerve deficit.   Psychiatric: She has a normal mood and affect.             Right Hand/Wrist Exam     Inspection   Scars: Wrist - present Hand -  present  Effusion: Wrist - absent Hand -  absent    Pain   Wrist - The patient exhibits pain of the ulnar nerve.    Tests   Cubital Tunnel Compression Test: positive    Atrophy   Thenar:  negative  1st Dorsal Interosseous: positive    Other     Neuorologic Exam    Median Distribution: normal  Ulnar Distribution: normal  Radial Distribution: abnormal    Comments:  The patient has a well-healed carpal tunnel scar.  Her symptoms are in the ulnar nerve distribution.  She does have a positive Tinel sign over the ulnar nerve right elbow.      Right Elbow Exam     Tests   Tinel's sign (cubital tunnel): positive          Vascular Exam       Capillary Refill  Right Hand: normal capillary refill        Relevant imaging results reviewed and  interpreted by me, discussed with the patient and / or family today MRI cervical spine showed multilevel spinal stenosis  Assessment:     Encounter Diagnoses   Name Primary?    Cubital tunnel syndrome on right Yes    Cervical radiculopathy     Carpal tunnel syndrome, bilateral         Plan:     The patient was told that part of her symptoms came from her neck and part from her right elbow.  I told her it is impossible to say how much of each is responsible for symptoms.  She has tried cervical injections without improvement.  She was counseled regarding a right cubital tunnel release.  Risk complications and alternatives were discussed including the risk of infection, anesthetic risk, injury to nerves and vessels, loss of motion, and possible need for additional surgeries were discussed.  She seems to understand and agree that surgery.  All questions were answered.  I told her she would probably have some residual symptoms from her neck that would not be improved after surgery                Disclaimer: This note was prepared using a voice recognition system and is likely to have sound alike errors within the text.

## 2023-03-23 ENCOUNTER — OFFICE VISIT (OUTPATIENT)
Dept: FAMILY MEDICINE | Facility: CLINIC | Age: 73
End: 2023-03-23
Payer: MEDICARE

## 2023-03-23 VITALS
OXYGEN SATURATION: 98 % | WEIGHT: 208.44 LBS | BODY MASS INDEX: 38.36 KG/M2 | HEART RATE: 70 BPM | HEIGHT: 62 IN | TEMPERATURE: 98 F | SYSTOLIC BLOOD PRESSURE: 130 MMHG | DIASTOLIC BLOOD PRESSURE: 80 MMHG

## 2023-03-23 DIAGNOSIS — I10 ESSENTIAL HYPERTENSION: ICD-10-CM

## 2023-03-23 DIAGNOSIS — G25.81 RLS (RESTLESS LEGS SYNDROME): ICD-10-CM

## 2023-03-23 DIAGNOSIS — E03.9 HYPOTHYROIDISM, UNSPECIFIED TYPE: ICD-10-CM

## 2023-03-23 DIAGNOSIS — M54.16 LUMBAR RADICULOPATHY: ICD-10-CM

## 2023-03-23 DIAGNOSIS — E66.01 SEVERE OBESITY WITH BODY MASS INDEX (BMI) OF 36.0 TO 36.9 WITH SERIOUS COMORBIDITY: ICD-10-CM

## 2023-03-23 DIAGNOSIS — R73.03 PREDIABETES: ICD-10-CM

## 2023-03-23 DIAGNOSIS — S86.012D TRAUMATIC RUPTURE OF LEFT ACHILLES TENDON, SUBSEQUENT ENCOUNTER: ICD-10-CM

## 2023-03-23 DIAGNOSIS — M54.12 CERVICAL RADICULOPATHY: ICD-10-CM

## 2023-03-23 DIAGNOSIS — M46.1 SACROILIITIS: ICD-10-CM

## 2023-03-23 DIAGNOSIS — Z74.09 LIMITED MOBILITY: Primary | ICD-10-CM

## 2023-03-23 DIAGNOSIS — M70.61 GREATER TROCHANTERIC BURSITIS OF RIGHT HIP: ICD-10-CM

## 2023-03-23 PROCEDURE — 99215 OFFICE O/P EST HI 40 MIN: CPT | Mod: S$PBB,,, | Performed by: FAMILY MEDICINE

## 2023-03-23 PROCEDURE — 99999 PR PBB SHADOW E&M-EST. PATIENT-LVL III: CPT | Mod: PBBFAC,,, | Performed by: FAMILY MEDICINE

## 2023-03-23 PROCEDURE — 99499 UNLISTED E&M SERVICE: CPT | Mod: S$PBB,,, | Performed by: FAMILY MEDICINE

## 2023-03-23 PROCEDURE — 99213 OFFICE O/P EST LOW 20 MIN: CPT | Mod: PBBFAC,PO | Performed by: FAMILY MEDICINE

## 2023-03-23 PROCEDURE — 99999 PR PBB SHADOW E&M-EST. PATIENT-LVL III: ICD-10-PCS | Mod: PBBFAC,,, | Performed by: FAMILY MEDICINE

## 2023-03-23 PROCEDURE — 99499 RISK ADDL DX/OHS AUDIT: ICD-10-PCS | Mod: S$PBB,,, | Performed by: FAMILY MEDICINE

## 2023-03-23 PROCEDURE — 99215 PR OFFICE/OUTPT VISIT, EST, LEVL V, 40-54 MIN: ICD-10-PCS | Mod: S$PBB,,, | Performed by: FAMILY MEDICINE

## 2023-03-23 NOTE — PROGRESS NOTES
Subjective:       Patient ID: Sana Crenshaw is a 72 y.o. female.    Chief Complaint: No chief complaint on file.      HPI Comments:       Current Outpatient Medications:     albuterol (PROVENTIL/VENTOLIN HFA) 90 mcg/actuation inhaler, Inhale 2 puffs into the lungs every 6 (six) hours as needed for Wheezing. Dispense with spacer., Disp: 18 g, Rfl: 3    amLODIPine (NORVASC) 5 MG tablet, TAKE 1 TABLET BY MOUTH ONCE DAILY, Disp: 90 tablet, Rfl: 3    aspirin (ECOTRIN) 81 MG EC tablet, Take 81 mg by mouth once daily., Disp: , Rfl:     DULoxetine (CYMBALTA) 30 MG capsule, TAKE 1 CAPSULE BY MOUTH  DAILY, Disp: 90 capsule, Rfl: 3    levothyroxine (SYNTHROID) 50 MCG tablet, TAKE 1 TABLET BY MOUTH  BEFORE BREAKFAST, Disp: 90 tablet, Rfl: 3    lisinopriL (PRINIVIL,ZESTRIL) 20 MG tablet, Take 1 tablet (20 mg total) by mouth once daily., Disp: 90 tablet, Rfl: 3    metoprolol succinate (TOPROL-XL) 100 MG 24 hr tablet, Take 1 tablet (100 mg total) by mouth once daily., Disp: 90 tablet, Rfl: 3    omeprazole (PRILOSEC) 20 MG capsule, Take 1 capsule (20 mg total) by mouth 2 (two) times a day., Disp: 180 capsule, Rfl: 3    oxybutynin (DITROPAN-XL) 10 MG 24 hr tablet, Take 10 mg by mouth once daily., Disp: , Rfl:     rOPINIRole (REQUIP) 1 MG tablet, TAKE 1 TABLET BY MOUTH IN  THE EVENING, Disp: 90 tablet, Rfl: 3    tiZANidine (ZANAFLEX) 4 MG tablet, Take 1 tablet (4 mg total) by mouth every 8 (eight) hours as needed (muscle spasms)., Disp: 270 tablet, Rfl: 1  No current facility-administered medications for this visit.    Facility-Administered Medications Ordered in Other Visits:     lactated ringers infusion, , Intravenous, Continuous, Franky Hartman MD, Last Rate: 20 mL/hr at 01/08/21 0731, New Bag at 01/08/21 0925    lactated ringers infusion, , Intravenous, Continuous, Estevan Rodriguez MD, Stopped at 02/23/21 6379    Previous motorized wheelchair recently broke.  Has been using for several years.  Needs evaluation for new  "unit.      Unable to use walker, cane, manual wheelchair due to longstanding shoulder injuries.      Medical reasons for walking problems:  Spina bifida, scoliosis, ligament injury of the left foot.  Wears a brace on that foot.  Legs give out when walking, back problems, 2 recent falls    Unable to use a scooter because she can not reach in front of her efficiently    Can shift her weight.  No pressure sores.  Occasional edema.  Wears compression stockings.      Motorized unit necessary for ADLs including getting to the bathroom/toilet, accessing kitchen to make meals, accessing the bedroom to [place] and dress    Needs an accessory of a reclining back because she has difficulty sitting forward for long periods of time due to stiffness in the hips and history of hip fracture    Current areas of pain include low back, both legs and hips, both shoulders, neck.  8/10    PATIENT IS BOTH MENTALLY AND PHYSICALLY ABLE TO SAFELY OPERATE THE POWER MOBILITY DEVICE, AND IS WILLING AND MOTIVATED TO USE THE DEVICE IN THE HOME    Review of Systems   Constitutional:  Negative for activity change, appetite change and fever.   HENT:  Negative for sore throat.    Respiratory:  Negative for cough and shortness of breath.    Cardiovascular:  Positive for leg swelling. Negative for chest pain.   Gastrointestinal:  Negative for abdominal pain, diarrhea and nausea.   Genitourinary:  Negative for difficulty urinating.   Musculoskeletal:  Positive for arthralgias, back pain, gait problem, neck pain and neck stiffness. Negative for myalgias.   Neurological:  Positive for weakness. Negative for dizziness and headaches.     Objective:      Vitals:    03/23/23 1401   BP: 130/80   Pulse: 70   Temp: 98.3 °F (36.8 °C)   TempSrc: Oral   SpO2: 98%   Weight: 94.6 kg (208 lb 7.1 oz)   Height: 5' 2" (1.575 m)     Physical Exam  Vitals and nursing note reviewed.   Constitutional:       General: She is not in acute distress.     Appearance: She is " well-developed. She is not diaphoretic.   HENT:      Head: Normocephalic.   Neck:      Thyroid: No thyromegaly.   Cardiovascular:      Rate and Rhythm: Normal rate and regular rhythm.      Heart sounds: Normal heart sounds. No murmur heard.  Pulmonary:      Effort: Pulmonary effort is normal.      Breath sounds: Normal breath sounds. No wheezing or rales.   Abdominal:      General: There is no distension.      Palpations: Abdomen is soft.   Musculoskeletal:      Right shoulder: Decreased range of motion. Decreased strength.      Left shoulder: Decreased range of motion. Decreased strength.      Right elbow: Normal range of motion.      Left elbow: Normal range of motion.      Right wrist: Normal range of motion.      Left wrist: Normal range of motion.      Cervical back: Neck supple.      Right hip: Decreased range of motion.      Left hip: Decreased range of motion.      Right knee: Normal range of motion.      Left knee: Normal range of motion.      Right ankle: Decreased range of motion.      Left ankle: Decreased range of motion.      Comments: Shoulders unable to abduct beyond 90° bilaterally.  Good range of motion at elbows and wrists.    Minimal hip abduction  Knees with normal range of motion  Ankles 20° dorsiflexion bilateral   Lymphadenopathy:      Cervical: No cervical adenopathy.   Skin:     General: Skin is warm and dry.   Neurological:      Mental Status: She is alert and oriented to person, place, and time.      Comments: Right upper extremity 4/5  Left upper extremity 4/5  Left lower extremity 3/5   Right lower extremity 4/5    Gait unsteady with shuffling   Psychiatric:         Mood and Affect: Mood normal.         Behavior: Behavior normal.         Thought Content: Thought content normal.         Judgment: Judgment normal.       Assessment:       1. Limited mobility    2. Severe obesity with body mass index (BMI) of 36.0 to 36.9 with serious comorbidity    3. Sacroiliitis    4. Cervical  radiculopathy    5. Essential hypertension    6. Prediabetes    7. RLS (restless legs syndrome)    8. Hypothyroidism, unspecified type    9. Lumbar radiculopathy    10. Traumatic rupture of left Achilles tendon, subsequent encounter    11. Greater trochanteric bursitis of right hip          Plan:   Limited mobility  Comments:  Physical limitations substantial.  Quality of life and ADLs significantly enhanced with motorized wheelchair.  Written Rx forwarded to [facility]    Severe obesity with body mass index (BMI) of 36.0 to 36.9 with serious comorbidity  Comments:  weight down 12 lb    Sacroiliitis  Comments:  unchanged    Cervical radiculopathy  Comments:  Followed by neurosurgery. pt says no surgery or other intervention planned for now    Essential hypertension  Comments:  stable    Prediabetes  Comments:  A1c 5.7    RLS (restless legs syndrome)  Comments:  on requip    Hypothyroidism, unspecified type  Comments:  Euthyroid on current dose    Lumbar radiculopathy    Traumatic rupture of left Achilles tendon, subsequent encounter    Greater trochanteric bursitis of right hip

## 2023-03-27 ENCOUNTER — OFFICE VISIT (OUTPATIENT)
Dept: FAMILY MEDICINE | Facility: CLINIC | Age: 73
End: 2023-03-27
Payer: MEDICARE

## 2023-03-27 ENCOUNTER — TELEPHONE (OUTPATIENT)
Dept: NEUROSURGERY | Facility: CLINIC | Age: 73
End: 2023-03-27
Payer: MEDICARE

## 2023-03-27 VITALS
BODY MASS INDEX: 38.14 KG/M2 | SYSTOLIC BLOOD PRESSURE: 110 MMHG | OXYGEN SATURATION: 97 % | TEMPERATURE: 99 F | HEART RATE: 77 BPM | DIASTOLIC BLOOD PRESSURE: 66 MMHG | WEIGHT: 207.25 LBS | HEIGHT: 62 IN

## 2023-03-27 DIAGNOSIS — J32.9 SINOBRONCHITIS: Primary | ICD-10-CM

## 2023-03-27 DIAGNOSIS — R73.03 PREDIABETES: ICD-10-CM

## 2023-03-27 DIAGNOSIS — I10 ESSENTIAL HYPERTENSION: ICD-10-CM

## 2023-03-27 DIAGNOSIS — Z74.09 LIMITED MOBILITY: ICD-10-CM

## 2023-03-27 DIAGNOSIS — J40 SINOBRONCHITIS: Primary | ICD-10-CM

## 2023-03-27 PROCEDURE — 99999 PR PBB SHADOW E&M-EST. PATIENT-LVL III: ICD-10-PCS | Mod: PBBFAC,,, | Performed by: FAMILY MEDICINE

## 2023-03-27 PROCEDURE — 99214 PR OFFICE/OUTPT VISIT, EST, LEVL IV, 30-39 MIN: ICD-10-PCS | Mod: S$PBB,,, | Performed by: FAMILY MEDICINE

## 2023-03-27 PROCEDURE — 99214 OFFICE O/P EST MOD 30 MIN: CPT | Mod: S$PBB,,, | Performed by: FAMILY MEDICINE

## 2023-03-27 PROCEDURE — 99213 OFFICE O/P EST LOW 20 MIN: CPT | Mod: PBBFAC,PO | Performed by: FAMILY MEDICINE

## 2023-03-27 PROCEDURE — 99999 PR PBB SHADOW E&M-EST. PATIENT-LVL III: CPT | Mod: PBBFAC,,, | Performed by: FAMILY MEDICINE

## 2023-03-27 RX ORDER — PROMETHAZINE HYDROCHLORIDE AND DEXTROMETHORPHAN HYDROBROMIDE 6.25; 15 MG/5ML; MG/5ML
5 SYRUP ORAL 3 TIMES DAILY PRN
Qty: 118 ML | Refills: 0 | Status: SHIPPED | OUTPATIENT
Start: 2023-03-27 | End: 2023-04-06

## 2023-03-27 RX ORDER — PREDNISONE 20 MG/1
20 TABLET ORAL DAILY
Qty: 3 TABLET | Refills: 0 | Status: SHIPPED | OUTPATIENT
Start: 2023-03-27 | End: 2023-07-24

## 2023-03-27 RX ORDER — AZITHROMYCIN 250 MG/1
TABLET, FILM COATED ORAL
Qty: 6 TABLET | Refills: 0 | Status: SHIPPED | OUTPATIENT
Start: 2023-03-27 | End: 2023-04-01

## 2023-03-27 NOTE — PROGRESS NOTES
Subjective:       Patient ID: Sana Crenshaw is a 72 y.o. female.    Chief Complaint: Sinus Problem      HPI Comments:       Current Outpatient Medications:     albuterol (PROVENTIL/VENTOLIN HFA) 90 mcg/actuation inhaler, Inhale 2 puffs into the lungs every 6 (six) hours as needed for Wheezing. Dispense with spacer., Disp: 18 g, Rfl: 3    amLODIPine (NORVASC) 5 MG tablet, TAKE 1 TABLET BY MOUTH ONCE DAILY, Disp: 90 tablet, Rfl: 3    aspirin (ECOTRIN) 81 MG EC tablet, Take 81 mg by mouth once daily., Disp: , Rfl:     DULoxetine (CYMBALTA) 30 MG capsule, TAKE 1 CAPSULE BY MOUTH  DAILY, Disp: 90 capsule, Rfl: 3    levothyroxine (SYNTHROID) 50 MCG tablet, TAKE 1 TABLET BY MOUTH  BEFORE BREAKFAST, Disp: 90 tablet, Rfl: 3    lisinopriL (PRINIVIL,ZESTRIL) 20 MG tablet, Take 1 tablet (20 mg total) by mouth once daily., Disp: 90 tablet, Rfl: 3    metoprolol succinate (TOPROL-XL) 100 MG 24 hr tablet, Take 1 tablet (100 mg total) by mouth once daily., Disp: 90 tablet, Rfl: 3    omeprazole (PRILOSEC) 20 MG capsule, Take 1 capsule (20 mg total) by mouth 2 (two) times a day., Disp: 180 capsule, Rfl: 3    oxybutynin (DITROPAN-XL) 10 MG 24 hr tablet, Take 10 mg by mouth once daily., Disp: , Rfl:     rOPINIRole (REQUIP) 1 MG tablet, TAKE 1 TABLET BY MOUTH IN  THE EVENING, Disp: 90 tablet, Rfl: 3    tiZANidine (ZANAFLEX) 4 MG tablet, Take 1 tablet (4 mg total) by mouth every 8 (eight) hours as needed (muscle spasms)., Disp: 270 tablet, Rfl: 1    azithromycin (Z-DILSHAD) 250 MG tablet, Take 2 tablets by mouth on day 1; Take 1 tablet by mouth on days 2-5, Disp: 6 tablet, Rfl: 0    predniSONE (DELTASONE) 20 MG tablet, Take 1 tablet (20 mg total) by mouth once daily., Disp: 3 tablet, Rfl: 0    promethazine-dextromethorphan (PROMETHAZINE-DM) 6.25-15 mg/5 mL Syrp, Take 5 mLs by mouth 3 (three) times daily as needed., Disp: 118 mL, Rfl: 0  No current facility-administered medications for this visit.    Facility-Administered Medications Ordered  in Other Visits:     lactated ringers infusion, , Intravenous, Continuous, Franky Hartman MD, Last Rate: 20 mL/hr at 01/08/21 0731, New Bag at 01/08/21 0925    lactated ringers infusion, , Intravenous, Continuous, Estevan Rodriguez MD, Stopped at 02/23/21 1645    She presents with a 3 day history of cough, rhinorrhea, sinus pressure, postnasal drip.  Like green sputum.  No shortness of breath or wheezing.  No fever chills.  No body aches or headaches.  Having trouble sleeping due to the cough.    Sinus Problem  Associated symptoms include congestion, coughing and a sore throat. Pertinent negatives include no chills, ear pain, headaches or shortness of breath.   Cough  This is a new problem. The current episode started in the past 7 days. The problem has been waxing and waning. The problem occurs constantly. The cough is Productive of brown sputum. Associated symptoms include nasal congestion, a sore throat and wheezing. Pertinent negatives include no chest pain, chills, ear congestion, ear pain, fever, headaches, heartburn, hemoptysis, myalgias, postnasal drip, rash, rhinorrhea, shortness of breath, sweats or weight loss. The symptoms are aggravated by lying down. She has tried OTC cough suppressant for the symptoms. The treatment provided no relief. Her past medical history is significant for asthma, bronchitis and environmental allergies. There is no history of bronchiectasis, COPD, emphysema or pneumonia.   Review of Systems   Constitutional:  Negative for chills, fever and weight loss.   HENT:  Positive for congestion and sore throat. Negative for ear pain, postnasal drip and rhinorrhea.    Respiratory:  Positive for cough and wheezing. Negative for hemoptysis and shortness of breath.    Cardiovascular:  Negative for chest pain.   Gastrointestinal:  Negative for heartburn.   Musculoskeletal:  Negative for myalgias.   Skin:  Negative for rash.   Allergic/Immunologic: Positive for environmental allergies.  "  Neurological:  Negative for headaches.     Objective:      Vitals:    03/27/23 0926   BP: 110/66   Pulse: 77   Temp: 99.3 °F (37.4 °C)   SpO2: 97%   Weight: 94 kg (207 lb 3.7 oz)   Height: 5' 2" (1.575 m)   PainSc: 0-No pain     Physical Exam  Vitals and nursing note reviewed.   Constitutional:       General: She is not in acute distress.     Appearance: She is well-developed. She is ill-appearing. She is not diaphoretic.   HENT:      Head: Normocephalic.      Nose: Mucosal edema and rhinorrhea present.      Mouth/Throat:      Pharynx: Pharyngeal swelling present. No oropharyngeal exudate or posterior oropharyngeal erythema.   Neck:      Thyroid: No thyromegaly.   Cardiovascular:      Rate and Rhythm: Normal rate and regular rhythm.      Heart sounds: Normal heart sounds. No murmur heard.  Pulmonary:      Effort: Pulmonary effort is normal.      Breath sounds: Normal breath sounds. No wheezing or rales.   Abdominal:      General: There is no distension.      Palpations: Abdomen is soft.   Musculoskeletal:      Cervical back: Neck supple.   Lymphadenopathy:      Cervical: No cervical adenopathy.   Skin:     General: Skin is warm and dry.   Neurological:      Mental Status: She is alert and oriented to person, place, and time.   Psychiatric:         Behavior: Behavior normal.         Thought Content: Thought content normal.         Judgment: Judgment normal.       Assessment:       1. Sinobronchitis    2. Essential hypertension    3. Limited mobility    4. Prediabetes          Plan:   Sinobronchitis  Comments:  Z-Dilshad, prednisone for 3 days.  Fluids and rest    Essential hypertension  Comments:  Controlled    Limited mobility  Comments:  At risk for complication    Prediabetes  Comments:  Stable    Other orders  -     azithromycin (Z-DILSHAD) 250 MG tablet; Take 2 tablets by mouth on day 1; Take 1 tablet by mouth on days 2-5  Dispense: 6 tablet; Refill: 0  -     predniSONE (DELTASONE) 20 MG tablet; Take 1 tablet (20 mg " total) by mouth once daily.  Dispense: 3 tablet; Refill: 0  -     promethazine-dextromethorphan (PROMETHAZINE-DM) 6.25-15 mg/5 mL Syrp; Take 5 mLs by mouth 3 (three) times daily as needed.  Dispense: 118 mL; Refill: 0

## 2023-03-27 NOTE — TELEPHONE ENCOUNTER
Urgent message sent to provider regarding closing office note on 1/5.      ----- Message from Hien Washington sent at 3/27/2023  4:20 PM CDT -----  Contact: Zenobia/[facility].  Z[facility]obia with [facility] is calling in regards to orders. Reports pt was ordered a tens machine and in order to get it, provider was supposed to sign off on visit notes from 1/5/23 and those have not been signed. Please return the call at [phone] ext [phone].

## 2023-03-31 ENCOUNTER — EXTERNAL CHRONIC CARE MANAGEMENT (OUTPATIENT)
Dept: PRIMARY CARE CLINIC | Facility: CLINIC | Age: 73
End: 2023-03-31
Payer: MEDICARE

## 2023-03-31 PROCEDURE — 99487 CPLX CHRNC CARE 1ST 60 MIN: CPT | Mod: PBBFAC,27,PO | Performed by: FAMILY MEDICINE

## 2023-03-31 PROCEDURE — 99489 PR COMPLX CHRON CARE MGMT, EA ADDTL 30 MIN, PER MONTH: ICD-10-PCS | Mod: S$PBB,,, | Performed by: FAMILY MEDICINE

## 2023-03-31 PROCEDURE — 99487 PR COMPLX CHRON CARE MGMT, 1ST HR, PER MONTH: ICD-10-PCS | Mod: S$PBB,,, | Performed by: FAMILY MEDICINE

## 2023-03-31 PROCEDURE — 99487 CPLX CHRNC CARE 1ST 60 MIN: CPT | Mod: S$PBB,,, | Performed by: FAMILY MEDICINE

## 2023-03-31 PROCEDURE — 99489 CPLX CHRNC CARE EA ADDL 30: CPT | Mod: PBBFAC,27,PO | Performed by: FAMILY MEDICINE

## 2023-03-31 PROCEDURE — 99489 CPLX CHRNC CARE EA ADDL 30: CPT | Mod: S$PBB,,, | Performed by: FAMILY MEDICINE

## 2023-04-04 ENCOUNTER — PATIENT MESSAGE (OUTPATIENT)
Dept: PREADMISSION TESTING | Facility: HOSPITAL | Age: 73
End: 2023-04-04
Payer: MEDICARE

## 2023-04-04 ENCOUNTER — HOSPITAL ENCOUNTER (OUTPATIENT)
Dept: PREADMISSION TESTING | Facility: HOSPITAL | Age: 73
Discharge: HOME | End: 2023-04-04
Attending: ORTHOPAEDIC SURGERY
Payer: MEDICARE

## 2023-04-04 VITALS
OXYGEN SATURATION: 97 % | RESPIRATION RATE: 14 BRPM | SYSTOLIC BLOOD PRESSURE: 154 MMHG | TEMPERATURE: 98 F | DIASTOLIC BLOOD PRESSURE: 75 MMHG | HEART RATE: 72 BPM

## 2023-04-04 DIAGNOSIS — I10 HYPERTENSION, UNSPECIFIED TYPE: ICD-10-CM

## 2023-04-04 DIAGNOSIS — E66.01 CLASS 2 SEVERE OBESITY DUE TO EXCESS CALORIES WITH SERIOUS COMORBIDITY AND BODY MASS INDEX (BMI) OF 38.0 TO 38.9 IN ADULT: ICD-10-CM

## 2023-04-04 DIAGNOSIS — Z01.818 PREOP EXAMINATION: Primary | ICD-10-CM

## 2023-04-04 DIAGNOSIS — G56.20 CUBITAL TUNNEL SYNDROME, UNSPECIFIED LATERALITY: ICD-10-CM

## 2023-04-04 DIAGNOSIS — J45.909 ASTHMA, UNSPECIFIED ASTHMA SEVERITY, UNSPECIFIED WHETHER COMPLICATED, UNSPECIFIED WHETHER PERSISTENT: ICD-10-CM

## 2023-04-04 DIAGNOSIS — E03.9 HYPOTHYROIDISM, UNSPECIFIED TYPE: ICD-10-CM

## 2023-04-04 LAB
ALBUMIN SERPL BCP-MCNC: 3.3 G/DL (ref 3.5–5.2)
ALP SERPL-CCNC: 108 U/L (ref 55–135)
ALT SERPL W/O P-5'-P-CCNC: 28 U/L (ref 10–44)
ANION GAP SERPL CALC-SCNC: 6 MMOL/L (ref 8–16)
AST SERPL-CCNC: 27 U/L (ref 10–40)
BASOPHILS # BLD AUTO: 0.06 K/UL (ref 0–0.2)
BASOPHILS NFR BLD: 0.8 % (ref 0–1.9)
BILIRUB SERPL-MCNC: 0.5 MG/DL (ref 0.1–1)
BUN SERPL-MCNC: 11 MG/DL (ref 8–23)
CALCIUM SERPL-MCNC: 9.4 MG/DL (ref 8.7–10.5)
CHLORIDE SERPL-SCNC: 106 MMOL/L (ref 95–110)
CO2 SERPL-SCNC: 26 MMOL/L (ref 23–29)
CREAT SERPL-MCNC: 0.7 MG/DL (ref 0.5–1.4)
DIFFERENTIAL METHOD: NORMAL
EOSINOPHIL # BLD AUTO: 0.3 K/UL (ref 0–0.5)
EOSINOPHIL NFR BLD: 4.5 % (ref 0–8)
ERYTHROCYTE [DISTWIDTH] IN BLOOD BY AUTOMATED COUNT: 12.7 % (ref 11.5–14.5)
EST. GFR  (NO RACE VARIABLE): >60 ML/MIN/1.73 M^2
GLUCOSE SERPL-MCNC: 116 MG/DL (ref 70–110)
HCT VFR BLD AUTO: 41.3 % (ref 37–48.5)
HGB BLD-MCNC: 14.1 G/DL (ref 12–16)
IMM GRANULOCYTES # BLD AUTO: 0.02 K/UL (ref 0–0.04)
IMM GRANULOCYTES NFR BLD AUTO: 0.3 % (ref 0–0.5)
LYMPHOCYTES # BLD AUTO: 1.9 K/UL (ref 1–4.8)
LYMPHOCYTES NFR BLD: 25.6 % (ref 18–48)
MCH RBC QN AUTO: 29.5 PG (ref 27–31)
MCHC RBC AUTO-ENTMCNC: 34.1 G/DL (ref 32–36)
MCV RBC AUTO: 86 FL (ref 82–98)
MONOCYTES # BLD AUTO: 0.6 K/UL (ref 0.3–1)
MONOCYTES NFR BLD: 7.6 % (ref 4–15)
NEUTROPHILS # BLD AUTO: 4.5 K/UL (ref 1.8–7.7)
NEUTROPHILS NFR BLD: 61.2 % (ref 38–73)
NRBC BLD-RTO: 0 /100 WBC
PLATELET # BLD AUTO: 238 K/UL (ref 150–450)
PMV BLD AUTO: 10.3 FL (ref 9.2–12.9)
POTASSIUM SERPL-SCNC: 3.5 MMOL/L (ref 3.5–5.1)
PROT SERPL-MCNC: 6.8 G/DL (ref 6–8.4)
RBC # BLD AUTO: 4.78 M/UL (ref 4–5.4)
SODIUM SERPL-SCNC: 138 MMOL/L (ref 136–145)
WBC # BLD AUTO: 7.41 K/UL (ref 3.9–12.7)

## 2023-04-04 PROCEDURE — 93010 EKG 12-LEAD: ICD-10-PCS | Mod: ,,, | Performed by: INTERNAL MEDICINE

## 2023-04-04 PROCEDURE — 85025 COMPLETE CBC W/AUTO DIFF WBC: CPT | Performed by: NURSE PRACTITIONER

## 2023-04-04 PROCEDURE — 93005 ELECTROCARDIOGRAM TRACING: CPT

## 2023-04-04 PROCEDURE — 80053 COMPREHEN METABOLIC PANEL: CPT | Performed by: NURSE PRACTITIONER

## 2023-04-04 PROCEDURE — 93010 ELECTROCARDIOGRAM REPORT: CPT | Mod: ,,, | Performed by: INTERNAL MEDICINE

## 2023-04-04 NOTE — H&P
Preoperative History and Physical  [facility]                                                                   Chief Complaint: Preoperative evaluation     History of Present Illness:      Sana Crenshaw is a 72 y.o. female with a PMHx of Arthritis, Asthma, HTN, Hypothyroidism, Obesity, and Cubital tunnel syndrome who presents to the office today for a preoperative consultation at the request of Dr. Nelson who plans on performing a Cubital tunnel release on April 10.     Functional Status:      The patient is able to climb a flight of stairs. The patient is able to ambulate without difficulty. The patient's functional status is affected by the surgical problem. The patient's functional status is not affected by shortness of breath, chest pain, dyspnea on exertion and fatigue.      MET score greater than 4    Past Medical History:      Past Medical History:   Diagnosis Date    Arthritis     Asthma     Hypertension     Thyroid disease         Past Surgical History:      Past Surgical History:   Procedure Laterality Date    ANKLE FUSION Right     BREAST SURGERY      BUNIONECTOMY Right     CATARACT EXTRACTION W/  INTRAOCULAR LENS IMPLANT      EPIDURAL STEROID INJECTION INTO CERVICAL SPINE N/A 12/28/2021    Procedure: C5/6 IL FLORA with left paramedian approach with RN IV sedation;  Surgeon: Satish Segura MD;  Location: [facility] PAIN [facility];  Service: Pain Management;  Laterality: N/A;    EYE SURGERY      HYSTERECTOMY      IMPLANTATION OF PERMANENT SACRAL NERVE STIMULATOR N/A 1/8/2021    Procedure: INSERTION, NEUROSTIMULATOR, PERMANENT, SACRAL;  Surgeon: Onofre Fallon MD;  Location: [facility] OR;  Service: Urology;  Laterality: N/A;    INJECTION OF ANESTHETIC AGENT AROUND MEDIAL BRANCH NERVES INNERVATING CERVICAL FACET JOINT Left 4/27/2022    Procedure: Left C4-6 MBB with RN IV sedation;  Surgeon: Satish Segura MD;  Location: [facility] PAIN MGT;  Service: Pain Management;   Laterality: Left;    INJECTION OF ANESTHETIC AGENT INTO SACROILIAC JOINT Right 1/12/2022    Procedure: right Sacroiliac Joint Injection with RN IV sedation;  Surgeon: Satish Segura MD;  Location: [facility] PAIN MGT;  Service: Pain Management;  Laterality: Right;    INJECTION OF JOINT Right 1/12/2022    Procedure: right GT bursa injection with RN IV sedation;  Surgeon: Satish Segura MD;  Location: [facility] PAIN [facility]GT;  Service: Pain Management;  Laterality: Right;    JOINT REPLACEMENT Bilateral     knees    RETINAL DETACHMENT SURGERY      REVISION OF PROCEDURE INVOLVING SACRAL NEUROSTIMULATOR DEVICE N/A 2/23/2021    Procedure: REVISION, NEUROSTIMULATOR, SACRAL;  Surgeon: Onofre Fallon MD;  Location: [facility] OR;  Service: Urology;  Laterality: N/A;    SHOULDER ARTHROSCOPY Right     SHOULDER ARTHROSCOPY W/ ROTATOR CUFF REPAIR Left     x 2    TOTAL REDUCTION MAMMOPLASTY Bilateral 2008        Social History:      Social History     Socioeconomic History    Marital status:    Tobacco Use    Smoking status: Never    Smokeless tobacco: Never   Substance and Sexual Activity    Alcohol use: Not Currently     Comment: Very rarely    Drug use: Never    Sexual activity: Not Currently     Social Determinants of Health     Financial Resource Strain: Low Risk     Difficulty of Paying Living Expenses: Not hard at all   Food Insecurity: No Food Insecurity    Worried About Running Out of Food in the Last Year: Never true    Ran Out of Food in the Last Year: Never true   Transportation Needs: No Transportation Needs    Lack of Transportation (Medical): No    Lack of Transportation (Non-Medical): No   Physical Activity: Inactive    Days of Exercise per Week: 0 days    Minutes of Exercise per Session: 0 min   Stress: No Stress Concern Present    Feeling of Stress : Not at all   Social Connections: Socially Isolated    Frequency of Communication with Friends and Family: More than three times a week    Frequency of Social Gatherings with  Friends and Family: More than three times a week    Attends [employer] Services: Never    Active Member of Clubs or Organizations: No    Attends Club or Organization Meetings: Never    Marital Status:    Housing Stability: Low Risk     Unable to Pay for Housing in the Last Year: No    Number of Places Lived in the Last Year: 1    Unstable Housing in the Last Year: No        Family History:      Family History   Problem Relation Age of Onset    Hypertension Mother     Kidney disease Mother     Heart disease Father     Heart attack Father 80    No Known Problems Sister     No Known Problems Sister     No Known Problems Sister     No Known Problems Brother     No Known Problems Brother     No Known Problems Brother     No Known Problems Brother     Cancer Maternal Grandfather     No Known Problems Maternal Grandmother     No Known Problems Paternal Grandfather     No Known Problems Paternal Grandmother        Allergies:      Review of patient's allergies indicates:   Allergen Reactions    Gabapentin Nausea And Vomiting     Causes vertigo        Medications:      Current Outpatient Medications   Medication Sig    albuterol (PROVENTIL/VENTOLIN HFA) 90 mcg/actuation inhaler Inhale 2 puffs into the lungs every 6 (six) hours as needed for Wheezing. Dispense with spacer.    amLODIPine (NORVASC) 5 MG tablet TAKE 1 TABLET BY MOUTH ONCE DAILY (Patient taking differently: every evening.)    aspirin (ECOTRIN) 81 MG EC tablet Take 81 mg by mouth once daily.    DULoxetine (CYMBALTA) 30 MG capsule TAKE 1 CAPSULE BY MOUTH  DAILY    levothyroxine (SYNTHROID) 50 MCG tablet TAKE 1 TABLET BY MOUTH  BEFORE BREAKFAST    lisinopriL (PRINIVIL,ZESTRIL) 20 MG tablet Take 1 tablet (20 mg total) by mouth once daily. (Patient taking differently: Take 20 mg by mouth every evening.)    metoprolol succinate (TOPROL-XL) 100 MG 24 hr tablet Take 1 tablet (100 mg total) by mouth once daily.    omeprazole (PRILOSEC) 20 MG capsule Take 1 capsule  (20 mg total) by mouth 2 (two) times a day.    oxybutynin (DITROPAN-XL) 10 MG 24 hr tablet Take 10 mg by mouth once daily.    rOPINIRole (REQUIP) 1 MG tablet TAKE 1 TABLET BY MOUTH IN  THE EVENING    tiZANidine (ZANAFLEX) 4 MG tablet Take 1 tablet (4 mg total) by mouth every 8 (eight) hours as needed (muscle spasms).    predniSONE (DELTASONE) 20 MG tablet Take 1 tablet (20 mg total) by mouth once daily.    promethazine-dextromethorphan (PROMETHAZINE-DM) 6.25-15 mg/5 mL Syrp Take 5 mLs by mouth 3 (three) times daily as needed.     No current facility-administered medications for this encounter.     Facility-Administered Medications Ordered in Other Encounters   Medication    lactated ringers infusion    lactated ringers infusion       Vitals:      Vitals:    04/04/23 1101   BP: (!) 154/75   Pulse: 72   Resp: 14   Temp: 97.5 °F (36.4 °C)       Review of Systems:        Constitutional: Negative for fever, chills, weight loss, malaise/fatigue and diaphoresis.   HENT: Negative for hearing loss, ear pain, nosebleeds, congestion, sore throat, neck pain, tinnitus and ear discharge.    Eyes: Negative for blurred vision, double vision, photophobia, pain, discharge and redness.   Respiratory: Negative for cough, hemoptysis, sputum production, shortness of breath, wheezing and stridor.    Cardiovascular: Negative for chest pain, palpitations, orthopnea, claudication, leg swelling and PND.   Gastrointestinal: Negative for heartburn, nausea, vomiting, abdominal pain, diarrhea, constipation, blood in stool and melena.   Genitourinary: Negative for dysuria, urgency, frequency, hematuria and flank pain.   Musculoskeletal: Negative for myalgias, back pain, and falls. Positive for right hand/finger pain, rates 10/10 at worst with radiation to elbow associated with loss of  strength.  Skin: Negative for itching and rash.   Neurological: Negative for dizziness, tingling, tremors, sensory change, speech change, focal weakness,  seizures, loss of consciousness, weakness and headaches.   Endo/Heme/Allergies: Negative for environmental allergies and polydipsia. Does not bruise/bleed easily.   Psychiatric/Behavioral: Negative for depression, suicidal ideas, hallucinations, memory loss and substance abuse. The patient is not nervous/anxious and does not have insomnia.    All 14 systems reviewed and negative except as noted above.    Physical Exam:      Constitutional: Appears well-developed, well-nourished and in no acute distress.  Patient is oriented to person, place, and time.   Head: Normocephalic and atraumatic. Mucous membranes moist.  Neck: Neck supple no mass.   Cardiovascular: Normal rate and regular rhythm.  S1 S2 appreciated by ascultation.  Pulmonary/Chest: Effort normal and clear to auscultation bilaterally. No respiratory distress.   Abdomen: Soft. Non-tender and non-distended. Bowel sounds are normal.   Neurological: Patient is alert and oriented to person, place and time. Moves all extremities.  Skin: Warm and dry. No lesions.  Extremities: No clubbing, cyanosis or edema.    Laboratory data:      Reviewed and noted in plan where applicable. Please see chart for full laboratory data.    No results for input(s): CPK, CPKMB, TROPONINI, MB in the last 24 hours. No results for input(s): POCTGLUCOSE in the last 24 hours.     No results found for: INR, PROTIME    Lab Results   Component Value Date    WBC 6.84 06/10/2022    HGB 15.1 06/10/2022    HCT 46.9 06/10/2022    MCV 92 06/10/2022     06/10/2022       No results for input(s): GLU, NA, K, CL, CO2, BUN, CREATININE, CALCIUM, MG in the last 24 hours.    Predictors of intubation difficulty:       Morbid obesity? yes    Anatomically abnormal facies? no   Prominent incisors? no   Receding mandible? no   Short, thick neck? yes    Neck range of motion: normal   Dentition:  Full upper and lower dentures.   Based on the Modified Mallampati, patient is a mallampati score: I (soft  palate, uvula, fauces, and tonsillar pillars visible)    Cardiographics:      ECG: normal sinus rhythm, no blocks or conduction defects, no ischemic changes    Echocardiogram: not indicated    Imaging:      Chest x-ray: not indicated    Assessment and Plan:      Cubital tunnel syndrome  - Patient presents today at the request of Dr. Nelson who plans on performing a Right Cubital tunnel release on April 10th.    Known risk factors for perioperative complications: Morbid Obesity  Asthma     Difficulty with intubation is not anticipated.    Cardiac Risk Estimation: Based on the Revised Cardiac Risk index, patient is a Class 1 risk with a 3.9% risk of a major cardiac event in a low risk procedure.    1.) Preoperative workup as follows: ECG, hemoglobin, hematocrit, electrolytes, creatinine, glucose, liver function studies.  2.) Change in medication regimen before surgery: discontinue ASA 6 days before surgery, discontinue NSAIDs 5 days before surgery.  3.) Prophylaxis for cardiac events with perioperative beta-blockers:Continue Toprol XL.  4.) Invasive hemodynamic monitoring perioperatively: not indicated.  5.) Deep vein thrombosis prophylaxis postoperatively: intermittent pneumatic compression boots and regimen to be chosen by surgical team.  6.) Surveillance for postoperative MI with ECG immediately postoperatively and on postoperati ve days 1 and 2 AND troponin levels 24 hours postoperatively and on day 4 or hospital discharge (whichever comes first): not indicated.  7.) Current medications which may produce withdrawal symptoms if withheld perioperatively: None.  8.) Other measures: None.    Hypertension  - BP under fair control.  - Continue home Norvasc and Toprol XL.  - Patient was instructed to continue Lisinopril for now but to hold the morning of surgery to avoid hypotension associated with anesthesia, and resume postoperatively as directed.    Hypothyroidism  - Continue Synthroid.    Class 2 severe obesity due  to excess calories with serious comorbidity and body mass index (BMI) of 38.0 to 38.9 in adult  - Self directed diet and weight loss.    Asthma  - Currently appears compensated with no respiratory complaints reported.  - Continue Albuterol prn.        Electronically signed by Sherine Hopper DNP, ACNP on 4/4/2023 at 10:35 AM.

## 2023-04-04 NOTE — ASSESSMENT & PLAN NOTE
- Patient presents today at the request of Dr. Nelson who plans on performing a Right Cubital tunnel release on April 10th.    Known risk factors for perioperative complications: Morbid Obesity  Asthma     Difficulty with intubation is not anticipated.    Cardiac Risk Estimation: Based on the Revised Cardiac Risk index, patient is a Class 1 risk with a 3.9% risk of a major cardiac event in a low risk procedure.    1.) Preoperative workup as follows: ECG, hemoglobin, hematocrit, electrolytes, creatinine, glucose, liver function studies.  2.) Change in medication regimen before surgery: discontinue ASA 6 days before surgery, discontinue NSAIDs 5 days before surgery.  3.) Prophylaxis for cardiac events with perioperative beta-blockers:Continue Toprol XL.  4.) Invasive hemodynamic monitoring perioperatively: not indicated.  5.) Deep vein thrombosis prophylaxis postoperatively: intermittent pneumatic compression boots and regimen to be chosen by surgical team.  6.) Surveillance for postoperative MI with ECG immediately postoperatively and on postoperati ve days 1 and 2 AND troponin levels 24 hours postoperatively and on day 4 or hospital discharge (whichever comes first): not indicated.  7.) Current medications which may produce withdrawal symptoms if withheld perioperatively: None.  8.) Other measures: None.

## 2023-04-04 NOTE — ASSESSMENT & PLAN NOTE
- Currently appears compensated with no respiratory complaints reported.  - Continue Albuterol prn.

## 2023-04-04 NOTE — ASSESSMENT & PLAN NOTE
- BP under fair control.  - Continue home Norvasc and Toprol XL.  - Patient was instructed to continue Lisinopril for now but to hold the morning of surgery to avoid hypotension associated with anesthesia, and resume postoperatively as directed.

## 2023-04-04 NOTE — DISCHARGE INSTRUCTIONS
To confirm, your doctor has instructed you that surgery is scheduled for 4.10.2023.       Pre admit office will call the afternoon prior to surgery between 1PM and 3PM with arrival time.    Surgery will be at [facility],  The address is [address]. [place]  [id number].      IMPORTANT INSTRUCTIONS!    Do not eat or drink after 12 midnight, including water.   Do not smoke or use chewing tobacco after 12 midnight  OK to brush teeth, but no gum, candy, or mints!      Take only these medicines with a small swallow of water-morning of surgery.     Cymbalta, ditropan, toprol, synthroid, prilosec         ____ Stop Aspirin, Ibuprofen, Motrin and Aleve at least 5-7 days before surgery, unless otherwise instructed by your doctor, or the nurse.   You MAY use Tylenol/acetaminophen until day of surgery.      ____  If you take diabetic medication, do NOT take morning of surgery unless instructed by Doctor. Metformin must be stopped 24 hrs prior to surgery time.       ____ Stop taking any Fish Oil supplements or Vitamins at least 5 days prior to surgery, unless instructed otherwise by your Doctor.       Please notify MD office if you have an active infection, currently taking antibiotics or received a vaccination within the past 7 days.      Bathing Instructions: The night before surgery and the morning prior to coming to the hospital:    - Shower & rinse your body as usual with anti-bacterial Soap (Dial or Lever 2000)   -Hibiclens (if indicated) use AFTER anti-bacterial soap; 1 packet PM/1 packet in AM on surgical site only   -Do not use hibiclens on your head, face, or genitals.    -Do not wash with anti-bacterial soap after you use the hibiclens.    -Do not shave surgical site 5-7 days prior to surgery.    -Pubic hair 7 days prior to surgery (gyn pt's).      Pediatric patients do not need to use anti-bacterial soap or Hibiclens.             After Bathing:   __ No powder, lotions, creams, or body spray to  skin     __No deodorant for any breast procedure, PORT, or upper arm surgery     __ No makeup, mascara, nail polish or artificial nails        **SURGERY WILL BE CANCELLED IF ARTIFICIAL/NAIL POLISH IS PRESENT!!!**    __ Please remove all piercings and leave all jewelry at home.    **SURGERY WILL BE CANCELLED IF PIERCINGS ARE PRESENT!!!**      __ Dentures, Hearing Aids and Contact Lens need to be removed prior to the start of surgery.      __ Wear clean, loose-fitting clothing. Allow for dressings/bandages/surgical equipment     __ You must have transportation, and they MUST stay the entire time.       [facility] Visitor/Ride Policy:   Only 1 adult allowed (over the age of 18) to accompany you into Pre-op/Recovery Surgery Dept and must stay through the entire length of admission.     Must have a ride home from a responsible adult that you know and trust.      Pediatric Patients are allowed 2 adult visitors.     Medical Transport, Uber or Lyft can only be used if patient has a responsible adult to accompany them during ride home.         Post-Op Instructions: You will receive surgery post-op instructions by your Discharge Nurse prior to going home.     Surgical Site Infection:   Prevention of surgical site infections:   -Keep incisions clean and dry.   -Do not soak/submerge incisions in water until completely healed.   -Do not apply lotions, powders, creams, or deodorants to site.   -Always make sure hands are cleaned with antibacterial soap/ alcohol-based   prior to touching the surgical site.       Signs and symptoms:               -Redness and pain around the area where you had surgery               -Drainage of cloudy fluid from your surgical wound               -Fever over 100.4 or chills     >>>Call Surgeon office/on-call Surgeon if you experience any of these signs & symptoms post-surgery @ [phone].      *Please Call [facility] Pre-Admit Department for surgery instruction  questions:  [phone]    *Payment questions:  [phone]    *Billing questions:  [phone]

## 2023-04-06 ENCOUNTER — PATIENT MESSAGE (OUTPATIENT)
Dept: PREADMISSION TESTING | Facility: HOSPITAL | Age: 73
End: 2023-04-06
Payer: MEDICARE

## 2023-04-06 ENCOUNTER — ANESTHESIA EVENT (OUTPATIENT)
Dept: SURGERY | Facility: HOSPITAL | Age: 73
End: 2023-04-06
Payer: MEDICARE

## 2023-04-10 ENCOUNTER — HOSPITAL ENCOUNTER (OUTPATIENT)
Facility: HOSPITAL | Age: 73
Discharge: HOME | End: 2023-04-10
Attending: ORTHOPAEDIC SURGERY | Admitting: ORTHOPAEDIC SURGERY
Payer: MEDICARE

## 2023-04-10 ENCOUNTER — ANESTHESIA (OUTPATIENT)
Dept: SURGERY | Facility: HOSPITAL | Age: 73
End: 2023-04-10
Payer: MEDICARE

## 2023-04-10 DIAGNOSIS — G56.21 CUBITAL TUNNEL SYNDROME ON RIGHT: Primary | ICD-10-CM

## 2023-04-10 PROCEDURE — D9220A PRA ANESTHESIA: ICD-10-PCS | Mod: ANES,,, | Performed by: ANESTHESIOLOGY

## 2023-04-10 PROCEDURE — 37000008 HC ANESTHESIA 1ST 15 MINUTES: Performed by: ORTHOPAEDIC SURGERY

## 2023-04-10 PROCEDURE — 64718 REVISE ULNAR NERVE AT ELBOW: CPT | Mod: RT,,, | Performed by: ORTHOPAEDIC SURGERY

## 2023-04-10 PROCEDURE — D9220A PRA ANESTHESIA: Mod: ANES,,, | Performed by: ANESTHESIOLOGY

## 2023-04-10 PROCEDURE — 63600175 PHARM REV CODE 636 W HCPCS: Performed by: NURSE ANESTHETIST, CERTIFIED REGISTERED

## 2023-04-10 PROCEDURE — 71000015 HC POSTOP RECOV 1ST HR: Performed by: ORTHOPAEDIC SURGERY

## 2023-04-10 PROCEDURE — 71000033 HC RECOVERY, INTIAL HOUR: Performed by: ORTHOPAEDIC SURGERY

## 2023-04-10 PROCEDURE — 64718 PR REVISE ULNAR NERVE AT ELBOW: ICD-10-PCS | Mod: RT,,, | Performed by: ORTHOPAEDIC SURGERY

## 2023-04-10 PROCEDURE — 27200651 HC AIRWAY, LMA: Performed by: ANESTHESIOLOGY

## 2023-04-10 PROCEDURE — 25000003 PHARM REV CODE 250: Performed by: NURSE ANESTHETIST, CERTIFIED REGISTERED

## 2023-04-10 PROCEDURE — 63600175 PHARM REV CODE 636 W HCPCS

## 2023-04-10 PROCEDURE — 37000009 HC ANESTHESIA EA ADD 15 MINS: Performed by: ORTHOPAEDIC SURGERY

## 2023-04-10 PROCEDURE — D9220A PRA ANESTHESIA: ICD-10-PCS | Mod: CRNA,,, | Performed by: NURSE ANESTHETIST, CERTIFIED REGISTERED

## 2023-04-10 PROCEDURE — D9220A PRA ANESTHESIA: Mod: CRNA,,, | Performed by: NURSE ANESTHETIST, CERTIFIED REGISTERED

## 2023-04-10 PROCEDURE — 36000706: Performed by: ORTHOPAEDIC SURGERY

## 2023-04-10 PROCEDURE — 25000003 PHARM REV CODE 250: Performed by: ORTHOPAEDIC SURGERY

## 2023-04-10 PROCEDURE — 36000707: Performed by: ORTHOPAEDIC SURGERY

## 2023-04-10 PROCEDURE — 63600175 PHARM REV CODE 636 W HCPCS: Performed by: ANESTHESIOLOGY

## 2023-04-10 RX ORDER — HYDROCODONE BITARTRATE AND ACETAMINOPHEN 5; 325 MG/1; MG/1
1 TABLET ORAL EVERY 4 HOURS PRN
Status: DISCONTINUED | OUTPATIENT
Start: 2023-04-10 | End: 2023-04-10 | Stop reason: HOSPADM

## 2023-04-10 RX ORDER — HYDROCODONE BITARTRATE AND ACETAMINOPHEN 5; 325 MG/1; MG/1
1 TABLET ORAL EVERY 6 HOURS PRN
Qty: 15 TABLET | Refills: 0 | Status: SHIPPED | OUTPATIENT
Start: 2023-04-10 | End: 2023-07-24

## 2023-04-10 RX ORDER — FENTANYL CITRATE 50 UG/ML
25 INJECTION, SOLUTION INTRAMUSCULAR; INTRAVENOUS EVERY 5 MIN PRN
Status: DISCONTINUED | OUTPATIENT
Start: 2023-04-10 | End: 2023-04-10 | Stop reason: HOSPADM

## 2023-04-10 RX ORDER — BUPIVACAINE HYDROCHLORIDE 2.5 MG/ML
INJECTION, SOLUTION EPIDURAL; INFILTRATION; INTRACAUDAL
Status: DISCONTINUED | OUTPATIENT
Start: 2023-04-10 | End: 2023-04-10 | Stop reason: HOSPADM

## 2023-04-10 RX ORDER — MIDAZOLAM HYDROCHLORIDE 1 MG/ML
INJECTION INTRAMUSCULAR; INTRAVENOUS
Status: DISCONTINUED | OUTPATIENT
Start: 2023-04-10 | End: 2023-04-10

## 2023-04-10 RX ORDER — DEXAMETHASONE SODIUM PHOSPHATE 4 MG/ML
INJECTION, SOLUTION INTRA-ARTICULAR; INTRALESIONAL; INTRAMUSCULAR; INTRAVENOUS; SOFT TISSUE
Status: DISCONTINUED | OUTPATIENT
Start: 2023-04-10 | End: 2023-04-10

## 2023-04-10 RX ORDER — PROPOFOL 10 MG/ML
INJECTION, EMULSION INTRAVENOUS
Status: DISCONTINUED | OUTPATIENT
Start: 2023-04-10 | End: 2023-04-10

## 2023-04-10 RX ORDER — ONDANSETRON 2 MG/ML
INJECTION INTRAMUSCULAR; INTRAVENOUS
Status: DISCONTINUED | OUTPATIENT
Start: 2023-04-10 | End: 2023-04-10

## 2023-04-10 RX ORDER — LIDOCAINE HYDROCHLORIDE 10 MG/ML
1 INJECTION, SOLUTION EPIDURAL; INFILTRATION; INTRACAUDAL; PERINEURAL ONCE
Status: DISCONTINUED | OUTPATIENT
Start: 2023-04-10 | End: 2023-04-10 | Stop reason: HOSPADM

## 2023-04-10 RX ORDER — CEFAZOLIN SODIUM 2 G/50ML
2 SOLUTION INTRAVENOUS
Status: COMPLETED | OUTPATIENT
Start: 2023-04-10 | End: 2023-04-10

## 2023-04-10 RX ORDER — LIDOCAINE HYDROCHLORIDE 20 MG/ML
INJECTION, SOLUTION INFILTRATION; PERINEURAL
Status: DISCONTINUED
Start: 2023-04-10 | End: 2023-04-10 | Stop reason: WASHOUT

## 2023-04-10 RX ORDER — DIPHENHYDRAMINE HYDROCHLORIDE 50 MG/ML
25 INJECTION INTRAMUSCULAR; INTRAVENOUS EVERY 6 HOURS PRN
Status: DISCONTINUED | OUTPATIENT
Start: 2023-04-10 | End: 2023-04-10 | Stop reason: HOSPADM

## 2023-04-10 RX ORDER — CHLORHEXIDINE GLUCONATE ORAL RINSE 1.2 MG/ML
10 SOLUTION DENTAL
Status: ACTIVE | OUTPATIENT
Start: 2023-04-10

## 2023-04-10 RX ORDER — LIDOCAINE HYDROCHLORIDE 20 MG/ML
INJECTION INTRAVENOUS
Status: DISCONTINUED | OUTPATIENT
Start: 2023-04-10 | End: 2023-04-10

## 2023-04-10 RX ORDER — CHLORHEXIDINE GLUCONATE ORAL RINSE 1.2 MG/ML
10 SOLUTION DENTAL 2 TIMES DAILY
Status: DISCONTINUED | OUTPATIENT
Start: 2023-04-10 | End: 2023-04-10 | Stop reason: HOSPADM

## 2023-04-10 RX ORDER — SODIUM CHLORIDE, SODIUM LACTATE, POTASSIUM CHLORIDE, CALCIUM CHLORIDE 600; 310; 30; 20 MG/100ML; MG/100ML; MG/100ML; MG/100ML
INJECTION, SOLUTION INTRAVENOUS CONTINUOUS
Status: DISCONTINUED | OUTPATIENT
Start: 2023-04-10 | End: 2023-04-10 | Stop reason: HOSPADM

## 2023-04-10 RX ORDER — FENTANYL CITRATE 50 UG/ML
INJECTION, SOLUTION INTRAMUSCULAR; INTRAVENOUS
Status: DISCONTINUED | OUTPATIENT
Start: 2023-04-10 | End: 2023-04-10

## 2023-04-10 RX ORDER — SODIUM CHLORIDE 0.9 % (FLUSH) 0.9 %
10 SYRINGE (ML) INJECTION
Status: DISCONTINUED | OUTPATIENT
Start: 2023-04-10 | End: 2023-04-10 | Stop reason: HOSPADM

## 2023-04-10 RX ORDER — BUPIVACAINE HYDROCHLORIDE 2.5 MG/ML
INJECTION, SOLUTION EPIDURAL; INFILTRATION; INTRACAUDAL
Status: DISCONTINUED
Start: 2023-04-10 | End: 2023-04-10 | Stop reason: HOSPADM

## 2023-04-10 RX ORDER — MEPERIDINE HYDROCHLORIDE 25 MG/ML
12.5 INJECTION INTRAMUSCULAR; INTRAVENOUS; SUBCUTANEOUS ONCE AS NEEDED
Status: DISCONTINUED | OUTPATIENT
Start: 2023-04-10 | End: 2023-04-10 | Stop reason: HOSPADM

## 2023-04-10 RX ADMIN — FENTANYL CITRATE 50 MCG: 50 INJECTION, SOLUTION INTRAMUSCULAR; INTRAVENOUS at 07:04

## 2023-04-10 RX ADMIN — DEXAMETHASONE SODIUM PHOSPHATE 8 MG: 4 INJECTION, SOLUTION INTRA-ARTICULAR; INTRALESIONAL; INTRAMUSCULAR; INTRAVENOUS; SOFT TISSUE at 07:04

## 2023-04-10 RX ADMIN — MIDAZOLAM HYDROCHLORIDE 2 MG: 1 INJECTION INTRAMUSCULAR; INTRAVENOUS at 07:04

## 2023-04-10 RX ADMIN — SODIUM CHLORIDE, SODIUM LACTATE, POTASSIUM CHLORIDE, AND CALCIUM CHLORIDE: 600; 310; 30; 20 INJECTION, SOLUTION INTRAVENOUS at 07:04

## 2023-04-10 RX ADMIN — FENTANYL CITRATE 25 MCG: 50 INJECTION, SOLUTION INTRAMUSCULAR; INTRAVENOUS at 07:04

## 2023-04-10 RX ADMIN — LIDOCAINE HYDROCHLORIDE 50 MG: 20 INJECTION INTRAVENOUS at 07:04

## 2023-04-10 RX ADMIN — PROPOFOL 50 MG: 10 INJECTION, EMULSION INTRAVENOUS at 07:04

## 2023-04-10 RX ADMIN — CEFAZOLIN SODIUM 2 G: 2 SOLUTION INTRAVENOUS at 07:04

## 2023-04-10 RX ADMIN — PROPOFOL 150 MG: 10 INJECTION, EMULSION INTRAVENOUS at 07:04

## 2023-04-10 RX ADMIN — ONDANSETRON 4 MG: 2 INJECTION INTRAMUSCULAR; INTRAVENOUS at 07:04

## 2023-04-10 NOTE — DISCHARGE SUMMARY
[facility]  Discharge Note  Short Stay    Procedure(s) (LRB):  RELEASE, CUBITAL TUNNEL (Right)      OUTCOME: Patient tolerated treatment/procedure well without complication and is now ready for discharge.    DISPOSITION: Home or Self Care    FINAL DIAGNOSIS:  Right cubital tunnel syndrome    FOLLOWUP: In clinic    DISCHARGE INSTRUCTIONS:    Discharge Procedure Orders   Diet general     Call MD for:  temperature >100.4     Call MD for:  persistent nausea and vomiting     Call MD for:  severe uncontrolled pain     Call MD for:  difficulty breathing, headache or visual disturbances     Call MD for:  redness, tenderness, or signs of infection (pain, swelling, redness, odor or green/yellow discharge around incision site)     Call MD for:  hives     Call MD for:  persistent dizziness or light-headedness     Call MD for:  extreme fatigue        TIME SPENT ON DISCHARGE:  20 minutes

## 2023-04-10 NOTE — OP NOTE
[facility]  General Surgery  Operative Note    SUMMARY     Date of Procedure: 4/10/2023     Procedure: Procedure(s) (LRB):  RELEASE, CUBITAL TUNNEL (Right)       Surgeon(s) and Role:     * Bhavin Nelson MD - Primary    Assisting Surgeon:  Scarlet MILIAN    Pre-Operative Diagnosis: Cubital tunnel syndrome on right [G56.21]    Post-Operative Diagnosis: Post-Op Diagnosis Codes:     * Cubital tunnel syndrome on right [G56.21]    Anesthesia: General    Description:  The patient is a 72-year-old female with right cubital tunnel syndrome documented by nerve conduction studies and symptoms compatible with that diagnosis taken to the operating room for right cubital tunnel release possible anterior transposition ulnar nerve.  The patient was taken to the operating room where general anesthesia was administered.  Satisfactory anesthesia having been achieved the right hand was prepped and draped in the usual sterile fashion.  After exsanguination of the extremity a proximal tourniquet was inflated to 250 mmHg after patient received 2 g of Ancef intravenously preoperatively.  At this time a longitudinal incision was made just anterior to the medial epicondyle.  Dissection was carried posterior to the medial epicondyle.  Dissection was carried out under 3.5 loupe magnification.  Douglas's ligaments were released sharply.  The ulnar nerve was decompressed from the medial intermuscular septum to the 1st branch of the flexor carpi ulnaris.  At this time the elbow was flexed and extended and the ulnar nerve was stable.  It did not sublux anteriorly.  It was elected not to do an anterior transposition.  Hemostasis was achieved using electrocautery.  Subcutaneous tissue was closed using 3-0 Vicryl suture.  Skin was closed using horizontal mattress 4-0 nylon suture.  Xeroform gauze 4x4s and 2 ABD pads overwrapped with a 3 in gauze dressing.  A sling was applied.  The patient tolerated the procedure well was  transferred to the recovery room in satisfactory condition.    Significant Surgical Tasks Conducted by the Assistant(s), if Applicable:  No assistant    Complications:  None     Estimated Blood Loss (EBL):  5 mL           Implants: None    Specimens: None           Condition: Good    Disposition: PACU - hemodynamically stable.    Attestation: I was present and scrubbed for the entire procedure.

## 2023-04-10 NOTE — TRANSFER OF CARE
"Anesthesia Transfer of Care Note    Patient: Sana Crenshaw    Procedure(s) Performed: Procedure(s) (LRB):  RELEASE, CUBITAL TUNNEL (Right)    Patient location: PACU    Anesthesia Type: general    Transport from OR: Transported from OR on room air with adequate spontaneous ventilation    Post pain: adequate analgesia    Post assessment: no apparent anesthetic complications    Post vital signs: stable    Level of consciousness: awake    Nausea/Vomiting: no nausea/vomiting    Complications: none    Transfer of care protocol was followed      Last vitals:   Visit Vitals  BP (!) 140/72 (BP Location: Left arm, Patient Position: Sitting)   Pulse 65   Temp 37 °C (98.6 °F) (Oral)   Resp 16   Ht 5' 1" (1.549 m)   Wt 95.3 kg (210 lb 1.6 oz)   SpO2 98%   Breastfeeding No   BMI 39.70 kg/m²     "

## 2023-04-10 NOTE — ANESTHESIA PREPROCEDURE EVALUATION
04/10/2023  Sana Crenshaw is a 72 y.o., female.      Pre-op Assessment    I have reviewed the Patient Summary Reports.    I have reviewed the NPO Status.   I have reviewed the Medications.     Review of Systems  Anesthesia Hx:  History of prior surgery of interest to airway management or planning:   Cardiovascular:   Hypertension    Pulmonary:   Asthma    Hepatic/GI:   GERD    Endocrine:   Hypothyroidism    Psych:   depression          Physical Exam  General: Cooperative    Airway:  Mallampati: III   Mouth Opening: Normal  TM Distance: Normal  Tongue: Normal  Neck ROM: Normal ROM    Dental:  Intact    Chest/Lungs:  Normal Respiratory Rate    Heart:  Rate: Normal        Anesthesia Plan  Type of Anesthesia, risks & benefits discussed:    Anesthesia Type: Gen ETT, Gen Supraglottic Airway, Gen Natural Airway, MAC  Intra-op Monitoring Plan: Standard ASA Monitors  Post Op Pain Control Plan: multimodal analgesia and IV/PO Opioids PRN  Induction:  IV  Informed Consent: Informed consent signed with the Patient and all parties understand the risks and agree with anesthesia plan.  All questions answered. Patient consented to blood products? No  ASA Score: 3  Day of Surgery Review of History & Physical: H&P Update referred to the surgeon/provider.    Ready For Surgery From Anesthesia Perspective.     .

## 2023-04-10 NOTE — DISCHARGE INSTRUCTIONS
After Hand Surgery  After surgery, the better you take care of yourself--especially your hand--the sooner it will heal. Follow your surgeons instructions. Try not to bump your hand, and dont move or lift anything while youre still wearing bandages, a splint, or a cast.  Care for your hand    Keep your hand elevated above heart level as much as possible for the first several days after surgery. This helps reduce swelling and pain.  To help prevent infection and speed healing, take care not to get your cast or bandages wet.  Keep dressing clean, dry, & intact until your follow up appointment.   Relieve pain as directed  Your surgeon may prescribe pain medicine or suggest you take an anti-inflammatory medicine. You might also be instructed to apply ice (or another cold source) to your hand. If you use ice cubes, put them in a plastic bag and rest it on top of your bandages. Leave the cold source on your hand for as long as its comfortable. Do this several times a day for the first few days after surgery. It may take several minutes before you can feel the cold through the cast or bandages.  Follow up with your surgeon  During a follow-up visit after surgery, your surgeon will check your progress. The stitches, bandages, splint, or cast may be removed. A new cast or splint may be placed. If your hand has healed enough, your surgeon may prescribe exercises.  Do prescribed hand exercises  Your surgeon may recommend that you do exercises. These may be done under the guidance of a physical or occupational therapist. The exercises strengthen your hand, help you regain flexibility, and restore proper function. Do the exercises as advised.  Call your surgeon if you have...  A fever higher than 100.4°F (38.0°C) taken by mouth  Side effects from your medicine, such as prolonged nausea  A wet or loose dressing, or a dressing that is too tight  Excessive bleeding  Increased, ongoing pain or numbness  Signs of infection (such  as drainage, warmth, or redness) at the incision site   Date Last Reviewed: 11/11/2015  © 2139-8343 [employer]. [address] [place] [address], [place] [address]. All rights reserved. This information is not intended as a substitute for professional medical care. Always follow your healthcare professional's instructions.    Nozin Instructions  Goal: the goal of Nozin is to reduce the risk of post-procedural infections by bacteria in the nasal cavity. Think of it as hand  for your nose.    How to use:    1. Shake Nozin bottle well    2. Take a cotton swab and apply 4 drops to one tip    3. Insert cotton tip into one nostril, being sure not to go deeper into nose than tip of the swab.    4. Swab nostril 6 times counterclockwise and 6 times clockwise. Make sure to swab the inside front pocket of the nostril.    5. Take swab out and apply 2 drops to the same cotton tip. Repeat steps 3 and 4 in the other nostril.        Do steps 1-5 twice a day for 7 days.

## 2023-04-10 NOTE — PLAN OF CARE
Reviewed and completed all discharge orders. Printed AVS and educated patient of its entirety, including physician's orders, follow-up appt, medications, when to call, and when to report to the emergency room. Reviewed prescriptions, pharmacy information, and made sure there were no conflicts preventing the patient from obtaining the newly prescribed medications. I encouraged questions, answered them thoroughly, and evaluated my instructions via teach-back method. Patient has met all hospital discharge criteria at this point.

## 2023-04-12 ENCOUNTER — TELEPHONE (OUTPATIENT)
Dept: FAMILY MEDICINE | Facility: CLINIC | Age: 73
End: 2023-04-12
Payer: MEDICARE

## 2023-04-12 NOTE — TELEPHONE ENCOUNTER
----- Message from Arlen Mendes sent at 4/12/2023  1:43 PM CDT -----  Regarding: wheel chair denial  Contact: Lucila Powell  Ms Hood needs to speak to someone regarding a denial from Medicare for patients power wheel chair, please call her back at [phone]. Thanks

## 2023-04-13 NOTE — PROGRESS NOTES
SUBJECTIVE:      Patient ID: Sana Crenshaw is a 72 y.o. female.    HPI: Ms. Crenshaw is here today for post-operative visit #1.  She is 4 days status post RELEASE, CUBITAL TUNNEL (Right) by Dr. Nelson on 4/10/23. She reports that she is doing well.  Pain is 1/10. She is taking Advil for pain. She notes improvement to the cubital tunnel symptoms. She has been compliant with postop instructions and keeping the extremity dry. She denies fever, chills, and sweats since the time of the surgery.     Past Medical History:   Diagnosis Date    Arthritis     Asthma     Hypertension     Thyroid disease      Past Surgical History:   Procedure Laterality Date    ANKLE FUSION Right     BREAST SURGERY      BUNIONECTOMY Right     CATARACT EXTRACTION W/  INTRAOCULAR LENS IMPLANT      EPIDURAL STEROID INJECTION INTO CERVICAL SPINE N/A 12/28/2021    Procedure: C5/6 IL FLORA with left paramedian approach with RN IV sedation;  Surgeon: Satish Segura MD;  Location: [facility] PAIN MGT;  Service: Pain Management;  Laterality: N/A;    EYE SURGERY      HYSTERECTOMY      IMPLANTATION OF PERMANENT SACRAL NERVE STIMULATOR N/A 1/8/2021    Procedure: INSERTION, NEUROSTIMULATOR, PERMANENT, SACRAL;  Surgeon: Onofre Fallon MD;  Location: [facility] OR;  Service: Urology;  Laterality: N/A;    INJECTION OF ANESTHETIC AGENT AROUND MEDIAL BRANCH NERVES INNERVATING CERVICAL FACET JOINT Left 4/27/2022    Procedure: Left C4-6 MBB with RN IV sedation;  Surgeon: Satish Segura MD;  Location: [facility] PAIN MGT;  Service: Pain Management;  Laterality: Left;    INJECTION OF ANESTHETIC AGENT INTO SACROILIAC JOINT Right 1/12/2022    Procedure: right Sacroiliac Joint Injection with RN IV sedation;  Surgeon: Satish Segura MD;  Location: [facility] PAIN MGT;  Service: Pain Management;  Laterality: Right;    INJECTION OF JOINT Right 1/12/2022    Procedure: right GT bursa injection with RN IV sedation;  Surgeon: Satish Segura MD;  Location: [facility] PAIN MGT;  Service: Pain Management;   Laterality: Right;    JOINT REPLACEMENT Bilateral     knees    RETINAL DETACHMENT SURGERY      REVISION OF PROCEDURE INVOLVING SACRAL NEUROSTIMULATOR DEVICE N/A 2/23/2021    Procedure: REVISION, NEUROSTIMULATOR, SACRAL;  Surgeon: Onofre Fallon MD;  Location: [facility] OR;  Service: Urology;  Laterality: N/A;    SHOULDER ARTHROSCOPY Right     SHOULDER ARTHROSCOPY W/ ROTATOR CUFF REPAIR Left     x 2    TOTAL REDUCTION MAMMOPLASTY Bilateral 2008    ULNAR TUNNEL RELEASE Right 4/10/2023    Procedure: RELEASE, CUBITAL TUNNEL;  Surgeon: Bhavin Nelson MD;  Location: [facility] OR;  Service: Orthopedics;  Laterality: Right;  right cubital tunnel release     Family History   Problem Relation Age of Onset    Hypertension Mother     Kidney disease Mother     Heart disease Father     Heart attack Father 80    No Known Problems Sister     No Known Problems Sister     No Known Problems Sister     No Known Problems Brother     No Known Problems Brother     No Known Problems Brother     No Known Problems Brother     Cancer Maternal Grandfather     No Known Problems Maternal Grandmother     No Known Problems Paternal Grandfather     No Known Problems Paternal Grandmother      Social History     Socioeconomic History    Marital status:    Tobacco Use    Smoking status: Never    Smokeless tobacco: Never   Substance and Sexual Activity    Alcohol use: Not Currently     Comment: Very rarely    Drug use: Never    Sexual activity: Not Currently     Social Determinants of Health     Financial Resource Strain: Low Risk     Difficulty of Paying Living Expenses: Not hard at all   Food Insecurity: No Food Insecurity    Worried About Running Out of Food in the Last Year: Never true    Ran Out of Food in the Last Year: Never true   Transportation Needs: No Transportation Needs    Lack of Transportation (Medical): No    Lack of Transportation (Non-Medical): No   Physical Activity: Inactive    Days of Exercise per Week: 0 days    Minutes  of Exercise per Session: 0 min   Stress: No Stress Concern Present    Feeling of Stress : Not at all   Social Connections: Socially Isolated    Frequency of Communication with Friends and Family: More than three times a week    Frequency of Social Gatherings with Friends and Family: More than three times a week    Attends Pentecostal Services: Never    Active Member of Clubs or Organizations: No    Attends Club or Organization Meetings: Never    Marital Status:    Housing Stability: Low Risk     Unable to Pay for Housing in the Last Year: No    Number of Places Lived in the Last Year: 1    Unstable Housing in the Last Year: No     Medication List with Changes/Refills   Current Medications    ALBUTEROL (PROVENTIL/VENTOLIN HFA) 90 MCG/ACTUATION INHALER    Inhale 2 puffs into the lungs every 6 (six) hours as needed for Wheezing. Dispense with spacer.    AMLODIPINE (NORVASC) 5 MG TABLET    TAKE 1 TABLET BY MOUTH ONCE DAILY    ASPIRIN (ECOTRIN) 81 MG EC TABLET    Take 81 mg by mouth once daily.    DULOXETINE (CYMBALTA) 30 MG CAPSULE    TAKE 1 CAPSULE BY MOUTH  DAILY    HYDROCODONE-ACETAMINOPHEN (NORCO) 5-325 MG PER TABLET    Take 1 tablet by mouth every 6 (six) hours as needed for Pain.    LEVOTHYROXINE (SYNTHROID) 50 MCG TABLET    TAKE 1 TABLET BY MOUTH  BEFORE BREAKFAST    LISINOPRIL (PRINIVIL,ZESTRIL) 20 MG TABLET    Take 1 tablet (20 mg total) by mouth once daily.    METOPROLOL SUCCINATE (TOPROL-XL) 100 MG 24 HR TABLET    Take 1 tablet (100 mg total) by mouth once daily.    OMEPRAZOLE (PRILOSEC) 20 MG CAPSULE    TAKE 1 CAPSULE BY MOUTH  TWICE DAILY    OXYBUTYNIN (DITROPAN-XL) 10 MG 24 HR TABLET    Take 10 mg by mouth once daily.    PREDNISONE (DELTASONE) 20 MG TABLET    Take 1 tablet (20 mg total) by mouth once daily.    ROPINIROLE (REQUIP) 1 MG TABLET    TAKE 1 TABLET BY MOUTH IN  THE EVENING    TIZANIDINE (ZANAFLEX) 4 MG TABLET    Take 1 tablet (4 mg total) by mouth every 8 (eight) hours as needed (muscle  "spasms).     Review of patient's allergies indicates:   Allergen Reactions    Gabapentin Nausea And Vomiting     Causes vertigo        OBJECTIVE:     Physical exam:    Vitals:    04/14/23 1520   Weight: 95.3 kg (210 lb 1.6 oz)   Height: 5' 1" (1.549 m)   PainSc:   1   PainLoc: Elbow     Vital signs are stable, patient is afebrile.  Patient is well dressed and well groomed, no acute distress.  Alert and oriented to person, place, and time.    Right UE:  Post op dressing taken down.   Incision is clean, dry, and intact.   There is no erythema or exudate. There is no sign of any infection.   Mild ecchymosis to elbow  Mild swelling locally  She is NVI.   Sutures in place.   2+ pulses noted.  Cap refill <2 seconds  Motor intact to hand    ASSESSMENT         Encounter Diagnosis   Name Primary?    S/P cubital tunnel release Yes            4 days status post RELEASE, CUBITAL TUNNEL (Right)    PLAN:           Sana was seen today for post-op evaluation.    Diagnoses and all orders for this visit:    S/P cubital tunnel release      - PO instruction reviewed and provided to patient  - The incision was cleaned with hydrogen peroxide and normal saline. A sterile Band-Aid was applied. Ace wrap applied.  - Patient may clean the incision daily as above.   - Patient may use brace/wrap as needed for symptomatic relief.    - Patient should notify the office of any signs or symptoms of infection including fevers, erythema, purulent drainage, increasing pain.    - Follow up for suture removal     POST OPERATIVE INSTRUCTIONS - Visit #1    BANDAGES/DRESSING/BATHING:  We have removed the dressing, cleaned your incision, and put on a band-aid at your first post op visit today. You may clean the incision daily as we did today. Keep the incision clean and dry. When showering, you may cover the incision with a plastic bag/umbrella bag.   Do not use Neosporin or other topical ointments or creams on the incision. If you are concerned about any " redness or drainage of the incisions, please call the office.    SWELLING  Some swelling of your arm, hand and fingers is normal. The swelling can be decreased by  elevating your arm on a few pillows when lying down. Swelling can be further controlled by cold therapy over the surgical site. Flexion/extension of the fingers (opening and closing your hands) will also help to relieve swelling and prevent stiffness.    ACTIVITY  You may use the hand and wrist as tolerated for light activity. You are encouraged to bend the wrist, elbow and fingers as soon as the initial surgical dressing is removed. Avoid lifting, pushing, or pulling any object greater than 5-10 pounds for the first 10-14 days.     BRACE  You may use a brace/wrap as needed for additional relief/support.    MEDICATIONS  Take pain medicines exactly as directed.  If the doctor gave you a prescription medicine for pain, take it as prescribed.  If you are not taking a prescription pain medicine, take an over-the-counter medicine such as acetaminophen (Tylenol), ibuprofen (Advil, Motrin), or naproxen (Aleve) as long as you do not have an allergy or medical condition that prevents you from taking them.  Do not take two or more pain medicines at the same time unless the doctor told you to. Many pain medicines have acetaminophen, which is Tylenol. Too much acetaminophen (Tylenol) can be harmful.    FOLLOW -UP  You should be scheduled for a post-op appointment within the 10-14 days following surgery, at which time we will review your surgery, remove sutures and evaluate incisions, review your post-operative program and answer any of your questions.         [facility]

## 2023-04-14 ENCOUNTER — OFFICE VISIT (OUTPATIENT)
Dept: ORTHOPEDICS | Facility: CLINIC | Age: 73
End: 2023-04-14
Payer: MEDICARE

## 2023-04-14 ENCOUNTER — PATIENT MESSAGE (OUTPATIENT)
Dept: ORTHOPEDICS | Facility: CLINIC | Age: 73
End: 2023-04-14

## 2023-04-14 VITALS — HEIGHT: 61 IN | BODY MASS INDEX: 39.67 KG/M2 | WEIGHT: 210.13 LBS

## 2023-04-14 DIAGNOSIS — Z98.890 S/P CUBITAL TUNNEL RELEASE: Primary | ICD-10-CM

## 2023-04-14 PROCEDURE — 99999 PR PBB SHADOW E&M-EST. PATIENT-LVL III: ICD-10-PCS | Mod: PBBFAC,,,

## 2023-04-14 PROCEDURE — 99213 OFFICE O/P EST LOW 20 MIN: CPT | Mod: PBBFAC

## 2023-04-14 PROCEDURE — 99024 PR POST-OP FOLLOW-UP VISIT: ICD-10-PCS | Mod: POP,,,

## 2023-04-14 PROCEDURE — 99024 POSTOP FOLLOW-UP VISIT: CPT | Mod: POP,,,

## 2023-04-14 PROCEDURE — 99999 PR PBB SHADOW E&M-EST. PATIENT-LVL III: CPT | Mod: PBBFAC,,,

## 2023-04-19 ENCOUNTER — TELEPHONE (OUTPATIENT)
Dept: FAMILY MEDICINE | Facility: CLINIC | Age: 73
End: 2023-04-19
Payer: MEDICARE

## 2023-04-19 NOTE — TELEPHONE ENCOUNTER
[facility] on Dr Gage to sign the delivery form and will fax it back when its signed. Call ended well.

## 2023-04-19 NOTE — TELEPHONE ENCOUNTER
----- Message from Mary Broderick sent at 4/19/2023  2:03 PM CDT -----  Contact: fercho/ sven Monroy with [facility] is calling to speak with the nurse regarding questions and concerns. Reports needing to know have the doctor received the updated delivery document. Please give fercho a call back at [phone]  Thanks katherine

## 2023-04-20 ENCOUNTER — TELEPHONE (OUTPATIENT)
Dept: NEUROSURGERY | Facility: CLINIC | Age: 73
End: 2023-04-20
Payer: MEDICARE

## 2023-04-20 NOTE — PROGRESS NOTES
SUBJECTIVE:      Patient ID: Sana Crenshaw is a 72 y.o. female.    HPI: Ms. Crenshaw is here today for post-operative visit #2.  She is 11 days status post RELEASE, CUBITAL TUNNEL (Right) by Dr. Nelson on 4/10/23. She reports that she is doing well.  States that she has some irritation from the Band-Aid adhesive. Pain is 1/10.  She is taking Advil as needed for pain.  She has been compliant with postop instructions and keeping the extremity dry. She denies fever, chills, and sweats since the time of the surgery.     Interval hx 4/14/23: Ms. Crenshaw is here today for post-operative visit #1.  She is 4 days status post RELEASE, CUBITAL TUNNEL (Right) by Dr. Nelson on 4/10/23. She reports that she is doing well.  Pain is 1/10. She is taking Advil for pain. She notes improvement to the cubital tunnel symptoms. She has been compliant with postop instructions and keeping the extremity dry. She denies fever, chills, and sweats since the time of the surgery.     Past Medical History:   Diagnosis Date    Arthritis     Asthma     Hypertension     Thyroid disease      Past Surgical History:   Procedure Laterality Date    ANKLE FUSION Right     BREAST SURGERY      BUNIONECTOMY Right     CATARACT EXTRACTION W/  INTRAOCULAR LENS IMPLANT      EPIDURAL STEROID INJECTION INTO CERVICAL SPINE N/A 12/28/2021    Procedure: C5/6 IL FLORA with left paramedian approach with RN IV sedation;  Surgeon: Satish Segura MD;  Location: [facility] MGT;  Service: Pain Management;  Laterality: N/A;    EYE SURGERY      HYSTERECTOMY      IMPLANTATION OF PERMANENT SACRAL NERVE STIMULATOR N/A 1/8/2021    Procedure: INSERTION, NEUROSTIMULATOR, PERMANENT, SACRAL;  Surgeon: Onofre Fallon MD;  Location: [facility] OR;  Service: Urology;  Laterality: N/A;    INJECTION OF ANESTHETIC AGENT AROUND MEDIAL BRANCH NERVES INNERVATING CERVICAL FACET JOINT Left 4/27/2022    Procedure: Left C4-6 MBB with RN IV sedation;  Surgeon: Satish Segura MD;  Location: [facility] PAIN MGT;   Service: Pain Management;  Laterality: Left;    INJECTION OF ANESTHETIC AGENT INTO SACROILIAC JOINT Right 1/12/2022    Procedure: right Sacroiliac Joint Injection with RN IV sedation;  Surgeon: Satish Segura MD;  Location: [facility] PAIN MGT;  Service: Pain Management;  Laterality: Right;    INJECTION OF JOINT Right 1/12/2022    Procedure: right GT bursa injection with RN IV sedation;  Surgeon: Satish Segura MD;  Location: [facility] PAIN MGT;  Service: Pain Management;  Laterality: Right;    JOINT REPLACEMENT Bilateral     knees    RETINAL DETACHMENT SURGERY      REVISION OF PROCEDURE INVOLVING SACRAL NEUROSTIMULATOR DEVICE N/A 2/23/2021    Procedure: REVISION, NEUROSTIMULATOR, SACRAL;  Surgeon: Onofre Fallon MD;  Location: [facility];  Service: Urology;  Laterality: N/A;    SHOULDER ARTHROSCOPY Right     SHOULDER ARTHROSCOPY W/ ROTATOR CUFF REPAIR Left     x 2    TOTAL REDUCTION MAMMOPLASTY Bilateral 2008    ULNAR TUNNEL RELEASE Right 4/10/2023    Procedure: RELEASE, CUBITAL TUNNEL;  Surgeon: Bhavin Nelson MD;  Location: [facility] OR;  Service: Orthopedics;  Laterality: Right;  right cubital tunnel release     Family History   Problem Relation Age of Onset    Hypertension Mother     Kidney disease Mother     Heart disease Father     Heart attack Father 80    No Known Problems Sister     No Known Problems Sister     No Known Problems Sister     No Known Problems Brother     No Known Problems Brother     No Known Problems Brother     No Known Problems Brother     Cancer Maternal Grandfather     No Known Problems Maternal Grandmother     No Known Problems Paternal Grandfather     No Known Problems Paternal Grandmother      Social History     Socioeconomic History    Marital status:    Tobacco Use    Smoking status: Never    Smokeless tobacco: Never   Substance and Sexual Activity    Alcohol use: Not Currently     Comment: Very rarely    Drug use: Never    Sexual activity: Not Currently     Social Determinants of  Health     Financial Resource Strain: Low Risk     Difficulty of Paying Living Expenses: Not hard at all   Food Insecurity: No Food Insecurity    Worried About Running Out of Food in the Last Year: Never true    Ran Out of Food in the Last Year: Never true   Transportation Needs: No Transportation Needs    Lack of Transportation (Medical): No    Lack of Transportation (Non-Medical): No   Physical Activity: Inactive    Days of Exercise per Week: 0 days    Minutes of Exercise per Session: 0 min   Stress: No Stress Concern Present    Feeling of Stress : Not at all   Social Connections: Socially Isolated    Frequency of Communication with Friends and Family: More than three times a week    Frequency of Social Gatherings with Friends and Family: More than three times a week    Attends Anabaptism Services: Never    Active Member of Clubs or Organizations: No    Attends Club or Organization Meetings: Never    Marital Status:    Housing Stability: Low Risk     Unable to Pay for Housing in the Last Year: No    Number of Places Lived in the Last Year: 1    Unstable Housing in the Last Year: No     Medication List with Changes/Refills   Current Medications    ALBUTEROL (PROVENTIL/VENTOLIN HFA) 90 MCG/ACTUATION INHALER    Inhale 2 puffs into the lungs every 6 (six) hours as needed for Wheezing. Dispense with spacer.    AMLODIPINE (NORVASC) 5 MG TABLET    TAKE 1 TABLET BY MOUTH ONCE DAILY    ASPIRIN (ECOTRIN) 81 MG EC TABLET    Take 81 mg by mouth once daily.    DULOXETINE (CYMBALTA) 30 MG CAPSULE    TAKE 1 CAPSULE BY MOUTH  DAILY    HYDROCODONE-ACETAMINOPHEN (NORCO) 5-325 MG PER TABLET    Take 1 tablet by mouth every 6 (six) hours as needed for Pain.    LEVOTHYROXINE (SYNTHROID) 50 MCG TABLET    TAKE 1 TABLET BY MOUTH  BEFORE BREAKFAST    LISINOPRIL (PRINIVIL,ZESTRIL) 20 MG TABLET    Take 1 tablet (20 mg total) by mouth once daily.    METOPROLOL SUCCINATE (TOPROL-XL) 100 MG 24 HR TABLET    Take 1 tablet (100 mg total)  "by mouth once daily.    OMEPRAZOLE (PRILOSEC) 20 MG CAPSULE    TAKE 1 CAPSULE BY MOUTH  TWICE DAILY    OXYBUTYNIN (DITROPAN-XL) 10 MG 24 HR TABLET    Take 10 mg by mouth once daily.    PREDNISONE (DELTASONE) 20 MG TABLET    Take 1 tablet (20 mg total) by mouth once daily.    ROPINIROLE (REQUIP) 1 MG TABLET    TAKE 1 TABLET BY MOUTH IN  THE EVENING    TIZANIDINE (ZANAFLEX) 4 MG TABLET    Take 1 tablet (4 mg total) by mouth every 8 (eight) hours as needed (muscle spasms).     Review of patient's allergies indicates:   Allergen Reactions    Gabapentin Nausea And Vomiting     Causes vertigo        OBJECTIVE:     Physical exam:    Vitals:    04/21/23 1028   Weight: 95.3 kg (210 lb 1.6 oz)   Height: 5' 1" (1.549 m)   PainSc:   1   PainLoc: Elbow     Vital signs are stable, patient is afebrile.  Patient is well dressed and well groomed, no acute distress.  Alert and oriented to person, place, and time.    Right UE:  Incision is clean, dry, and intact.   There is no erythema or exudate. There is no sign of any infection.   Mild skin irritation surrounding incision from Band-Aid adhesive  She is NVI.   Sutures in place.   2+ pulses noted.  Cap refill <2 seconds  Motor intact to hand    ASSESSMENT         Encounter Diagnosis   Name Primary?    S/P cubital tunnel release Yes            11 days status post RELEASE, CUBITAL TUNNEL (Right)    PLAN:           Sana was seen today for post-op evaluation.    Diagnoses and all orders for this visit:    S/P cubital tunnel release      - PO instruction reviewed and provided to patient  - The incision was cleaned with hydrogen peroxide. Sutures removed with no difficulty. Steri-Strips applied.   - Patient may use brace/wrap as needed for symptomatic relief.    - Patient should notify the office of any signs or symptoms of infection including fevers, erythema, purulent drainage, increasing pain.    - Follow up PRN     POST OPERATIVE VISIT INSTRUCTIONS - Visit #2    1. No soaking the " incision for at least 7-10 days. You may get it wet in the shower and use regular soap.    2. To avoid a hard, painful scar, we recommend you use Mederma and/or Silicone Scar patches. You may also use Cocoa Butter, Vitamin E oil, Coconut oil, etc.    You may start this 5-7 days after stitches are removed and when wound is completely closed.    - Mederma scar cream: scar massage over incision 1-2 times per day. You may use topical lotion or Vitamin E oil. Massage an additional few times a day to help the scar become soft and less sensitive.    - Silicone Scar Patches: cut and place over the incision, then massage over the patch to help with scarring and sensitivity. Can be reused up to 10 days if you rinse it clean, let it dry out, then reapply.    Brands: Mepiform Silicone Scar Sheets (recommended), Scar Away, [employer] brand or generic pharmacy brand ([facility], [facility], [facility])    3. Continue range of motion exercises as instructed at todays visit.    4. You may take Tylenol 500mg and/or Ibuprofen 400mg every 4-6 hours with food for pain as tolerated as long as you do not have an allergy or medical condition that prevents you from taking them.    5. Therapy recommended:  None at this time    6. Immobilization:  Brace/wrap as needed    7. Lifting restrictions:  Start light at about 10 lb and increase gradually as tolerated    8. [facility]

## 2023-04-20 NOTE — TELEPHONE ENCOUNTER
I attempted to call the number provided below 2 times and each time was on a 10 minute wait with music playing the entire time. Pt has a order in the system for a tens unit that was placed and signed on 1/5.       ----- Message from Viji Hensley MA sent at 4/19/2023  4:23 PM CDT -----  Contact: daquan/ [facility]  Get with Dr. Harrington regarding order  ----- Message -----  From: Mary Broderick  Sent: 4/19/2023   3:48 PM CDT  To: [facility] Staff    Page with [facility] is calling to speak with the nurse regarding orders. Reports placing a order for a ten unit and stats it hasn't been signed for the patient to receive it. Please give page a call back at [phone] ext [phone]  Thanks katherine

## 2023-04-21 ENCOUNTER — OFFICE VISIT (OUTPATIENT)
Dept: ORTHOPEDICS | Facility: CLINIC | Age: 73
End: 2023-04-21
Payer: MEDICARE

## 2023-04-21 VITALS — WEIGHT: 210.13 LBS | BODY MASS INDEX: 39.67 KG/M2 | HEIGHT: 61 IN

## 2023-04-21 DIAGNOSIS — Z98.890 S/P CUBITAL TUNNEL RELEASE: Primary | ICD-10-CM

## 2023-04-21 PROCEDURE — 99024 POSTOP FOLLOW-UP VISIT: CPT | Mod: POP,,,

## 2023-04-21 PROCEDURE — 99999 PR PBB SHADOW E&M-EST. PATIENT-LVL III: ICD-10-PCS | Mod: PBBFAC,,,

## 2023-04-21 PROCEDURE — 99024 PR POST-OP FOLLOW-UP VISIT: ICD-10-PCS | Mod: POP,,,

## 2023-04-21 PROCEDURE — 99999 PR PBB SHADOW E&M-EST. PATIENT-LVL III: CPT | Mod: PBBFAC,,,

## 2023-04-21 PROCEDURE — 99213 OFFICE O/P EST LOW 20 MIN: CPT | Mod: PBBFAC

## 2023-04-30 ENCOUNTER — EXTERNAL CHRONIC CARE MANAGEMENT (OUTPATIENT)
Dept: PRIMARY CARE CLINIC | Facility: CLINIC | Age: 73
End: 2023-04-30
Payer: MEDICARE

## 2023-04-30 PROCEDURE — 99439 CHRNC CARE MGMT STAF EA ADDL: CPT | Mod: S$PBB,,, | Performed by: FAMILY MEDICINE

## 2023-04-30 PROCEDURE — 99490 CHRNC CARE MGMT STAFF 1ST 20: CPT | Mod: PBBFAC,25,PO | Performed by: FAMILY MEDICINE

## 2023-04-30 PROCEDURE — 99439 PR CHRONIC CARE MGMT, EA ADDTL 20 MIN: ICD-10-PCS | Mod: S$PBB,,, | Performed by: FAMILY MEDICINE

## 2023-04-30 PROCEDURE — 99490 CHRNC CARE MGMT STAFF 1ST 20: CPT | Mod: S$PBB,,, | Performed by: FAMILY MEDICINE

## 2023-04-30 PROCEDURE — 99439 CHRNC CARE MGMT STAF EA ADDL: CPT | Mod: PBBFAC,PO | Performed by: FAMILY MEDICINE

## 2023-04-30 PROCEDURE — 99490 PR CHRONIC CARE MGMT, 1ST 20 MIN: ICD-10-PCS | Mod: S$PBB,,, | Performed by: FAMILY MEDICINE

## 2023-05-10 VITALS
SYSTOLIC BLOOD PRESSURE: 143 MMHG | DIASTOLIC BLOOD PRESSURE: 72 MMHG | HEART RATE: 58 BPM | RESPIRATION RATE: 15 BRPM | WEIGHT: 210.13 LBS | OXYGEN SATURATION: 95 % | HEIGHT: 61 IN | TEMPERATURE: 98 F | BODY MASS INDEX: 39.67 KG/M2

## 2023-05-25 DIAGNOSIS — M50.30 DEGENERATIVE DISC DISEASE, CERVICAL: ICD-10-CM

## 2023-05-25 DIAGNOSIS — I10 ESSENTIAL HYPERTENSION: ICD-10-CM

## 2023-05-25 RX ORDER — DULOXETIN HYDROCHLORIDE 60 MG/1
CAPSULE, DELAYED RELEASE ORAL
Qty: 90 CAPSULE | Refills: 0 | OUTPATIENT
Start: 2023-05-25

## 2023-05-25 RX ORDER — TIZANIDINE 4 MG/1
TABLET ORAL
Qty: 270 TABLET | Refills: 0 | OUTPATIENT
Start: 2023-05-25

## 2023-05-25 RX ORDER — ROPINIROLE 1 MG/1
TABLET, FILM COATED ORAL
Qty: 90 TABLET | Refills: 0 | OUTPATIENT
Start: 2023-05-25

## 2023-05-25 RX ORDER — OMEPRAZOLE 40 MG/1
CAPSULE, DELAYED RELEASE ORAL
Qty: 90 CAPSULE | Refills: 0 | OUTPATIENT
Start: 2023-05-25

## 2023-05-25 RX ORDER — LISINOPRIL 10 MG/1
TABLET ORAL
Qty: 90 TABLET | Refills: 0 | OUTPATIENT
Start: 2023-05-25

## 2023-05-25 RX ORDER — LEVOTHYROXINE SODIUM 50 UG/1
TABLET ORAL
Qty: 90 TABLET | Refills: 0 | OUTPATIENT
Start: 2023-05-25

## 2023-05-25 RX ORDER — OXYBUTYNIN CHLORIDE 5 MG/1
TABLET ORAL
Qty: 180 TABLET | Refills: 0 | OUTPATIENT
Start: 2023-05-25

## 2023-05-25 RX ORDER — AMLODIPINE BESYLATE 5 MG/1
TABLET ORAL
Qty: 90 TABLET | Refills: 0 | OUTPATIENT
Start: 2023-05-25

## 2023-05-25 RX ORDER — METOPROLOL SUCCINATE 25 MG/1
TABLET, EXTENDED RELEASE ORAL
Qty: 90 TABLET | Refills: 0 | OUTPATIENT
Start: 2023-05-25

## 2023-05-25 NOTE — TELEPHONE ENCOUNTER
Refill Decision Note   Sana Crenshaw  is requesting a refill authorization.  Brief Assessment and Rationale for Refill:        Medication Therapy Plan:  Pharmacy is requesting new scripts for the following medications without required information, (sig/ frequency/qty/etc)           Comments: Pharmacies have been requesting medications for patients without required information, (sig, frequency, qty, etc.). In addition, requests are sent for medication(s) pt. are currently not taking, and medications patients have never taken.    We have spoken to the pharmacies about these request types and advised their teams previously that we are unable to assess these New Script requests and require all details for these requests. This is a known issue and has been reported.      No Care Gaps recommended.     Note composed:3:09 PM 05/25/2023

## 2023-05-31 ENCOUNTER — EXTERNAL CHRONIC CARE MANAGEMENT (OUTPATIENT)
Dept: PRIMARY CARE CLINIC | Facility: CLINIC | Age: 73
End: 2023-05-31
Payer: MEDICARE

## 2023-05-31 PROCEDURE — 99490 CHRNC CARE MGMT STAFF 1ST 20: CPT | Mod: S$PBB,,, | Performed by: FAMILY MEDICINE

## 2023-05-31 PROCEDURE — 99490 PR CHRONIC CARE MGMT, 1ST 20 MIN: ICD-10-PCS | Mod: S$PBB,,, | Performed by: FAMILY MEDICINE

## 2023-05-31 PROCEDURE — 99490 CHRNC CARE MGMT STAFF 1ST 20: CPT | Mod: PBBFAC,PO | Performed by: FAMILY MEDICINE

## 2023-06-09 ENCOUNTER — TELEPHONE (OUTPATIENT)
Dept: ADMINISTRATIVE | Facility: HOSPITAL | Age: 73
End: 2023-06-09
Payer: MEDICARE

## 2023-06-14 ENCOUNTER — HOSPITAL ENCOUNTER (OUTPATIENT)
Dept: RADIOLOGY | Facility: HOSPITAL | Age: 73
Discharge: HOME | End: 2023-06-14
Attending: FAMILY MEDICINE
Payer: MEDICARE

## 2023-06-14 VITALS — BODY MASS INDEX: 39.54 KG/M2 | WEIGHT: 209.44 LBS | HEIGHT: 61 IN

## 2023-06-14 DIAGNOSIS — R92.8 ABNORMAL MAMMOGRAM: ICD-10-CM

## 2023-06-14 PROCEDURE — 77065 DX MAMMO INCL CAD UNI: CPT | Mod: 26,RT,, | Performed by: RADIOLOGY

## 2023-06-14 PROCEDURE — 77061 BREAST TOMOSYNTHESIS UNI: CPT | Mod: 26,RT,, | Performed by: RADIOLOGY

## 2023-06-14 PROCEDURE — 77065 MAMMO DIGITAL DIAGNOSTIC RIGHT WITH TOMO: ICD-10-PCS | Mod: 26,RT,, | Performed by: RADIOLOGY

## 2023-06-14 PROCEDURE — 77061 MAMMO DIGITAL DIAGNOSTIC RIGHT WITH TOMO: ICD-10-PCS | Mod: 26,RT,, | Performed by: RADIOLOGY

## 2023-06-14 PROCEDURE — 77061 BREAST TOMOSYNTHESIS UNI: CPT | Mod: TC,RT

## 2023-06-15 DIAGNOSIS — Z12.31 ENCOUNTER FOR SCREENING MAMMOGRAM FOR MALIGNANT NEOPLASM OF BREAST: ICD-10-CM

## 2023-06-15 DIAGNOSIS — Z12.39 ENCOUNTER FOR SCREENING FOR MALIGNANT NEOPLASM OF BREAST, UNSPECIFIED SCREENING MODALITY: Primary | ICD-10-CM

## 2023-06-30 ENCOUNTER — EXTERNAL CHRONIC CARE MANAGEMENT (OUTPATIENT)
Dept: PRIMARY CARE CLINIC | Facility: CLINIC | Age: 73
End: 2023-06-30
Payer: MEDICARE

## 2023-06-30 PROCEDURE — 99490 CHRNC CARE MGMT STAFF 1ST 20: CPT | Mod: PBBFAC,PO | Performed by: FAMILY MEDICINE

## 2023-06-30 PROCEDURE — 99490 PR CHRONIC CARE MGMT, 1ST 20 MIN: ICD-10-PCS | Mod: S$GLB,,, | Performed by: FAMILY MEDICINE

## 2023-06-30 PROCEDURE — 99490 CHRNC CARE MGMT STAFF 1ST 20: CPT | Mod: S$GLB,,, | Performed by: FAMILY MEDICINE

## 2023-07-19 ENCOUNTER — PATIENT MESSAGE (OUTPATIENT)
Dept: FAMILY MEDICINE | Facility: CLINIC | Age: 73
End: 2023-07-19
Payer: MEDICARE

## 2023-07-21 ENCOUNTER — OFFICE VISIT (OUTPATIENT)
Dept: FAMILY MEDICINE | Facility: CLINIC | Age: 73
End: 2023-07-21
Payer: MEDICARE

## 2023-07-21 ENCOUNTER — LAB VISIT (OUTPATIENT)
Dept: LAB | Facility: HOSPITAL | Age: 73
End: 2023-07-21
Attending: FAMILY MEDICINE
Payer: MEDICARE

## 2023-07-21 ENCOUNTER — TELEPHONE (OUTPATIENT)
Dept: FAMILY MEDICINE | Facility: CLINIC | Age: 73
End: 2023-07-21
Payer: MEDICARE

## 2023-07-21 VITALS
HEIGHT: 61 IN | DIASTOLIC BLOOD PRESSURE: 70 MMHG | BODY MASS INDEX: 40.08 KG/M2 | SYSTOLIC BLOOD PRESSURE: 130 MMHG | OXYGEN SATURATION: 98 % | WEIGHT: 212.31 LBS | HEART RATE: 72 BPM | TEMPERATURE: 98 F

## 2023-07-21 DIAGNOSIS — I10 ESSENTIAL HYPERTENSION: ICD-10-CM

## 2023-07-21 DIAGNOSIS — E66.01 SEVERE OBESITY WITH BODY MASS INDEX (BMI) OF 36.0 TO 36.9 WITH SERIOUS COMORBIDITY: ICD-10-CM

## 2023-07-21 DIAGNOSIS — E03.9 HYPOTHYROIDISM, UNSPECIFIED TYPE: ICD-10-CM

## 2023-07-21 DIAGNOSIS — Z74.09 LIMITED MOBILITY: Primary | ICD-10-CM

## 2023-07-21 DIAGNOSIS — R73.03 PREDIABETES: ICD-10-CM

## 2023-07-21 LAB
ESTIMATED AVG GLUCOSE: 126 MG/DL (ref 68–131)
HBA1C MFR BLD: 6 % (ref 4–5.6)
TSH SERPL DL<=0.005 MIU/L-ACNC: 2.08 UIU/ML (ref 0.4–4)

## 2023-07-21 PROCEDURE — 3288F FALL RISK ASSESSMENT DOCD: CPT | Mod: HCNC,CPTII,S$GLB, | Performed by: FAMILY MEDICINE

## 2023-07-21 PROCEDURE — 99999 PR PBB SHADOW E&M-EST. PATIENT-LVL IV: CPT | Mod: PBBFAC,HCNC,, | Performed by: FAMILY MEDICINE

## 2023-07-21 PROCEDURE — 3008F PR BODY MASS INDEX (BMI) DOCUMENTED: ICD-10-PCS | Mod: HCNC,CPTII,S$GLB, | Performed by: FAMILY MEDICINE

## 2023-07-21 PROCEDURE — 99214 PR OFFICE/OUTPT VISIT, EST, LEVL IV, 30-39 MIN: ICD-10-PCS | Mod: HCNC,S$GLB,, | Performed by: FAMILY MEDICINE

## 2023-07-21 PROCEDURE — 3078F PR MOST RECENT DIASTOLIC BLOOD PRESSURE < 80 MM HG: ICD-10-PCS | Mod: HCNC,CPTII,S$GLB, | Performed by: FAMILY MEDICINE

## 2023-07-21 PROCEDURE — 1159F MED LIST DOCD IN RCRD: CPT | Mod: HCNC,CPTII,S$GLB, | Performed by: FAMILY MEDICINE

## 2023-07-21 PROCEDURE — 1101F PT FALLS ASSESS-DOCD LE1/YR: CPT | Mod: HCNC,CPTII,S$GLB, | Performed by: FAMILY MEDICINE

## 2023-07-21 PROCEDURE — 1159F PR MEDICATION LIST DOCUMENTED IN MEDICAL RECORD: ICD-10-PCS | Mod: HCNC,CPTII,S$GLB, | Performed by: FAMILY MEDICINE

## 2023-07-21 PROCEDURE — 36415 COLL VENOUS BLD VENIPUNCTURE: CPT | Mod: HCNC,PO | Performed by: FAMILY MEDICINE

## 2023-07-21 PROCEDURE — 3078F DIAST BP <80 MM HG: CPT | Mod: HCNC,CPTII,S$GLB, | Performed by: FAMILY MEDICINE

## 2023-07-21 PROCEDURE — 3075F PR MOST RECENT SYSTOLIC BLOOD PRESS GE 130-139MM HG: ICD-10-PCS | Mod: HCNC,CPTII,S$GLB, | Performed by: FAMILY MEDICINE

## 2023-07-21 PROCEDURE — 1101F PR PT FALLS ASSESS DOC 0-1 FALLS W/OUT INJ PAST YR: ICD-10-PCS | Mod: HCNC,CPTII,S$GLB, | Performed by: FAMILY MEDICINE

## 2023-07-21 PROCEDURE — 3008F BODY MASS INDEX DOCD: CPT | Mod: HCNC,CPTII,S$GLB, | Performed by: FAMILY MEDICINE

## 2023-07-21 PROCEDURE — 4010F ACE/ARB THERAPY RXD/TAKEN: CPT | Mod: HCNC,CPTII,S$GLB, | Performed by: FAMILY MEDICINE

## 2023-07-21 PROCEDURE — 83036 HEMOGLOBIN GLYCOSYLATED A1C: CPT | Mod: HCNC | Performed by: FAMILY MEDICINE

## 2023-07-21 PROCEDURE — 99214 OFFICE O/P EST MOD 30 MIN: CPT | Mod: HCNC,S$GLB,, | Performed by: FAMILY MEDICINE

## 2023-07-21 PROCEDURE — 99999 PR PBB SHADOW E&M-EST. PATIENT-LVL IV: ICD-10-PCS | Mod: PBBFAC,HCNC,, | Performed by: FAMILY MEDICINE

## 2023-07-21 PROCEDURE — 3288F PR FALLS RISK ASSESSMENT DOCUMENTED: ICD-10-PCS | Mod: HCNC,CPTII,S$GLB, | Performed by: FAMILY MEDICINE

## 2023-07-21 PROCEDURE — 3075F SYST BP GE 130 - 139MM HG: CPT | Mod: HCNC,CPTII,S$GLB, | Performed by: FAMILY MEDICINE

## 2023-07-21 PROCEDURE — 84443 ASSAY THYROID STIM HORMONE: CPT | Mod: HCNC | Performed by: FAMILY MEDICINE

## 2023-07-21 PROCEDURE — 4010F PR ACE/ARB THEARPY RXD/TAKEN: ICD-10-PCS | Mod: HCNC,CPTII,S$GLB, | Performed by: FAMILY MEDICINE

## 2023-07-21 NOTE — TELEPHONE ENCOUNTER
----- Message from Josefina Mancuso sent at 7/21/2023  9:23 AM CDT -----  Contact: [facility] pharmacy  Patient needs a refill on all the medication  called into [facility]  pharmacy at [phone].  Please call pharmacy for list of medications.           [facility] - [place], [place] - [address]  [address] [address]  Phone: [phone] Fax: [phone]           Thanks KB

## 2023-07-21 NOTE — PROGRESS NOTES
Subjective:       Patient ID: Sana Crenshaw is a 72 y.o. female.    Chief Complaint: Medication Refill      HPI Comments:       Current Outpatient Medications:     albuterol (PROVENTIL/VENTOLIN HFA) 90 mcg/actuation inhaler, Inhale 2 puffs into the lungs every 6 (six) hours as needed for Wheezing. Dispense with spacer., Disp: 18 g, Rfl: 3    amLODIPine (NORVASC) 5 MG tablet, TAKE 1 TABLET BY MOUTH ONCE DAILY (Patient taking differently: every evening.), Disp: 90 tablet, Rfl: 3    aspirin (ECOTRIN) 81 MG EC tablet, Take 81 mg by mouth once daily., Disp: , Rfl:     DULoxetine (CYMBALTA) 30 MG capsule, TAKE 1 CAPSULE BY MOUTH  DAILY, Disp: 90 capsule, Rfl: 3    levothyroxine (SYNTHROID) 50 MCG tablet, TAKE 1 TABLET BY MOUTH  BEFORE BREAKFAST, Disp: 90 tablet, Rfl: 3    lisinopriL (PRINIVIL,ZESTRIL) 20 MG tablet, Take 1 tablet (20 mg total) by mouth once daily. (Patient taking differently: Take 20 mg by mouth every evening.), Disp: 90 tablet, Rfl: 3    metoprolol succinate (TOPROL-XL) 100 MG 24 hr tablet, Take 1 tablet (100 mg total) by mouth once daily., Disp: 90 tablet, Rfl: 3    omeprazole (PRILOSEC) 20 MG capsule, TAKE 1 CAPSULE BY MOUTH  TWICE DAILY, Disp: 180 capsule, Rfl: 3    oxybutynin (DITROPAN-XL) 10 MG 24 hr tablet, Take 10 mg by mouth once daily., Disp: , Rfl:     rOPINIRole (REQUIP) 1 MG tablet, TAKE 1 TABLET BY MOUTH IN  THE EVENING, Disp: 90 tablet, Rfl: 3    tiZANidine (ZANAFLEX) 4 MG tablet, Take 1 tablet (4 mg total) by mouth every 8 (eight) hours as needed (muscle spasms)., Disp: 270 tablet, Rfl: 1    HYDROcodone-acetaminophen (NORCO) 5-325 mg per tablet, Take 1 tablet by mouth every 6 (six) hours as needed for Pain. (Patient not taking: Reported on 4/14/2023), Disp: 15 tablet, Rfl: 0    predniSONE (DELTASONE) 20 MG tablet, Take 1 tablet (20 mg total) by mouth once daily., Disp: 3 tablet, Rfl: 0  No current facility-administered medications for this visit.    Facility-Administered Medications  "Ordered in Other Visits:     chlorhexidine 0.12 % solution 10 mL, 10 mL, Mouth/Throat, On Call Procedure, Scarlet Cooney PA-C    lactated ringers infusion, , Intravenous, Continuous, Franky Hartman MD, Last Rate: 20 mL/hr at 01/08/21 0731, New Bag at 01/08/21 0925    lactated ringers infusion, , Intravenous, Continuous, Estevan Rodriguez MD, Stopped at 02/23/21 1645      Here for follow-up.  Generally doing well.  Got her [employer] scooter that is working well    Needs refills on her medications.  Takes muscle relaxants for her neck and back in the evening.  Takes Requip on most days for her legs and works well.    Recently had cubital tunnel surgery release that has been helpful for the numbness that she was having, though she still has a little bit in her pinky finger.    Review of Systems   Constitutional:  Negative for activity change and unexpected weight change.   HENT:  Negative for hearing loss, rhinorrhea and trouble swallowing.    Eyes:  Negative for discharge and visual disturbance.   Respiratory:  Negative for chest tightness and wheezing.    Cardiovascular:  Negative for palpitations.   Gastrointestinal:  Negative for blood in stool, constipation and diarrhea.   Endocrine: Negative for polydipsia and polyuria.   Genitourinary:  Negative for difficulty urinating, dysuria, hematuria and menstrual problem.   Musculoskeletal:  Positive for back pain and neck pain.   Psychiatric/Behavioral:  Negative for confusion and dysphoric mood.      Objective:      Vitals:    07/21/23 1058   BP: 130/70   Pulse: 72   Temp: 97.7 °F (36.5 °C)   TempSrc: Tympanic   SpO2: 98%   Weight: 96.3 kg (212 lb 4.9 oz)   Height: 5' 1" (1.549 m)     Physical Exam  Vitals and nursing note reviewed.   Constitutional:       General: She is not in acute distress.     Appearance: She is well-developed. She is not diaphoretic.   HENT:      Head: Normocephalic.   Neck:      Thyroid: No thyromegaly.   Cardiovascular:      Rate and " Rhythm: Normal rate and regular rhythm.      Heart sounds: Normal heart sounds. No murmur heard.  Pulmonary:      Effort: Pulmonary effort is normal.      Breath sounds: Normal breath sounds. No wheezing or rales.   Abdominal:      General: There is no distension.      Palpations: Abdomen is soft.   Musculoskeletal:      Cervical back: Neck supple.   Lymphadenopathy:      Cervical: No cervical adenopathy.   Skin:     General: Skin is warm and dry.   Neurological:      Mental Status: She is alert and oriented to person, place, and time.   Psychiatric:         Mood and Affect: Mood normal.         Behavior: Behavior normal.         Thought Content: Thought content normal.         Judgment: Judgment normal.       Assessment:       1. Limited mobility    2. Essential hypertension    3. Prediabetes    4. Severe obesity with body mass index (BMI) of 36.0 to 36.9 with serious comorbidity    5. Hypothyroidism, unspecified type        Plan:   Limited mobility  Comments:  Ambulating well on her own.  Has a scooter for trips    Essential hypertension  Comments:  Controlled.  Follow-up 6 months    Prediabetes  Comments:  A1c today  Orders:  -     Hemoglobin A1C; Future; Expected date: 07/21/2023    Severe obesity with body mass index (BMI) of 36.0 to 36.9 with serious comorbidity  Comments:  Weight up 5 lb    Hypothyroidism, unspecified type  Comments:  TSH today  Orders:  -     TSH; Future; Expected date: 07/21/2023

## 2023-07-24 ENCOUNTER — PATIENT MESSAGE (OUTPATIENT)
Dept: FAMILY MEDICINE | Facility: CLINIC | Age: 73
End: 2023-07-24
Payer: MEDICARE

## 2023-07-25 ENCOUNTER — PATIENT MESSAGE (OUTPATIENT)
Dept: ADMINISTRATIVE | Facility: OTHER | Age: 73
End: 2023-07-25
Payer: MEDICARE

## 2023-07-28 ENCOUNTER — PATIENT MESSAGE (OUTPATIENT)
Dept: FAMILY MEDICINE | Facility: CLINIC | Age: 73
End: 2023-07-28
Payer: MEDICARE

## 2023-07-28 DIAGNOSIS — M54.12 CERVICAL RADICULOPATHY: ICD-10-CM

## 2023-07-28 DIAGNOSIS — M50.30 DEGENERATIVE DISC DISEASE, CERVICAL: ICD-10-CM

## 2023-07-28 NOTE — TELEPHONE ENCOUNTER
No care due was identified.  [facility] Embedded Care Due Messages. Reference number: [id number].   7/28/2023 1:16:42 PM CDT

## 2023-07-29 ENCOUNTER — PATIENT MESSAGE (OUTPATIENT)
Dept: OTHER | Facility: OTHER | Age: 73
End: 2023-07-29
Payer: MEDICARE

## 2023-07-31 ENCOUNTER — PATIENT MESSAGE (OUTPATIENT)
Dept: FAMILY MEDICINE | Facility: CLINIC | Age: 73
End: 2023-07-31
Payer: MEDICARE

## 2023-07-31 RX ORDER — TIZANIDINE 4 MG/1
4 TABLET ORAL EVERY 8 HOURS PRN
Qty: 270 TABLET | Refills: 1 | Status: SHIPPED | OUTPATIENT
Start: 2023-07-31

## 2023-07-31 RX ORDER — DULOXETIN HYDROCHLORIDE 30 MG/1
30 CAPSULE, DELAYED RELEASE ORAL DAILY
Qty: 90 CAPSULE | Refills: 1 | Status: SHIPPED | OUTPATIENT
Start: 2023-07-31 | End: 2023-08-07 | Stop reason: SDUPTHER

## 2023-08-03 DIAGNOSIS — Z71.89 COMPLEX CARE COORDINATION: ICD-10-CM

## 2023-08-04 ENCOUNTER — PES CALL (OUTPATIENT)
Dept: ADMINISTRATIVE | Facility: CLINIC | Age: 73
End: 2023-08-04
Payer: MEDICARE

## 2023-08-05 DIAGNOSIS — M54.12 CERVICAL RADICULOPATHY: ICD-10-CM

## 2023-08-05 NOTE — TELEPHONE ENCOUNTER
No care due was identified.  [facility] Embedded Care Due Messages. Reference number: [id number].   8/05/2023 12:05:08 PM CDT

## 2023-08-07 DIAGNOSIS — M54.12 CERVICAL RADICULOPATHY: ICD-10-CM

## 2023-08-07 RX ORDER — DULOXETIN HYDROCHLORIDE 30 MG/1
30 CAPSULE, DELAYED RELEASE ORAL DAILY
Qty: 90 CAPSULE | Refills: 1 | OUTPATIENT
Start: 2023-08-07

## 2023-08-07 RX ORDER — ALBUTEROL SULFATE 90 UG/1
2 AEROSOL, METERED RESPIRATORY (INHALATION) EVERY 6 HOURS PRN
Qty: 18 G | Refills: 3 | Status: SHIPPED | OUTPATIENT
Start: 2023-08-07 | End: 2023-08-07 | Stop reason: SDUPTHER

## 2023-08-07 RX ORDER — METOPROLOL SUCCINATE 100 MG/1
100 TABLET, EXTENDED RELEASE ORAL DAILY
Qty: 90 TABLET | Refills: 3 | Status: SHIPPED | OUTPATIENT
Start: 2023-08-07 | End: 2023-08-07 | Stop reason: SDUPTHER

## 2023-08-07 NOTE — TELEPHONE ENCOUNTER
No care due was identified.  [facility] Embedded Care Due Messages. Reference number: [id number].   8/07/2023 12:20:20 PM CDT

## 2023-08-07 NOTE — TELEPHONE ENCOUNTER
Spoke with Chris KIM with [facility] she  Stated 100mg tablets of metoprolol was sent to pt address. They show no incorrect mg being sent out . Tried to call pt n/a

## 2023-08-09 RX ORDER — DULOXETIN HYDROCHLORIDE 30 MG/1
30 CAPSULE, DELAYED RELEASE ORAL DAILY
Qty: 90 CAPSULE | Refills: 1 | Status: SHIPPED | OUTPATIENT
Start: 2023-08-09 | End: 2024-01-03

## 2023-08-09 RX ORDER — ALBUTEROL SULFATE 90 UG/1
2 AEROSOL, METERED RESPIRATORY (INHALATION) EVERY 6 HOURS PRN
Qty: 18 G | Refills: 3 | Status: SHIPPED | OUTPATIENT
Start: 2023-08-09 | End: 2024-08-08

## 2023-08-09 RX ORDER — METOPROLOL SUCCINATE 100 MG/1
100 TABLET, EXTENDED RELEASE ORAL DAILY
Qty: 90 TABLET | Refills: 3 | Status: SHIPPED | OUTPATIENT
Start: 2023-08-09

## 2023-08-22 ENCOUNTER — PATIENT MESSAGE (OUTPATIENT)
Dept: ADMINISTRATIVE | Facility: OTHER | Age: 73
End: 2023-08-22
Payer: MEDICARE

## 2023-09-11 ENCOUNTER — OFFICE VISIT (OUTPATIENT)
Dept: INTERNAL MEDICINE | Facility: CLINIC | Age: 73
End: 2023-09-11
Payer: MEDICARE

## 2023-09-11 VITALS
DIASTOLIC BLOOD PRESSURE: 78 MMHG | BODY MASS INDEX: 38.67 KG/M2 | HEIGHT: 62 IN | OXYGEN SATURATION: 97 % | HEART RATE: 79 BPM | SYSTOLIC BLOOD PRESSURE: 132 MMHG | WEIGHT: 210.13 LBS

## 2023-09-11 DIAGNOSIS — Z74.09 OTHER REDUCED MOBILITY: ICD-10-CM

## 2023-09-11 DIAGNOSIS — R09.89 DECREASED DORSALIS PEDIS PULSE: ICD-10-CM

## 2023-09-11 DIAGNOSIS — E66.01 SEVERE OBESITY (BMI 35.0-39.9) WITH COMORBIDITY: ICD-10-CM

## 2023-09-11 DIAGNOSIS — M46.1 SACROILIITIS: ICD-10-CM

## 2023-09-11 DIAGNOSIS — Z00.00 ENCOUNTER FOR PREVENTIVE HEALTH EXAMINATION: Primary | ICD-10-CM

## 2023-09-11 DIAGNOSIS — R73.03 PREDIABETES: ICD-10-CM

## 2023-09-11 DIAGNOSIS — M85.80 OSTEOPENIA, UNSPECIFIED LOCATION: ICD-10-CM

## 2023-09-11 DIAGNOSIS — E03.9 HYPOTHYROIDISM, UNSPECIFIED TYPE: ICD-10-CM

## 2023-09-11 DIAGNOSIS — I10 HYPERTENSION, UNSPECIFIED TYPE: ICD-10-CM

## 2023-09-11 PROCEDURE — 3008F PR BODY MASS INDEX (BMI) DOCUMENTED: ICD-10-PCS | Mod: CPTII,S$GLB,, | Performed by: NURSE PRACTITIONER

## 2023-09-11 PROCEDURE — 3075F PR MOST RECENT SYSTOLIC BLOOD PRESS GE 130-139MM HG: ICD-10-PCS | Mod: CPTII,S$GLB,, | Performed by: NURSE PRACTITIONER

## 2023-09-11 PROCEDURE — 3008F BODY MASS INDEX DOCD: CPT | Mod: CPTII,S$GLB,, | Performed by: NURSE PRACTITIONER

## 2023-09-11 PROCEDURE — 3078F DIAST BP <80 MM HG: CPT | Mod: CPTII,S$GLB,, | Performed by: NURSE PRACTITIONER

## 2023-09-11 PROCEDURE — 3078F PR MOST RECENT DIASTOLIC BLOOD PRESSURE < 80 MM HG: ICD-10-PCS | Mod: CPTII,S$GLB,, | Performed by: NURSE PRACTITIONER

## 2023-09-11 PROCEDURE — 1170F FXNL STATUS ASSESSED: CPT | Mod: CPTII,S$GLB,, | Performed by: NURSE PRACTITIONER

## 2023-09-11 PROCEDURE — 4010F PR ACE/ARB THEARPY RXD/TAKEN: ICD-10-PCS | Mod: CPTII,S$GLB,, | Performed by: NURSE PRACTITIONER

## 2023-09-11 PROCEDURE — 1170F PR FUNCTIONAL STATUS ASSESSED: ICD-10-PCS | Mod: CPTII,S$GLB,, | Performed by: NURSE PRACTITIONER

## 2023-09-11 PROCEDURE — 4010F ACE/ARB THERAPY RXD/TAKEN: CPT | Mod: CPTII,S$GLB,, | Performed by: NURSE PRACTITIONER

## 2023-09-11 PROCEDURE — 3288F FALL RISK ASSESSMENT DOCD: CPT | Mod: CPTII,S$GLB,, | Performed by: NURSE PRACTITIONER

## 2023-09-11 PROCEDURE — 3044F PR MOST RECENT HEMOGLOBIN A1C LEVEL <7.0%: ICD-10-PCS | Mod: CPTII,S$GLB,, | Performed by: NURSE PRACTITIONER

## 2023-09-11 PROCEDURE — G0439 PPPS, SUBSEQ VISIT: HCPCS | Mod: S$GLB,,, | Performed by: NURSE PRACTITIONER

## 2023-09-11 PROCEDURE — 1160F RVW MEDS BY RX/DR IN RCRD: CPT | Mod: CPTII,S$GLB,, | Performed by: NURSE PRACTITIONER

## 2023-09-11 PROCEDURE — 3044F HG A1C LEVEL LT 7.0%: CPT | Mod: CPTII,S$GLB,, | Performed by: NURSE PRACTITIONER

## 2023-09-11 PROCEDURE — 1159F PR MEDICATION LIST DOCUMENTED IN MEDICAL RECORD: ICD-10-PCS | Mod: CPTII,S$GLB,, | Performed by: NURSE PRACTITIONER

## 2023-09-11 PROCEDURE — 1160F PR REVIEW ALL MEDS BY PRESCRIBER/CLIN PHARMACIST DOCUMENTED: ICD-10-PCS | Mod: CPTII,S$GLB,, | Performed by: NURSE PRACTITIONER

## 2023-09-11 PROCEDURE — 1101F PT FALLS ASSESS-DOCD LE1/YR: CPT | Mod: CPTII,S$GLB,, | Performed by: NURSE PRACTITIONER

## 2023-09-11 PROCEDURE — 99999 PR PBB SHADOW E&M-EST. PATIENT-LVL IV: ICD-10-PCS | Mod: PBBFAC,,, | Performed by: NURSE PRACTITIONER

## 2023-09-11 PROCEDURE — 1159F MED LIST DOCD IN RCRD: CPT | Mod: CPTII,S$GLB,, | Performed by: NURSE PRACTITIONER

## 2023-09-11 PROCEDURE — 1101F PR PT FALLS ASSESS DOC 0-1 FALLS W/OUT INJ PAST YR: ICD-10-PCS | Mod: CPTII,S$GLB,, | Performed by: NURSE PRACTITIONER

## 2023-09-11 PROCEDURE — 99999 PR PBB SHADOW E&M-EST. PATIENT-LVL IV: CPT | Mod: PBBFAC,,, | Performed by: NURSE PRACTITIONER

## 2023-09-11 PROCEDURE — 3075F SYST BP GE 130 - 139MM HG: CPT | Mod: CPTII,S$GLB,, | Performed by: NURSE PRACTITIONER

## 2023-09-11 PROCEDURE — G0439 PR MEDICARE ANNUAL WELLNESS SUBSEQUENT VISIT: ICD-10-PCS | Mod: S$GLB,,, | Performed by: NURSE PRACTITIONER

## 2023-09-11 PROCEDURE — 1125F PR PAIN SEVERITY QUANTIFIED, PAIN PRESENT: ICD-10-PCS | Mod: CPTII,S$GLB,, | Performed by: NURSE PRACTITIONER

## 2023-09-11 PROCEDURE — 1125F AMNT PAIN NOTED PAIN PRSNT: CPT | Mod: CPTII,S$GLB,, | Performed by: NURSE PRACTITIONER

## 2023-09-11 PROCEDURE — 3288F PR FALLS RISK ASSESSMENT DOCUMENTED: ICD-10-PCS | Mod: CPTII,S$GLB,, | Performed by: NURSE PRACTITIONER

## 2023-09-11 NOTE — PATIENT INSTRUCTIONS
Counseling and Referral of Other Preventative  (Italic type indicates deductible and co-insurance are waived)    Patient Name: Sana Crenshaw  Today's Date: 9/11/2023    Health Maintenance       Date Due Completion Date    Shingles Vaccine (1 of 2) Never done ---    Influenza Vaccine (1) 09/01/2023 12/1/2021    COVID-19 Vaccine (3 - Pfizer series) 09/11/2024 (Originally 11/8/2021) 9/13/2021    Mammogram 06/14/2024 6/14/2023    Hemoglobin A1c (Prediabetes) 07/21/2024 7/21/2023    DEXA Scan 09/16/2024 9/16/2021    Lipid Panel 06/10/2027 6/10/2022    Colorectal Cancer Screening 11/09/2030 11/9/2020    TETANUS VACCINE 07/23/2032 7/23/2022        No orders of the defined types were placed in this encounter.    The following information is provided to all patients.  This information is to help you find resources for any of the problems found today that may be affecting your health:                Living healthy guide: www.[facility].[place].gov      Understanding Diabetes: www.diabetes.org      Eating healthy: www.cdc.gov/healthyweight      CDC home safety checklist: www.cdc.gov/steadi/patient.html      Agency on Aging: www.goea.[place].gov      Alcoholics anonymous (AA): www.aa.org      Physical Activity: www.reggie.nih.gov/qg4ejly      Tobacco use: www.quitwithusla.org

## 2023-09-11 NOTE — PROGRESS NOTES
"  Sana Crenshaw presented for a  Medicare AWV and comprehensive Health Risk Assessment today. The following components were reviewed and updated:    Medical history  Family History  Social history  Allergies and Current Medications  Health Risk Assessment  Health Maintenance  Care Team         ** See Completed Assessments for Annual Wellness Visit within the encounter summary.**         The following assessments were completed:  Living Situation  CAGE  Depression Screening  Timed Get Up and Go  Whisper Test  Cognitive Function Screening  Nutrition Screening  ADL Screening  PAQ Screening        Vitals:    09/11/23 1446   BP: 132/78   Pulse: 79   SpO2: 97%   Weight: 95.3 kg (210 lb 1.6 oz)   Height: 5' 2" (1.575 m)     Body mass index is 38.43 kg/m².  Physical Exam  Vitals and nursing note reviewed.   Constitutional:       Appearance: She is well-developed.   HENT:      Head: Normocephalic.   Cardiovascular:      Rate and Rhythm: Normal rate and regular rhythm.      Pulses:           Dorsalis pedis pulses are 2+ on the right side and 1+ on the left side.      Heart sounds: Normal heart sounds.      Comments: Left foot warm to touch with normal color.   Pulmonary:      Effort: Pulmonary effort is normal. No respiratory distress.      Breath sounds: Normal breath sounds.   Abdominal:      Palpations: Abdomen is soft. There is no mass.      Tenderness: There is no abdominal tenderness.   Musculoskeletal:         General: Normal range of motion.   Skin:     General: Skin is warm and dry.   Neurological:      Mental Status: She is alert and oriented to person, place, and time.      Motor: No abnormal muscle tone.   Psychiatric:         Speech: Speech normal.         Behavior: Behavior normal.               Diagnoses and health risks identified today and associated recommendations/orders:    1. Encounter for preventive health examination     Review for Opioid Screening: Pt does not have Rx for Opioids      Review for " Substance Use Disorders: Patient does not use substance  per chart     Discussed receiving flu vaccine at pharmacy.     Discussed receiving Shingrix at pharmacy.    Declines COVID booster      2. Sacroiliitis  Continue current treatment plan as previously prescribed with your  pcp    3. Severe obesity (BMI 35.0-39.9) with comorbidity  Encouraged healthy diet and exercise as tolerated to help bring BMI into normal range.    Continue current treatment plan as previously prescribed with your  pcp    4. Hypertension, unspecified type  Continue current treatment plan as previously prescribed with your  pcp    5. Hypothyroidism, unspecified type  Stable and controlled. Continue current treatment plan as previously prescribed with your PCP.      6. Prediabetes  A1c 6.0  Continue current treatment plan as previously prescribed with your  pcp    7. Osteopenia, unspecified location  Dexa 9/21  Continue current treatment plan as previously prescribed with your  pcp    8. Other reduced mobility  Abnormal timed get up and go test. Denies any falls in the last 12 months.    Fall precautions reviewed with patient. Advised to follow up with PCP for further recommendations. Patient expressed understanding.       9. Decreased dorsalis pedis pulse  Red flag s/s discussed (denies any) and advised 911/ER if occur. Patient expressed understanding.    Advised to follow up with PCP for further evaluation and recommendations. Patient expressed understanding.    - Ankle Brachial Indices (KATHRYN); Future      Provided Sana with a 5-10 year written screening schedule and personal prevention plan. Recommendations were developed using the USPSTF age appropriate recommendations. Education, counseling, and referrals were provided as needed. After Visit Summary printed and given to patient which includes a list of additional screenings\tests needed.    Follow up in about 1 year (around 9/11/2024) for awv.    Elvia Lindsay NP  I offered to discuss  advanced care planning, including how to pick a person who would make decisions for you if you were unable to make them for yourself, called a health care power of , and what kind of decisions you might make such as use of life sustaining treatments such as ventilators and tube feeding when faced with a life limiting illness recorded on a living will that they will need to know. (How you want to be cared for as you near the end of your natural life)     X Patient is interested in learning more about how to make advanced directives.  I provided them paperwork and offered to discuss this with them.

## 2023-09-18 ENCOUNTER — HOSPITAL ENCOUNTER (OUTPATIENT)
Dept: CARDIOLOGY | Facility: HOSPITAL | Age: 73
Discharge: HOME | End: 2023-09-18
Attending: NURSE PRACTITIONER
Payer: MEDICARE

## 2023-09-18 VITALS
WEIGHT: 210 LBS | DIASTOLIC BLOOD PRESSURE: 71 MMHG | SYSTOLIC BLOOD PRESSURE: 119 MMHG | BODY MASS INDEX: 38.64 KG/M2 | HEIGHT: 62 IN

## 2023-09-18 DIAGNOSIS — R09.89 DECREASED DORSALIS PEDIS PULSE: ICD-10-CM

## 2023-09-18 LAB
LEFT ABI: 1.45
LEFT ARM BP: 119 MMHG
LEFT DORSALIS PEDIS: 171 MMHG
LEFT POSTERIOR TIBIAL: 173 MMHG
LEFT TBI: 1.21
LEFT TOE PRESSURE: 144 MMHG
RIGHT ABI: 1.46
RIGHT ARM BP: 101 MMHG
RIGHT DORSALIS PEDIS: 174 MMHG
RIGHT POSTERIOR TIBIAL: 157 MMHG
RIGHT TBI: 1.01
RIGHT TOE PRESSURE: 120 MMHG

## 2023-09-18 PROCEDURE — 93922 UPR/L XTREMITY ART 2 LEVELS: CPT

## 2023-09-18 PROCEDURE — 93922 UPR/L XTREMITY ART 2 LEVELS: CPT | Mod: 26,,, | Performed by: INTERNAL MEDICINE

## 2023-09-18 PROCEDURE — 93922 ANKLE BRACHIAL INDICES (ABI): ICD-10-PCS | Mod: 26,,, | Performed by: INTERNAL MEDICINE

## 2023-10-31 ENCOUNTER — OFFICE VISIT (OUTPATIENT)
Dept: FAMILY MEDICINE | Facility: CLINIC | Age: 73
End: 2023-10-31
Payer: MEDICARE

## 2023-10-31 VITALS
WEIGHT: 213.31 LBS | OXYGEN SATURATION: 95 % | DIASTOLIC BLOOD PRESSURE: 64 MMHG | HEIGHT: 62 IN | SYSTOLIC BLOOD PRESSURE: 130 MMHG | BODY MASS INDEX: 39.26 KG/M2 | TEMPERATURE: 98 F | HEART RATE: 70 BPM

## 2023-10-31 DIAGNOSIS — J20.9 ACUTE BRONCHITIS, UNSPECIFIED ORGANISM: Primary | ICD-10-CM

## 2023-10-31 DIAGNOSIS — Z74.09 LIMITED MOBILITY: ICD-10-CM

## 2023-10-31 DIAGNOSIS — I10 ESSENTIAL HYPERTENSION: ICD-10-CM

## 2023-10-31 DIAGNOSIS — E66.01 SEVERE OBESITY WITH BODY MASS INDEX (BMI) OF 36.0 TO 36.9 WITH SERIOUS COMORBIDITY: ICD-10-CM

## 2023-10-31 DIAGNOSIS — R73.03 PREDIABETES: ICD-10-CM

## 2023-10-31 DIAGNOSIS — E03.9 HYPOTHYROIDISM, UNSPECIFIED TYPE: ICD-10-CM

## 2023-10-31 LAB
CTP QC/QA: YES
CTP QC/QA: YES
FLUAV AG NPH QL: NEGATIVE
FLUBV AG NPH QL: NEGATIVE
SARS-COV-2 RDRP RESP QL NAA+PROBE: NEGATIVE

## 2023-10-31 PROCEDURE — 3008F BODY MASS INDEX DOCD: CPT | Mod: CPTII,S$GLB,, | Performed by: FAMILY MEDICINE

## 2023-10-31 PROCEDURE — 4010F ACE/ARB THERAPY RXD/TAKEN: CPT | Mod: CPTII,S$GLB,, | Performed by: FAMILY MEDICINE

## 2023-10-31 PROCEDURE — 3288F FALL RISK ASSESSMENT DOCD: CPT | Mod: CPTII,S$GLB,, | Performed by: FAMILY MEDICINE

## 2023-10-31 PROCEDURE — 3075F PR MOST RECENT SYSTOLIC BLOOD PRESS GE 130-139MM HG: ICD-10-PCS | Mod: CPTII,S$GLB,, | Performed by: FAMILY MEDICINE

## 2023-10-31 PROCEDURE — 87635 SARS-COV-2 COVID-19 AMP PRB: CPT | Mod: QW,S$GLB,, | Performed by: FAMILY MEDICINE

## 2023-10-31 PROCEDURE — 99214 OFFICE O/P EST MOD 30 MIN: CPT | Mod: S$GLB,,, | Performed by: FAMILY MEDICINE

## 2023-10-31 PROCEDURE — 99999 PR PBB SHADOW E&M-EST. PATIENT-LVL IV: CPT | Mod: PBBFAC,,, | Performed by: FAMILY MEDICINE

## 2023-10-31 PROCEDURE — 1126F AMNT PAIN NOTED NONE PRSNT: CPT | Mod: CPTII,S$GLB,, | Performed by: FAMILY MEDICINE

## 2023-10-31 PROCEDURE — 3288F PR FALLS RISK ASSESSMENT DOCUMENTED: ICD-10-PCS | Mod: CPTII,S$GLB,, | Performed by: FAMILY MEDICINE

## 2023-10-31 PROCEDURE — 3075F SYST BP GE 130 - 139MM HG: CPT | Mod: CPTII,S$GLB,, | Performed by: FAMILY MEDICINE

## 2023-10-31 PROCEDURE — 1159F PR MEDICATION LIST DOCUMENTED IN MEDICAL RECORD: ICD-10-PCS | Mod: CPTII,S$GLB,, | Performed by: FAMILY MEDICINE

## 2023-10-31 PROCEDURE — 3044F PR MOST RECENT HEMOGLOBIN A1C LEVEL <7.0%: ICD-10-PCS | Mod: CPTII,S$GLB,, | Performed by: FAMILY MEDICINE

## 2023-10-31 PROCEDURE — 3008F PR BODY MASS INDEX (BMI) DOCUMENTED: ICD-10-PCS | Mod: CPTII,S$GLB,, | Performed by: FAMILY MEDICINE

## 2023-10-31 PROCEDURE — 87635: ICD-10-PCS | Mod: QW,S$GLB,, | Performed by: FAMILY MEDICINE

## 2023-10-31 PROCEDURE — 1126F PR PAIN SEVERITY QUANTIFIED, NO PAIN PRESENT: ICD-10-PCS | Mod: CPTII,S$GLB,, | Performed by: FAMILY MEDICINE

## 2023-10-31 PROCEDURE — 99214 PR OFFICE/OUTPT VISIT, EST, LEVL IV, 30-39 MIN: ICD-10-PCS | Mod: S$GLB,,, | Performed by: FAMILY MEDICINE

## 2023-10-31 PROCEDURE — 1159F MED LIST DOCD IN RCRD: CPT | Mod: CPTII,S$GLB,, | Performed by: FAMILY MEDICINE

## 2023-10-31 PROCEDURE — 3078F DIAST BP <80 MM HG: CPT | Mod: CPTII,S$GLB,, | Performed by: FAMILY MEDICINE

## 2023-10-31 PROCEDURE — 87804 POCT INFLUENZA A/B: ICD-10-PCS | Mod: QW,S$GLB,, | Performed by: FAMILY MEDICINE

## 2023-10-31 PROCEDURE — 87804 INFLUENZA ASSAY W/OPTIC: CPT | Mod: QW,S$GLB,, | Performed by: FAMILY MEDICINE

## 2023-10-31 PROCEDURE — 99999 PR PBB SHADOW E&M-EST. PATIENT-LVL IV: ICD-10-PCS | Mod: PBBFAC,,, | Performed by: FAMILY MEDICINE

## 2023-10-31 PROCEDURE — 4010F PR ACE/ARB THEARPY RXD/TAKEN: ICD-10-PCS | Mod: CPTII,S$GLB,, | Performed by: FAMILY MEDICINE

## 2023-10-31 PROCEDURE — 1101F PR PT FALLS ASSESS DOC 0-1 FALLS W/OUT INJ PAST YR: ICD-10-PCS | Mod: CPTII,S$GLB,, | Performed by: FAMILY MEDICINE

## 2023-10-31 PROCEDURE — 3078F PR MOST RECENT DIASTOLIC BLOOD PRESSURE < 80 MM HG: ICD-10-PCS | Mod: CPTII,S$GLB,, | Performed by: FAMILY MEDICINE

## 2023-10-31 PROCEDURE — 3044F HG A1C LEVEL LT 7.0%: CPT | Mod: CPTII,S$GLB,, | Performed by: FAMILY MEDICINE

## 2023-10-31 PROCEDURE — 1101F PT FALLS ASSESS-DOCD LE1/YR: CPT | Mod: CPTII,S$GLB,, | Performed by: FAMILY MEDICINE

## 2023-10-31 RX ORDER — AZITHROMYCIN 250 MG/1
TABLET, FILM COATED ORAL
Qty: 6 TABLET | Refills: 0 | Status: SHIPPED | OUTPATIENT
Start: 2023-10-31 | End: 2023-11-05

## 2023-10-31 RX ORDER — PROMETHAZINE HYDROCHLORIDE AND DEXTROMETHORPHAN HYDROBROMIDE 6.25; 15 MG/5ML; MG/5ML
5 SYRUP ORAL 3 TIMES DAILY PRN
Qty: 118 ML | Refills: 0 | Status: SHIPPED | OUTPATIENT
Start: 2023-10-31 | End: 2023-11-10

## 2023-11-03 ENCOUNTER — PATIENT MESSAGE (OUTPATIENT)
Dept: FAMILY MEDICINE | Facility: CLINIC | Age: 73
End: 2023-11-03
Payer: MEDICARE

## 2023-12-18 DIAGNOSIS — I10 ESSENTIAL HYPERTENSION: ICD-10-CM

## 2023-12-18 RX ORDER — LISINOPRIL 10 MG/1
10 TABLET ORAL
Qty: 90 TABLET | Refills: 1 | OUTPATIENT
Start: 2023-12-18

## 2023-12-18 RX ORDER — LEVOTHYROXINE SODIUM 50 UG/1
50 TABLET ORAL
Qty: 90 TABLET | Refills: 2 | Status: SHIPPED | OUTPATIENT
Start: 2023-12-18

## 2023-12-18 RX ORDER — AMLODIPINE BESYLATE 5 MG/1
5 TABLET ORAL
Qty: 90 TABLET | Refills: 3 | Status: SHIPPED | OUTPATIENT
Start: 2023-12-18

## 2023-12-18 NOTE — TELEPHONE ENCOUNTER
No care due was identified.  [facility] Embedded Care Due Messages. Reference number: [id number].   12/18/2023 4:03:05 PM CST

## 2023-12-18 NOTE — TELEPHONE ENCOUNTER
Refill Routing Note   Medication(s) are not appropriate for processing by [facility] for the following reason(s):        Other  Patient has been taking prescribed medication differently than prescribed; DEFER    [facility] action(s):  Defer  Quick Discontinue  Approve      Medication Therapy Plan: Levothyroxine- MRM resolved; approve      Appointments  past 12m or future 3m with PCP    Date Provider   Last Visit   10/31/2023 Stuart Gage MD   Next Visit   1/29/2024 Stuart Gage MD   ED visits in past 90 days: 0        Note composed:10:02 PM 12/21/2023

## 2023-12-18 NOTE — TELEPHONE ENCOUNTER
No care due was identified.  [facility] Embedded Care Due Messages. Reference number: [id number].   12/18/2023 3:43:25 PM CST

## 2023-12-18 NOTE — TELEPHONE ENCOUNTER
No care due was identified.  [facility] Embedded Care Due Messages. Reference number: [id number].   12/18/2023 4:31:40 PM CST

## 2023-12-18 NOTE — TELEPHONE ENCOUNTER
No care due was identified.  [facility] Embedded Care Due Messages. Reference number: [id number].   12/18/2023 4:14:34 PM CST

## 2023-12-22 RX ORDER — LISINOPRIL 20 MG/1
20 TABLET ORAL DAILY
Qty: 90 TABLET | Refills: 1 | Status: SHIPPED | OUTPATIENT
Start: 2023-12-22

## 2023-12-25 NOTE — TELEPHONE ENCOUNTER
No care due was identified.  [facility] Embedded Care Due Messages. Reference number: [id number].   12/25/2023 5:29:13 AM CST

## 2023-12-27 ENCOUNTER — PATIENT MESSAGE (OUTPATIENT)
Dept: FAMILY MEDICINE | Facility: CLINIC | Age: 73
End: 2023-12-27
Payer: MEDICARE

## 2023-12-27 RX ORDER — OMEPRAZOLE 40 MG/1
40 CAPSULE, DELAYED RELEASE ORAL 2 TIMES DAILY
Qty: 180 CAPSULE | Refills: 3 | OUTPATIENT
Start: 2023-12-27

## 2024-01-02 ENCOUNTER — PATIENT MESSAGE (OUTPATIENT)
Dept: ADMINISTRATIVE | Facility: OTHER | Age: 74
End: 2024-01-02
Payer: MEDICARE

## 2024-01-02 ENCOUNTER — OFFICE VISIT (OUTPATIENT)
Dept: FAMILY MEDICINE | Facility: CLINIC | Age: 74
End: 2024-01-02
Payer: MEDICARE

## 2024-01-02 ENCOUNTER — HOSPITAL ENCOUNTER (OUTPATIENT)
Dept: RADIOLOGY | Facility: HOSPITAL | Age: 74
Discharge: HOME | End: 2024-01-02
Attending: FAMILY MEDICINE
Payer: MEDICARE

## 2024-01-02 VITALS
DIASTOLIC BLOOD PRESSURE: 70 MMHG | SYSTOLIC BLOOD PRESSURE: 132 MMHG | HEART RATE: 67 BPM | BODY MASS INDEX: 37.93 KG/M2 | HEIGHT: 62 IN | TEMPERATURE: 98 F | OXYGEN SATURATION: 96 % | WEIGHT: 206.13 LBS

## 2024-01-02 DIAGNOSIS — M54.12 CERVICAL RADICULOPATHY: ICD-10-CM

## 2024-01-02 DIAGNOSIS — R73.03 PREDIABETES: ICD-10-CM

## 2024-01-02 DIAGNOSIS — S80.02XA CONTUSION OF LEFT KNEE, INITIAL ENCOUNTER: ICD-10-CM

## 2024-01-02 DIAGNOSIS — Z74.09 LIMITED MOBILITY: ICD-10-CM

## 2024-01-02 DIAGNOSIS — I10 ESSENTIAL HYPERTENSION: ICD-10-CM
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
## 2024-01-02 DIAGNOSIS — Z23 NEED FOR VACCINATION: ICD-10-CM

## 2024-01-02 DIAGNOSIS — E66.01 SEVERE OBESITY WITH BODY MASS INDEX (BMI) OF 36.0 TO 36.9 WITH SERIOUS COMORBIDITY: ICD-10-CM

## 2024-01-02 DIAGNOSIS — M46.1 SACROILIITIS: ICD-10-CM

## 2024-01-02 DIAGNOSIS — E03.9 HYPOTHYROIDISM, UNSPECIFIED TYPE: ICD-10-CM

## 2024-01-02 PROCEDURE — 73560 X-RAY EXAM OF KNEE 1 OR 2: CPT | Mod: 26,59,RT, | Performed by: RADIOLOGY

## 2024-01-02 PROCEDURE — 73562 X-RAY EXAM OF KNEE 3: CPT | Mod: TC,PO,LT

## 2024-01-02 PROCEDURE — 73560 X-RAY EXAM OF KNEE 1 OR 2: CPT | Mod: TC,PO,RT

## 2024-01-02 PROCEDURE — 99999 PR PBB SHADOW E&M-EST. PATIENT-LVL IV: CPT | Mod: PBBFAC,,, | Performed by: FAMILY MEDICINE

## 2024-01-02 PROCEDURE — 3078F DIAST BP <80 MM HG: CPT | Mod: CPTII,S$GLB,, | Performed by: FAMILY MEDICINE

## 2024-01-02 PROCEDURE — 73562 X-RAY EXAM OF KNEE 3: CPT | Mod: 26,LT,, | Performed by: RADIOLOGY

## 2024-01-02 PROCEDURE — G0008 ADMIN INFLUENZA VIRUS VAC: HCPCS | Mod: S$GLB,,, | Performed by: FAMILY MEDICINE

## 2024-01-02 PROCEDURE — 1101F PT FALLS ASSESS-DOCD LE1/YR: CPT | Mod: CPTII,S$GLB,, | Performed by: FAMILY MEDICINE

## 2024-01-02 PROCEDURE — 90694 VACC AIIV4 NO PRSRV 0.5ML IM: CPT | Mod: S$GLB,,, | Performed by: FAMILY MEDICINE

## 2024-01-02 PROCEDURE — 99214 OFFICE O/P EST MOD 30 MIN: CPT | Mod: S$GLB,,, | Performed by: FAMILY MEDICINE

## 2024-01-02 PROCEDURE — 3288F FALL RISK ASSESSMENT DOCD: CPT | Mod: CPTII,S$GLB,, | Performed by: FAMILY MEDICINE

## 2024-01-02 PROCEDURE — 3075F SYST BP GE 130 - 139MM HG: CPT | Mod: CPTII,S$GLB,, | Performed by: FAMILY MEDICINE

## 2024-01-02 PROCEDURE — 3008F BODY MASS INDEX DOCD: CPT | Mod: CPTII,S$GLB,, | Performed by: FAMILY MEDICINE

## 2024-01-02 PROCEDURE — 1159F MED LIST DOCD IN RCRD: CPT | Mod: CPTII,S$GLB,, | Performed by: FAMILY MEDICINE

## 2024-01-02 RX ORDER — OMEPRAZOLE 20 MG/1
20 CAPSULE, DELAYED RELEASE ORAL 2 TIMES DAILY
Qty: 180 CAPSULE | Refills: 3 | Status: SHIPPED | OUTPATIENT
Start: 2024-01-02

## 2024-01-02 NOTE — PROGRESS NOTES
Subjective:       Patient ID: Sana Crenshaw is a 73 y.o. female.    Chief Complaint: Follow-up and Leg Injury      HPI Comments:       Current Outpatient Medications:     albuterol (PROVENTIL/VENTOLIN HFA) 90 mcg/actuation inhaler, Inhale 2 puffs into the lungs every 6 (six) hours as needed for Wheezing. Dispense with spacer., Disp: 18 g, Rfl: 3    amLODIPine (NORVASC) 5 MG tablet, TAKE 1 TABLET EVERY DAY, Disp: 90 tablet, Rfl: 3    aspirin (ECOTRIN) 81 MG EC tablet, Take 81 mg by mouth once daily., Disp: , Rfl:     DULoxetine (CYMBALTA) 30 MG capsule, Take 1 capsule (30 mg total) by mouth once daily., Disp: 90 capsule, Rfl: 1    levothyroxine (SYNTHROID) 50 MCG tablet, TAKE 1 TABLET EVERY DAY, Disp: 90 tablet, Rfl: 2    lisinopriL (PRINIVIL,ZESTRIL) 20 MG tablet, Take 1 tablet (20 mg total) by mouth once daily., Disp: 90 tablet, Rfl: 1    metoprolol succinate (TOPROL-XL) 100 MG 24 hr tablet, Take 1 tablet (100 mg total) by mouth once daily., Disp: 90 tablet, Rfl: 3    oxybutynin (DITROPAN-XL) 10 MG 24 hr tablet, Take 10 mg by mouth once daily., Disp: , Rfl:     rOPINIRole (REQUIP) 1 MG tablet, TAKE 1 TABLET BY MOUTH IN  THE EVENING, Disp: 90 tablet, Rfl: 3    tiZANidine (ZANAFLEX) 4 MG tablet, Take 1 tablet (4 mg total) by mouth every 8 (eight) hours as needed (muscle spasms)., Disp: 270 tablet, Rfl: 1    omeprazole (PRILOSEC) 20 MG capsule, Take 1 capsule (20 mg total) by mouth 2 (two) times daily., Disp: 180 capsule, Rfl: 3  No current facility-administered medications for this visit.    Facility-Administered Medications Ordered in Other Visits:     chlorhexidine 0.12 % solution 10 mL, 10 mL, Mouth/Throat, On Call Procedure, Hemphill, KATHRIN Novak    lactated ringers infusion, , Intravenous, Continuous, Franky Hartman MD, Last Rate: 20 mL/hr at 01/08/21 0731, New Bag at 01/08/21 0925    lactated ringers infusion, , Intravenous, Continuous, Estevan Rodriguez MD, Stopped at 02/23/21 1645      Fell twice  "over the last few days.  Left leg gave out.  Can not say why.  Did not seem to be related to pain.  Both times she hit her knee against a blunt surface and has bruising.  Able to walk without any significant pain currently.    Low back pain about the same.      Refuses colonoscopy despite recommendation by GI.  She saw them last year.  Also discussed her high-dose PPI therapy with them.  They recommended EGD if she has any breakthrough pain on her current b.i.d. dosing.  She says she does not have any pain currently.  Takes aspirin for years.  Not sure why.  Discussed aspirin use with her today.  Will continue for now.    Uses Requip for restless leg symptoms.  Cramping and movement at night.  Seems to really help.      Review of Systems   Constitutional:  Negative for activity change, appetite change and fever.   HENT:  Negative for sore throat.    Respiratory:  Negative for cough and shortness of breath.    Cardiovascular:  Negative for chest pain.   Gastrointestinal:  Negative for abdominal pain, diarrhea and nausea.   Genitourinary:  Negative for difficulty urinating.   Musculoskeletal:  Positive for gait problem. Negative for arthralgias and myalgias.   Neurological:  Negative for dizziness and headaches.       Objective:      Vitals:    01/02/24 0910   BP: 132/70   Pulse: 67   Temp: 98.3 °F (36.8 °C)   TempSrc: Oral   SpO2: 96%   Weight: 93.5 kg (206 lb 2.1 oz)   Height: 5' 2" (1.575 m)     Physical Exam  Vitals and nursing note reviewed.   Constitutional:       General: She is not in acute distress.     Appearance: She is well-developed. She is not diaphoretic.   HENT:      Head: Normocephalic.   Neck:      Thyroid: No thyromegaly.   Cardiovascular:      Rate and Rhythm: Normal rate and regular rhythm.      Heart sounds: Normal heart sounds. No murmur heard.  Pulmonary:      Effort: Pulmonary effort is normal.      Breath sounds: Normal breath sounds. No wheezing or rales.   Abdominal:      General: There is " no distension.      Palpations: Abdomen is soft.   Musculoskeletal:      Cervical back: Neck supple.      Left knee: Swelling and ecchymosis present. No effusion. Decreased range of motion. Tenderness present over the medial joint line and lateral joint line.   Lymphadenopathy:      Cervical: No cervical adenopathy.   Skin:     General: Skin is warm and dry.   Neurological:      Mental Status: She is alert and oriented to person, place, and time.   Psychiatric:         Mood and Affect: Mood normal.         Behavior: Behavior normal.         Thought Content: Thought content normal.         Judgment: Judgment normal.         Assessment:       1. Fall, initial encounter    2. Limited mobility    3. Essential hypertension    4. Prediabetes    5. Severe obesity with body mass index (BMI) of 36.0 to 36.9 with serious comorbidity    6. Hypothyroidism, unspecified type    7. Cervical radiculopathy    8. Sacroiliitis    9. Contusion of left knee, initial encounter        Plan:   Fall, initial encounter  Comments:  Physical therapy.  Orders:  -     Ambulatory referral/consult to Physical/Occupational Therapy; Future; Expected date: 01/09/2024    Limited mobility  Comments:  Physical therapy consult  Orders:  -     Ambulatory referral/consult to Physical/Occupational Therapy; Future; Expected date: 01/09/2024    Essential hypertension  Comments:  Controlled.  Follow-up 6 months    Prediabetes  Comments:  A1c 6.0    Severe obesity with body mass index (BMI) of 36.0 to 36.9 with serious comorbidity  Comments:  No change    Hypothyroidism, unspecified type  Comments:  Euthyroid on current dose    Cervical radiculopathy  Comments:  Stable    Sacroiliitis  Comments:  Stable    Contusion of left knee, initial encounter  Comments:  X-ray today.  No pain with ambulation or weight-bearing  Orders:  -     X-Ray Knee 3 View Left; Future; Expected date: 01/02/2024    Other orders  -     omeprazole (PRILOSEC) 20 MG capsule; Take 1 capsule  (20 mg total) by mouth 2 (two) times daily.  Dispense: 180 capsule; Refill: 3

## 2024-01-09 ENCOUNTER — CLINICAL SUPPORT (OUTPATIENT)
Dept: REHABILITATION | Facility: HOSPITAL | Age: 74
End: 2024-01-09
Attending: FAMILY MEDICINE
Payer: MEDICARE

## 2024-01-09 DIAGNOSIS — R26.89 ANTALGIC GAIT: ICD-10-CM

## 2024-01-09 DIAGNOSIS — R53.1 WEAKNESS: ICD-10-CM
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
## 2024-01-09 DIAGNOSIS — R52 PAIN: Primary | ICD-10-CM

## 2024-01-09 DIAGNOSIS — Z74.09 LIMITED MOBILITY: ICD-10-CM

## 2024-01-09 PROBLEM — M54.2 NECK PAIN: Status: RESOLVED | Noted: 2021-12-27 | Resolved: 2024-01-09

## 2024-01-09 PROCEDURE — 97530 THERAPEUTIC ACTIVITIES: CPT | Mod: PN

## 2024-01-09 PROCEDURE — 97112 NEUROMUSCULAR REEDUCATION: CPT | Mod: PN

## 2024-01-09 PROCEDURE — 97161 PT EVAL LOW COMPLEX 20 MIN: CPT | Mod: PN

## 2024-01-09 NOTE — PLAN OF CARE
[facility]   Physical Therapy Initial Evaluation        Name: Sana Crenshaw  Clinic Number: [id number]    Therapy Diagnosis:   Encounter Diagnoses   Name Primary?    Limited mobility     Fall, initial encounter     Pain Yes    Weakness     Antalgic gait      Physician: Stuart Gage MD    Physician Orders: PT Eval and Treat   Medical Diagnosis from Referral: Limited mobility   Fall, initial encounter  Evaluation Date: 1/9/2024  Authorization Period Expiration: 01/01/2025  Plan of Care Expiration: 3/15/24  Progress Note Due: 30 days  Visit # / Visits authorized: 1/ 1   FOTO: 1/3  Precautions: Standard,  falls  , bladder stim implant    Time In: 9:00  Time Out: 9:58  Total Billable Time: 58 minutes (low Complexity Evaluation, Therapeutic Exercise - 0, Manual Therapy - 0, Therapeutic activities- 23, Neuro muscular re education- 15)      Subjective   Date of onset: fell 2x in 12/2023 due to L knee giving out  History of current condition - Sana reports: she has been wearing L ankle brace for about 5 years due to torn ligament with no repair.  Patient had surgery on R foot about 6-7 years ago limiting ankle ROM. Patient reports recent falls and L knee giving out. Patient reports she uses an electric w/c to get around for community distances. 2003 L TKR/ 2004 R TKR. She reports L knee pain since her falls and reports she fell directly on L knee. Patient reports swelling in L knee.       Medical History:   Past Medical History:   Diagnosis Date    Arthritis     Asthma     Hypertension     Thyroid disease        Surgical History:   Sana Crenshaw  has a past surgical history that includes Eye surgery; Breast surgery; Hysterectomy; Shoulder arthroscopy w/ rotator cuff repair (Left); Shoulder arthroscopy (Right); Bunionectomy (Right); Joint replacement (Bilateral); Ankle Fusion (Right); Cataract extraction w/  intraocular lens implant; Retinal detachment surgery; Implantation of  permanent sacral nerve stimulator (N/A, 1/8/2021); Revision of procedure involving sacral neurostimulator device (N/A, 2/23/2021); Total Reduction Mammoplasty (Bilateral, 2008); Epidural steroid injection into cervical spine (N/A, 12/28/2021); Injection of joint (Right, 1/12/2022); Injection of anesthetic agent into sacroiliac joint (Right, 1/12/2022); Injection of anesthetic agent around medial branch nerves innervating cervical facet joint (Left, 4/27/2022); and Ulnar tunnel release (Right, 4/10/2023).    Medications:   Sana has a current medication list which includes the following prescription(s): albuterol, amlodipine, aspirin, duloxetine, levothyroxine, lisinopril, metoprolol succinate, omeprazole, oxybutynin, ropinirole, and tizanidine, and the following Facility-Administered Medications: chlorhexidine, lactated ringers, and lactated ringers.    Allergies:   Review of patient's allergies indicates:   Allergen Reactions    Gabapentin Nausea And Vomiting     Causes vertigo         Imaging, : X-rays of B knees: Bilateral total knee arthroplasties are noted.  No hardware failure or loosening of the left knee prosthesis.  The appearance is similar to the prior exam.  Cannot exclude a small suprapatellar joint effusion.  No acute fracture or dislocation.     Prior Therapy: nothing recent  Social History:  lives with their family  Occupation: retired  Prior Level of Function: independent/ uses electric w/c for long distances  Current Level of Function: falls, can not negotiate curbs, has not been able to negotiate steps for a couple of years, L ankle brace/ [place]/ hard static brace    Pain:   Current 7/10, worst 10/10, best 7/10   Location: left knee pain since fall 12/2023   Description: Aching  Aggravating Factors: Standing and Walking  Easing Factors: ice and rest    Pts goals: decrease risk of falls    Objective     Posture: L hand dominant, swelling L knee, decrease medial longitudinal arches,  forward  head, rounding shoulders, R shoulder elevated, scoliosis  Palpation: TTP L knee  Sensation: intact to light touch  Range of Motion/Strength:           Knee Right Left Pain/Dysfunction with Movement   AROM      flexion  110  110    extension  0  0        L/E MMT Right Left Pain/Dysfunction with Movement   Hip Flexion 3+/5 3/5    Hip Extension NT due to not able to get in prone position NT due to not able to get in prone position    Hip Abduction 3+/5 3/5    Hip Adduction 5/5 3+/5    Knee Flexion 5/5 5/5    Knee Extension 4/5 3+/5 Painful L knee    Ankle DF 5/5 NT due to brace donned    Ankle PF 3-/5 2/5 Fearful to SLS on L LE       Flexibility: mild hamstring tightness    Special Tests: patella mobility normal    Gait Analysis:Without AD Assistance none  Deviations: waddles, antalgic gait, favoring L LE, decrease heel strike and toe push off, L ankle brace donned    TU seconds         Other: 7 sit to stands in 30 secs with UE support        Intake Outcome Measure for FOTO balance Survey    Therapist reviewed FOTO scores for Sana Crenshaw on 2024.   FOTO documents entered into EPIC - see Media section.    Intake Score: 41%       TREATMENT   Treatment Time In: 9:00  Treatment Time Out: 9:58  Total Treatment time separate from Evaluation: 38 minutes    Sana received therapeutic exercises to develop strength, endurance, ROM, flexibility, posture, and core stabilization for 0 minutes includin    Sana received the following manual therapy techniques:  were applied to the:  for 0 minutes, includin      neuromuscular re-education activities to improve: Balance, Coordination, Kinesthetic, Sense, Proprioception, and Posture for 15 minutes. The following activities were included:  Quad set with towel  SLR with quad set and cuing especially for eccentric control  Ball squeezes  SL hip abduction with cuing to roll hips forward      therapeutic activities to improve functional performance for 23  minutes,  including:  - HEP issued and reviewed with patient,  form and technique reviewed, and benefits and frequency discussed as well  - bed mobility  - sit to stands        Sana participated in gait training to improve functional mobility and safety for 0  minutes, includin    Sana received cold pack for 0 minutes to 0.      Home Exercises Provided and Patient Education Provided       Education/Self-Care provided:  Patient educated on the impairments noted above and the effects of physical therapy intervention to improve overall condition and QOL.   Patient was educated on all the above exercise prior/during/after for proper posture, positioning, and execution for safe performance with home exercise program.   Patient educated on the importance of improved core and lower extremity strength in order to improve alignment of the spine and lower extremities with static positions and dynamic movement.   Patient educated on the importance of strong core and lower extremity musculature in order to improve both static and dynamic balance, improve gait mechanics, reduce fall risk and improve household and community mobility.       Written Home Exercises Provided: yes.  Exercises were reviewed and Sana was able to demonstrate them prior to the end of the session.  Sana demonstrated good  understanding of the education provided.     See EMR under Patient Instructions for exercises provided 2024.      Assessment   Sana is a 73 y.o. female referred to outpatient Physical Therapy with a medical diagnosis of Limited mobility and  Fall, initial encounter. Pt presents with pain, posture deficits, weakness, and balance/ gait deficits. Patient is limited due to history of ankle surgery and injury.  She wears L ankle/ hard brace at all times for ligament injury because surgery was not an option. Patient would benefit from skilled PT intervention for pain management, posture education/ retraining, strengthening, and gait/  balance training to reduce the risk of falls and improve functional mobility.     Pt prognosis is Fair.   Pt will benefit from skilled outpatient Physical Therapy to address the deficits stated above and in the chart below, provide pt/family education, and to maximize pt's level of independence.     Plan of care discussed with patient: Yes  Pt's spiritual, cultural and educational needs considered and patient is agreeable to the plan of care and goals as stated below:     Anticipated Barriers for therapy: chronicity of condition, co- morbidities       Medical Necessity is demonstrated by the following  History  Co-morbidities and personal factors that may impact the plan of care [] LOW: no personal factors / co-morbidities  [x] MODERATE: 1-2 personal factors / co-morbidities  [] HIGH: 3+ personal factors / co-morbidities     Moderate / High Support Documentation:   Co-morbidities affecting plan of care:    chronicity of condition  Ankle and knee surgery history  Personal Factors:   no deficits      Examination  Body Structures and Functions, activity limitations and participation restrictions that may impact the plan of care [x] LOW: addressing 1-2 elements  [] MODERATE: 3+ elements  [] HIGH: 4+ elements (please support below)     Moderate / High Support Documentation:       Clinical Presentation [x] LOW: stable  [] MODERATE: Evolving  [] HIGH: Unstable      Decision Making/ Complexity Score: low         Goals:  STG's 2 weeks  Patient will be independent with 50% of HEP    LTG's 10 weeks  Patient will improve FOTO functional measure score  to 52 % in order to improve overall QOL & return to PLOF   2. Patient will report an overall decrease in pain with ADL's and functional mobility  3. Patient will increase strength by at least 1/2 muscle grade in affected musculature to   improve functional mobility  4. Patient will improve strength and endurance and perform 10 sit to stands in 30 secs with UE support  5. Patient  will be more aware of posture throughout the day to reduce stress  and maintain optimal alignment of the spine  6. Patient will be independent with HEP  7. Patient will improve TUG and perform TUG in 12 secs or less safely to reduce the risk of falls  Plan   Plan of care Certification: 1/9/2024 to 3/15/24.    Outpatient Physical Therapy 2 times weekly for 10 weeks to include the following interventions: Gait Training, Manual Therapy, Moist Heat/ Ice, Neuromuscular Re-ed, Patient Education, Self Care, Therapeutic Activities, Therapeutic Exercise, and Ultrasound, ASTYM, Kinesiotaping PRN, Functional Dry Needling      Damaris Joyce, PT        I CERTIFY THE NEED FOR THESE SERVICES FURNISHED UNDER THIS PLAN OF TREATMENT AND WHILE UNDER MY CARE   Physician's comments:     Physician's Signature: ___________________________________________________

## 2024-01-12 ENCOUNTER — CLINICAL SUPPORT (OUTPATIENT)
Dept: REHABILITATION | Facility: HOSPITAL | Age: 74
End: 2024-01-12
Payer: MEDICARE

## 2024-01-12 DIAGNOSIS — R53.1 WEAKNESS: ICD-10-CM

## 2024-01-12 DIAGNOSIS — R52 PAIN: Primary | ICD-10-CM

## 2024-01-12 PROCEDURE — 97110 THERAPEUTIC EXERCISES: CPT | Mod: PN

## 2024-01-12 PROCEDURE — 97112 NEUROMUSCULAR REEDUCATION: CPT | Mod: PN

## 2024-01-12 PROCEDURE — 97530 THERAPEUTIC ACTIVITIES: CPT | Mod: PN

## 2024-01-12 NOTE — PROGRESS NOTES
[facility]   Physical Therapy Treatment Note      Name: Sana Crenshaw  Clinic Number: [id number]    Therapy Diagnosis:   Encounter Diagnoses   Name Primary?    Pain Yes    Weakness      Physician: Stuart Gage MD    Visit Date: 1/12/2024      Physician Orders: PT Eval and Treat   Medical Diagnosis from Referral: Limited mobility   Fall, initial encounter  Evaluation Date: 1/9/2024  Authorization Period Expiration: 01/01/2025  Plan of Care Expiration: 3/15/24  Progress Note Due: 30 days  Visit # / Visits authorized: 1/ 20 + eval   FOTO: 1/3  Precautions: Standard,  falls  , bladder stim implant    PTA Visit #: 0/5     Time In: 9:00  Time Out: 9:59  Total Billable Time: 59 minutes    Subjective     Pt reports: she always has pain and her neck is bothering her because of the change in weather and reports L knee pain as well.  She was compliant with home exercise program./ SL hip abduction most difficult per patient  Response to previous treatment: no issues  Functional change: n/a at this time    Pain: 4/10  Location: left knee      Objective      Objective Measures updated at progress report unless specified.     Treatment     Sana received the following treatments:    Sana received the following manual therapy techniques applied for (0) minutes, including:      Sana received therapeutic exercises to develop strength, endurance, ROM, flexibility, posture, and core stabilization for (10)  minutes including:  Nustep for ROM, strength, and endurance x 8 minutes R:4  Sitting knee extension with 2# 3 x 10 reps on each leg      Sana participated in neuromuscular re-education activities to improve: Balance, Coordination, Kinesthetic, Sense, Proprioception, and Posture for 37 minutes., including:  -Shuttle 4 bands 3 minutes with cuing for core engagement and control with movements/ heel raises 2 x 10 reps  - standing hip extension in // bars with cuing for posture 3 x 10 reps  - standing  heel raises 2 x 10 reps in // bars  - side stepping // bars with UE support  - walking marches in // bars with core engaged with UE support  - romberg stance with eyes open 1 minute each  - standing hip abduction 2 x 10 reps each leg  - step ups on 2 in box 2 x 10 reps each leg in // bars/ lateral step ups 2 in box 2 x 10 reps      Sana participated in dynamic functional therapeutic activities to improve functional performance for 12 minutes, including:  Transfers  Sit to stands 2 x 10 in a row  Negotiation of 4 steps x 3 using HR's for support with step to gait pattern    Patient Education and Home Exercises       Education provided:   - HEP reviewed    Written Home Exercises Provided: Patient instructed to cont prior HEP. Exercises were reviewed and Sana was able to demonstrate them prior to the end of the session.  Sana demonstrated good  understanding of the education provided. See EMR under Patient Instructions for exercises provided during therapy sessions    Assessment     First visit after initial evaluation. Patient tolerated new exercises with minimal difficulty. Patient has a lot of difficulty with higher level dynamic balance activities. PT will progress patient as tolerated.     Sana Is progressing well towards her goals.   Pt prognosis is Fair.     Pt will continue to benefit from skilled outpatient physical therapy to address the deficits listed in the problem list box on initial evaluation, provide pt/family education and to maximize pt's level of independence in the home and community environment.     Pt's spiritual, cultural and educational needs considered and pt agreeable to plan of care and goals.     Anticipated barriers to physical therapy: chronicity of condition, co- morbidities     Goals: STG's 2 weeks  Patient will be independent with 50% of HEP Progressing     LTG's 10 weeks  Patient will improve FOTO functional measure score  to 52 % in order to improve overall QOL & return to PLOF    2. Patient will report an overall decrease in pain with ADL's and functional mobility  3. Patient will increase strength by at least 1/2 muscle grade in affected musculature to improve functional mobility  4. Patient will improve strength and endurance and perform 10 sit to stands in 30 secs with UE support  5. Patient will be more aware of posture throughout the day to reduce stress  and maintain optimal alignment of the spine  6. Patient will be independent with HEP  7. Patient will improve TUG and perform TUG in 12 secs or less safely to reduce the risk of falls    Plan     Plan of care Certification: 1/9/2024 to 3/15/24.     Outpatient Physical Therapy 2 times weekly for 10 weeks to include the following interventions: Gait Training, Manual Therapy, Moist Heat/ Ice, Neuromuscular Re-ed, Patient Education, Self Care, Therapeutic Activities, Therapeutic Exercise, and Ultrasound, ASTYM, Kinesiotaping PRN, Functional Dry Needling    Damaris Joyce, PT

## 2024-01-22 ENCOUNTER — HOSPITAL ENCOUNTER (OUTPATIENT)
Dept: RADIOLOGY | Facility: HOSPITAL | Age: 74
Discharge: HOME | End: 2024-01-22
Attending: FAMILY MEDICINE
Payer: MEDICARE

## 2024-01-22 DIAGNOSIS — Z12.31 ENCOUNTER FOR SCREENING MAMMOGRAM FOR BREAST CANCER: ICD-10-CM

## 2024-01-22 PROCEDURE — 77063 BREAST TOMOSYNTHESIS BI: CPT | Mod: 26,,, | Performed by: RADIOLOGY

## 2024-01-22 PROCEDURE — 77067 SCR MAMMO BI INCL CAD: CPT | Mod: TC

## 2024-01-22 PROCEDURE — 77067 SCR MAMMO BI INCL CAD: CPT | Mod: 26,,, | Performed by: RADIOLOGY

## 2024-01-26 ENCOUNTER — DOCUMENTATION ONLY (OUTPATIENT)
Dept: REHABILITATION | Facility: HOSPITAL | Age: 74
End: 2024-01-26

## 2024-01-26 ENCOUNTER — CLINICAL SUPPORT (OUTPATIENT)
Dept: REHABILITATION | Facility: HOSPITAL | Age: 74
End: 2024-01-26
Payer: MEDICARE

## 2024-01-26 DIAGNOSIS — R53.1 WEAKNESS: ICD-10-CM

## 2024-01-26 DIAGNOSIS — R52 PAIN: Primary | ICD-10-CM

## 2024-01-26 PROCEDURE — 97112 NEUROMUSCULAR REEDUCATION: CPT | Mod: PN,CQ

## 2024-01-26 PROCEDURE — 97530 THERAPEUTIC ACTIVITIES: CPT | Mod: PN,CQ

## 2024-01-26 PROCEDURE — 97110 THERAPEUTIC EXERCISES: CPT | Mod: PN,CQ

## 2024-01-26 NOTE — PROGRESS NOTES
[facility]   Physical Therapy Treatment Note      Name: Sana Crenshaw  Clinic Number: [id number]    Therapy Diagnosis:   Encounter Diagnoses   Name Primary?    Pain Yes    Weakness        Physician: Stuart Gage MD    Visit Date: 1/26/2024      Physician Orders: PT Eval and Treat   Medical Diagnosis from Referral: Limited mobility   Fall, initial encounter  Evaluation Date: 1/9/2024  Authorization Period Expiration: 01/01/2025  Plan of Care Expiration: 3/15/24  Progress Note Due: 30 days  Visit # / Visits authorized: 2/ 20 + eval   FOTO: 1/3  Precautions: Standard,  falls  , bladder stim implant    PTA Visit #: 1/5     Time In: 8:01  Time Out: 9:05  Total Billable Time: 64 minutes    Subjective     Pt reports: knee pain and swelling has gone down. She is feeling sick the past week and can't get in to see the doctor until Monday.   She was compliant with home exercise program.  Response to previous treatment: improvement in pain and knee swelling  Functional change: n/a at this time    Pain: 2/10  Location: left knee      Objective      Objective Measures updated at progress report unless specified.     Treatment     Sana received the following treatments:    Sana received the following manual therapy techniques applied for (0) minutes, including:    Sana received therapeutic exercises to develop strength, endurance, ROM, flexibility, posture, and core stabilization for (14)  minutes including:  Nustep for ROM, strength, and endurance x 9 minutes  Sitting knee extension with 2# 2 x 15 reps on each leg    Sana participated in neuromuscular re-education activities to improve: Balance, Coordination, Kinesthetic, Sense, Proprioception, and Posture for 40 minutes., including:  -Shuttle 4 bands 3 minutes with cuing for core engagement and control with movements/ heel raises 2 x 10 reps   - standing hip extension in // bars with cuing for posture 3 x 10 reps  - standing heel raises 2 x  10 reps in // bars  - side stepping // bars with UE support x2 laps  - walking marches in // bars with core engaged with UE support  - romberg stance with eyes open 1 minute each  - standing hip abduction 2 x 10 reps each leg  - step ups on 2 in box 2 x 10 reps each leg in // bars/ lateral step ups 2 in box 2 x 10 reps    Sana participated in dynamic functional therapeutic activities to improve functional performance for 10 minutes, including:  Transfers  Sit to stands 2 x 10 in a row  Negotiation of 4 steps x 3 using HR's for support with step through gait pattern    Patient Education and Home Exercises       Education provided:   - HEP reviewed    Written Home Exercises Provided: Patient instructed to cont prior HEP. Exercises were reviewed and Sana was able to demonstrate them prior to the end of the session.  Sana demonstrated good  understanding of the education provided. See EMR under Patient Instructions for exercises provided during therapy sessions    Assessment     Patient reports less knee pain and swelling today. She participated in exercises for ankle stability, hip/ quad strengthening and balance without any adverse symptoms noted. Patient had difficulty with getting onto shuttle due to decreased core and hip strength. Performed marches in parallel bars with focus on core stability as patient tends to lean backwards. Patient has significant weakness noted in bilateral ankles secondary to previous ankle injuries and surgery. PT will progress patient as tolerated.     Sana Is progressing well towards her goals.   Pt prognosis is Fair.     Pt will continue to benefit from skilled outpatient physical therapy to address the deficits listed in the problem list box on initial evaluation, provide pt/family education and to maximize pt's level of independence in the home and community environment.     Pt's spiritual, cultural and educational needs considered and pt agreeable to plan of care and goals.      Anticipated barriers to physical therapy: chronicity of condition, co- morbidities     Goals: STG's 2 weeks  Patient will be independent with 50% of HEP Progressing     LTG's 10 weeks  Patient will improve FOTO functional measure score  to 52 % in order to improve overall QOL & return to PLOF   2. Patient will report an overall decrease in pain with ADL's and functional mobility  3. Patient will increase strength by at least 1/2 muscle grade in affected musculature to improve functional mobility  4. Patient will improve strength and endurance and perform 10 sit to stands in 30 secs with UE support  5. Patient will be more aware of posture throughout the day to reduce stress  and maintain optimal alignment of the spine  6. Patient will be independent with HEP  7. Patient will improve TUG and perform TUG in 12 secs or less safely to reduce the risk of falls    Plan     Plan of care Certification: 1/9/2024 to 3/15/24.     Outpatient Physical Therapy 2 times weekly for 10 weeks to include the following interventions: Gait Training, Manual Therapy, Moist Heat/ Ice, Neuromuscular Re-ed, Patient Education, Self Care, Therapeutic Activities, Therapeutic Exercise, and Ultrasound, ASTYM, Kinesiotaping PRN, Functional Dry Needling    Amberly Whitlock, PTA

## 2024-01-29 ENCOUNTER — OFFICE VISIT (OUTPATIENT)
Dept: FAMILY MEDICINE | Facility: CLINIC | Age: 74
End: 2024-01-29
Payer: MEDICARE

## 2024-01-29 VITALS
WEIGHT: 206.44 LBS | OXYGEN SATURATION: 100 % | RESPIRATION RATE: 19 BRPM | HEIGHT: 62 IN | TEMPERATURE: 99 F | BODY MASS INDEX: 37.99 KG/M2 | SYSTOLIC BLOOD PRESSURE: 119 MMHG | DIASTOLIC BLOOD PRESSURE: 78 MMHG | HEART RATE: 65 BPM

## 2024-01-29 DIAGNOSIS — E03.9 HYPOTHYROIDISM, UNSPECIFIED TYPE: ICD-10-CM

## 2024-01-29 DIAGNOSIS — J01.90 ACUTE NON-RECURRENT SINUSITIS, UNSPECIFIED LOCATION: Primary | ICD-10-CM

## 2024-01-29 DIAGNOSIS — E66.01 SEVERE OBESITY WITH BODY MASS INDEX (BMI) OF 36.0 TO 36.9 WITH SERIOUS COMORBIDITY: ICD-10-CM

## 2024-01-29 DIAGNOSIS — R73.03 PREDIABETES: ICD-10-CM

## 2024-01-29 DIAGNOSIS — Z74.09 LIMITED MOBILITY: ICD-10-CM

## 2024-01-29 DIAGNOSIS — I10 ESSENTIAL HYPERTENSION: ICD-10-CM

## 2024-01-29 PROCEDURE — 99214 OFFICE O/P EST MOD 30 MIN: CPT | Mod: S$GLB,,, | Performed by: FAMILY MEDICINE

## 2024-01-29 PROCEDURE — 1126F AMNT PAIN NOTED NONE PRSNT: CPT | Mod: CPTII,S$GLB,, | Performed by: FAMILY MEDICINE

## 2024-01-29 PROCEDURE — 1101F PT FALLS ASSESS-DOCD LE1/YR: CPT | Mod: CPTII,S$GLB,, | Performed by: FAMILY MEDICINE

## 2024-01-29 PROCEDURE — 3008F BODY MASS INDEX DOCD: CPT | Mod: CPTII,S$GLB,, | Performed by: FAMILY MEDICINE

## 2024-01-29 PROCEDURE — 99999 PR PBB SHADOW E&M-EST. PATIENT-LVL V: CPT | Mod: PBBFAC,,, | Performed by: FAMILY MEDICINE

## 2024-01-29 PROCEDURE — 3074F SYST BP LT 130 MM HG: CPT | Mod: CPTII,S$GLB,, | Performed by: FAMILY MEDICINE

## 2024-01-29 PROCEDURE — 3078F DIAST BP <80 MM HG: CPT | Mod: CPTII,S$GLB,, | Performed by: FAMILY MEDICINE

## 2024-01-29 PROCEDURE — 1159F MED LIST DOCD IN RCRD: CPT | Mod: CPTII,S$GLB,, | Performed by: FAMILY MEDICINE

## 2024-01-29 PROCEDURE — 3288F FALL RISK ASSESSMENT DOCD: CPT | Mod: CPTII,S$GLB,, | Performed by: FAMILY MEDICINE

## 2024-01-29 RX ORDER — AMOXICILLIN AND CLAVULANATE POTASSIUM 875; 125 MG/1; MG/1
1 TABLET, FILM COATED ORAL EVERY 12 HOURS
Qty: 20 TABLET | Refills: 0 | Status: SHIPPED | OUTPATIENT
Start: 2024-01-29

## 2024-01-29 RX ORDER — PROMETHAZINE HYDROCHLORIDE AND DEXTROMETHORPHAN HYDROBROMIDE 6.25; 15 MG/5ML; MG/5ML
5 SYRUP ORAL 3 TIMES DAILY PRN
Qty: 118 ML | Refills: 0 | Status: SHIPPED | OUTPATIENT
Start: 2024-01-29 | End: 2024-02-08

## 2024-01-31 ENCOUNTER — CLINICAL SUPPORT (OUTPATIENT)
Dept: REHABILITATION | Facility: HOSPITAL | Age: 74
End: 2024-01-31
Payer: MEDICARE

## 2024-01-31 DIAGNOSIS — R53.1 WEAKNESS: ICD-10-CM

## 2024-01-31 DIAGNOSIS — R52 PAIN: Primary | ICD-10-CM

## 2024-01-31 PROCEDURE — 97110 THERAPEUTIC EXERCISES: CPT | Mod: PN,CQ

## 2024-01-31 PROCEDURE — 97112 NEUROMUSCULAR REEDUCATION: CPT | Mod: PN,CQ

## 2024-01-31 NOTE — PROGRESS NOTES
[facility]   Physical Therapy Treatment Note      Name: Sana Crenshaw  Clinic Number: [id number]    Therapy Diagnosis:   Encounter Diagnoses   Name Primary?    Pain Yes    Weakness        Physician: Stuart Gage MD    Visit Date: 1/31/2024      Physician Orders: PT Eval and Treat   Medical Diagnosis from Referral: Limited mobility   Fall, initial encounter  Evaluation Date: 1/9/2024  Authorization Period Expiration: 01/01/2025  Plan of Care Expiration: 3/15/24  Progress Note Due: 30 days  Visit # / Visits authorized: 3/ 20 + eval   FOTO: 1/3  Precautions: Standard,  falls  , bladder stim implant    PTA Visit #: 1/5     Time In: 8:00am  Time Out: 9:00am  Total Billable Time: 55 minutes    Subjective     Pt reports: able to see a doctor on Monday for diagnosis of recent sickness. Patient reports getting an antibiotic and cough medicine for sinus infection. Patient reports no complaints of   She was compliant with home exercise program.  Response to previous treatment: improvement in pain and knee swelling  Functional change: n/a at this time    Pain: 2/10  Location: left knee      Objective      Objective Measures updated at progress report unless specified.     Treatment     Sana received the following treatments:    Sana received the following manual therapy techniques applied for (0) minutes, including:    Sana received therapeutic exercises to develop strength, endurance, ROM, flexibility, posture, and core stabilization for (15)  minutes including:    -Nustep for ROM, strength, and endurance x 9 minutes  -Sitting knee extension with 2# 2 x 15 reps on each leg  -Shuttle 4 bands 3 minutes    Sana participated in neuromuscular re-education activities to improve: Balance, Coordination, Kinesthetic, Sense, Proprioception, and Posture for (38) minutes., including:    - romberg stance with eyes closed 1 minute each  - step ups on 4 in box 2 x 10 reps each leg in // bars  - lateral  "step ups 4 in box 2 x 10 reps each  +Tandem Stance 2 x30" each leg in front  +Standing hip 3 way; x15 each  +Standing marches holding bungee cord 2 x 1 minute  +Hurdles x5 forward; 2 laps both feet stepping into each tena, 2 laps alternating steps - UE A as needed  +Sit to stands 2 x 10 from low mat, cue hip extensor activation    Sana participated in dynamic functional therapeutic activities to improve functional performance for 00 minutes, including:        Patient Education and Home Exercises       Education provided:     Written Home Exercises Provided: Patient instructed to cont prior HEP. Exercises were reviewed and Sana was able to demonstrate them prior to the end of the session.  aSna demonstrated good  understanding of the education provided. See EMR under Patient Instructions for exercises provided during therapy sessions    Assessment     Continued focus of treatment attempting to improve balance/stability, quadriceps strength, and functional endurance. Patient continues to demonstrate waddling gait mechanics when weightbearing on both lower extremities. Patient is able to decrease lateral shifting with light UE A as well as decreased iban. Patient demonstrates difficulty of proper sit to stand mechanics from lower surfaces often having weight displaced posteriorly. Cueing provided for patient to engage hip extensors during knee extension which did prove beneficial in decreasing posterior lean.     Sana Is progressing well towards her goals.   Pt prognosis is Fair.     Pt will continue to benefit from skilled outpatient physical therapy to address the deficits listed in the problem list box on initial evaluation, provide pt/family education and to maximize pt's level of independence in the home and community environment.     Pt's spiritual, cultural and educational needs considered and pt agreeable to plan of care and goals.     Anticipated barriers to physical therapy: chronicity of condition, " co- morbidities     Goals: STG's 2 weeks  Patient will be independent with 50% of HEP Progressing     LTG's 10 weeks  Patient will improve FOTO functional measure score  to 52 % in order to improve overall QOL & return to PLOF   2. Patient will report an overall decrease in pain with ADL's and functional mobility  3. Patient will increase strength by at least 1/2 muscle grade in affected musculature to improve functional mobility  4. Patient will improve strength and endurance and perform 10 sit to stands in 30 secs with UE support  5. Patient will be more aware of posture throughout the day to reduce stress  and maintain optimal alignment of the spine  6. Patient will be independent with HEP  7. Patient will improve TUG and perform TUG in 12 secs or less safely to reduce the risk of falls    Plan     Plan of care Certification: 1/9/2024 to 3/15/24.     Outpatient Physical Therapy 2 times weekly for 10 weeks to include the following interventions: Gait Training, Manual Therapy, Moist Heat/ Ice, Neuromuscular Re-ed, Patient Education, Self Care, Therapeutic Activities, Therapeutic Exercise, and Ultrasound, ASTYM, Kinesiotaping PRN, Functional Dry Needling    Hernando Dong, PTA

## 2024-02-01 ENCOUNTER — PATIENT MESSAGE (OUTPATIENT)
Dept: FAMILY MEDICINE | Facility: CLINIC | Age: 74
End: 2024-02-01
Payer: MEDICARE

## 2024-02-02 ENCOUNTER — CLINICAL SUPPORT (OUTPATIENT)
Dept: REHABILITATION | Facility: HOSPITAL | Age: 74
End: 2024-02-02
Payer: MEDICARE

## 2024-02-02 DIAGNOSIS — R52 PAIN: Primary | ICD-10-CM

## 2024-02-02 DIAGNOSIS — R53.1 WEAKNESS: ICD-10-CM

## 2024-02-02 PROCEDURE — 97530 THERAPEUTIC ACTIVITIES: CPT | Mod: PN

## 2024-02-02 PROCEDURE — 97112 NEUROMUSCULAR REEDUCATION: CPT | Mod: PN

## 2024-02-02 NOTE — PROGRESS NOTES
[facility]   Physical Therapy Treatment Note      Name: Sana Crenshaw  Clinic Number: [id number]    Therapy Diagnosis:   Encounter Diagnoses   Name Primary?    Pain Yes    Weakness          Physician: Stuart Gage MD    Visit Date: 2/2/2024      Physician Orders: PT Eval and Treat   Medical Diagnosis from Referral: Limited mobility   Fall, initial encounter  Evaluation Date: 1/9/2024  Authorization Period Expiration: 01/01/2025  Plan of Care Expiration: 3/15/24  Progress Note Due: 30 days  Visit # / Visits authorized: 4/ 20 + eval   FOTO: 1/3  Precautions: Standard,  falls  , bladder stim implant    PTA Visit #: 1/5     Time In: 9:00  Time Out:9:59  Total Billable Time: 59 minutes    Subjective     Pt reports: she is feeling better and has no reports of any recent falls    She was compliant with home exercise program.  Response to previous treatment: improvement in pain and knee swelling  Functional change: n/a at this time    Pain: 0/10  Location: left knee      Objective      Objective Measures updated at progress report unless specified.     Treatment     Sana received the following treatments:    Sana received the following manual therapy techniques applied for (0) minutes, including:    FOTO next visit    Sana received therapeutic exercises to develop strength, endurance, ROM, flexibility, posture, and core stabilization for 8  minutes including:    -Nustep for ROM, strength, and endurance x 8 minutes R:4  -Sitting knee extension with 2# 2 x 15 reps on each leg not performed today      Sana participated in neuromuscular re-education activities to improve: Balance, Coordination, Kinesthetic, Sense, Proprioception, and Posture for 28  minutes., including:  Shuttle 4 bands 3 minutes with cuing for core engagement and control with movements   - standing hip extension in // bars with cuing for posture 3 x 10 reps   - standing heel raises in // bars with UE support 3 x 10 reps/  not able to perform with 5# in each hand   - side stepping // bars with UE support  - walking marches in // bars with core engaged with UE support / less today per patient  - romberg stance with eyes open 1 minute each with eyes open  - step ups on 2 in box 2 x 10 reps each leg in // bars/ lateral step ups 2 in box 2 x 10 reps using UE support   walking backwards      Sana participated in dynamic functional therapeutic activities to improve functional performance for 23 minutes, including:  Transfers  Sit to stands 9 in 30 secs with cuing for upright posture and good eccentric control/ / 6 with better posture and control fatigued  Negotiation of 4 steps x 4 using HR's for support with alternating gait pattern today  -picking up cones off of floor in // bars with no UE support    Patient Education and Home Exercises       Education provided:     Written Home Exercises Provided: Patient instructed to cont prior HEP. Exercises were reviewed and Sana was able to demonstrate them prior to the end of the session.  Sana demonstrated good  understanding of the education provided. See EMR under Patient Instructions for exercises provided during therapy sessions    Assessment   Patient demonstrated improvement negotiating a flight of steps with alternating pattern using B HR's for support. Working on endurance and control with sit to stands with eccentric control. She was not able to progress to no UE support with resistance with standing heel raises. Dynamic balance activities continue to be challenging requiring UE support most of the time. She was able to progress and  cones in the // bars with no UE support.  PT will progress patient as tolerated.     Sana Is progressing well towards her goals.   Pt prognosis is Fair.     Pt will continue to benefit from skilled outpatient physical therapy to address the deficits listed in the problem list box on initial evaluation, provide pt/family education and to maximize  pt's level of independence in the home and community environment.     Pt's spiritual, cultural and educational needs considered and pt agreeable to plan of care and goals.     Anticipated barriers to physical therapy: chronicity of condition, co- morbidities     Goals: STG's 2 weeks  Patient will be independent with 50% of HEP MET 2/2/24     LTG's 10 weeks  Patient will improve FOTO functional measure score  to 52 % in order to improve overall QOL & return to PLOF   2. Patient will report an overall decrease in pain with ADL's and functional mobility Progressing  3. Patient will increase strength by at least 1/2 muscle grade in affected musculature to improve functional mobility  4. Patient will improve strength and endurance and perform 10 sit to stands in 30 secs with UE support Progressing  5. Patient will be more aware of posture throughout the day to reduce stress  and maintain optimal alignment of the spine  6. Patient will be independent with HEP  7. Patient will improve TUG and perform TUG in 12 secs or less safely to reduce the risk of falls    Plan     Plan of care Certification: 1/9/2024 to 3/15/24.     Outpatient Physical Therapy 2 times weekly for 10 weeks to include the following interventions: Gait Training, Manual Therapy, Moist Heat/ Ice, Neuromuscular Re-ed, Patient Education, Self Care, Therapeutic Activities, Therapeutic Exercise, and Ultrasound, ASTYM, Kinesiotaping PRN, Functional Dry Needling    Damaris Joyce, PT

## 2024-02-05 ENCOUNTER — CLINICAL SUPPORT (OUTPATIENT)
Dept: REHABILITATION | Facility: HOSPITAL | Age: 74
End: 2024-02-05
Payer: MEDICARE

## 2024-02-05 DIAGNOSIS — R53.1 WEAKNESS: ICD-10-CM

## 2024-02-05 DIAGNOSIS — R52 PAIN: Primary | ICD-10-CM

## 2024-02-05 PROCEDURE — 97112 NEUROMUSCULAR REEDUCATION: CPT | Mod: PN,CQ

## 2024-02-05 PROCEDURE — 97530 THERAPEUTIC ACTIVITIES: CPT | Mod: PN,CQ

## 2024-02-05 PROCEDURE — 97110 THERAPEUTIC EXERCISES: CPT | Mod: PN,CQ

## 2024-02-05 NOTE — PROGRESS NOTES
[facility]   Physical Therapy Treatment Note      Name: Sana Crenshaw  Clinic Number: [id number]    Therapy Diagnosis:   Encounter Diagnoses   Name Primary?    Pain Yes    Weakness            Physician: Stuart Gage MD    Visit Date: 2/5/2024      Physician Orders: PT Eval and Treat   Medical Diagnosis from Referral: Limited mobility   Fall, initial encounter  Evaluation Date: 1/9/2024  Authorization Period Expiration: 01/01/2025  Plan of Care Expiration: 3/15/24  Progress Note Due: 30 days  Visit # / Visits authorized: 4/ 20 + eval   FOTO: 1/3  Precautions: Standard,  falls  , bladder stim implant    PTA Visit #: 0/5     Time In: 10:00  Time Out: ***  Total Billable Time: 59*** minutes    Subjective     Pt reports: she was tired after her last sesssion.     She was compliant with home exercise program.  Response to previous treatment: improvement in pain and knee swelling  Functional change: n/a at this time    Pain: 0/10  Location: left knee      Objective      Objective Measures updated at progress report unless specified.     Treatment     Sana received the following treatments:    Sana received the following manual therapy techniques applied for (0) minutes, including:    FOTO next visit    Sana received therapeutic exercises to develop strength, endurance, ROM, flexibility, posture, and core stabilization for 8***  minutes including:    -Nustep for ROM, strength, and endurance x 8 minutes R:2 no charge    -Sitting knee extension with 2# 2 x 15 reps on each leg not performed today      Sana participated in neuromuscular re-education activities to improve: Balance, Coordination, Kinesthetic, Sense, Proprioception, and Posture for 28  minutes., including:  Shuttle 4 bands 3 minutes with cuing for core engagement and control with movements   - standing hip extension in // bars with cuing for posture 3 x 10 reps   - standing heel raises in // bars with UE support 3 x 10 reps/  not able to perform with 5# in each hand   - side stepping // bars with UE support  - walking marches in // bars with core engaged with UE support / less today per patient  - romberg stance with eyes open 1 minute each with eyes open  - step ups on 2 in box 2 x 10 reps each leg in // bars/ lateral step ups 2 in box 2 x 10 reps using UE support   walking backwards      Sana participated in dynamic functional therapeutic activities to improve functional performance for 23 minutes, including:  Transfers  Sit to stands 9 in 30 secs with cuing for upright posture and good eccentric control/ / 6 with better posture and control fatigued  Negotiation of 4 steps x 4 using HR's for support with alternating gait pattern today  -picking up cones off of floor in // bars with no UE support    Patient Education and Home Exercises       Education provided:     Written Home Exercises Provided: Patient instructed to cont prior HEP. Exercises were reviewed and Sana was able to demonstrate them prior to the end of the session.  Sana demonstrated good  understanding of the education provided. See EMR under Patient Instructions for exercises provided during therapy sessions    Assessment   Patient demonstrated improvement negotiating a flight of steps with alternating pattern using B HR's for support. Working on endurance and control with sit to stands with eccentric control. She was not able to progress to no UE support with resistance with standing heel raises. Dynamic balance activities continue to be challenging requiring UE support most of the time. She was able to progress and  cones in the // bars with no UE support.  PT will progress patient as tolerated.     Sana Is progressing well towards her goals.   Pt prognosis is Fair.     Pt will continue to benefit from skilled outpatient physical therapy to address the deficits listed in the problem list box on initial evaluation, provide pt/family education and to maximize  pt's level of independence in the home and community environment.     Pt's spiritual, cultural and educational needs considered and pt agreeable to plan of care and goals.     Anticipated barriers to physical therapy: chronicity of condition, co- morbidities     Goals: STG's 2 weeks  Patient will be independent with 50% of HEP MET 2/2/24     LTG's 10 weeks  Patient will improve FOTO functional measure score  to 52 % in order to improve overall QOL & return to PLOF   2. Patient will report an overall decrease in pain with ADL's and functional mobility Progressing  3. Patient will increase strength by at least 1/2 muscle grade in affected musculature to improve functional mobility  4. Patient will improve strength and endurance and perform 10 sit to stands in 30 secs with UE support Progressing  5. Patient will be more aware of posture throughout the day to reduce stress  and maintain optimal alignment of the spine  6. Patient will be independent with HEP  7. Patient will improve TUG and perform TUG in 12 secs or less safely to reduce the risk of falls    Plan     Plan of care Certification: 1/9/2024 to 3/15/24.     Outpatient Physical Therapy 2 times weekly for 10 weeks to include the following interventions: Gait Training, Manual Therapy, Moist Heat/ Ice, Neuromuscular Re-ed, Patient Education, Self Care, Therapeutic Activities, Therapeutic Exercise, and Ultrasound, ASTYM, Kinesiotaping PRN, Functional Dry Needling    Damaris Joyce, PT

## 2024-02-05 NOTE — PROGRESS NOTES
[facility]   Physical Therapy Treatment Note      Name: Sana Crenshaw  Clinic Number: [id number]    Therapy Diagnosis:   Encounter Diagnoses   Name Primary?    Pain Yes    Weakness        Physician: Stuart Gage MD    Visit Date: 2/5/2024      Physician Orders: PT Eval and Treat   Medical Diagnosis from Referral: Limited mobility   Fall, initial encounter  Evaluation Date: 1/9/2024  Authorization Period Expiration: 01/01/2025  Plan of Care Expiration: 3/15/24  Progress Note Due: 30 days  Visit # / Visits authorized: 6/ 20 + eval   FOTO: 2/3 *last performed 2/5*  Precautions: Standard,  falls  , bladder stim implant    PTA Visit #: 1/5     Time In: 10:00am  Time Out: 11:00am  Total Billable Time: 55 minutes    Subjective     Pt reports: continuing to report gradual improvements in balance noticed. Patient reports no complaints in knee over the past few days.  She was compliant with home exercise program.  Response to previous treatment: significant tiredness later in the day  Functional change: gradual balance improvements    Pain: 0/10  Location: left knee      Objective      Objective Measures updated at progress report unless specified.     Treatment     Sana received the following treatments:    Sana received the following manual therapy techniques applied for (0) minutes, including:      Sana received therapeutic exercises to develop strength, endurance, ROM, flexibility, posture, and core stabilization for 8  minutes including:    -Nustep for ROM, strength, and endurance x 8 minutes R:3  -Sitting knee extension with 2# 2 x 15 reps on each leg - not performed today      Sana participated in neuromuscular re-education activities to improve: Balance, Coordination, Kinesthetic, Sense, Proprioception, and Posture for 35  minutes., including:    - Shuttle 5 bands 2 minutes with cuing for core engagement and control with movements   - standing hip extension in // bars with Red  Theraband; 2 x 10 reps   - Standing Hip Abduction in // bars with Red Theraband; 2 x10 each  - side stepping // bars with UE support  - walking marches in // bars with core engaged with UE support / less today per patient  - step ups on 2 in box 2 x 10 reps each leg in // bars/ lateral step ups 2 in box 2 x 10 reps using UE support   walking backwards  - Static balance against variable perturbations; 2 x 1 minute  - Righting/catching reactions x20 for ankle DF/PF and x10 for stepping forward and backward each leg     NOT TODAY:  - romberg stance with eyes open 1 minute each with eyes open  - standing heel raises in // bars with UE support 3 x 10 reps/ not able to perform with 5# in each hand     Sana participated in dynamic functional therapeutic activities to improve functional performance for 10 minutes, including:    - Sit to stands from hi lo; 2  x10  - Negotiation of 4 steps x 4 using HR's for support with alternating gait pattern today  - picking up cones off of floor in // bars with no UE support    Patient Education and Home Exercises       Education provided:     Written Home Exercises Provided: Patient instructed to cont prior HEP. Exercises were reviewed and Sana was able to demonstrate them prior to the end of the session.  Sana demonstrated good  understanding of the education provided. See EMR under Patient Instructions for exercises provided during therapy sessions    Assessment     Patient continues to demonstrate need of upper extremity assistance during standing activities. When attempting to decrease upper extremity assistance to one hand patient demonstrates greater stability with right upper extremity compared to left. Patient complete a FOTO today demonstrating improvements in overall score. Patient continues to find greatest limitations with extended durations of walking, crowded areas, walking on unstable surfaces such as iced sidewalk, or getting on/off escalators without upper extremity  assistance. Progressed treatment with righting reactions of ankle musculature as well as stepping reactions to help improve patients dynamic stability. Patient demonstrates frequent to constant need of UE A with stepping reaction as well as perturbations given during static balance.    Sana Is progressing well towards her goals.   Pt prognosis is Fair.     Pt will continue to benefit from skilled outpatient physical therapy to address the deficits listed in the problem list box on initial evaluation, provide pt/family education and to maximize pt's level of independence in the home and community environment.     Pt's spiritual, cultural and educational needs considered and pt agreeable to plan of care and goals.     Anticipated barriers to physical therapy: chronicity of condition, co- morbidities     Goals: STG's 2 weeks  Patient will be independent with 50% of HEP MET 2/2/24     LTG's 10 weeks  Patient will improve FOTO functional measure score  to 52 % in order to improve overall QOL & return to PLOF   2. Patient will report an overall decrease in pain with ADL's and functional mobility Progressing  3. Patient will increase strength by at least 1/2 muscle grade in affected musculature to improve functional mobility  4. Patient will improve strength and endurance and perform 10 sit to stands in 30 secs with UE support Progressing  5. Patient will be more aware of posture throughout the day to reduce stress  and maintain optimal alignment of the spine  6. Patient will be independent with HEP  7. Patient will improve TUG and perform TUG in 12 secs or less safely to reduce the risk of falls    Plan     Plan of care Certification: 1/9/2024 to 3/15/24.     Outpatient Physical Therapy 2 times weekly for 10 weeks to include the following interventions: Gait Training, Manual Therapy, Moist Heat/ Ice, Neuromuscular Re-ed, Patient Education, Self Care, Therapeutic Activities, Therapeutic Exercise, and Ultrasound, HOLLIE,  Kinesiotaping PRN, Functional Dry Needling    Hernando Dong, PTA

## 2024-02-07 ENCOUNTER — CLINICAL SUPPORT (OUTPATIENT)
Dept: REHABILITATION | Facility: HOSPITAL | Age: 74
End: 2024-02-07
Payer: MEDICARE

## 2024-02-07 DIAGNOSIS — R52 PAIN: Primary | ICD-10-CM

## 2024-02-07 DIAGNOSIS — R53.1 WEAKNESS: ICD-10-CM

## 2024-02-07 PROCEDURE — 97112 NEUROMUSCULAR REEDUCATION: CPT | Mod: PN

## 2024-02-07 PROCEDURE — 97530 THERAPEUTIC ACTIVITIES: CPT | Mod: PN

## 2024-02-07 NOTE — PROGRESS NOTES
[facility]   Physical Therapy Treatment Note      Name: Sana Crenshaw  Clinic Number: [id number]    Therapy Diagnosis:   Encounter Diagnoses   Name Primary?    Pain Yes    Weakness          Physician: Stuart Gage MD    Visit Date: 2/7/2024      Physician Orders: PT Eval and Treat   Medical Diagnosis from Referral: Limited mobility   Fall, initial encounter  Evaluation Date: 1/9/2024  Authorization Period Expiration: 01/01/2025  Plan of Care Expiration: 3/15/24  Progress Note Due: 30 days  Visit # / Visits authorized: 6/ 20 + eval   FOTO: 1/3  Precautions: Standard,  falls  , bladder stim implant    PTA Visit #: 0/5     Time In:1:02  Time Out:1:45  Total Billable Time: 43 minutes    Subjective     Pt reports: she is having a pretty good day. She reports she needs to leave early to pick her grandchildren up.     She was compliant with home exercise program.  Response to previous treatment: improvement in pain and knee swelling  Functional change: n/a at this time    Pain: 0/10  Location: left knee      Objective    2/5/24 FOTO performed      Treatment     Sana received the following treatments:    Sana received the following manual therapy techniques applied for (0) minutes, including:      Sana received therapeutic exercises to develop strength, endurance, ROM, flexibility, posture, and core stabilization for 0  minutes including:    -Nustep for ROM, strength, and endurance x 8 minutes R:4  -Sitting knee extension with 2# 2 x 15 reps on each leg not performed today      Sana participated in neuromuscular re-education activities to improve: Balance, Coordination, Kinesthetic, Sense, Proprioception, and Posture for 35   minutes., including:  Shuttle 4 bands 3 minutes with cuing for core engagement and control with movements   - standing hip extension in // bars with cuing for posture 3 x 10 reps   - standing heel raises in // bars with UE support 3 x 10 reps/ not able to perform  with 5# in each hand   - side stepping // bars with UE support  - walking marches in // bars with core engaged with UE support / less UE support  - romberg stance with eyes open 1 minute each with eyes open  - step ups on 2 in box 2 x 10 reps each leg in // bars/ lateral step ups 2 in box 2 x 10 reps using UE support   walking backwards      Sana participated in dynamic functional therapeutic activities to improve functional performance for 8 minutes, including:  TUG 16 secs/ 13 secs  Transfers  Sit to stands 8 and needed rest break with LOB posteriorly/ 10 with no LOB    deferred  Negotiation of 4 steps x 4 using HR's for support with alternating gait pattern today  -picking up cones off of floor in // bars with no UE support    Patient Education and Home Exercises       Education provided:     Written Home Exercises Provided: Patient instructed to cont prior HEP. Exercises were reviewed and Sana was able to demonstrate them prior to the end of the session.  Sana demonstrated good  understanding of the education provided. See EMR under Patient Instructions for exercises provided during therapy sessions    Assessment   PT continues to work on strength and stability to prevent risk for falls. Patient requires UE support with higher lever balance activities. Patient improved TUG score from 17 seconds on initial evaluation to 13 seconds today. Treatment time decreased due to patient needed to leave for personal reasons. PT will progress patient as tolerated.     Sana Is progressing well towards her goals.   Pt prognosis is Fair.     Pt will continue to benefit from skilled outpatient physical therapy to address the deficits listed in the problem list box on initial evaluation, provide pt/family education and to maximize pt's level of independence in the home and community environment.     Pt's spiritual, cultural and educational needs considered and pt agreeable to plan of care and goals.     Anticipated barriers  to physical therapy: chronicity of condition, co- morbidities     Goals: STG's 2 weeks  Patient will be independent with 50% of HEP MET 2/2/24     LTG's 10 weeks  Patient will improve FOTO functional measure score  to 52 % in order to improve overall QOL & return to PLOF Progressing 48% 2/5/24  2. Patient will report an overall decrease in pain with ADL's and functional mobility Progressing  3. Patient will increase strength by at least 1/2 muscle grade in affected musculature to improve functional mobility  4. Patient will improve strength and endurance and perform 10 sit to stands in 30 secs with UE support Progressing  5. Patient will be more aware of posture throughout the day to reduce stress  and maintain optimal alignment of the spine  6. Patient will be independent with HEP  7. Patient will improve TUG and perform TUG in 12 secs or less safely to reduce the risk of falls Progressing 13 secs 2/7/24    Plan     Plan of care Certification: 1/9/2024 to 3/15/24.     Outpatient Physical Therapy 2 times weekly for 10 weeks to include the following interventions: Gait Training, Manual Therapy, Moist Heat/ Ice, Neuromuscular Re-ed, Patient Education, Self Care, Therapeutic Activities, Therapeutic Exercise, and Ultrasound, ASTYM, Kinesiotaping PRN, Functional Dry Needling    Damaris Joyce, PT

## 2024-02-12 ENCOUNTER — CLINICAL SUPPORT (OUTPATIENT)
Dept: REHABILITATION | Facility: HOSPITAL | Age: 74
End: 2024-02-12
Payer: MEDICARE

## 2024-02-12 DIAGNOSIS — R53.1 WEAKNESS: ICD-10-CM

## 2024-02-12 DIAGNOSIS — R52 PAIN: Primary | ICD-10-CM

## 2024-02-12 PROCEDURE — 97530 THERAPEUTIC ACTIVITIES: CPT | Mod: PN

## 2024-02-12 PROCEDURE — 97112 NEUROMUSCULAR REEDUCATION: CPT | Mod: PN

## 2024-02-12 NOTE — PROGRESS NOTES
[facility]   Physical Therapy Treatment Note      Name: Sana Crenshaw  Clinic Number: [id number]    Therapy Diagnosis:   Encounter Diagnoses   Name Primary?    Pain Yes    Weakness            Physician: Stuart Gage MD    Visit Date: 2/12/2024      Physician Orders: PT Eval and Treat   Medical Diagnosis from Referral: Limited mobility   Fall, initial encounter  Evaluation Date: 1/9/2024  Authorization Period Expiration: 01/01/2025  Plan of Care Expiration: 3/15/24  Progress Note Due: 30 days  Visit # / Visits authorized: 7/ 20 + eval   FOTO: 2/3  Precautions: Standard,  falls  , bladder stim implant    PTA Visit #: 0/5     Time In:12:55  Time Out: 1:55  Total Billable Time: 60 minutes    Subjective     Pt reports: she is having a pretty good day. She reports she needs to leave early to pick her grandchildren up.     She was compliant with home exercise program.  Response to previous treatment: improvement in pain and knee swelling  Functional change: n/a at this time    Pain: 0/10  Location: left knee      Objective    2/5/24 FOTO performed      Treatment     Sana received the following treatments:    Sana received the following manual therapy techniques applied for (0) minutes, including:      Sana received therapeutic exercises to develop strength, endurance, ROM, flexibility, posture, and core stabilization for 8  minutes including:    -Nustep for ROM, strength, and endurance x 8 minutes R:4    deferred  -Sitting knee extension with 2# 2 x 15 reps on each leg not performed today      Sana participated in neuromuscular re-education activities to improve: Balance, Coordination, Kinesthetic, Sense, Proprioception, and Posture for 40  minutes., including:  Shuttle 4 bands 3 minutes with cuing for core engagement and control with movements   - standing hip extension in // bars with cuing for posture 3 x 10 reps   - standing heel raises in // bars with UE support 3 x 10 reps/ not  able to perform with 5# in each hand   - side stepping // bars with  no UE support  - walking marches in // bars with core engaged with UE support / less UE support  - romberg stance with eyes open 1 minute each with eyes open  - step ups on 2 in box 2 x 10 reps each leg in // bars/ lateral step ups 2 in box 2 x 10 reps using UE support    deferred   walking backwards      Sana participated in dynamic functional therapeutic activities to improve functional performance for 10 minutes, including:  Push sled 1/2 lap  TRX sit to stands 3 x 10 reps    deferred  TUG 16 secs/ 13 secs  Transfers  Sit to stands 8 and needed rest break with LOB posteriorly/ 10 with no LOB  Negotiation of 4 steps x 4 using HR's for support with alternating gait pattern today  -picking up cones off of floor in // bars with no UE support    Patient Education and Home Exercises       Education provided:     Written Home Exercises Provided: Patient instructed to cont prior HEP. Exercises were reviewed and Sana was able to demonstrate them prior to the end of the session.  Sana demonstrated good  understanding of the education provided. See EMR under Patient Instructions for exercises provided during therapy sessions    Assessment   Patient tolerated treatment well. She was able to side step in // bars with no UE support. She requires UE support with step ups forward and lateral and standing heel raises. She progressed to pushing sled a 1/2 lap with difficulty. She required rest breaks throughout the session.  PT will continue to progress patient as tolerated.         Sana Is progressing well towards her goals.   Pt prognosis is Fair.     Pt will continue to benefit from skilled outpatient physical therapy to address the deficits listed in the problem list box on initial evaluation, provide pt/family education and to maximize pt's level of independence in the home and community environment.     Pt's spiritual, cultural and educational needs  considered and pt agreeable to plan of care and goals.     Anticipated barriers to physical therapy: chronicity of condition, co- morbidities     Goals: STG's 2 weeks  Patient will be independent with 50% of HEP MET 2/2/24     LTG's 10 weeks  Patient will improve FOTO functional measure score  to 52 % in order to improve overall QOL & return to PLOF Progressing 48% 2/5/24  2. Patient will report an overall decrease in pain with ADL's and functional mobility Progressing  3. Patient will increase strength by at least 1/2 muscle grade in affected musculature to improve functional mobility  4. Patient will improve strength and endurance and perform 10 sit to stands in 30 secs with UE support Progressing  5. Patient will be more aware of posture throughout the day to reduce stress  and maintain optimal alignment of the spine  6. Patient will be independent with HEP  7. Patient will improve TUG and perform TUG in 12 secs or less safely to reduce the risk of falls Progressing 13 secs 2/7/24    Plan     Plan of care Certification: 1/9/2024 to 3/15/24.     Outpatient Physical Therapy 2 times weekly for 10 weeks to include the following interventions: Gait Training, Manual Therapy, Moist Heat/ Ice, Neuromuscular Re-ed, Patient Education, Self Care, Therapeutic Activities, Therapeutic Exercise, and Ultrasound, ASTYM, Kinesiotaping PRN, Functional Dry Needling    aDmaris Joyce, PT

## 2024-02-15 ENCOUNTER — CLINICAL SUPPORT (OUTPATIENT)
Dept: REHABILITATION | Facility: HOSPITAL | Age: 74
End: 2024-02-15
Payer: MEDICARE

## 2024-02-15 DIAGNOSIS — R52 PAIN: Primary | ICD-10-CM

## 2024-02-15 DIAGNOSIS — R53.1 WEAKNESS: ICD-10-CM

## 2024-02-15 PROCEDURE — 97112 NEUROMUSCULAR REEDUCATION: CPT | Mod: PN

## 2024-02-15 PROCEDURE — 97110 THERAPEUTIC EXERCISES: CPT | Mod: PN

## 2024-02-15 NOTE — PROGRESS NOTES
[facility]   Physical Therapy Treatment Note      Name: Sana Crenshaw  Clinic Number: [id number]    Therapy Diagnosis:   Encounter Diagnoses   Name Primary?    Pain Yes    Weakness              Physician: Stuart Gage MD    Visit Date: 2/15/2024      Physician Orders: PT Eval and Treat   Medical Diagnosis from Referral: Limited mobility   Fall, initial encounter  Evaluation Date: 1/9/2024  Authorization Period Expiration: 01/01/2025  Plan of Care Expiration: 3/15/24  Progress Note Due: 30 days  Visit # / Visits authorized: 8/ 20 + eval   FOTO: 2/3  Precautions: Standard,  falls  , bladder stim implant    PTA Visit #: 0/5     Time In:8:58  Time Out: 9:55  Total Billable Time:57 minutes    Subjective     Pt reports: was hurting Monday night after therapy session in her lower back and bottom. She reports she is feeling better.    She was compliant with home exercise program.  Response to previous treatment: improvement in pain and knee swelling  Functional change: n/a at this time    Pain: 3/10  Location: L side of neck    Objective    2/5/24 FOTO performed      Treatment     Sana received the following treatments:    Sana received the following manual therapy techniques applied for (0) minutes, including:      Sana received therapeutic exercises to develop strength, endurance, ROM, flexibility, posture, and core stabilization for 16 minutes including:    -Nustep for ROM, strength, and endurance x 10 minutes R:4  - hip abduction 40# 3 x 10 reps  -hip adduction 35# 3 x 10 reps      deferred  -Sitting knee extension with 2# 2 x 15 reps on each leg not performed today      Sana participated in neuromuscular re-education activities to improve: Balance, Coordination, Kinesthetic, Sense, Proprioception, and Posture for 41  minutes., including:  Shuttle 4 bands 3 minutes with cuing for core engagement and control with movements   - standing hip extension in // bars with cuing for posture  3 x 10 reps   - standing heel raises in // bars with UE support 3 x 10 reps/ not able to perform with 5# in each hand   - side stepping // bars with  no UE support  - walking marches in // bars with core engaged with UE support / less UE support  - romberg stance with eyes open 1 minute each with eyes open  - step ups on 2 in box 2 x 10 reps each leg in // bars/ lateral step ups 2 in box 2 x 10 reps using UE support  - standing hip flexion 2 x 10 reps  - heel taps with air ex 2 x 10 reps   -standing knee flexion 2 x 10 reps working on control with UE   Support    deferred   walking backwards      Sana participated in dynamic functional therapeutic activities to improve functional performance for 0 minutes, including:  Push sled 1/2 lap  TRX sit to stands 3 x 10 reps    deferred  TUG 16 secs/ 13 secs  Transfers  Sit to stands 8 and needed rest break with LOB posteriorly/ 10 with no LOB  Negotiation of 4 steps x 4 using HR's for support with alternating gait pattern today  -picking up cones off of floor in // bars with no UE support    Patient Education and Home Exercises       Education provided:     Written Home Exercises Provided: Patient instructed to cont prior HEP. Exercises were reviewed and Sana was able to demonstrate them prior to the end of the session.  Sana demonstrated good  understanding of the education provided. See EMR under Patient Instructions for exercises provided during therapy sessions    Assessment   Patient presents to therapy with minimal pain in L side of her neck. She reports increase pain in low back and bottom after her last treatment session which included progression of  functional activities of pushing sled and sit to stands. She progressed with hip exercises in sitting and standing. She had some difficulty getting on and off of machines. PT will continue to progress patient as tolerated.       Sana Is progressing well towards her goals.   Pt prognosis is Fair.     Pt will  continue to benefit from skilled outpatient physical therapy to address the deficits listed in the problem list box on initial evaluation, provide pt/family education and to maximize pt's level of independence in the home and community environment.     Pt's spiritual, cultural and educational needs considered and pt agreeable to plan of care and goals.     Anticipated barriers to physical therapy: chronicity of condition, co- morbidities     Goals: STG's 2 weeks  Patient will be independent with 50% of HEP MET 2/2/24     LTG's 10 weeks  Patient will improve FOTO functional measure score  to 52 % in order to improve overall QOL & return to PLOF Progressing 48% 2/5/24  2. Patient will report an overall decrease in pain with ADL's and functional mobility Progressing  3. Patient will increase strength by at least 1/2 muscle grade in affected musculature to improve functional mobility  4. Patient will improve strength and endurance and perform 10 sit to stands in 30 secs with UE support Progressing  5. Patient will be more aware of posture throughout the day to reduce stress  and maintain optimal alignment of the spine  6. Patient will be independent with HEP  7. Patient will improve TUG and perform TUG in 12 secs or less safely to reduce the risk of falls Progressing 13 secs 2/7/24    Plan     Plan of care Certification: 1/9/2024 to 3/15/24.     Outpatient Physical Therapy 2 times weekly for 10 weeks to include the following interventions: Gait Training, Manual Therapy, Moist Heat/ Ice, Neuromuscular Re-ed, Patient Education, Self Care, Therapeutic Activities, Therapeutic Exercise, and Ultrasound, ASTYM, Kinesiotaping PRN, Functional Dry Needling    Damaris Joyce, PT

## 2024-02-18 ENCOUNTER — PATIENT MESSAGE (OUTPATIENT)
Dept: ADMINISTRATIVE | Facility: OTHER | Age: 74
End: 2024-02-18
Payer: MEDICARE

## 2024-02-19 ENCOUNTER — CLINICAL SUPPORT (OUTPATIENT)
Dept: REHABILITATION | Facility: HOSPITAL | Age: 74
End: 2024-02-19
Payer: MEDICARE

## 2024-02-19 DIAGNOSIS — R53.1 WEAKNESS: ICD-10-CM

## 2024-02-19 DIAGNOSIS — R52 PAIN: Primary | ICD-10-CM

## 2024-02-19 PROCEDURE — 97110 THERAPEUTIC EXERCISES: CPT | Mod: PN

## 2024-02-19 PROCEDURE — 97112 NEUROMUSCULAR REEDUCATION: CPT | Mod: PN

## 2024-02-19 PROCEDURE — 97530 THERAPEUTIC ACTIVITIES: CPT | Mod: PN

## 2024-02-19 NOTE — PROGRESS NOTES
[facility]   Physical Therapy Treatment Note      Name: Sana Crenshaw  Clinic Number: [id number]    Therapy Diagnosis:   Encounter Diagnoses   Name Primary?    Pain Yes    Weakness          Physician: Stuart Gage MD    Visit Date: 2/19/2024      Physician Orders: PT Eval and Treat   Medical Diagnosis from Referral: Limited mobility   Fall, initial encounter  Evaluation Date: 1/9/2024  Authorization Period Expiration: 01/01/2025  Plan of Care Expiration: 3/15/24  Progress Note Due: 30 days  Visit # / Visits authorized: 9/ 20 + eval   FOTO: 2/3  Precautions: Standard,  falls  , bladder stim implant    PTA Visit #: 0/5     Time In:12:53  Time Out: 1:48  Total Billable Time: 55 minutes    Subjective     Pt reports: she was only a little sore after her last treatment session. She reports she was able to clean her floors this weekend with no problems and do laundry.     She was compliant with home exercise program.  Response to previous treatment: improvement in pain and knee swelling  Functional change: n/a at this time    Pain: 0/10  Location:  none    Objective    2/5/24 FOTO performed      Treatment     Sana received the following treatments:    Sana received the following manual therapy techniques applied for (0) minutes, including:      Sana received therapeutic exercises to develop strength, endurance, ROM, flexibility, posture, and core stabilization for 16 minutes including:    -Nustep for ROM, strength, and endurance x 10 minutes R:5  - hip abduction 40# 3 x 10 reps  -hip adduction 35# 3 x 10 reps      deferred  -Sitting knee extension with 2# 2 x 15 reps on each leg not performed today      Sana participated in neuromuscular re-education activities to improve: Balance, Coordination, Kinesthetic, Sense, Proprioception, and Posture for 24  minutes., including:  Shuttle 4 bands 3 minutes with cuing for core engagement and control with movements   - standing hip extension in  // bars with cuing for posture 3 x 10 reps   - standing heel raises in // bars with UE support 3 x 10 reps/ not able to perform with 5# in each hand   - side stepping // bars with  no UE support  - walking marches in // bars with core engaged with UE support / less UE support  - romberg stance with eyes open 1 minute each with eyes open  - step ups on 2 in box 2 x 10 reps each leg in // bars/ lateral step ups 2 in box 2 x 10 reps using UE support  - heel taps with air ex 2 x 10 reps     deferred  - standing hip flexion 2 x 10 reps  -standing knee flexion 2 x 10 reps working on control with UE   Support   walking backwards      Sana participated in dynamic functional therapeutic activities to improve functional performance for 15  minutes, including:  Sit to stands 8 with LOB posteriorly and fatigue with no UE support  Transfers  Negotiation of 4 steps x 4 using HR's for support with alternating gait pattern today  Standing pertubations in // bars with eyes open/ closed    deferred  Push sled 1/2 lap  TRX sit to stands 3 x 10 reps  TUG 16 secs/ 13 secs  -picking up cones off of floor in // bars with no UE support    Patient Education and Home Exercises       Education provided:     Written Home Exercises Provided: Patient instructed to cont prior HEP. Exercises were reviewed and Sana was able to demonstrate them prior to the end of the session.  Sana demonstrated good  understanding of the education provided. See EMR under Patient Instructions for exercises provided during therapy sessions    Assessment   Patient presents to therapy with no complaints of pain. She has some difficulty getting on and off of exercise equipment. Patient continues to fatigue with sit to stands and only able to perform 8 in 30 secs with no UE support. She did experience LOB posteriorly due to fatigue.   PT will continue to progress patient as tolerated.       Sana Is progressing well towards her goals.   Pt prognosis is Fair.     Pt  will continue to benefit from skilled outpatient physical therapy to address the deficits listed in the problem list box on initial evaluation, provide pt/family education and to maximize pt's level of independence in the home and community environment.     Pt's spiritual, cultural and educational needs considered and pt agreeable to plan of care and goals.     Anticipated barriers to physical therapy: chronicity of condition, co- morbidities     Goals: STG's 2 weeks  Patient will be independent with 50% of HEP MET 2/2/24     LTG's 10 weeks  Patient will improve FOTO functional measure score  to 52 % in order to improve overall QOL & return to PLOF Progressing 48% 2/5/24  2. Patient will report an overall decrease in pain with ADL's and functional mobility Progressing  3. Patient will increase strength by at least 1/2 muscle grade in affected musculature to improve functional mobility  4. Patient will improve strength and endurance and perform 10 sit to stands in 30 secs with UE support Progressing  5. Patient will be more aware of posture throughout the day to reduce stress  and maintain optimal alignment of the spine Progressing  6. Patient will be independent with HEP Progressing  7. Patient will improve TUG and perform TUG in 12 secs or less safely to reduce the risk of falls Progressing 13 secs 2/7/24    Plan     Plan of care Certification: 1/9/2024 to 3/15/24.     Outpatient Physical Therapy 2 times weekly for 10 weeks to include the following interventions: Gait Training, Manual Therapy, Moist Heat/ Ice, Neuromuscular Re-ed, Patient Education, Self Care, Therapeutic Activities, Therapeutic Exercise, and Ultrasound, ASTYM, Kinesiotaping PRN, Functional Dry Needling    Damaris Joyce, PT

## 2024-02-22 ENCOUNTER — CLINICAL SUPPORT (OUTPATIENT)
Dept: REHABILITATION | Facility: HOSPITAL | Age: 74
End: 2024-02-22
Payer: MEDICARE

## 2024-02-22 DIAGNOSIS — R53.1 WEAKNESS: ICD-10-CM

## 2024-02-22 DIAGNOSIS — R52 PAIN: Primary | ICD-10-CM

## 2024-02-22 PROCEDURE — 97112 NEUROMUSCULAR REEDUCATION: CPT | Mod: PN

## 2024-02-22 PROCEDURE — 97110 THERAPEUTIC EXERCISES: CPT | Mod: PN

## 2024-02-22 PROCEDURE — 97530 THERAPEUTIC ACTIVITIES: CPT | Mod: PN

## 2024-02-22 NOTE — PROGRESS NOTES
[facility]   Physical Therapy Treatment Note      Name: Sana Crenshaw  Clinic Number: [id number]    Therapy Diagnosis:   Encounter Diagnoses   Name Primary?    Pain Yes    Weakness            Physician: Stuart Gage MD    Visit Date: 2/22/2024      Physician Orders: PT Eval and Treat   Medical Diagnosis from Referral: Limited mobility   Fall, initial encounter  Evaluation Date: 1/9/2024  Authorization Period Expiration: 01/01/2025  Plan of Care Expiration: 3/15/24  Progress Note Due: 30 days  Visit # / Visits authorized: 10/ 20 + eval   FOTO: 2/3  Precautions: Standard,  falls  , bladder stim implant    PTA Visit #: 0/5     Time In:8:55  Time Out: 9:53  Total Billable Time: 58 minutes    Subjective     Pt reports: her low back was sore yesterday. She reports it feels tender today. Patient reports she can get out of her bed easier.     She was compliant with home exercise program.  Response to previous treatment: improvement in pain and knee swelling  Functional change: n/a at this time    Pain: 1-2/10  Location:  low back    Objective    2/5/24 FOTO performed      Treatment     Sana received the following treatments:    Sana received the following manual therapy techniques applied for (0) minutes, including:      Sana received therapeutic exercises to develop strength, endurance, ROM, flexibility, posture, and core stabilization for 20 minutes including:    -Nustep for ROM, strength, and endurance x 8 minutes R:4  - ball in and outs 1 x 20 reps  - LTR over the ball 1 x 20 reps  - hip abduction 40# 3 x 10 reps  -hip adduction 35# 3 x 10 reps      deferred  -Sitting knee extension with 2# 2 x 15 reps on each leg not performed today      Sana participated in neuromuscular re-education activities to improve: Balance, Coordination, Kinesthetic, Sense, Proprioception, and Posture for 30 minutes., including:  Bridges with core engaged 2 x 10 reps  Shuttle 4 bands 3 minutes with cuing  for core engagement and control with movements   - standing hip extension in // bars with cuing for posture 3 x 10 reps   - standing heel raises in // bars with UE support 3 x 10 reps/ not able to perform with 5# in each hand   - romberg stance with eyes open 1 minute each with eyes open  - walking marches in // bars with core engaged with UE support / less UE support  - side stepping // bars with  no UE support    deferred  - step ups on 2 in box 2 x 10 reps each leg in // bars/ lateral step ups 2 in box 2 x 10 reps using UE support  - heel taps with air ex 2 x 10 reps   - standing hip flexion 2 x 10 reps  -standing knee flexion 2 x 10 reps working on control with UE   Support   walking backwards      Sana participated in dynamic functional therapeutic activities to improve functional performance for 8 minutes, including:  Sit to stands   Transfers  Bed mobility    deferred  Negotiation of 4 steps x 4 using HR's for support with alternating gait pattern today  Standing pertubations in // bars with eyes open/ closed    Push sled 1/2 lap  TRX sit to stands 3 x 10 reps  TUG 16 secs/ 13 secs  -picking up cones off of floor in // bars with no UE support    Patient Education and Home Exercises       Education provided:     Written Home Exercises Provided: Patient instructed to cont prior HEP. Exercises were reviewed and Sana was able to demonstrate them prior to the end of the session.  Sana demonstrated good  understanding of the education provided. See EMR under Patient Instructions for exercises provided during therapy sessions    Assessment   Patient presents to therapy with soreness and tenderness in low back. She responded well to low back stretch exercises with ball. She demonstrates improved gait pattern with less waddling and reports she can get in and out of her bed easier at home. Bridges performed with difficulty due to weakness. She demonstrated improvement with sit to stands and performed 10 in 30 secs  with good control and safely. Patient was tired after the session.  PT will continue to progress patient as tolerated.       Sana Is progressing well towards her goals.   Pt prognosis is Fair.     Pt will continue to benefit from skilled outpatient physical therapy to address the deficits listed in the problem list box on initial evaluation, provide pt/family education and to maximize pt's level of independence in the home and community environment.     Pt's spiritual, cultural and educational needs considered and pt agreeable to plan of care and goals.     Anticipated barriers to physical therapy: chronicity of condition, co- morbidities     Goals: STG's 2 weeks  Patient will be independent with 50% of HEP MET 2/2/24     LTG's 10 weeks  Patient will improve FOTO functional measure score  to 52 % in order to improve overall QOL & return to PLOF Progressing 48% 2/5/24  2. Patient will report an overall decrease in pain with ADL's and functional mobility Progressing  3. Patient will increase strength by at least 1/2 muscle grade in affected musculature to improve functional mobility  4. Patient will improve strength and endurance and perform 10 sit to stands in 30 secs with UE support  2/22/24 safely today 10/ progressing  5. Patient will be more aware of posture throughout the day to reduce stress  and maintain optimal alignment of the spine Progressing  6. Patient will be independent with HEP Progressing  7. Patient will improve TUG and perform TUG in 12 secs or less safely to reduce the risk of falls Progressing 13 secs 2/7/24    Plan     Plan of care Certification: 1/9/2024 to 3/15/24.     Outpatient Physical Therapy 2 times weekly for 10 weeks to include the following interventions: Gait Training, Manual Therapy, Moist Heat/ Ice, Neuromuscular Re-ed, Patient Education, Self Care, Therapeutic Activities, Therapeutic Exercise, and Ultrasound, ASTYM, Kinesiotaping PRN, Functional Dry Needling    Damaris Joyce, PT

## 2024-02-26 ENCOUNTER — CLINICAL SUPPORT (OUTPATIENT)
Dept: REHABILITATION | Facility: HOSPITAL | Age: 74
End: 2024-02-26
Payer: MEDICARE

## 2024-02-26 DIAGNOSIS — R53.1 WEAKNESS: ICD-10-CM

## 2024-02-26 DIAGNOSIS — R52 PAIN: Primary | ICD-10-CM

## 2024-02-26 PROCEDURE — 97112 NEUROMUSCULAR REEDUCATION: CPT | Mod: PN

## 2024-02-26 PROCEDURE — 97110 THERAPEUTIC EXERCISES: CPT | Mod: PN

## 2024-02-26 PROCEDURE — 97530 THERAPEUTIC ACTIVITIES: CPT | Mod: PN

## 2024-02-26 NOTE — PROGRESS NOTES
[facility]   Physical Therapy Treatment Note      Name: Sana Crenshaw  Clinic Number: [id number]    Therapy Diagnosis:   Encounter Diagnoses   Name Primary?    Pain Yes    Weakness              Physician: Stuart Gage MD    Visit Date: 2/26/2024      Physician Orders: PT Eval and Treat   Medical Diagnosis from Referral: Limited mobility   Fall, initial encounter  Evaluation Date: 1/9/2024  Authorization Period Expiration: 01/01/2025  Plan of Care Expiration: 3/15/24  Progress Note Due: 30 days  Visit # / Visits authorized: 11/ 20 + eval   FOTO: 2/3  Precautions: Standard,  falls  , bladder stim implant    PTA Visit #: 0/5     Time In:11:01  Time Out: 11:55  Total Billable Time: 54 minutes    Subjective     Pt reports: no new complaints. Patient reports her neck is always painful.     She was compliant with home exercise program.  Response to previous treatment: improvement in pain and knee swelling  Functional change: n/a at this time    Pain: 6/ 10  Location:  neck pain    Objective    2/5/24 FOTO performed      Treatment     Sana received the following treatments:    Sana received the following manual therapy techniques applied for (0) minutes, including:      Sana received therapeutic exercises to develop strength, endurance, ROM, flexibility, posture, and core stabilization for 12  minutes including:    -Nustep for ROM, strength, and endurance x 8 minutes R:5  - hip abduction 40# 3 x 10 reps  -hip adduction 35# 3 x 10 reps      deferred  - ball in and outs 1 x 20 reps  - LTR over the ball 1 x 20 reps  -Sitting knee extension with 2# 2 x 15 reps on each leg not performed today      Sana participated in neuromuscular re-education activities to improve: Balance, Coordination, Kinesthetic, Sense, Proprioception, and Posture for 27 minutes., including:  Bridges with core engaged 2 x 10 reps  Shuttle 4 bands 3 minutes with cuing for core engagement and control with movements   -  standing hip extension in // bars with cuing for posture 3 x 10 reps with R TB  - standing heel raises in // bars with UE support 3 x 10 reps/ not able to perform with 5# in each hand   - romberg stance with eyes open 1 minute each with eyes open  - walking marches in // bars with core engaged with UE support / less UE support  - side stepping // bars with  no UE support  Hip circles in // bars 1 x 10 reps each direction and each leg    deferred  - step ups on 2 in box 2 x 10 reps each leg in // bars/ lateral step ups 2 in box 2 x 10 reps using UE support  - heel taps with air ex 2 x 10 reps   - standing hip flexion 2 x 10 reps  -standing knee flexion 2 x 10 reps working on control with UE   Support   walking backwards      Sana participated in dynamic functional therapeutic activities to improve functional performance for  15 minutes, including:  Negotiation of 4 steps x 4 using HR's for support with alternating gait pattern today  Sit to stands  2 x 10 reps with 5#  Transfers    deferred  Standing pertubations in // bars with eyes open/ closed  Bed mobility  Push sled 1/2 lap  TRX sit to stands 3 x 10 reps  TUG 16 secs/ 13 secs  -picking up cones off of floor in // bars with no UE support    Patient Education and Home Exercises       Education provided:     Written Home Exercises Provided: Patient instructed to cont prior HEP. Exercises were reviewed and Sana was able to demonstrate them prior to the end of the session.  Sana demonstrated good  understanding of the education provided. See EMR under Patient Instructions for exercises provided during therapy sessions    Assessment   Patient presented to therapy with no low back pain complaints. She tolerated treatment well and continues to demonstrate improved form, ease, and control with  sit to stands and progressed to resistance with sit to stands today.  Patient was tired after the session.  PT will continue to progress patient as tolerated.     Sana Is  progressing well towards her goals.   Pt prognosis is Fair.     Pt will continue to benefit from skilled outpatient physical therapy to address the deficits listed in the problem list box on initial evaluation, provide pt/family education and to maximize pt's level of independence in the home and community environment.     Pt's spiritual, cultural and educational needs considered and pt agreeable to plan of care and goals.     Anticipated barriers to physical therapy: chronicity of condition, co- morbidities     Goals: STG's 2 weeks  Patient will be independent with 50% of HEP MET 2/2/24     LTG's 10 weeks  Patient will improve FOTO functional measure score  to 52 % in order to improve overall QOL & return to PLOF Progressing 48% 2/5/24  2. Patient will report an overall decrease in pain with ADL's and functional mobility Progressing  3. Patient will increase strength by at least 1/2 muscle grade in affected musculature to improve functional mobility  4. Patient will improve strength and endurance and perform 10 sit to stands in 30 secs with UE support  2/22/24 safely today 10/ progressing  5. Patient will be more aware of posture throughout the day to reduce stress  and maintain optimal alignment of the spine Progressing  6. Patient will be independent with HEP Progressing  7. Patient will improve TUG and perform TUG in 12 secs or less safely to reduce the risk of falls Progressing 13 secs 2/7/24    Plan     Plan of care Certification: 1/9/2024 to 3/15/24.     Outpatient Physical Therapy 2 times weekly for 10 weeks to include the following interventions: Gait Training, Manual Therapy, Moist Heat/ Ice, Neuromuscular Re-ed, Patient Education, Self Care, Therapeutic Activities, Therapeutic Exercise, and Ultrasound, ASTYM, Kinesiotaping PRN, Functional Dry Needling    Damaris Joyce, PT

## 2024-02-29 ENCOUNTER — CLINICAL SUPPORT (OUTPATIENT)
Dept: REHABILITATION | Facility: HOSPITAL | Age: 74
End: 2024-02-29
Payer: MEDICARE

## 2024-02-29 DIAGNOSIS — R52 PAIN: Primary | ICD-10-CM

## 2024-02-29 DIAGNOSIS — R53.1 WEAKNESS: ICD-10-CM

## 2024-02-29 PROCEDURE — 97110 THERAPEUTIC EXERCISES: CPT | Mod: PN

## 2024-02-29 PROCEDURE — 97112 NEUROMUSCULAR REEDUCATION: CPT | Mod: PN

## 2024-02-29 NOTE — PROGRESS NOTES
[facility]   Physical Therapy Treatment Note      Name: Sana Crenshaw  Clinic Number: [id number]    Therapy Diagnosis:   Encounter Diagnoses   Name Primary?    Pain Yes    Weakness          Physician: Stuart Gage MD    Visit Date: 2/29/2024      Physician Orders: PT Eval and Treat   Medical Diagnosis from Referral: Limited mobility   Fall, initial encounter  Evaluation Date: 1/9/2024  Authorization Period Expiration: 01/01/2025  Plan of Care Expiration: 3/15/24  Progress Note Due: 30 days  Visit # / Visits authorized: 12/ 20 + eval   FOTO: 2/3  Precautions: Standard,  falls  , bladder stim implant    PTA Visit #: 0/5     Time In:9:01  Time Out: 9:58  Total Billable Time: 57 minutes    Subjective     Pt reports: no new complaints.    She was compliant with home exercise program.  Response to previous treatment: improvement in pain and knee swelling  Functional change: n/a at this time    Pain: 6/ 10  Location:  neck pain    Objective    2/29/24 wearing L ankle solid custom made brace for 3-4 years due to tear ATFL. MMT 5/5 DF, EV, inversion 3+/5, PF 2/5      2/5/24 FOTO performed      Treatment     Sana received the following treatments:    Sana received the following manual therapy techniques applied for (0) minutes, including:      Sana received therapeutic exercises to develop strength, endurance, ROM, flexibility, posture, and core stabilization for 14  minutes including:    -Nustep for ROM, strength, and endurance x 8 minutes R:4  - hip abduction 40# 3 x 10 reps  -hip adduction 35# 3 x 10 reps  MMT      deferred  - ball in and outs 1 x 20 reps  - LTR over the ball 1 x 20 reps  -Sitting knee extension with 2# 2 x 15 reps on each leg not performed today      Sana participated in neuromuscular re-education activities to improve: Balance, Coordination, Kinesthetic, Sense, Proprioception, and Posture for 43 minutes., including:  SL hip abduction 2 x 10 reps B   SL clams 2 x 10  reps B  SL hip circles CW/ CCW 1 x 10 reps each  PF with yellow TB 3 x 10 reps  SLS on L not able to perform  Shuttle 4 bands 3 minutes with cuing for core engagement and control with movements   walking marches in // bars with core engaged with UE support / less UE support  - standing hip extension in // bars with cuing for posture 3 x 10 reps with R TB      deferred  Bridges with core engaged 2 x 10 reps  - standing heel raises in // bars with UE support 3 x 10 reps/ not able to perform with 5# in each hand   - romberg stance with eyes open 1 minute each with eyes open  - side stepping // bars with  no UE support  Hip circles in // bars 1 x 10 reps each direction and each leg      - step ups on 2 in box 2 x 10 reps each leg in // bars/ lateral step ups 2 in box 2 x 10 reps using UE support  - heel taps with air ex 2 x 10 reps   - standing hip flexion 2 x 10 reps  -standing knee flexion 2 x 10 reps working on control with UE   Support   walking backwards      Sana participated in dynamic functional therapeutic activities to improve functional performance for  0 minutes, including:  Negotiation of 4 steps x 4 using HR's for support with alternating gait pattern today  Sit to stands  2 x 10 reps with 5#  Transfers    deferred  Standing pertubations in // bars with eyes open/ closed  Bed mobility  Push sled 1/2 lap  TRX sit to stands 3 x 10 reps  TUG 16 secs/ 13 secs  -picking up cones off of floor in // bars with no UE support    Patient Education and Home Exercises       Education provided:   HEP issued and reviewed and yellow TB issued    Written Home Exercises Provided: Patient instructed to cont prior HEP. Exercises were reviewed and Sana was able to demonstrate them prior to the end of the session.  Sana demonstrated good  understanding of the education provided. See EMR under Patient Instructions for exercises provided during therapy sessions    Assessment   Patient continues to demonstrate weakness in B  hips. HEP issued and patient demonstrated and verbalized understanding. Patient reports past injury of L ankle where she sustained a tear to ATFL and surgery was not an option. Patient has been wearing a custom solid ankle brace for about 3-4 years. MMT 5/5 DF, EV 5/5, inversion 3+/5, PF 2/5.  She tolerated treatment well. Walking B hip flexion/ marches in // bars continues to be difficult and patient requires UE support for balance and stability.  PT will continue to progress patient as tolerated.       Sana Is progressing well towards her goals.   Pt prognosis is Fair.     Pt will continue to benefit from skilled outpatient physical therapy to address the deficits listed in the problem list box on initial evaluation, provide pt/family education and to maximize pt's level of independence in the home and community environment.     Pt's spiritual, cultural and educational needs considered and pt agreeable to plan of care and goals.     Anticipated barriers to physical therapy: chronicity of condition, co- morbidities     Goals: STG's 2 weeks  Patient will be independent with 50% of HEP MET 2/2/24     LTG's 10 weeks  Patient will improve FOTO functional measure score  to 52 % in order to improve overall QOL & return to PLOF Progressing 48% 2/5/24  2. Patient will report an overall decrease in pain with ADL's and functional mobility Progressing  3. Patient will increase strength by at least 1/2 muscle grade in affected musculature to improve functional mobility  4. Patient will improve strength and endurance and perform 10 sit to stands in 30 secs with UE support  2/22/24 safely today 10/ progressing  5. Patient will be more aware of posture throughout the day to reduce stress  and maintain optimal alignment of the spine Progressing  6. Patient will be independent with HEP Progressing  7. Patient will improve TUG and perform TUG in 12 secs or less safely to reduce the risk of falls Progressing 13 secs 2/7/24    Plan      Plan of care Certification: 1/9/2024 to 3/15/24.     Outpatient Physical Therapy 2 times weekly for 10 weeks to include the following interventions: Gait Training, Manual Therapy, Moist Heat/ Ice, Neuromuscular Re-ed, Patient Education, Self Care, Therapeutic Activities, Therapeutic Exercise, and Ultrasound, ASTYM, Kinesiotaping PRN, Functional Dry Needling    Damaris Joyce, PT

## 2024-03-03 DIAGNOSIS — Z71.89 COMPLEX CARE COORDINATION: ICD-10-CM

## 2024-03-04 ENCOUNTER — CLINICAL SUPPORT (OUTPATIENT)
Dept: REHABILITATION | Facility: HOSPITAL | Age: 74
End: 2024-03-04
Payer: MEDICARE

## 2024-03-04 ENCOUNTER — TELEPHONE (OUTPATIENT)
Dept: NEUROSURGERY | Facility: CLINIC | Age: 74
End: 2024-03-04
Payer: MEDICARE

## 2024-03-04 DIAGNOSIS — R53.1 WEAKNESS: ICD-10-CM

## 2024-03-04 DIAGNOSIS — R52 PAIN: Primary | ICD-10-CM

## 2024-03-04 PROCEDURE — 97112 NEUROMUSCULAR REEDUCATION: CPT | Mod: PN

## 2024-03-04 PROCEDURE — 97530 THERAPEUTIC ACTIVITIES: CPT | Mod: PN

## 2024-03-04 PROCEDURE — 97110 THERAPEUTIC EXERCISES: CPT | Mod: PN

## 2024-03-04 NOTE — PROGRESS NOTES
[facility]   Physical Therapy Treatment Note      Name: Sana Crenshaw  Clinic Number: [id number]    Therapy Diagnosis:   Encounter Diagnoses   Name Primary?    Pain Yes    Weakness            Physician: Stuart Gage MD    Visit Date: 3/4/2024      Physician Orders: PT Eval and Treat   Medical Diagnosis from Referral: Limited mobility   Fall, initial encounter  Evaluation Date: 1/9/2024  Authorization Period Expiration: 01/01/2025  Plan of Care Expiration: 3/15/24  Progress Note Due: 30 days  Visit # / Visits authorized: 13/ 20 + eval   FOTO: 2/3  Precautions: Standard,  falls  , bladder stim implant    PTA Visit #: 0/5     Time In:10:03  Time Out: 11:00  Total Billable Time: 57 minutes    Subjective     Pt reports: R hip soreness.     She was compliant with home exercise program.  Response to previous treatment: improvement in pain and knee swelling  Functional change: n/a at this time    Pain: 6/ 10  Location:  neck pain/ normal per patient    Objective    2/29/24 wearing L ankle solid custom made brace for 3-4 years due to tear ATFL. MMT 5/5 DF, EV, inversion 3+/5, PF 2/5      2/5/24 FOTO performed      Treatment     Sana received the following treatments:    Sana received the following manual therapy techniques applied for (0) minutes, including:      Sana received therapeutic exercises to develop strength, endurance, ROM, flexibility, posture, and core stabilization for 14 minutes including:    -Nustep for ROM, strength, and endurance x 8 minutes R:4  - hip abduction 40# 3 x 10 reps  -hip adduction 35# 3 x 10 reps        deferred  - ball in and outs 1 x 20 reps  - LTR over the ball 1 x 20 reps  -Sitting knee extension with 2# 2 x 15 reps on each leg not performed today      Sana participated in neuromuscular re-education activities to improve: Balance, Coordination, Kinesthetic, Sense, Proprioception, and Posture for 33 minutes., including:  Shuttle 4 bands 3 minutes with  cuing for core engagement and control with movements  Hip circles in // bars 1 x 10 reps each direction and each leg  - standing hip extension in // bars with cuing for posture 3 x 10 reps with R TB  - romberg stance with eyes open 1 minute each with eyes open  - side stepping // bars with  no UE support  -walking marches in // bars with core engaged with UE support / less UE support  - WS over tena forward and backwards wit UE support/ patient scared to do without UE support or with therapist   -SL knee flexion in // bars with UE support 2 x 10 reps  - walking backwards    deferred  SL hip abduction 2 x 10 reps B   SL clams 2 x 10 reps B  SL hip circles CW/ CCW 1 x 10 reps each  PF with yellow TB 3 x 10 reps  SLS on L not able to perform  Bridges with core engaged 2 x 10 reps  - standing heel raises in // bars with UE support 3 x 10 reps/ not able to perform with 5# in each hand       - step ups on 2 in box 2 x 10 reps each leg in // bars/ lateral step ups 2 in box 2 x 10 reps using UE support  - heel taps with air ex 2 x 10 reps   - standing hip flexion 2 x 10 reps  -standing knee flexion 2 x 10 reps working on control with UE   Support      Sana participated in dynamic functional therapeutic activities to improve functional performance for 10 minutes, including:  Negotiation of 4 steps x 4 using HR's for support with alternating gait pattern today  Sit to stands  2 x 10 reps with 5#  Transfers    deferred  Standing pertubations in // bars with eyes open/ closed  Bed mobility  Push sled 1/2 lap  TRX sit to stands 3 x 10 reps  TUG 16 secs/ 13 secs  -picking up cones off of floor in // bars with no UE support    Patient Education and Home Exercises       Education provided:   HEP issued and reviewed and yellow TB issued    Written Home Exercises Provided: Patient instructed to cont prior HEP. Exercises were reviewed and Sana was able to demonstrate them prior to the end of the session.  Sana demonstrated good   understanding of the education provided. See EMR under Patient Instructions for exercises provided during therapy sessions    Assessment   Patient tolerated treatment well. PT continues to work on strength and balance. Patient is fearful of falling and performing WS over obstacle/ tena with decrease UE support.   PT will continue to progress patient as tolerated.       Sana Is progressing well towards her goals.   Pt prognosis is Fair.     Pt will continue to benefit from skilled outpatient physical therapy to address the deficits listed in the problem list box on initial evaluation, provide pt/family education and to maximize pt's level of independence in the home and community environment.     Pt's spiritual, cultural and educational needs considered and pt agreeable to plan of care and goals.     Anticipated barriers to physical therapy: chronicity of condition, co- morbidities     Goals: STG's 2 weeks  Patient will be independent with 50% of HEP MET 2/2/24     LTG's 10 weeks  Patient will improve FOTO functional measure score  to 52 % in order to improve overall QOL & return to PLOF Progressing 48% 2/5/24  2. Patient will report an overall decrease in pain with ADL's and functional mobility Progressing  3. Patient will increase strength by at least 1/2 muscle grade in affected musculature to improve functional mobility  4. Patient will improve strength and endurance and perform 10 sit to stands in 30 secs with UE support  2/22/24 safely today 10/ progressing  5. Patient will be more aware of posture throughout the day to reduce stress  and maintain optimal alignment of the spine Progressing  6. Patient will be independent with HEP Progressing  7. Patient will improve TUG and perform TUG in 12 secs or less safely to reduce the risk of falls Progressing 13 secs 2/7/24    Plan     Plan of care Certification: 1/9/2024 to 3/15/24.     Outpatient Physical Therapy 2 times weekly for 10 weeks to include the following  interventions: Gait Training, Manual Therapy, Moist Heat/ Ice, Neuromuscular Re-ed, Patient Education, Self Care, Therapeutic Activities, Therapeutic Exercise, and Ultrasound, ASTYM, Kinesiotaping PRN, Functional Dry Needling    Damaris Joyce, PT

## 2024-03-04 NOTE — TELEPHONE ENCOUNTER
I spoke with the patient about rescheduling the appointment for tomorrow, due to Dr. Harrington undergoing corrective surgery tomorrow. The patient has been rescheduled for 05/28 at 1 PM for F/U with Dr. Harrington. The patient confirmed the appointment date and time. The patient verbalized understanding.

## 2024-03-07 ENCOUNTER — CLINICAL SUPPORT (OUTPATIENT)
Dept: REHABILITATION | Facility: HOSPITAL | Age: 74
End: 2024-03-07
Payer: MEDICARE

## 2024-03-07 DIAGNOSIS — R52 PAIN: Primary | ICD-10-CM

## 2024-03-07 DIAGNOSIS — R53.1 WEAKNESS: ICD-10-CM

## 2024-03-07 PROCEDURE — 97112 NEUROMUSCULAR REEDUCATION: CPT | Mod: PN

## 2024-03-07 PROCEDURE — 97110 THERAPEUTIC EXERCISES: CPT | Mod: PN

## 2024-03-07 PROCEDURE — 97530 THERAPEUTIC ACTIVITIES: CPT | Mod: PN

## 2024-03-07 NOTE — PROGRESS NOTES
[facility]   Physical Therapy Treatment Note      Name: Sana Crenshaw  Clinic Number: [id number]    Therapy Diagnosis:   Encounter Diagnoses   Name Primary?    Pain Yes    Weakness          Physician: Stuart Gage MD    Visit Date: 3/7/2024      Physician Orders: PT Eval and Treat   Medical Diagnosis from Referral: Limited mobility   Fall, initial encounter  Evaluation Date: 1/9/2024  Authorization Period Expiration: 01/01/2025  Plan of Care Expiration: 3/15/24  Progress Note Due: 30 days  Visit # / Visits authorized: 13/ 20 + eval   FOTO: 3/3  Precautions: Standard,  falls  , bladder stim implant    PTA Visit #: 0/5     Time In:9:48  Time Out: 10:58  Total Billable Time:70 minutes    Subjective     Pt reports: no new complaints. Patient reports she tripped over a toy stuck under a rug and she was able to stabilize herself without falling or incident.     She was compliant with home exercise program.  Response to previous treatment: improvement in pain and knee swelling  Functional change: n/a at this time    Pain: 0/ 10  Location:      Objective    3/5/24 FOTO completed      2/29/24 wearing L ankle solid custom made brace for 3-4 years due to tear ATFL. MMT 5/5 DF, EV, inversion 3+/5, PF 2/5      2/5/24 FOTO performed      Treatment     Sana received the following treatments:    Sana received the following manual therapy techniques applied for (0) minutes, including:      Sana received therapeutic exercises to develop strength, endurance, ROM, flexibility, posture, and core stabilization for 14 minutes including:    -Nustep for ROM, strength, and endurance x 8 minutes R:4  - hip abduction 40# 3 x 10 reps  -hip adduction 35# 3 x 10 reps        deferred  - ball in and outs 1 x 20 reps  - LTR over the ball 1 x 20 reps  -Sitting knee extension with 2# 2 x 15 reps on each leg not performed today      Sana participated in neuromuscular re-education activities to improve: Balance,  Coordination, Kinesthetic, Sense, Proprioception, and Posture for 33 minutes., including:  -Shuttle 4 bands DL 3 minutes with cuing for core engagement and control with movements/ 5 bands too difficult/ SL 2 bands 1 minute each  -Hip circles in // bars 1 x 10 reps each direction and each leg  - standing hip extension in // bars with cuing for posture 3 x 10 reps with R TB  - romberg stance with eyes open 1 minute each with eyes open  - side stepping // bars with  no UE support  -walking marches in // bars with core engaged with UE support / less UE support  - WS over tena forward and backwards wit UE support/ patient scared to do without UE support or with therapist   -SL knee flexion in // bars with UE support 2 x 10 reps  - walking backwards  - SL clams 3 x 10 reps B  -Bridges with core engaged 2 x 10 reps    deferred  SL hip abduction 2 x 10 reps B   SL hip circles CW/ CCW 1 x 10 reps each  PF with yellow TB 3 x 10 reps  SLS on L not able to perform    - standing heel raises in // bars with UE support 3 x 10 reps/ not able to perform with 5# in each hand       - step ups on 2 in box 2 x 10 reps each leg in // bars/ lateral step ups 2 in box 2 x 10 reps using UE support  - heel taps with air ex 2 x 10 reps   - standing hip flexion 2 x 10 reps  -standing knee flexion 2 x 10 reps working on control with UE   Support      Sana participated in dynamic functional therapeutic activities to improve functional performance for 23 minutes, including:  Negotiation of 4 steps x 4 using HR's for support with alternating gait pattern today  Sit to stands  2 x 10 reps with 5#  Transfers  TUG  14 secs/ 14 secs  Bed mobility    deferred  Standing pertubations in // bars with eyes open/ closed  Push sled 1/2 lap  TRX sit to stands 3 x 10 reps  -picking up cones off of floor in // bars with no UE support    Patient Education and Home Exercises       Education provided:   HEP issued and reviewed and yellow TB issued    Written  Home Exercises Provided: Patient instructed to cont prior HEP. Exercises were reviewed and Sana was able to demonstrate them prior to the end of the session.  Sana demonstrated good  understanding of the education provided. See EMR under Patient Instructions for exercises provided during therapy sessions    Assessment   Patient is responding well to treatment. FOTO score is improving. Patient was able to maintain her balance at home when she tripped on a toy under the carpet with no incident.   TUG performed in 14 secs 3 x with no LOB. PT will continue to progress patient as tolerated.         Sana Is progressing well towards her goals.   Pt prognosis is Fair.     Pt will continue to benefit from skilled outpatient physical therapy to address the deficits listed in the problem list box on initial evaluation, provide pt/family education and to maximize pt's level of independence in the home and community environment.     Pt's spiritual, cultural and educational needs considered and pt agreeable to plan of care and goals.     Anticipated barriers to physical therapy: chronicity of condition, co- morbidities     Goals: STG's 2 weeks  Patient will be independent with 50% of HEP MET 2/2/24     LTG's 10 weeks  Patient will improve FOTO functional measure score  to 52 % in order to improve overall QOL & return to PLOF Progressing 48% 2/5/24 / MET 60% 3/7/24  2. Patient will report an overall decrease in pain with ADL's and functional mobility Progressing  3. Patient will increase strength by at least 1/2 muscle grade in affected musculature to improve functional mobility  4. Patient will improve strength and endurance and perform 10 sit to stands in 30 secs with UE support  2/22/24 safely today 10/ progressing  5. Patient will be more aware of posture throughout the day to reduce stress  and maintain optimal alignment of the spine Progressing  6. Patient will be independent with HEP Progressing  7. Patient will improve  TUG and perform TUG in 12 secs or less safely to reduce the risk of falls Progressing 13 secs 2/7/24    Plan     Plan of care Certification: 1/9/2024 to 3/15/24.     Outpatient Physical Therapy 2 times weekly for 10 weeks to include the following interventions: Gait Training, Manual Therapy, Moist Heat/ Ice, Neuromuscular Re-ed, Patient Education, Self Care, Therapeutic Activities, Therapeutic Exercise, and Ultrasound, ASTYM, Kinesiotaping PRN, Functional Dry Needling    Damaris Joyce, PT

## 2024-03-11 ENCOUNTER — CLINICAL SUPPORT (OUTPATIENT)
Dept: REHABILITATION | Facility: HOSPITAL | Age: 74
End: 2024-03-11
Payer: MEDICARE

## 2024-03-11 DIAGNOSIS — R52 PAIN: Primary | ICD-10-CM

## 2024-03-11 DIAGNOSIS — R53.1 WEAKNESS: ICD-10-CM

## 2024-03-11 PROCEDURE — 97110 THERAPEUTIC EXERCISES: CPT | Mod: PN

## 2024-03-11 PROCEDURE — 97530 THERAPEUTIC ACTIVITIES: CPT | Mod: PN

## 2024-03-11 PROCEDURE — 97112 NEUROMUSCULAR REEDUCATION: CPT | Mod: PN

## 2024-03-11 NOTE — PROGRESS NOTES
[facility]   Physical Therapy Treatment Note      Name: Sana Crenshaw  Clinic Number: [id number]    Therapy Diagnosis:   Encounter Diagnoses   Name Primary?    Pain Yes    Weakness            Physician: Stuart Gage MD    Visit Date: 3/11/2024      Physician Orders: PT Eval and Treat   Medical Diagnosis from Referral: Limited mobility   Fall, initial encounter  Evaluation Date: 1/9/2024  Authorization Period Expiration: 01/01/2025  Plan of Care Expiration: 3/15/24  Progress Note Due: 30 days  Visit # / Visits authorized: 15/ 20 + eval   FOTO: 3/3  Precautions: Standard,  falls  , bladder stim implant    PTA Visit #: 0/5     Time In:10:02  Time Out: 10:51   Total Billable Time:49 minutes    Subjective     Pt reports: no new complaints.   She was compliant with home exercise program.  Response to previous treatment: improvement in pain and knee swelling  Functional change: n/a at this time    Pain: 0/ 10  Location:      Objective    3/11/24 MMT B hip flexion 4/5, knee extension 3+- 4/5 B, B hip abduction 3+/5- 4/5, B hip adduction 5/5, B hip extension 3+/5     3/5/24 FOTO completed      2/29/24 wearing L ankle solid custom made brace for 3-4 years due to tear ATFL. MMT 5/5 DF, EV, inversion 3+/5, PF 2/5      2/5/24 FOTO performed      Treatment     Sana received the following treatments:    Sana received the following manual therapy techniques applied for (0) minutes, including:      Sana received therapeutic exercises to develop strength, endurance, ROM, flexibility, posture, and core stabilization for 18  minutes including:    -Nustep for ROM, strength, and endurance x 8 minutes R:4  - hip abduction 40# 3 x 10 reps  -hip adduction 35# 3 x 10 reps  MMT        deferred  - ball in and outs 1 x 20 reps  - LTR over the ball 1 x 20 reps  -Sitting knee extension with 2# 2 x 15 reps on each leg not performed today      Sana participated in neuromuscular re-education activities to  improve: Balance, Coordination, Kinesthetic, Sense, Proprioception, and Posture for 19  minutes., including:  -Shuttle 4 bands DL 3 minutes with cuing for core engagement and control with movements/ 5 bands too difficult/ SL 2 bands 1 minute each  -Hip circles in // bars 1 x 10 reps each direction and each leg  - standing hip extension in // bars with cuing for posture 3 x 10 reps with R TB  - romberg stance with eyes open 1 minute each with eyes open  - side stepping // bars with  no UE support  -walking marches in // bars with core engaged with UE support / less UE support        - WS over tena forward and backwards wit UE support/ patient scared to do without UE support or with therapist   -SL knee flexion in // bars with UE support 2 x 10 reps  - walking backwards  - SL clams 3 x 10 reps B  -Bridges with core engaged 2 x 10 reps    deferred  SL hip abduction 2 x 10 reps B   SL hip circles CW/ CCW 1 x 10 reps each  PF with yellow TB 3 x 10 reps  SLS on L not able to perform    - standing heel raises in // bars with UE support 3 x 10 reps/ not able to perform with 5# in each hand       - step ups on 2 in box 2 x 10 reps each leg in // bars/ lateral step ups 2 in box 2 x 10 reps using UE support  - heel taps with air ex 2 x 10 reps   - standing hip flexion 2 x 10 reps  -standing knee flexion 2 x 10 reps working on control with UE   Support      Sana participated in dynamic functional therapeutic activities to improve functional performance for 12 minutes, including:  Negotiation of 4 steps x 4 using HR's for support with alternating gait pattern today  Sit to stands 3 x 10 reps with 10#  Transfers  Bed mobility    deferred    TUG  14 secs/ 14 secs  Standing pertubations in // bars with eyes open/ closed  Push sled 1/2 lap  TRX sit to stands 3 x 10 reps  -picking up cones off of floor in // bars with no UE support    Patient Education and Home Exercises       Education provided:   [facility] issued and reviewed and  yellow TB issued    Written Home Exercises Provided: Patient instructed to cont prior HEP. Exercises were reviewed and Sana was able to demonstrate them prior to the end of the session.  Sana demonstrated good  understanding of the education provided. See EMR under Patient Instructions for exercises provided during therapy sessions    Assessment   Patient is responding well to treatment. MMT B hip flexion 4/5, knee extension 3+- 4/5 B, B hip abduction 3+/5- 4/5, B hip adduction 5/5, B hip extension 3+/5. Patient progressed to 10# with sit to stands. Plan to d/c patient next visit.         Sana Is progressing well towards her goals.   Pt prognosis is Fair.     Pt will continue to benefit from skilled outpatient physical therapy to address the deficits listed in the problem list box on initial evaluation, provide pt/family education and to maximize pt's level of independence in the home and community environment.     Pt's spiritual, cultural and educational needs considered and pt agreeable to plan of care and goals.     Anticipated barriers to physical therapy: chronicity of condition, co- morbidities     Goals: STG's 2 weeks  Patient will be independent with 50% of HEP MET 2/2/24     LTG's 10 weeks  Patient will improve FOTO functional measure score  to 52 % in order to improve overall QOL & return to PLOF Progressing 48% 2/5/24 / MET 60% 3/7/24  2. Patient will report an overall decrease in pain with ADL's and functional mobility MET 3/11/24  3. Patient will increase strength by at least 1/2 muscle grade in affected musculature to improve functional mobility MET 3/11/24   4. Patient will improve strength and endurance and perform 10 sit to stands in 30 secs with UE support  2/22/24 safely today 10/ progressing  5. Patient will be more aware of posture throughout the day to reduce stress  and maintain optimal alignment of the spine Progressing  6. Patient will be independent with HEP Progressing  7. Patient will  improve TUG and perform TUG in 12 secs or less safely to reduce the risk of falls Progressing 13 secs 2/7/24    Plan     Plan of care Certification: 1/9/2024 to 3/15/24.     Outpatient Physical Therapy 2 times weekly for 10 weeks to include the following interventions: Gait Training, Manual Therapy, Moist Heat/ Ice, Neuromuscular Re-ed, Patient Education, Self Care, Therapeutic Activities, Therapeutic Exercise, and Ultrasound, ASTYM, Kinesiotaping PRN, Functional Dry Needling    Damaris Joyce, PT

## 2024-03-14 ENCOUNTER — CLINICAL SUPPORT (OUTPATIENT)
Dept: REHABILITATION | Facility: HOSPITAL | Age: 74
End: 2024-03-14
Payer: MEDICARE

## 2024-03-14 DIAGNOSIS — R52 PAIN: Primary | ICD-10-CM

## 2024-03-14 DIAGNOSIS — R53.1 WEAKNESS: ICD-10-CM

## 2024-03-14 PROCEDURE — 97110 THERAPEUTIC EXERCISES: CPT | Mod: PN

## 2024-03-14 PROCEDURE — 97530 THERAPEUTIC ACTIVITIES: CPT | Mod: PN

## 2024-03-14 NOTE — PLAN OF CARE
[facility]   Physical Therapy Treatment Note / D/C summary     Name: Sana Crenshaw  Clinic Number: [id number]    Therapy Diagnosis:   Encounter Diagnoses   Name Primary?    Pain Yes    Weakness            Physician: Stuart Gage MD    Visit Date: 3/14/2024      Physician Orders: PT Eval and Treat   Medical Diagnosis from Referral: Limited mobility   Fall, initial encounter  Evaluation Date: 1/9/2024  Authorization Period Expiration: 01/01/2025  Plan of Care Expiration: 3/15/24  Progress Note Due: 30 days  Visit # / Visits authorized: 16/ 20 + eval   FOTO: 3/3  Precautions: Standard,  falls  , bladder stim implant    PTA Visit #: 0/5     Time In:10:05  Time Out: 10:30  Total Billable Time:25 minutes    Subjective     Pt reports: no new complaints.   She was compliant with home exercise program.  Response to previous treatment: improvement in pain and knee swelling  Functional change: n/a at this time    Pain: 0/ 10  Location:      Objective    3/11/24 MMT B hip flexion 4/5, knee extension 3+- 4/5 B, B hip abduction 3+/5- 4/5, B hip adduction 5/5, B hip extension 3+/5     3/5/24 FOTO completed      2/29/24 wearing L ankle solid custom made brace for 3-4 years due to tear ATFL. MMT 5/5 DF, EV, inversion 3+/5, PF 2/5        Treatment     Sana received the following treatments:    Sana received the following manual therapy techniques applied for (0) minutes, including:      Sana received therapeutic exercises to develop strength, endurance, ROM, flexibility, posture, and core stabilization for 12 minutes including:    -Nustep for ROM, strength, and endurance x 8 minutes R:4  - hip abduction 40# 3 x 10 reps  -hip adduction 35# 3 x 10 reps          Sana participated in dynamic functional therapeutic activities to improve functional performance for 13 minutes, including:  Negotiation of 4 steps x 4 using HR's for support with alternating gait pattern today  Sit to stands 3 x 10 reps    Transfers  TUG 13 secs/ 12 secs  HEP reviewed    Patient Education and Home Exercises       Education provided:   HEP issued and reviewed and yellow TB issued    Written Home Exercises Provided: Patient instructed to cont prior HEP. Exercises were reviewed and Sana was able to demonstrate them prior to the end of the session.  Sana demonstrated good  understanding of the education provided. See EMR under Patient Instructions for exercises provided during therapy sessions    Assessment   Patient is being d/c from PT services and patient is in agreement. She has met goals established on initial evaluation. MMT B hip flexion 4/5, knee extension 3+- 4/5 B, B hip abduction 3+/5- 4/5, B hip adduction 5/5, B hip extension 3+/5. She progressed and performed 13 sit to stands in 30 secs with no UE support with ease and she was able to perform TUG safely in 12 secs today. She is independent with HEP.  Patient has been wearing L ankle solid custom made brace for 3-4 years due to tear ATFL. MMT 5/5 DF, EV, inversion 3+/5, PF 2/5. Patient reports no falls since starting therapy and is able to stabilize and recover with LOB independently.         Goals: STG's 2 weeks  Patient will be independent with 50% of HEP MET 2/2/24     LTG's 10 weeks  Patient will improve FOTO functional measure score  to 52 % in order to improve overall QOL & return to PLOF Progressing 48% 2/5/24 / MET 60% 3/7/24  2. Patient will report an overall decrease in pain with ADL's and functional mobility MET 3/11/24  3. Patient will increase strength by at least 1/2 muscle grade in affected musculature to improve functional mobility MET 3/11/24   4. Patient will improve strength and endurance and perform 10 sit to stands in 30 secs with UE support  2/22/24 safely today 10/ MET 13 3/14/24  5. Patient will be more aware of posture throughout the day to reduce stress  and maintain optimal alignment of the spine MET 3/14/24  6. Patient will be independent with  HEP MET 3/14/24  7. Patient will improve TUG and perform TUG in 12 secs or less safely to reduce the risk of falls Progressing 13 secs 2/7/24/ MET 12 secs 3/14/24    Plan     D/c from PT services      Damaris Joyce, PT

## 2024-03-14 NOTE — PROGRESS NOTES
[facility]   Physical Therapy Treatment Note / D/C summary     Name: Sana Crenshaw  Clinic Number: [id number]    Therapy Diagnosis:   Encounter Diagnoses   Name Primary?    Pain Yes    Weakness            Physician: Stuart Gage MD    Visit Date: 3/14/2024      Physician Orders: PT Eval and Treat   Medical Diagnosis from Referral: Limited mobility   Fall, initial encounter  Evaluation Date: 1/9/2024  Authorization Period Expiration: 01/01/2025  Plan of Care Expiration: 3/15/24  Progress Note Due: 30 days  Visit # / Visits authorized: 16/ 20 + eval   FOTO: 3/3  Precautions: Standard,  falls  , bladder stim implant    PTA Visit #: 0/5     Time In:10:05  Time Out: 10:30  Total Billable Time:25 minutes    Subjective     Pt reports: no new complaints.   She was compliant with home exercise program.  Response to previous treatment: improvement in pain and knee swelling  Functional change: n/a at this time    Pain: 0/ 10  Location:      Objective    3/11/24 MMT B hip flexion 4/5, knee extension 3+- 4/5 B, B hip abduction 3+/5- 4/5, B hip adduction 5/5, B hip extension 3+/5     3/5/24 FOTO completed      2/29/24 wearing L ankle solid custom made brace for 3-4 years due to tear ATFL. MMT 5/5 DF, EV, inversion 3+/5, PF 2/5        Treatment     Sana received the following treatments:    Sana received the following manual therapy techniques applied for (0) minutes, including:      Sana received therapeutic exercises to develop strength, endurance, ROM, flexibility, posture, and core stabilization for 12 minutes including:    -Nustep for ROM, strength, and endurance x 8 minutes R:4  - hip abduction 40# 3 x 10 reps  -hip adduction 35# 3 x 10 reps          Sana participated in dynamic functional therapeutic activities to improve functional performance for 13 minutes, including:  Negotiation of 4 steps x 4 using HR's for support with alternating gait pattern today  Sit to stands 3 x 10 reps    Transfers  TUG 13 secs/ 12 secs  HEP reviewed    Patient Education and Home Exercises       Education provided:   HEP issued and reviewed and yellow TB issued    Written Home Exercises Provided: Patient instructed to cont prior HEP. Exercises were reviewed and Sana was able to demonstrate them prior to the end of the session.  Sana demonstrated good  understanding of the education provided. See EMR under Patient Instructions for exercises provided during therapy sessions    Assessment   Patient is being d/c from PT services and patient is in agreement. She has met goals established on initial evaluation. MMT B hip flexion 4/5, knee extension 3+- 4/5 B, B hip abduction 3+/5- 4/5, B hip adduction 5/5, B hip extension 3+/5. She progressed and performed 13 sit to stands in 30 secs with no UE support with ease and she was able to perform TUG safely in 12 secs today. She is independent with HEP.  Patient has been wearing L ankle solid custom made brace for 3-4 years due to tear ATFL. MMT 5/5 DF, EV, inversion 3+/5, PF 2/5. Patient reports no falls since starting therapy and is able to stabilize and recover with LOB independently.         Goals: STG's 2 weeks  Patient will be independent with 50% of HEP MET 2/2/24     LTG's 10 weeks  Patient will improve FOTO functional measure score  to 52 % in order to improve overall QOL & return to PLOF Progressing 48% 2/5/24 / MET 60% 3/7/24  2. Patient will report an overall decrease in pain with ADL's and functional mobility MET 3/11/24  3. Patient will increase strength by at least 1/2 muscle grade in affected musculature to improve functional mobility MET 3/11/24   4. Patient will improve strength and endurance and perform 10 sit to stands in 30 secs with UE support  2/22/24 safely today 10/ MET 13 3/14/24  5. Patient will be more aware of posture throughout the day to reduce stress  and maintain optimal alignment of the spine MET 3/14/24  6. Patient will be independent with  HEP MET 3/14/24  7. Patient will improve TUG and perform TUG in 12 secs or less safely to reduce the risk of falls Progressing 13 secs 2/7/24/ MET 12 secs 3/14/24    Plan     D/c from PT services      Damaris Joyce, PT

## 2024-03-14 NOTE — HIM RECORD RETIREMENT NOTE
group home of Incomplete Medical Record    3/14/24    Patient Name: Sana Crenshaw  Contact Serial # (CSN): [id number]  Patient Medical Record # (MRN): [id number]  Date of Service: Orders Only on 12/17/2020  Physician Name: Onofre Fallon,    This record has been reviewed and is being retired as incomplete by the approval of the  Medical Staff Operating Committee (MSOC)     On 9/7/2022., due to:  Unavailability of Provider     Missing Information/Comments:  []    Discharge Summary   []    DC Note/Short Stay Summary   []    ED Provider Note   []    Delivery Note   []    History & Physical   []   Operative Note   []     Procedure Note   []     Physician Order   [x]     Verbal Order   []       Other, specify:

## 2024-03-26 ENCOUNTER — PATIENT MESSAGE (OUTPATIENT)
Dept: FAMILY MEDICINE | Facility: CLINIC | Age: 74
End: 2024-03-26
Payer: MEDICARE

## 2024-04-08 ENCOUNTER — LAB VISIT (OUTPATIENT)
Dept: LAB | Facility: HOSPITAL | Age: 74
End: 2024-04-08
Attending: FAMILY MEDICINE
Payer: MEDICARE

## 2024-04-08 ENCOUNTER — OFFICE VISIT (OUTPATIENT)
Dept: FAMILY MEDICINE | Facility: CLINIC | Age: 74
End: 2024-04-08
Payer: MEDICARE

## 2024-04-08 VITALS
RESPIRATION RATE: 18 BRPM | DIASTOLIC BLOOD PRESSURE: 70 MMHG | SYSTOLIC BLOOD PRESSURE: 130 MMHG | OXYGEN SATURATION: 95 % | BODY MASS INDEX: 36.94 KG/M2 | WEIGHT: 200.75 LBS | TEMPERATURE: 99 F | HEIGHT: 62 IN | HEART RATE: 67 BPM

## 2024-04-08 DIAGNOSIS — R73.03 PREDIABETES: ICD-10-CM

## 2024-04-08 DIAGNOSIS — I10 ESSENTIAL HYPERTENSION: ICD-10-CM

## 2024-04-08 DIAGNOSIS — J18.9 PNEUMONIA OF RIGHT LOWER LOBE DUE TO INFECTIOUS ORGANISM: Primary | ICD-10-CM

## 2024-04-08 DIAGNOSIS — Z74.09 LIMITED MOBILITY: ICD-10-CM

## 2024-04-08 DIAGNOSIS — E03.9 HYPOTHYROIDISM, UNSPECIFIED TYPE: ICD-10-CM

## 2024-04-08 DIAGNOSIS — E66.01 SEVERE OBESITY WITH BODY MASS INDEX (BMI) OF 36.0 TO 36.9 WITH SERIOUS COMORBIDITY: ICD-10-CM

## 2024-04-08 LAB
ALBUMIN SERPL BCP-MCNC: 3.4 G/DL (ref 3.5–5.2)
ALP SERPL-CCNC: 95 U/L (ref 55–135)
ALT SERPL W/O P-5'-P-CCNC: 14 U/L (ref 10–44)
ANION GAP SERPL CALC-SCNC: 7 MMOL/L (ref 8–16)
AST SERPL-CCNC: 23 U/L (ref 10–40)
BASOPHILS # BLD AUTO: 0.06 K/UL (ref 0–0.2)
BASOPHILS NFR BLD: 0.9 % (ref 0–1.9)
BILIRUB SERPL-MCNC: 0.5 MG/DL (ref 0.1–1)
BUN SERPL-MCNC: 11 MG/DL (ref 8–23)
CALCIUM SERPL-MCNC: 9.3 MG/DL (ref 8.7–10.5)
CHLORIDE SERPL-SCNC: 106 MMOL/L (ref 95–110)
CO2 SERPL-SCNC: 27 MMOL/L (ref 23–29)
CREAT SERPL-MCNC: 0.7 MG/DL (ref 0.5–1.4)
DIFFERENTIAL METHOD BLD: NORMAL
EOSINOPHIL # BLD AUTO: 0.2 K/UL (ref 0–0.5)
EOSINOPHIL NFR BLD: 2.6 % (ref 0–8)
ERYTHROCYTE [DISTWIDTH] IN BLOOD BY AUTOMATED COUNT: 13.1 % (ref 11.5–14.5)
EST. GFR  (NO RACE VARIABLE): >60 ML/MIN/1.73 M^2
ESTIMATED AVG GLUCOSE: 120 MG/DL (ref 68–131)
GLUCOSE SERPL-MCNC: 101 MG/DL (ref 70–110)
HBA1C MFR BLD: 5.8 % (ref 4–5.6)
HCT VFR BLD AUTO: 42.2 % (ref 37–48.5)
HGB BLD-MCNC: 13.8 G/DL (ref 12–16)
IMM GRANULOCYTES # BLD AUTO: 0.02 K/UL (ref 0–0.04)
IMM GRANULOCYTES NFR BLD AUTO: 0.3 % (ref 0–0.5)
LYMPHOCYTES # BLD AUTO: 1.9 K/UL (ref 1–4.8)
LYMPHOCYTES NFR BLD: 27.1 % (ref 18–48)
MCH RBC QN AUTO: 29.6 PG (ref 27–31)
MCHC RBC AUTO-ENTMCNC: 32.7 G/DL (ref 32–36)
MCV RBC AUTO: 90 FL (ref 82–98)
MONOCYTES # BLD AUTO: 0.5 K/UL (ref 0.3–1)
MONOCYTES NFR BLD: 6.9 % (ref 4–15)
NEUTROPHILS # BLD AUTO: 4.4 K/UL (ref 1.8–7.7)
NEUTROPHILS NFR BLD: 62.2 % (ref 38–73)
NRBC BLD-RTO: 0 /100 WBC
PLATELET # BLD AUTO: 272 K/UL (ref 150–450)
PMV BLD AUTO: 11.2 FL (ref 9.2–12.9)
POTASSIUM SERPL-SCNC: 4.2 MMOL/L (ref 3.5–5.1)
PROT SERPL-MCNC: 6.6 G/DL (ref 6–8.4)
RBC # BLD AUTO: 4.67 M/UL (ref 4–5.4)
SODIUM SERPL-SCNC: 140 MMOL/L (ref 136–145)
TSH SERPL DL<=0.005 MIU/L-ACNC: 1.85 UIU/ML (ref 0.4–4)
WBC # BLD AUTO: 7 K/UL (ref 3.9–12.7)

## 2024-04-08 PROCEDURE — 84443 ASSAY THYROID STIM HORMONE: CPT | Performed by: FAMILY MEDICINE

## 2024-04-08 PROCEDURE — 4010F ACE/ARB THERAPY RXD/TAKEN: CPT | Mod: CPTII,S$GLB,, | Performed by: FAMILY MEDICINE

## 2024-04-08 PROCEDURE — 3008F BODY MASS INDEX DOCD: CPT | Mod: CPTII,S$GLB,, | Performed by: FAMILY MEDICINE

## 2024-04-08 PROCEDURE — 36415 COLL VENOUS BLD VENIPUNCTURE: CPT | Mod: PO | Performed by: FAMILY MEDICINE

## 2024-04-08 PROCEDURE — 3078F DIAST BP <80 MM HG: CPT | Mod: CPTII,S$GLB,, | Performed by: FAMILY MEDICINE

## 2024-04-08 PROCEDURE — 99999 PR PBB SHADOW E&M-EST. PATIENT-LVL IV: CPT | Mod: PBBFAC,,, | Performed by: FAMILY MEDICINE

## 2024-04-08 PROCEDURE — 3288F FALL RISK ASSESSMENT DOCD: CPT | Mod: CPTII,S$GLB,, | Performed by: FAMILY MEDICINE

## 2024-04-08 PROCEDURE — 80053 COMPREHEN METABOLIC PANEL: CPT | Performed by: FAMILY MEDICINE

## 2024-04-08 PROCEDURE — 85025 COMPLETE CBC W/AUTO DIFF WBC: CPT | Performed by: FAMILY MEDICINE

## 2024-04-08 PROCEDURE — 1159F MED LIST DOCD IN RCRD: CPT | Mod: CPTII,S$GLB,, | Performed by: FAMILY MEDICINE

## 2024-04-08 PROCEDURE — 99214 OFFICE O/P EST MOD 30 MIN: CPT | Mod: S$GLB,,, | Performed by: FAMILY MEDICINE

## 2024-04-08 PROCEDURE — 1101F PT FALLS ASSESS-DOCD LE1/YR: CPT | Mod: CPTII,S$GLB,, | Performed by: FAMILY MEDICINE

## 2024-04-08 PROCEDURE — 3075F SYST BP GE 130 - 139MM HG: CPT | Mod: CPTII,S$GLB,, | Performed by: FAMILY MEDICINE

## 2024-04-08 PROCEDURE — 1126F AMNT PAIN NOTED NONE PRSNT: CPT | Mod: CPTII,S$GLB,, | Performed by: FAMILY MEDICINE

## 2024-04-08 PROCEDURE — 83036 HEMOGLOBIN GLYCOSYLATED A1C: CPT | Performed by: FAMILY MEDICINE

## 2024-04-08 NOTE — PROGRESS NOTES
Subjective:       Patient ID: Sana Crenshaw is a 73 y.o. female.    Chief Complaint: Follow-up      HPI Comments:       Current Outpatient Medications:     albuterol (PROVENTIL/VENTOLIN HFA) 90 mcg/actuation inhaler, Inhale 2 puffs into the lungs every 6 (six) hours as needed for Wheezing. Dispense with spacer., Disp: 18 g, Rfl: 3    amLODIPine (NORVASC) 5 MG tablet, TAKE 1 TABLET EVERY DAY, Disp: 90 tablet, Rfl: 3    amoxicillin-clavulanate 875-125mg (AUGMENTIN) 875-125 mg per tablet, Take 1 tablet by mouth every 12 (twelve) hours., Disp: 20 tablet, Rfl: 0    aspirin (ECOTRIN) 81 MG EC tablet, Take 81 mg by mouth once daily., Disp: , Rfl:     DULoxetine (CYMBALTA) 30 MG capsule, TAKE 1 CAPSULE EVERY DAY, Disp: 90 capsule, Rfl: 0    levothyroxine (SYNTHROID) 50 MCG tablet, TAKE 1 TABLET EVERY DAY, Disp: 90 tablet, Rfl: 2    lisinopriL (PRINIVIL,ZESTRIL) 20 MG tablet, Take 1 tablet (20 mg total) by mouth once daily., Disp: 90 tablet, Rfl: 1    metoprolol succinate (TOPROL-XL) 100 MG 24 hr tablet, Take 1 tablet (100 mg total) by mouth once daily., Disp: 90 tablet, Rfl: 3    omeprazole (PRILOSEC) 20 MG capsule, Take 1 capsule (20 mg total) by mouth 2 (two) times daily., Disp: 180 capsule, Rfl: 3    oxybutynin (DITROPAN-XL) 10 MG 24 hr tablet, Take 10 mg by mouth once daily., Disp: , Rfl:     rOPINIRole (REQUIP) 1 MG tablet, TAKE 1 TABLET BY MOUTH IN  THE EVENING, Disp: 90 tablet, Rfl: 3    tiZANidine (ZANAFLEX) 4 MG tablet, Take 1 tablet (4 mg total) by mouth every 8 (eight) hours as needed (muscle spasms)., Disp: 270 tablet, Rfl: 1  No current facility-administered medications for this visit.    Facility-Administered Medications Ordered in Other Visits:     chlorhexidine 0.12 % solution 10 mL, 10 mL, Mouth/Throat, On Call Procedure, Gove, Scarlet, PA-C    lactated ringers infusion, , Intravenous, Continuous, Franky Hartman MD, Last Rate: 20 mL/hr at 01/08/21 0731, New Bag at 01/08/21 0925    lactated ringers  infusion, , Intravenous, Continuous, Michael, Estevan DORMAN MD, Stopped at 02/23/21 1645      ER follow-up.  Two weeks ago.  Nausea and vomiting.  Found right lower lobe pneumonia.  Treated with Z-Arash.  Gotten better.  Workup also found a white count of 19.2 and a potassium of 3.0.    Completed physical therapy.  Says she feels more stable    Last time I saw put on Augmentin for sinusitis.  She had diarrhea so we added to her allergy list.    Taking over-the-counter bladder control supplement called uro.  I reviewed the ingredients and approved.  She says it is helping.      GI saw her in 2022 for PPI use.  Continues to go b.i.d. to avoid symptoms           Hypertension  This is a recurrent problem. The current episode started more than 1 year ago. The problem is unchanged. The problem is resistant. Associated symptoms include malaise/fatigue and neck pain. Pertinent negatives include no anxiety, blurred vision, chest pain, headaches, orthopnea, palpitations, peripheral edema, PND, shortness of breath or sweats. Agents associated with hypertension include NSAIDs. Risk factors for coronary artery disease include obesity and sedentary lifestyle. Past treatments include nothing. There are no compliance problems.      Review of Systems   Constitutional:  Positive for malaise/fatigue. Negative for activity change, appetite change and fever.   HENT:  Negative for sore throat.    Eyes:  Negative for blurred vision.   Respiratory:  Negative for cough and shortness of breath.    Cardiovascular:  Negative for chest pain, palpitations, orthopnea and PND.   Gastrointestinal:  Negative for abdominal pain, diarrhea and nausea.   Genitourinary:  Negative for difficulty urinating.   Musculoskeletal:  Positive for neck pain. Negative for arthralgias and myalgias.   Neurological:  Negative for dizziness and headaches.       Objective:      Vitals:    04/08/24 0917   BP: 130/70   Pulse: 67   Resp: 18   Temp: 99 °F (37.2 °C)   TempSrc:  "Tympanic   SpO2: 95%   Weight: 91 kg (200 lb 11.7 oz)   Height: 5' 2" (1.575 m)   PainSc: 0-No pain     Physical Exam  Vitals and nursing note reviewed.   Constitutional:       General: She is not in acute distress.     Appearance: She is well-developed. She is not diaphoretic.   HENT:      Head: Normocephalic.   Neck:      Thyroid: No thyromegaly.   Cardiovascular:      Rate and Rhythm: Normal rate and regular rhythm.      Heart sounds: Normal heart sounds. No murmur heard.  Pulmonary:      Effort: Pulmonary effort is normal.      Breath sounds: Normal breath sounds. No wheezing or rales.   Abdominal:      General: There is no distension.      Palpations: Abdomen is soft.   Musculoskeletal:      Cervical back: Neck supple.   Lymphadenopathy:      Cervical: No cervical adenopathy.   Skin:     General: Skin is warm and dry.   Neurological:      Mental Status: She is alert and oriented to person, place, and time.   Psychiatric:         Mood and Affect: Mood normal.         Behavior: Behavior normal.         Thought Content: Thought content normal.         Judgment: Judgment normal.         Assessment:       1. Pneumonia of right lower lobe due to infectious organism    2. Limited mobility    3. Essential hypertension    4. Prediabetes    5. Severe obesity with body mass index (BMI) of 36.0 to 36.9 with serious comorbidity    6. Hypothyroidism, unspecified type        Plan:   Pneumonia of right lower lobe due to infectious organism  Comments:  Resolved with antibiotics.    Limited mobility  Comments:  Improved with physical therapy    Essential hypertension  Comments:  Controlled, follow-up 6 months  Orders:  -     CBC Auto Differential; Future; Expected date: 04/08/2024    Prediabetes  Comments:  A1c today  Orders:  -     Comprehensive Metabolic Panel; Future; Expected date: 04/08/2024  -     Hemoglobin A1C; Future; Expected date: 04/08/2024    Severe obesity with body mass index (BMI) of 36.0 to 36.9 with serious " comorbidity  Comments:  Weight unchanged    Hypothyroidism, unspecified type  Comments:  TSH today  Orders:  -     TSH; Future; Expected date: 04/08/2024

## 2024-04-26 ENCOUNTER — PATIENT MESSAGE (OUTPATIENT)
Dept: ADMINISTRATIVE | Facility: OTHER | Age: 74
End: 2024-04-26
Payer: MEDICARE

## 2024-05-16 RX ORDER — LISINOPRIL 20 MG/1
TABLET ORAL
Qty: 90 TABLET | Refills: 3 | Status: SHIPPED | OUTPATIENT
Start: 2024-05-16

## 2024-05-16 NOTE — TELEPHONE ENCOUNTER
Refill Routing Note   Medication(s) are not appropriate for processing by [facility] for the following reason(s):        Outside of protocol    [facility] action(s):  Route  Approve               Appointments  past 12m or future 3m with PCP    Date Provider   Last Visit   4/8/2024 Stuart Gage MD   Next Visit   7/8/2024 Stuart Gage MD   ED visits in past 90 days: 0        Note composed:7:34 AM 05/16/2024

## 2024-05-16 NOTE — TELEPHONE ENCOUNTER
No care due was identified.  [facility] Embedded Care Due Messages. Reference number: [id number].   5/16/2024 1:43:22 AM CDT

## 2024-05-17 RX ORDER — ROPINIROLE 1 MG/1
1 TABLET, FILM COATED ORAL NIGHTLY
Qty: 90 TABLET | Refills: 3 | Status: SHIPPED | OUTPATIENT
Start: 2024-05-17

## 2024-05-24 RX ORDER — LEVOTHYROXINE SODIUM 50 UG/1
50 TABLET ORAL
Qty: 90 TABLET | Refills: 3 | Status: SHIPPED | OUTPATIENT
Start: 2024-05-24

## 2024-05-24 RX ORDER — METOPROLOL SUCCINATE 100 MG/1
100 TABLET, EXTENDED RELEASE ORAL DAILY
Qty: 90 TABLET | Refills: 3 | Status: SHIPPED | OUTPATIENT
Start: 2024-05-24

## 2024-05-24 NOTE — TELEPHONE ENCOUNTER
No care due was identified.  [facility] Embedded Care Due Messages. Reference number: [id number].   5/23/2024 11:22:34 PM CDT

## 2024-05-24 NOTE — TELEPHONE ENCOUNTER
Refill Decision Note   Sana Crenshaw  is requesting a refill authorization.  Brief Assessment and Rationale for Refill:  Approve     Medication Therapy Plan: TSH WNL      Comments:     Note composed:9:56 AM 05/24/2024

## 2024-05-28 ENCOUNTER — OFFICE VISIT (OUTPATIENT)
Dept: NEUROSURGERY | Facility: CLINIC | Age: 74
End: 2024-05-28
Payer: MEDICARE

## 2024-05-28 VITALS
BODY MASS INDEX: 37.69 KG/M2 | HEIGHT: 62 IN | SYSTOLIC BLOOD PRESSURE: 140 MMHG | DIASTOLIC BLOOD PRESSURE: 80 MMHG | WEIGHT: 204.81 LBS | HEART RATE: 59 BPM

## 2024-05-28 DIAGNOSIS — T14.8XXA MUSCULOLIGAMENTOUS STRAIN: ICD-10-CM

## 2024-05-28 DIAGNOSIS — M50.30 DEGENERATIVE DISC DISEASE, CERVICAL: Primary | ICD-10-CM

## 2024-05-28 DIAGNOSIS — Z01.818 ENCOUNTER FOR OTHER PREPROCEDURAL EXAMINATION: ICD-10-CM

## 2024-05-28 DIAGNOSIS — M54.12 CERVICAL RADICULOPATHY: ICD-10-CM

## 2024-05-28 PROCEDURE — 99213 OFFICE O/P EST LOW 20 MIN: CPT | Mod: S$GLB,,, | Performed by: NEUROLOGICAL SURGERY

## 2024-05-28 PROCEDURE — 3079F DIAST BP 80-89 MM HG: CPT | Mod: CPTII,S$GLB,, | Performed by: NEUROLOGICAL SURGERY

## 2024-05-28 PROCEDURE — 1159F MED LIST DOCD IN RCRD: CPT | Mod: CPTII,S$GLB,, | Performed by: NEUROLOGICAL SURGERY

## 2024-05-28 PROCEDURE — 1125F AMNT PAIN NOTED PAIN PRSNT: CPT | Mod: CPTII,S$GLB,, | Performed by: NEUROLOGICAL SURGERY

## 2024-05-28 PROCEDURE — 3044F HG A1C LEVEL LT 7.0%: CPT | Mod: CPTII,S$GLB,, | Performed by: NEUROLOGICAL SURGERY

## 2024-05-28 PROCEDURE — 99999 PR PBB SHADOW E&M-EST. PATIENT-LVL III: CPT | Mod: PBBFAC,,, | Performed by: NEUROLOGICAL SURGERY

## 2024-05-28 PROCEDURE — 3077F SYST BP >= 140 MM HG: CPT | Mod: CPTII,S$GLB,, | Performed by: NEUROLOGICAL SURGERY

## 2024-05-28 PROCEDURE — 1101F PT FALLS ASSESS-DOCD LE1/YR: CPT | Mod: CPTII,S$GLB,, | Performed by: NEUROLOGICAL SURGERY

## 2024-05-28 PROCEDURE — 3008F BODY MASS INDEX DOCD: CPT | Mod: CPTII,S$GLB,, | Performed by: NEUROLOGICAL SURGERY

## 2024-05-28 PROCEDURE — 4010F ACE/ARB THERAPY RXD/TAKEN: CPT | Mod: CPTII,S$GLB,, | Performed by: NEUROLOGICAL SURGERY

## 2024-05-28 PROCEDURE — 3288F FALL RISK ASSESSMENT DOCD: CPT | Mod: CPTII,S$GLB,, | Performed by: NEUROLOGICAL SURGERY

## 2024-05-28 NOTE — PROGRESS NOTES
Subjective:      Patient ID: Sana Crenshaw is a 73 y.o. female.    Chief Complaint: Degenerative disc disease, cervical    Patient is here today for follow-up of her cervical spine  Prior she was seen for neck pain and upper extremity symptoms found to have both cervical as well as peripheral nerve compression  She ended up being evaluated by Orthopedics and had a cubital tunnel release with Dr. Nelson  She comes in with persistent neck pain rated 8/10  Currently taking Cymbalta  Patient's pain is more in the neck than in the upper extremities now  She did quite well with the ulnar nerve release with Dr. Nelson  Last time we talked about a TENS unit however this was not ordered her was not delivered will make sure this is taking care of for her this visit  She would previously used 1 in the past however this was not currently functioning  She was trying to muscle relaxer however could not tolerate it secondary to making her sleepy      Review of Systems   Constitutional:  Negative for activity change, appetite change and chills.   HENT:  Negative for congestion and ear pain.    Eyes:  Negative for photophobia, redness and visual disturbance.   Respiratory:  Negative for apnea and chest tightness.    Cardiovascular:  Negative for chest pain.   Gastrointestinal:  Negative for abdominal distention and abdominal pain.   Endocrine: Negative for cold intolerance.   Genitourinary:  Negative for difficulty urinating.   Musculoskeletal:  Positive for myalgias, neck pain and neck stiffness. Negative for arthralgias.   Skin:  Negative for color change.   Allergic/Immunologic: Negative for environmental allergies.   Neurological:  Positive for weakness and numbness. Negative for dizziness.   Hematological:  Negative for adenopathy. Does not bruise/bleed easily.   Psychiatric/Behavioral:  Negative for agitation, behavioral problems and confusion.          Objective:       Physical Exam:  Nursing note and vitals  reviewed.    Constitutional: She appears well-nourished. No distress.     Eyes: EOM are normal.     Cardiovascular: Normal rate.     Psych/Behavior: She is alert. She is oriented to person, place, and time. She has a normal mood and affect.     Musculoskeletal:        Neck: Range of motion is limited. There is tenderness. Muscle strength is 5/5.        Right Upper Extremities: There is no tenderness. Muscle strength is 5/5.        Left Upper Extremities: There is no tenderness. Muscle strength is 5/5.     Neurological:        Sensory: There is no sensory deficit in the trunk. There is no sensory deficit in the extremities.        Cranial nerves: Cranial nerve(s) II, III, IV, V, VI, VII, VIII, IX, X, XI and XII are intact.     General    Nursing note and vitals reviewed.  Constitutional: She is oriented to person, place, and time. She appears well-nourished. No distress.   Eyes: EOM are normal.   Cardiovascular:  Normal rate.            Neurological: She is alert and oriented to person, place, and time.   Psychiatric: She has a normal mood and affect.             Ortho Exam         MR  Multilevel degenerative changes as detailed above including prominent atlanto odontoid osteophytosis with mild edema in the underlying odontoid process.     Mild spinal canal stenosis with focal compression on the right hemicord at the C5-C6 and C6-C7 levels.  Faint intramedullary T2 hyperintense signal in the right pawan cord at the C6-C7 level suggesting compressive myelopathy.     Additional mild spinal canal stenosis at C3-C4, C4-C5, and C7-T1 without additional cord compression or intramedullary signal abnormality.     Varying degrees of bilateral neural foraminal stenosis as detailed above.       Assessment:     1. Degenerative disc disease, cervical    2. Cervical radiculopathy      Plan:     Degenerative disc disease, cervical    Cervical radiculopathy    I reviewed her MRI which was from 2022 showing degenerative changes and  stenosis varying degrees C3-C7 at C5-6 and C6-7 seem to have worse degenerative stenosis than the other levels  I will order the 10s unit for her again I think this might be able to offer her some relief for the muscular ligamentous symptoms she is having in the back of her neck I do think that with the ongoing neck pain it would not be a bad idea to update her imaging as her last MRI is over 2 years old  Lets get updated imaging of her MRI cervical spine without contrast and have her come back and see me after to make sure there is no worsening degenerative changes specifically at the C5-6 and C6-7 levels were previously there was a hint of possible T2 cord signal change at the C6-7 level    Thank you for the referral   Please call with any questions    Donovan Harrington MD  Neurosurgery     Disclaimer: This note was prepared using a voice recognition system and is likely to have sound alike errors within the text.

## 2024-05-30 DIAGNOSIS — M54.12 CERVICAL RADICULOPATHY: ICD-10-CM

## 2024-05-30 NOTE — TELEPHONE ENCOUNTER
No care due was identified.  [facility] Embedded Care Due Messages. Reference number: [id number].   5/30/2024 3:48:31 AM CDT

## 2024-05-30 NOTE — TELEPHONE ENCOUNTER
Refill Routing Note   Medication(s) are not appropriate for processing by [facility] for the following reason(s):        Required vitals abnormal    [facility] action(s):  Defer               Appointments  past 12m or future 3m with PCP    Date Provider   Last Visit   4/8/2024 Stuart Gage MD   Next Visit   7/8/2024 Stuart Gage MD   ED visits in past 90 days: 0        Note composed:8:37 AM 05/30/2024

## 2024-06-01 RX ORDER — DULOXETIN HYDROCHLORIDE 30 MG/1
30 CAPSULE, DELAYED RELEASE ORAL
Qty: 90 CAPSULE | Refills: 0 | Status: SHIPPED | OUTPATIENT
Start: 2024-06-01

## 2024-06-02 DIAGNOSIS — M54.12 CERVICAL RADICULOPATHY: ICD-10-CM

## 2024-06-02 NOTE — TELEPHONE ENCOUNTER
No care due was identified.  [facility] Embedded Care Due Messages. Reference number: [id number].   6/02/2024 5:22:25 PM CDT

## 2024-06-03 RX ORDER — DULOXETIN HYDROCHLORIDE 30 MG/1
30 CAPSULE, DELAYED RELEASE ORAL
Qty: 90 CAPSULE | Refills: 0 | OUTPATIENT
Start: 2024-06-03

## 2024-06-03 NOTE — TELEPHONE ENCOUNTER
Refill Decision Note   Sana Crenshaw  is requesting a refill authorization.  Brief Assessment and Rationale for Refill:  Quick Discontinue     Medication Therapy Plan:       Medication Reconciliation Completed: Yes   Comments:     No Care Gaps recommended.     Duplicate Pended Encounter(s)/ Last Prescribed Details:    Pharmacy    [facility] - [place], [place] - [address]  [address], [facility] [address]  Phone: [phone]  Fax: [phone]  DANISHA #: --   ERIN Reason: --     Outpatient Medication Detail     Disp Refills Start End ERIN   DULoxetine (CYMBALTA) 30 MG capsule 90 capsule 0 6/1/2024 -- No   Sig - Route: TAKE 1 CAPSULE EVERY DAY - Oral   Sent to pharmacy as: DULoxetine (CYMBALTA) 30 MG capsule   Class: Normal   Order: [id number]   Date/Time Signed: 6/1/2024 06:05       E-Prescribing Status: Receipt confirmed by pharmacy (6/1/2024  6:05 AM CDT)            Note composed:10:39 AM 06/03/2024   Note composed:10:39 AM 06/03/2024

## 2024-06-16 ENCOUNTER — HOSPITAL ENCOUNTER (OUTPATIENT)
Dept: RADIOLOGY | Facility: HOSPITAL | Age: 74
Discharge: HOME | End: 2024-06-16
Attending: NEUROLOGICAL SURGERY
Payer: MEDICARE

## 2024-06-16 DIAGNOSIS — Z01.818 ENCOUNTER FOR OTHER PREPROCEDURAL EXAMINATION: ICD-10-CM

## 2024-06-16 DIAGNOSIS — M54.12 CERVICAL RADICULOPATHY: ICD-10-CM

## 2024-06-16 PROCEDURE — 72141 MRI NECK SPINE W/O DYE: CPT | Mod: TC

## 2024-06-20 ENCOUNTER — OFFICE VISIT (OUTPATIENT)
Dept: PODIATRY | Facility: CLINIC | Age: 74
End: 2024-06-20
Payer: MEDICARE

## 2024-06-20 DIAGNOSIS — M20.41 HAMMER TOES OF BOTH FEET: ICD-10-CM

## 2024-06-20 DIAGNOSIS — Z87.828 HISTORY OF RUPTURE OF ACHILLES TENDON: ICD-10-CM

## 2024-06-20 DIAGNOSIS — M20.42 HAMMER TOES OF BOTH FEET: ICD-10-CM

## 2024-06-20 DIAGNOSIS — L90.9 FAT PAD ATROPHY OF FOOT: Primary | ICD-10-CM

## 2024-06-20 PROCEDURE — 99213 OFFICE O/P EST LOW 20 MIN: CPT | Mod: S$GLB,,, | Performed by: PODIATRIST

## 2024-06-20 PROCEDURE — 3288F FALL RISK ASSESSMENT DOCD: CPT | Mod: CPTII,S$GLB,, | Performed by: PODIATRIST

## 2024-06-20 PROCEDURE — 1101F PT FALLS ASSESS-DOCD LE1/YR: CPT | Mod: CPTII,S$GLB,, | Performed by: PODIATRIST

## 2024-06-20 PROCEDURE — 4010F ACE/ARB THERAPY RXD/TAKEN: CPT | Mod: CPTII,S$GLB,, | Performed by: PODIATRIST

## 2024-06-20 PROCEDURE — 1160F RVW MEDS BY RX/DR IN RCRD: CPT | Mod: CPTII,S$GLB,, | Performed by: PODIATRIST

## 2024-06-20 PROCEDURE — 99999 PR PBB SHADOW E&M-EST. PATIENT-LVL III: CPT | Mod: PBBFAC,,, | Performed by: PODIATRIST

## 2024-06-20 PROCEDURE — 1159F MED LIST DOCD IN RCRD: CPT | Mod: CPTII,S$GLB,, | Performed by: PODIATRIST

## 2024-06-20 PROCEDURE — 3044F HG A1C LEVEL LT 7.0%: CPT | Mod: CPTII,S$GLB,, | Performed by: PODIATRIST

## 2024-06-20 PROCEDURE — 1125F AMNT PAIN NOTED PAIN PRSNT: CPT | Mod: CPTII,S$GLB,, | Performed by: PODIATRIST

## 2024-06-20 NOTE — PROGRESS NOTES
Subjective:       Patient ID: Sana Crenshaw is a 73 y.o. female.    Chief Complaint: Foot Problem (Patient complains 8/10 pain at present. Patient is nondiabetic. Patient states, right 2nd and 3rd toe are the worse. Left heel causes intense pain. )      HPI: Sana Crenshaw presents today in clinic to assess pain to the bilateral digits.  She states 8/10 pain.  Relates pain increases with ambulation barefoot or in shower.  She also complains of pain to the left heel as well when ambulating outside of shoe gear.  Does have a history of left Achilles tendon rupture and ambulates with an [place] brace on the left lower extremity.  States she has having no issues with weakness or ambulation. Patient's Primary Care Provider is Stuart Gage MD.     Review of patient's allergies indicates:   Allergen Reactions    Augmentin [amoxicillin-pot clavulanate]      diarrhea    Gabapentin Nausea And Vomiting     Causes vertigo        Past Medical History:   Diagnosis Date    Agminate folliculitis     Arthritis     Asthma     Diarrhea     Agmentria    Hypertension     Thyroid disease        Family History   Problem Relation Name Age of Onset    Hypertension Mother      Kidney disease Mother      Heart disease Father      Heart attack Father  80    No Known Problems Sister      No Known Problems Sister      No Known Problems Sister      Breast cancer Maternal Aunt      No Known Problems Maternal Grandmother      Cancer Maternal Grandfather      No Known Problems Paternal Grandmother      No Known Problems Paternal Grandfather      No Known Problems Brother      No Known Problems Brother      No Known Problems Brother      No Known Problems Brother         Social History     Socioeconomic History    Marital status:    Tobacco Use    Smoking status: Never     Passive exposure: Never    Smokeless tobacco: Never   Substance and Sexual Activity    Alcohol use: Not Currently     Comment: Very rarely    Drug use: Never     Sexual activity: Not Currently     Social Determinants of Health     Financial Resource Strain: Low Risk  (1/24/2024)    Overall Financial Resource Strain (CARDIA)     Difficulty of Paying Living Expenses: Not hard at all   Food Insecurity: No Food Insecurity (1/24/2024)    Hunger Vital Sign     Worried About Running Out of Food in the Last Year: Never true     Ran Out of Food in the Last Year: Never true   Transportation Needs: No Transportation Needs (1/24/2024)    PRAPARE - Transportation     Lack of Transportation (Medical): No     Lack of Transportation (Non-Medical): No   Physical Activity: Unknown (1/24/2024)    Exercise Vital Sign     Days of Exercise per Week: Patient declined     Minutes of Exercise per Session: 0 min   Stress: Patient Declined (1/24/2024)    [employer] [place] [employer] - Occupational Stress Questionnaire     Feeling of Stress : Patient declined   Housing Stability: Low Risk  (1/24/2024)    Housing Stability Vital Sign     Unable to Pay for Housing in the Last Year: No     Number of Places Lived in the Last Year: 1     Unstable Housing in the Last Year: No       Past Surgical History:   Procedure Laterality Date    ANKLE FUSION Right     BREAST SURGERY      BUNIONECTOMY Right     CATARACT EXTRACTION W/  INTRAOCULAR LENS IMPLANT      EPIDURAL STEROID INJECTION INTO CERVICAL SPINE N/A 12/28/2021    Procedure: C5/6 IL FLORA with left paramedian approach with RN IV sedation;  Surgeon: Satish Segura MD;  Location: [facility];  Service: Pain Management;  Laterality: N/A;    EYE SURGERY      HYSTERECTOMY      IMPLANTATION OF PERMANENT SACRAL NERVE STIMULATOR N/A 01/08/2021    Procedure: INSERTION, NEUROSTIMULATOR, PERMANENT, SACRAL;  Surgeon: Onofre Fallon MD;  Location: [facility] OR;  Service: Urology;  Laterality: N/A;    INJECTION OF ANESTHETIC AGENT AROUND MEDIAL BRANCH NERVES INNERVATING CERVICAL FACET JOINT Left 04/27/2022    Procedure: Left C4-6 MBB with RN IV sedation;   Surgeon: Satish Segura MD;  Location: [facility];  Service: Pain Management;  Laterality: Left;    INJECTION OF ANESTHETIC AGENT INTO SACROILIAC JOINT Right 01/12/2022    Procedure: right Sacroiliac Joint Injection with RN IV sedation;  Surgeon: Satish Segura MD;  Location: [facility];  Service: Pain Management;  Laterality: Right;    INJECTION OF JOINT Right 01/12/2022    Procedure: right GT bursa injection with RN IV sedation;  Surgeon: Satish Segura MD;  Location: [facility];  Service: Pain Management;  Laterality: Right;    JOINT REPLACEMENT Bilateral     knees    OOPHORECTOMY      RETINAL DETACHMENT SURGERY      REVISION OF PROCEDURE INVOLVING SACRAL NEUROSTIMULATOR DEVICE N/A 02/23/2021    Procedure: REVISION, NEUROSTIMULATOR, SACRAL;  Surgeon: Onofre Fallon MD;  Location: [facility] OR;  Service: Urology;  Laterality: N/A;    SHOULDER ARTHROSCOPY Right     SHOULDER ARTHROSCOPY W/ ROTATOR CUFF REPAIR Left     x 2    TOTAL REDUCTION MAMMOPLASTY Bilateral 2008    ULNAR TUNNEL RELEASE Right 04/10/2023    Procedure: RELEASE, CUBITAL TUNNEL;  Surgeon: Bhavin Nelson MD;  Location: [facility];  Service: Orthopedics;  Laterality: Right;  right cubital tunnel release       Review of Systems       Objective:   There were no vitals taken for this visit.    MRI Cervical Spine Without Contrast  Narrative: EXAMINATION:  MRI CERVICAL SPINE WITHOUT CONTRAST    CLINICAL HISTORY:  Presurgical eval, C-spine; Radiculopathy, cervical region    TECHNIQUE:  Multiplanar, multisequence MR images of the cervical spine were performed without the administration of contrast.    COMPARISON:  MRI cervical spine 12/22/2022    FINDINGS:  Alignment: Straightening of the normal cervical lordosis.  There is T2-T3 grade 1 anterolisthesis.    Vertebrae: Cervical vertebral body heights are maintained.  There is occlusion of the C2-C3 facet joints, C4-C5 disc space and left C4-C5 facet joints.  Prominent C5-C6 and C6-C7 endplate  degenerative changes with endplate edema and anterior osteophyte formation.  Edema, particularly at C5-C6 has progressed from the prior.  There is new edema within the right C5-C6 facet joint no evidence of an acute fracture.  Marrow signal is otherwise within normal limits.    Discs: Disc desiccation and height loss throughout the cervical spine.  Fusion of the C4-C5 disc space.    Cord: No cord signal abnormality.  The previously identified cord signal abnormality on the right at C6-C7 is not well visualized on the current exam.    Skull base and craniocervical junction: Degenerative changes of the left greater than right atlantoaxial joint.    Degenerative findings:    C2-C3: Minor asymmetric right disc osteophyte complex.  Osseous fusion of the facet joints most pronounced on the right.  No significant neural foraminal stenosis or spinal canal stenosis.    C3-C4: Mild broad-based posterior disc osteophyte complex effaces the ventral thecal sac.  Significant bilateral facet arthropathy with ligamentum flavum hypertrophy and uncovertebral joint spurring contribute to mild spinal canal stenosis and moderate bilateral neural foraminal stenosis.  Appearance is similar to the prior.    C4-C5: Fusion of the disc space.  Broad-based posterior disc osteophyte complex contacts the ventral cord without significant deformity and contributes to mild spinal canal stenosis.  Fusion of the left facet joint.  Uncovertebral joint spurring contributes to mild bilateral neural foraminal stenosis.  Appearance is similar to the prior.    C5-C6: Broad-based posterior disc osteophyte complex contacts and significantly flattens the ventral cord.  Bilateral facet arthropathy with ligamentum flavum hypertrophy and uncovertebral joint spurring contributes to moderate spinal canal stenosis and moderate bilateral neural foraminal stenosis.  Canal stenosis appears progressed from the prior.    C6-C7: Mild broad-based posterior disc osteophyte  complex is asymmetric to the right.  Mild bilateral facet arthropathy contributes to mild spinal canal stenosis, mild-to-moderate right and mild left-sided neural foraminal stenosis.  Appearance is similar to the prior.    C7-T1: Broad-based posterior disc osteophyte complex effaces the ventral thecal sac.  No ventral cord contact.  Bilateral facet arthropathy with prominent ligamentum flavum hypertrophy contributes to mild spinal canal stenosis with moderate right and mild left-sided neural foraminal stenosis.  Appearance is similar to the prior.    T1-T2: Mild asymmetric right disc bulge and right greater than left facet arthropathy contributes to moderate right and mild left-sided neural foraminal stenosis.  No significant spinal canal stenosis.    Paraspinal muscles & soft tissues: Within normal limits.  Impression: Multilevel degenerative changes of the cervical spine are most pronounced at C5-C6 with moderate spinal canal stenosis and moderate bilateral neural foraminal stenosis.  Progressed spinal canal stenosis and Modic type 1 changes as compared to the prior.    Multilevel degenerative changes of the cervical spine are otherwise similar to the prior with mild spinal canal stenosis mild-to-moderate neural foraminal stenosis at C3-C4, C4-C5, C6-C7 and C7-T1.    Electronically signed by: Perry Jay  Date:    06/17/2024  Time:    08:01       Physical Exam    LOWER EXTREMITY PHYSICAL EXAMINATION    ORTHOPEDIC:  Hammertoe formation present to multiple digits on the right foot and left foot.  Deformity is semi-rigid in nature.  Pain is noted at the distal tuft of the affected lesser digits.  Fat pad atrophy noted.  Large 5-7 cm gap to the left Achilles tendon where previous rupture was noted.  Patient does have weakness with plantar flexion versus resistance.  Deep posterior muscles appear to be working properly and patient is able to flex and extend the digits.  She does have weakness with ambulation.Gait  pattern is antalgic at present.     DERMATOLOGY: There is no noted erythema or cellulitis noted. No ecchymosis is noted. Skin is supple, dry and intact. There is no palpable bursa noted at the achilles tendon insertion.  Skin is moist.  No ulcerations.  No calluses.     NEUROLOGY: Proprioception is intact, bilateral. Sensation to light touch is intact. Negative Tinel's Sign and negative Valleix sign. No neurological sensations with compression of the area of Bhatia's Nerve in the area of the Abductor Hallucis muscle belly.    VASCULAR:  The right dorsalis pedis pulse 2/4 and the right posterior tibial pulse 2/4.  The left dorsalis pedis pulse 2/4 and posterior tibial pulse on the left is 2/4.  Capillary refill is intact.  Pedal hair growth intact      Assessment:     1. Fat pad atrophy of foot    2. Hammer toes of both feet    3. History of rupture of Achilles tendon          Plan:     Fat pad atrophy of foot    Hammer toes of both feet    History of rupture of Achilles tendon      Thorough discussion is had with the patient this afternoon, concerning the diagnosis, its etiology, and the treatment algorithm at present.    This patient does have hammertoe (digital) contractures. I did advise the patient to ambulate with shoe gear that is high in the tox box to allow for extra room and depth in the sagittal plane, in order to alleviate and lessen the potential for dorsal digital break down at the IPJs. I do also recommended shoe gear that is soft and supple in the foot bed as to lessen the potential for plantar distal digital break down at the contracted digits. If the patient does not feel the aforementioned is necessary, he or she may also purchase OTC padding devices to be worn across the MTPJ, at the distal aspects of the digits, and/or at the dorsal aspects of the IPJs. The patient does acknowledge understanding and is said to be amenable to compliance.     Continue to reinforce the importance  to utilize  AFO/[place] brace on the left lower extremity due to chronic Achilles tendon rupture.          Future Appointments   Date Time Provider Department [place]   7/8/2024 11:00 AM Stuart Gage MD [facility]   7/9/2024  3:30 PM Donovan Harrington MD [facility] NEURSUR [facility]

## 2024-07-08 ENCOUNTER — OFFICE VISIT (OUTPATIENT)
Dept: FAMILY MEDICINE | Facility: CLINIC | Age: 74
End: 2024-07-08
Payer: MEDICARE

## 2024-07-08 VITALS
TEMPERATURE: 96 F | DIASTOLIC BLOOD PRESSURE: 70 MMHG | HEIGHT: 62 IN | WEIGHT: 206.13 LBS | HEART RATE: 79 BPM | BODY MASS INDEX: 37.93 KG/M2 | SYSTOLIC BLOOD PRESSURE: 120 MMHG | OXYGEN SATURATION: 95 %

## 2024-07-08 DIAGNOSIS — M25.551 RIGHT HIP PAIN: Primary | ICD-10-CM

## 2024-07-08 DIAGNOSIS — I10 ESSENTIAL HYPERTENSION: ICD-10-CM

## 2024-07-08 DIAGNOSIS — M54.12 CERVICAL RADICULOPATHY: ICD-10-CM

## 2024-07-08 DIAGNOSIS — J20.9 ACUTE BRONCHITIS, UNSPECIFIED ORGANISM: ICD-10-CM

## 2024-07-08 DIAGNOSIS — R73.03 PREDIABETES: ICD-10-CM

## 2024-07-08 DIAGNOSIS — E66.01 SEVERE OBESITY WITH BODY MASS INDEX (BMI) OF 36.0 TO 36.9 WITH SERIOUS COMORBIDITY: ICD-10-CM

## 2024-07-08 DIAGNOSIS — Z74.09 LIMITED MOBILITY: ICD-10-CM

## 2024-07-08 DIAGNOSIS — E03.9 HYPOTHYROIDISM, UNSPECIFIED TYPE: ICD-10-CM

## 2024-07-08 PROCEDURE — 1100F PTFALLS ASSESS-DOCD GE2>/YR: CPT | Mod: CPTII,S$GLB,, | Performed by: FAMILY MEDICINE

## 2024-07-08 PROCEDURE — 1159F MED LIST DOCD IN RCRD: CPT | Mod: CPTII,S$GLB,, | Performed by: FAMILY MEDICINE

## 2024-07-08 PROCEDURE — 3288F FALL RISK ASSESSMENT DOCD: CPT | Mod: CPTII,S$GLB,, | Performed by: FAMILY MEDICINE

## 2024-07-08 PROCEDURE — 3074F SYST BP LT 130 MM HG: CPT | Mod: CPTII,S$GLB,, | Performed by: FAMILY MEDICINE

## 2024-07-08 PROCEDURE — 99214 OFFICE O/P EST MOD 30 MIN: CPT | Mod: S$GLB,,, | Performed by: FAMILY MEDICINE

## 2024-07-08 PROCEDURE — 1126F AMNT PAIN NOTED NONE PRSNT: CPT | Mod: CPTII,S$GLB,, | Performed by: FAMILY MEDICINE

## 2024-07-08 PROCEDURE — 3078F DIAST BP <80 MM HG: CPT | Mod: CPTII,S$GLB,, | Performed by: FAMILY MEDICINE

## 2024-07-08 PROCEDURE — 3044F HG A1C LEVEL LT 7.0%: CPT | Mod: CPTII,S$GLB,, | Performed by: FAMILY MEDICINE

## 2024-07-08 PROCEDURE — 4010F ACE/ARB THERAPY RXD/TAKEN: CPT | Mod: CPTII,S$GLB,, | Performed by: FAMILY MEDICINE

## 2024-07-08 PROCEDURE — 3008F BODY MASS INDEX DOCD: CPT | Mod: CPTII,S$GLB,, | Performed by: FAMILY MEDICINE

## 2024-07-08 PROCEDURE — 99999 PR PBB SHADOW E&M-EST. PATIENT-LVL IV: CPT | Mod: PBBFAC,,, | Performed by: FAMILY MEDICINE

## 2024-07-08 RX ORDER — METHOCARBAMOL 500 MG/1
500 TABLET, FILM COATED ORAL EVERY 8 HOURS PRN
COMMUNITY
Start: 2024-06-30

## 2024-07-08 RX ORDER — OXYBUTYNIN CHLORIDE 5 MG/1
5 TABLET ORAL 2 TIMES DAILY
COMMUNITY
Start: 2024-06-01

## 2024-07-08 RX ORDER — PROMETHAZINE HYDROCHLORIDE AND DEXTROMETHORPHAN HYDROBROMIDE 6.25; 15 MG/5ML; MG/5ML
5 SYRUP ORAL 3 TIMES DAILY PRN
Qty: 118 ML | Refills: 0 | Status: SHIPPED | OUTPATIENT
Start: 2024-07-08 | End: 2024-07-18

## 2024-07-08 RX ORDER — DOXYCYCLINE 100 MG/1
100 CAPSULE ORAL 2 TIMES DAILY
Qty: 20 CAPSULE | Refills: 0 | Status: SHIPPED | OUTPATIENT
Start: 2024-07-08

## 2024-07-08 RX ORDER — ROPINIROLE 2 MG/1
2 TABLET, FILM COATED ORAL NIGHTLY
Qty: 90 TABLET | Refills: 1 | Status: SHIPPED | OUTPATIENT
Start: 2024-07-08

## 2024-07-08 RX ORDER — KETOROLAC TROMETHAMINE 10 MG/1
10 TABLET, FILM COATED ORAL EVERY 6 HOURS PRN
COMMUNITY
Start: 2024-06-30

## 2024-07-08 NOTE — PROGRESS NOTES
Subjective:       Patient ID: Sana Crenshaw is a 73 y.o. female.    Chief Complaint: Follow-up      HPI Comments:       Current Outpatient Medications:     albuterol (PROVENTIL/VENTOLIN HFA) 90 mcg/actuation inhaler, Inhale 2 puffs into the lungs every 6 (six) hours as needed for Wheezing. Dispense with spacer., Disp: 18 g, Rfl: 3    amLODIPine (NORVASC) 5 MG tablet, TAKE 1 TABLET EVERY DAY, Disp: 90 tablet, Rfl: 3    amoxicillin-clavulanate 875-125mg (AUGMENTIN) 875-125 mg per tablet, Take 1 tablet by mouth every 12 (twelve) hours., Disp: 20 tablet, Rfl: 0    aspirin (ECOTRIN) 81 MG EC tablet, Take 81 mg by mouth once daily., Disp: , Rfl:     DULoxetine (CYMBALTA) 30 MG capsule, TAKE 1 CAPSULE EVERY DAY, Disp: 90 capsule, Rfl: 0    ketorolac (TORADOL) 10 mg tablet, Take 10 mg by mouth every 6 (six) hours as needed., Disp: , Rfl:     levothyroxine (SYNTHROID) 50 MCG tablet, Take 1 tablet (50 mcg total) by mouth before breakfast., Disp: 90 tablet, Rfl: 3    lisinopriL (PRINIVIL,ZESTRIL) 20 MG tablet, TAKE 1 TABLET ONE TIME DAILY (DOSE CHANGE), Disp: 90 tablet, Rfl: 3    methocarbamoL (ROBAXIN) 500 MG Tab, Take 500 mg by mouth every 8 (eight) hours as needed., Disp: , Rfl:     metoprolol succinate (TOPROL-XL) 100 MG 24 hr tablet, Take 1 tablet (100 mg total) by mouth once daily., Disp: 90 tablet, Rfl: 3    omeprazole (PRILOSEC) 20 MG capsule, Take 1 capsule (20 mg total) by mouth 2 (two) times daily., Disp: 180 capsule, Rfl: 3    oxybutynin (DITROPAN) 5 MG Tab, Take 5 mg by mouth 2 (two) times daily., Disp: , Rfl:     oxybutynin (DITROPAN-XL) 10 MG 24 hr tablet, Take 10 mg by mouth once daily., Disp: , Rfl:     doxycycline (MONODOX) 100 MG capsule, Take 1 capsule (100 mg total) by mouth 2 (two) times daily., Disp: 20 capsule, Rfl: 0    promethazine-dextromethorphan (PROMETHAZINE-DM) 6.25-15 mg/5 mL Syrp, Take 5 mLs by mouth 3 (three) times daily as needed., Disp: 118 mL, Rfl: 0    rOPINIRole (REQUIP) 2 MG tablet,  Take 1 tablet (2 mg total) by mouth every evening., Disp: 90 tablet, Rfl: 1    tiZANidine (ZANAFLEX) 4 MG tablet, Take 1 tablet (4 mg total) by mouth every 8 (eight) hours as needed (muscle spasms)., Disp: 270 tablet, Rfl: 1  No current facility-administered medications for this visit.    Facility-Administered Medications Ordered in Other Visits:     chlorhexidine 0.12 % solution 10 mL, 10 mL, Mouth/Throat, On Call Procedure, Scarlet Cooney PA-C    lactated ringers infusion, , Intravenous, Continuous, Franky Hartman MD, Last Rate: 20 mL/hr at 01/08/21 0731, New Bag at 01/08/21 0925    lactated ringers infusion, , Intravenous, Continuous, Estevan Rodriguez MD, Stopped at 02/23/21 1645      Accidental fall about 10 days ago.  Went to [facility] for right hip and right arm pain.  CT scan showed no fracture.  Taking Advil.  Walking without AD    Complains of 3 day history of cough with green sputum.  No shortness a breath or wheezing.    Saw pain management for neck.  MRI pending.  Using 10s unit    Follow-up  Associated symptoms include arthralgias, coughing, joint swelling and neck pain. Pertinent negatives include no chest pain, headaches, vomiting or weakness.     Review of Systems   Constitutional:  Negative for activity change and unexpected weight change.   HENT:  Negative for hearing loss, rhinorrhea and trouble swallowing.    Eyes:  Negative for discharge and visual disturbance.   Respiratory:  Positive for cough. Negative for chest tightness and wheezing.    Cardiovascular:  Negative for chest pain and palpitations.   Gastrointestinal:  Negative for blood in stool, constipation, diarrhea and vomiting.   Endocrine: Negative for polydipsia and polyuria.   Genitourinary:  Negative for difficulty urinating, dysuria, hematuria and menstrual problem.   Musculoskeletal:  Positive for arthralgias, joint swelling and neck pain.   Neurological:  Negative for weakness and headaches.   Psychiatric/Behavioral:  Negative  "for confusion and dysphoric mood.        Objective:      Vitals:    07/08/24 1058 07/08/24 1113   BP: (!) 140/74 120/70   Pulse: 79    Temp: 96.3 °F (35.7 °C)    TempSrc: Tympanic    SpO2: 95%    Weight: 93.5 kg (206 lb 2.1 oz)    Height: 5' 2" (1.575 m)    PainSc: 0-No pain      Physical Exam  Vitals and nursing note reviewed.   Constitutional:       General: She is not in acute distress.     Appearance: She is well-developed. She is not diaphoretic.   HENT:      Head: Normocephalic.   Neck:      Thyroid: No thyromegaly.   Cardiovascular:      Rate and Rhythm: Normal rate and regular rhythm.      Heart sounds: Normal heart sounds. No murmur heard.  Pulmonary:      Effort: Pulmonary effort is normal.      Breath sounds: Normal breath sounds. No wheezing or rales.   Abdominal:      General: There is no distension.      Palpations: Abdomen is soft.   Musculoskeletal:      Cervical back: Neck supple.   Lymphadenopathy:      Cervical: No cervical adenopathy.   Skin:     General: Skin is warm and dry.   Neurological:      Mental Status: She is alert and oriented to person, place, and time.   Psychiatric:         Mood and Affect: Mood normal.         Behavior: Behavior normal.         Thought Content: Thought content normal.         Judgment: Judgment normal.         Assessment:       1. Right hip pain    2. Limited mobility    3. Essential hypertension    4. Prediabetes    5. Severe obesity with body mass index (BMI) of 36.0 to 36.9 with serious comorbidity    6. Hypothyroidism, unspecified type    7. Cervical radiculopathy    8. Acute bronchitis, unspecified organism        Plan:   Right hip pain  Comments:  Continue Advil as needed    Limited mobility  Comments:  Uses Handy at times.  No AD    Essential hypertension  Comments:  Controlled    Prediabetes  Comments:  A1c 5.8    Severe obesity with body mass index (BMI) of 36.0 to 36.9 with serious comorbidity  Comments:  No change    Hypothyroidism, unspecified " type  Comments:  Euthyroid on current dose    Cervical radiculopathy  Comments:  Followed by pain management.  MRI pending.  10s unit    Acute bronchitis, unspecified organism  Comments:  Doxycycline    Other orders  -     doxycycline (MONODOX) 100 MG capsule; Take 1 capsule (100 mg total) by mouth 2 (two) times daily.  Dispense: 20 capsule; Refill: 0  -     promethazine-dextromethorphan (PROMETHAZINE-DM) 6.25-15 mg/5 mL Syrp; Take 5 mLs by mouth 3 (three) times daily as needed.  Dispense: 118 mL; Refill: 0  -     rOPINIRole (REQUIP) 2 MG tablet; Take 1 tablet (2 mg total) by mouth every evening.  Dispense: 90 tablet; Refill: 1

## 2024-07-09 ENCOUNTER — TELEPHONE (OUTPATIENT)
Dept: NEUROSURGERY | Facility: CLINIC | Age: 74
End: 2024-07-09
Payer: MEDICARE

## 2024-07-09 NOTE — TELEPHONE ENCOUNTER
Left vm informing the pt her appt was rescheduled on July 9 to August 13 at 4pm but I also let her know she could come in at 2pm.

## 2024-07-29 RX ORDER — LEVOTHYROXINE SODIUM 50 UG/1
50 TABLET ORAL
Qty: 90 TABLET | Refills: 2 | Status: SHIPPED | OUTPATIENT
Start: 2024-07-29

## 2024-07-29 NOTE — TELEPHONE ENCOUNTER
No care due was identified.  Health [facility] Embedded Care Due Messages. Reference number: [id number].   7/29/2024 1:25:45 AM CDT

## 2024-07-30 NOTE — TELEPHONE ENCOUNTER
Refill Decision Note   Sana Crenshaw  is requesting a refill authorization.  Brief Assessment and Rationale for Refill:  Approve     Medication Therapy Plan:       Medication Reconciliation Completed: No   Comments:     No Care Gaps recommended.     Note composed:9:32 PM 07/29/2024

## 2024-08-01 ENCOUNTER — OFFICE VISIT (OUTPATIENT)
Dept: NEUROSURGERY | Facility: CLINIC | Age: 74
End: 2024-08-01
Payer: MEDICARE

## 2024-08-01 VITALS
SYSTOLIC BLOOD PRESSURE: 134 MMHG | BODY MASS INDEX: 38.14 KG/M2 | WEIGHT: 207.25 LBS | HEART RATE: 62 BPM | HEIGHT: 62 IN | DIASTOLIC BLOOD PRESSURE: 78 MMHG

## 2024-08-01 DIAGNOSIS — M54.12 CERVICAL RADICULOPATHY: ICD-10-CM

## 2024-08-01 DIAGNOSIS — M50.30 DEGENERATIVE DISC DISEASE, CERVICAL: Primary | ICD-10-CM

## 2024-08-01 DIAGNOSIS — M47.892 OTHER SPONDYLOSIS, CERVICAL REGION: ICD-10-CM

## 2024-08-01 PROCEDURE — 3078F DIAST BP <80 MM HG: CPT | Mod: CPTII,S$GLB,, | Performed by: NEUROLOGICAL SURGERY

## 2024-08-01 PROCEDURE — 99214 OFFICE O/P EST MOD 30 MIN: CPT | Mod: S$GLB,,, | Performed by: NEUROLOGICAL SURGERY

## 2024-08-01 PROCEDURE — 3075F SYST BP GE 130 - 139MM HG: CPT | Mod: CPTII,S$GLB,, | Performed by: NEUROLOGICAL SURGERY

## 2024-08-01 PROCEDURE — 1125F AMNT PAIN NOTED PAIN PRSNT: CPT | Mod: CPTII,S$GLB,, | Performed by: NEUROLOGICAL SURGERY

## 2024-08-01 PROCEDURE — 3044F HG A1C LEVEL LT 7.0%: CPT | Mod: CPTII,S$GLB,, | Performed by: NEUROLOGICAL SURGERY

## 2024-08-01 PROCEDURE — 3008F BODY MASS INDEX DOCD: CPT | Mod: CPTII,S$GLB,, | Performed by: NEUROLOGICAL SURGERY

## 2024-08-01 PROCEDURE — 4010F ACE/ARB THERAPY RXD/TAKEN: CPT | Mod: CPTII,S$GLB,, | Performed by: NEUROLOGICAL SURGERY

## 2024-08-01 PROCEDURE — 99999 PR PBB SHADOW E&M-EST. PATIENT-LVL III: CPT | Mod: PBBFAC,,, | Performed by: NEUROLOGICAL SURGERY

## 2024-08-01 NOTE — PROGRESS NOTES
Subjective:      Patient ID: Sana Crenshaw is a 73 y.o. female.    Chief Complaint: Degenerative disc disease, cervical    Patient here for follow-up  She has a new MRI cervical spine review  Symptoms are essentially unchanged from previous    Previous notes  Patient here for follow up cervical spine   She has a new MR to review   Neck pain 8/10   Radiates through the L > R   Ambulates unassisted   Last PT was 3 mo ago   FLORA with PM with no lasting relief   No weakness N/T         Review of Systems   Constitutional:  Negative for activity change, appetite change and chills.   HENT:  Negative for congestion and ear pain.    Eyes:  Negative for photophobia, redness and visual disturbance.   Respiratory:  Negative for apnea and chest tightness.    Cardiovascular:  Negative for chest pain.   Gastrointestinal:  Negative for abdominal distention and abdominal pain.   Endocrine: Negative for cold intolerance.   Genitourinary:  Negative for difficulty urinating.   Musculoskeletal:  Positive for myalgias, neck pain and neck stiffness. Negative for arthralgias.   Skin:  Negative for color change.   Allergic/Immunologic: Negative for environmental allergies.   Neurological:  Positive for weakness and numbness. Negative for dizziness.   Hematological:  Negative for adenopathy. Does not bruise/bleed easily.   Psychiatric/Behavioral:  Negative for agitation, behavioral problems and confusion.          Objective:       Physical Exam:  Nursing note and vitals reviewed.    Constitutional: She appears well-nourished. No distress.     Eyes: EOM are normal.     Cardiovascular: Normal rate.     Psych/Behavior: She is alert. She is oriented to person, place, and time. She has a normal mood and affect.     Musculoskeletal:        Neck: Range of motion is limited. There is tenderness. Muscle strength is 5/5.        Right Upper Extremities: There is no tenderness. Muscle strength is 5/5.        Left Upper Extremities: There is no  tenderness. Muscle strength is 5/5.     Neurological:        Sensory: There is no sensory deficit in the trunk. There is no sensory deficit in the extremities.        Cranial nerves: Cranial nerve(s) II, III, IV, V, VI, VII, VIII, IX, X, XI and XII are intact.     General    Nursing note and vitals reviewed.  Constitutional: She is oriented to person, place, and time. She appears well-nourished. No distress.   Eyes: EOM are normal.   Cardiovascular:  Normal rate.            Neurological: She is alert and oriented to person, place, and time.   Psychiatric: She has a normal mood and affect.             Ortho Exam     MR cervical   Multilevel degenerative changes of the cervical spine are most pronounced at C5-C6 with moderate spinal canal stenosis and moderate bilateral neural foraminal stenosis.  Progressed spinal canal stenosis and Modic type 1 changes as compared to the prior.     Multilevel degenerative changes of the cervical spine are otherwise similar to the prior with mild spinal canal stenosis mild-to-moderate neural foraminal stenosis at C3-C4, C4-C5, C6-C7 and C7-T1.         Assessment:     1. Degenerative disc disease, cervical    2. Cervical radiculopathy      Plan:     Degenerative disc disease, cervical    Cervical radiculopathy    Patient with progress degenerative cervical spine disease with advanced stenosis and foraminal stenosis contributing to her symptoms at C5-6 and to a lesser degree C6-7  I have offered her an anterior cervical diskectomy and fusion from C5-C7  Decompress the thecal sac as well as to decompres the foramen bilaterally    The patient was informed of all benefits and potential risk of the operation including but not limited to:  Pain, infection, bleeding, coma, paralysis, death.  Cerebrospinal fluid leak, failure of any instrumentation, the need for additional procedures in the future. No guarantee was given that this procedure would alleviate all of the symptoms.    Consents  signed today in the office    Will call with surgical date once cleared clinically      Thank you for the referral   Please call with any questions    Donovan Harrington MD  Neurosurgery     Disclaimer: This note was prepared using a voice recognition system and is likely to have sound alike errors within the text.

## 2024-08-14 ENCOUNTER — OFFICE VISIT (OUTPATIENT)
Dept: FAMILY MEDICINE | Facility: CLINIC | Age: 74
End: 2024-08-14
Payer: MEDICARE

## 2024-08-14 VITALS
HEIGHT: 62 IN | WEIGHT: 209.13 LBS | SYSTOLIC BLOOD PRESSURE: 120 MMHG | HEART RATE: 67 BPM | TEMPERATURE: 97 F | BODY MASS INDEX: 38.48 KG/M2 | DIASTOLIC BLOOD PRESSURE: 78 MMHG | OXYGEN SATURATION: 95 %

## 2024-08-14 DIAGNOSIS — M54.12 CERVICAL RADICULOPATHY: ICD-10-CM

## 2024-08-14 DIAGNOSIS — E66.01 SEVERE OBESITY WITH BODY MASS INDEX (BMI) OF 36.0 TO 36.9 WITH SERIOUS COMORBIDITY: ICD-10-CM

## 2024-08-14 DIAGNOSIS — Z74.09 LIMITED MOBILITY: ICD-10-CM

## 2024-08-14 DIAGNOSIS — E03.9 HYPOTHYROIDISM, UNSPECIFIED TYPE: ICD-10-CM

## 2024-08-14 DIAGNOSIS — I10 ESSENTIAL HYPERTENSION: ICD-10-CM

## 2024-08-14 DIAGNOSIS — R73.03 PREDIABETES: ICD-10-CM

## 2024-08-14 DIAGNOSIS — J20.9 ACUTE BRONCHITIS, UNSPECIFIED ORGANISM: Primary | ICD-10-CM

## 2024-08-14 PROCEDURE — 3074F SYST BP LT 130 MM HG: CPT | Mod: CPTII,S$GLB,, | Performed by: FAMILY MEDICINE

## 2024-08-14 PROCEDURE — G2211 COMPLEX E/M VISIT ADD ON: HCPCS | Mod: S$GLB,,, | Performed by: FAMILY MEDICINE

## 2024-08-14 PROCEDURE — 3288F FALL RISK ASSESSMENT DOCD: CPT | Mod: CPTII,S$GLB,, | Performed by: FAMILY MEDICINE

## 2024-08-14 PROCEDURE — 1126F AMNT PAIN NOTED NONE PRSNT: CPT | Mod: CPTII,S$GLB,, | Performed by: FAMILY MEDICINE

## 2024-08-14 PROCEDURE — 3044F HG A1C LEVEL LT 7.0%: CPT | Mod: CPTII,S$GLB,, | Performed by: FAMILY MEDICINE

## 2024-08-14 PROCEDURE — 4010F ACE/ARB THERAPY RXD/TAKEN: CPT | Mod: CPTII,S$GLB,, | Performed by: FAMILY MEDICINE

## 2024-08-14 PROCEDURE — 3008F BODY MASS INDEX DOCD: CPT | Mod: CPTII,S$GLB,, | Performed by: FAMILY MEDICINE

## 2024-08-14 PROCEDURE — 99999 PR PBB SHADOW E&M-EST. PATIENT-LVL IV: CPT | Mod: PBBFAC,,, | Performed by: FAMILY MEDICINE

## 2024-08-14 PROCEDURE — 99214 OFFICE O/P EST MOD 30 MIN: CPT | Mod: S$GLB,,, | Performed by: FAMILY MEDICINE

## 2024-08-14 PROCEDURE — 1101F PT FALLS ASSESS-DOCD LE1/YR: CPT | Mod: CPTII,S$GLB,, | Performed by: FAMILY MEDICINE

## 2024-08-14 PROCEDURE — 1159F MED LIST DOCD IN RCRD: CPT | Mod: CPTII,S$GLB,, | Performed by: FAMILY MEDICINE

## 2024-08-14 PROCEDURE — 3078F DIAST BP <80 MM HG: CPT | Mod: CPTII,S$GLB,, | Performed by: FAMILY MEDICINE

## 2024-08-14 RX ORDER — DULOXETIN HYDROCHLORIDE 30 MG/1
30 CAPSULE, DELAYED RELEASE ORAL
Qty: 90 CAPSULE | Refills: 2 | Status: SHIPPED | OUTPATIENT
Start: 2024-08-14

## 2024-08-14 RX ORDER — AZITHROMYCIN 250 MG/1
TABLET, FILM COATED ORAL
Qty: 6 TABLET | Refills: 0 | Status: SHIPPED | OUTPATIENT
Start: 2024-08-14 | End: 2024-08-19

## 2024-08-14 NOTE — TELEPHONE ENCOUNTER
No care due was identified.  Health [facility] Embedded Care Due Messages. Reference number: [id number].   8/14/2024 4:21:39 AM CDT

## 2024-08-14 NOTE — TELEPHONE ENCOUNTER
Refill Decision Note   Sana Crenshaw  is requesting a refill authorization.  Brief Assessment and Rationale for Refill:  Approve     Medication Therapy Plan:         Comments:     Note composed:10:19 AM 08/14/2024

## 2024-08-14 NOTE — PROGRESS NOTES
Subjective:       Patient ID: Sana Crenshaw is a 73 y.o. female.    Chief Complaint: Cough      HPI Comments:       Current Outpatient Medications:     amLODIPine (NORVASC) 5 MG tablet, TAKE 1 TABLET EVERY DAY, Disp: 90 tablet, Rfl: 3    amoxicillin-clavulanate 875-125mg (AUGMENTIN) 875-125 mg per tablet, Take 1 tablet by mouth every 12 (twelve) hours., Disp: 20 tablet, Rfl: 0    aspirin (ECOTRIN) 81 MG EC tablet, Take 81 mg by mouth once daily., Disp: , Rfl:     doxycycline (MONODOX) 100 MG capsule, Take 1 capsule (100 mg total) by mouth 2 (two) times daily., Disp: 20 capsule, Rfl: 0    DULoxetine (CYMBALTA) 30 MG capsule, TAKE 1 CAPSULE EVERY DAY, Disp: 90 capsule, Rfl: 2    levothyroxine (SYNTHROID) 50 MCG tablet, TAKE 1 TABLET EVERY DAY, Disp: 90 tablet, Rfl: 2    lisinopriL (PRINIVIL,ZESTRIL) 20 MG tablet, TAKE 1 TABLET ONE TIME DAILY (DOSE CHANGE), Disp: 90 tablet, Rfl: 3    metoprolol succinate (TOPROL-XL) 100 MG 24 hr tablet, Take 1 tablet (100 mg total) by mouth once daily., Disp: 90 tablet, Rfl: 3    omeprazole (PRILOSEC) 20 MG capsule, Take 1 capsule (20 mg total) by mouth 2 (two) times daily., Disp: 180 capsule, Rfl: 3    oxybutynin (DITROPAN) 5 MG Tab, Take 5 mg by mouth 2 (two) times daily., Disp: , Rfl:     oxybutynin (DITROPAN-XL) 10 MG 24 hr tablet, Take 10 mg by mouth once daily., Disp: , Rfl:     rOPINIRole (REQUIP) 2 MG tablet, Take 1 tablet (2 mg total) by mouth every evening., Disp: 90 tablet, Rfl: 1    albuterol (PROVENTIL/VENTOLIN HFA) 90 mcg/actuation inhaler, Inhale 2 puffs into the lungs every 6 (six) hours as needed for Wheezing. Dispense with spacer., Disp: 18 g, Rfl: 3    azithromycin (Z-DILSHAD) 250 MG tablet, Take 2 tablets by mouth on day 1; Take 1 tablet by mouth on days 2-5, Disp: 6 tablet, Rfl: 0    ketorolac (TORADOL) 10 mg tablet, Take 10 mg by mouth every 6 (six) hours as needed., Disp: , Rfl:     methocarbamoL (ROBAXIN) 500 MG Tab, Take 500 mg by mouth every 8 (eight) hours as  needed., Disp: , Rfl:     tiZANidine (ZANAFLEX) 4 MG tablet, Take 1 tablet (4 mg total) by mouth every 8 (eight) hours as needed (muscle spasms)., Disp: 270 tablet, Rfl: 1  No current facility-administered medications for this visit.    Facility-Administered Medications Ordered in Other Visits:     chlorhexidine 0.12 % solution 10 mL, 10 mL, Mouth/Throat, On Call Procedure, Scarlet Cooney PA-C    lactated ringers infusion, , Intravenous, Continuous, Franky Hartman MD, Last Rate: 20 mL/hr at 01/08/21 0731, New Bag at 01/08/21 0925    lactated ringers infusion, , Intravenous, Continuous, Estevan Rodriguez MD, Stopped at 02/23/21 1645      Last seen a month ago and treated for infection with doxycycline.  Got better.  Now with a 4 day history of nasal congestion very little rhinorrhea, cough and chills.  Denies shortness a breath or wheezing.  No body aches or headaches.  She is a nonsmoker    Saw neuro surgery last week.  Plans to do a decompression cervical disc surgery next after a bone density test    Cough  This is a recurrent problem. The current episode started in the past 7 days. The problem has been gradually worsening. The problem occurs every few minutes. The cough is Productive of sputum. Associated symptoms include nasal congestion and a sore throat. Pertinent negatives include no chest pain, chills, ear congestion, ear pain, fever, headaches, heartburn, hemoptysis, myalgias, postnasal drip, rash, rhinorrhea, shortness of breath, sweats, weight loss or wheezing. The symptoms are aggravated by lying down. Her past medical history is significant for asthma, bronchitis and pneumonia. There is no history of bronchiectasis, COPD, emphysema or environmental allergies.     Review of Systems   Constitutional:  Negative for chills, fever and weight loss.   HENT:  Positive for congestion and sore throat. Negative for ear pain, postnasal drip and rhinorrhea.    Respiratory:  Positive for cough. Negative for  "hemoptysis, shortness of breath and wheezing.    Cardiovascular:  Negative for chest pain.   Gastrointestinal:  Negative for heartburn.   Musculoskeletal:  Negative for myalgias.   Skin:  Negative for rash.   Allergic/Immunologic: Negative for environmental allergies.   Neurological:  Negative for headaches.       Objective:      Vitals:    08/14/24 1142   BP: 120/78   Pulse: 67   Temp: 96.8 °F (36 °C)   TempSrc: Tympanic   SpO2: 95%   Weight: 94.8 kg (209 lb 1.7 oz)   Height: 5' 2" (1.575 m)   PainSc: 0-No pain     Physical Exam  Vitals and nursing note reviewed.   Constitutional:       General: She is not in acute distress.     Appearance: She is well-developed. She is ill-appearing. She is not diaphoretic.   HENT:      Head: Normocephalic.      Nose: Mucosal edema present. No rhinorrhea.      Mouth/Throat:      Pharynx: No pharyngeal swelling, oropharyngeal exudate or posterior oropharyngeal erythema.   Neck:      Thyroid: No thyromegaly.   Cardiovascular:      Rate and Rhythm: Normal rate and regular rhythm.      Heart sounds: Normal heart sounds. No murmur heard.  Pulmonary:      Effort: Pulmonary effort is normal.      Breath sounds: Normal breath sounds. No wheezing or rales.   Abdominal:      General: There is no distension.      Palpations: Abdomen is soft.   Musculoskeletal:      Cervical back: Neck supple.   Lymphadenopathy:      Cervical: No cervical adenopathy.   Skin:     General: Skin is warm and dry.   Neurological:      Mental Status: She is alert and oriented to person, place, and time.   Psychiatric:         Behavior: Behavior normal.         Thought Content: Thought content normal.         Judgment: Judgment normal.         Assessment:       1. Acute bronchitis, unspecified organism    2. Limited mobility    3. Essential hypertension    4. Prediabetes    5. Severe obesity with body mass index (BMI) of 36.0 to 36.9 with serious comorbidity    6. Hypothyroidism, unspecified type    7. Cervical " radiculopathy        Plan:   Acute bronchitis, unspecified organism  Comments:  Efren.  Mucinex.  Follow-up parameters given    Limited mobility  Comments:  Uses Ole p.ralvino    Essential hypertension  Comments:  Controlled    Prediabetes  Comments:  A1c 5.8    Severe obesity with body mass index (BMI) of 36.0 to 36.9 with serious comorbidity  Comments:  No change in weight    Hypothyroidism, unspecified type  Comments:  Euthyroid on current dose    Cervical radiculopathy  Comments:  Planned for neurosurgery soon    Other orders  -     azithromycin (Z-DILSHAD) 250 MG tablet; Take 2 tablets by mouth on day 1; Take 1 tablet by mouth on days 2-5  Dispense: 6 tablet; Refill: 0

## 2024-08-19 ENCOUNTER — HOSPITAL ENCOUNTER (OUTPATIENT)
Dept: RADIOLOGY | Facility: HOSPITAL | Age: 74
Discharge: HOME | End: 2024-08-19
Attending: NEUROLOGICAL SURGERY
Payer: MEDICARE

## 2024-08-19 DIAGNOSIS — M50.30 DEGENERATIVE DISC DISEASE, CERVICAL: ICD-10-CM

## 2024-08-19 DIAGNOSIS — M54.12 CERVICAL RADICULOPATHY: ICD-10-CM

## 2024-08-19 DIAGNOSIS — M47.892 OTHER SPONDYLOSIS, CERVICAL REGION: ICD-10-CM

## 2024-08-19 PROCEDURE — 72125 CT NECK SPINE W/O DYE: CPT | Mod: TC

## 2024-08-19 PROCEDURE — 72125 CT NECK SPINE W/O DYE: CPT | Mod: 26,,, | Performed by: RADIOLOGY

## 2024-08-23 ENCOUNTER — PATIENT MESSAGE (OUTPATIENT)
Dept: NEUROSURGERY | Facility: CLINIC | Age: 74
End: 2024-08-23
Payer: MEDICARE

## 2024-08-23 DIAGNOSIS — R79.1 ABNORMAL COAGULATION PROFILE: ICD-10-CM

## 2024-08-23 DIAGNOSIS — M54.12 CERVICAL RADICULOPATHY: Primary | ICD-10-CM

## 2024-08-23 DIAGNOSIS — Z01.818 PRE-OP TESTING: ICD-10-CM

## 2024-09-03 ENCOUNTER — HOSPITAL ENCOUNTER (OUTPATIENT)
Dept: CARDIOLOGY | Facility: HOSPITAL | Age: 74
Discharge: HOME | End: 2024-09-03
Attending: NEUROLOGICAL SURGERY
Payer: MEDICARE

## 2024-09-03 ENCOUNTER — HOSPITAL ENCOUNTER (OUTPATIENT)
Dept: RADIOLOGY | Facility: HOSPITAL | Age: 74
Discharge: HOME | End: 2024-09-03
Attending: NEUROLOGICAL SURGERY
Payer: MEDICARE

## 2024-09-03 DIAGNOSIS — Z01.818 PRE-OP TESTING: ICD-10-CM

## 2024-09-03 LAB
OHS QRS DURATION: 86 MS
OHS QTC CALCULATION: 387 MS

## 2024-09-03 PROCEDURE — 93005 ELECTROCARDIOGRAM TRACING: CPT

## 2024-09-03 PROCEDURE — 93010 ELECTROCARDIOGRAM REPORT: CPT | Mod: ,,, | Performed by: INTERNAL MEDICINE

## 2024-09-03 PROCEDURE — 71046 X-RAY EXAM CHEST 2 VIEWS: CPT | Mod: TC

## 2024-09-03 PROCEDURE — 71046 X-RAY EXAM CHEST 2 VIEWS: CPT | Mod: 26,,, | Performed by: RADIOLOGY

## 2024-09-10 ENCOUNTER — OFFICE VISIT (OUTPATIENT)
Dept: INTERNAL MEDICINE | Facility: CLINIC | Age: 74
End: 2024-09-10
Payer: MEDICARE

## 2024-09-10 ENCOUNTER — HOSPITAL ENCOUNTER (OUTPATIENT)
Dept: CARDIOLOGY | Facility: HOSPITAL | Age: 74
Discharge: HOME | End: 2024-09-10
Payer: MEDICARE

## 2024-09-10 VITALS
OXYGEN SATURATION: 97 % | SYSTOLIC BLOOD PRESSURE: 151 MMHG | TEMPERATURE: 98 F | HEART RATE: 66 BPM | DIASTOLIC BLOOD PRESSURE: 81 MMHG

## 2024-09-10 DIAGNOSIS — I10 HYPERTENSION, UNSPECIFIED TYPE: ICD-10-CM

## 2024-09-10 DIAGNOSIS — K21.9 GASTROESOPHAGEAL REFLUX DISEASE WITHOUT ESOPHAGITIS: ICD-10-CM

## 2024-09-10 DIAGNOSIS — Z01.818 PRE-OP TESTING: ICD-10-CM

## 2024-09-10 DIAGNOSIS — Z01.818 PRE-OP TESTING: Primary | ICD-10-CM

## 2024-09-10 DIAGNOSIS — E03.9 HYPOTHYROIDISM, UNSPECIFIED TYPE: ICD-10-CM

## 2024-09-10 DIAGNOSIS — J45.909 ASTHMA, UNSPECIFIED ASTHMA SEVERITY, UNSPECIFIED WHETHER COMPLICATED, UNSPECIFIED WHETHER PERSISTENT: ICD-10-CM

## 2024-09-10 DIAGNOSIS — N39.46 URGE AND STRESS INCONTINENCE: ICD-10-CM

## 2024-09-10 DIAGNOSIS — M54.12 CERVICAL RADICULOPATHY: Primary | ICD-10-CM

## 2024-09-10 DIAGNOSIS — R94.31 ABNORMAL FINDING ON EKG: ICD-10-CM

## 2024-09-10 LAB
OHS QRS DURATION: 86 MS
OHS QTC CALCULATION: 408 MS

## 2024-09-10 PROCEDURE — 99999 PR PBB SHADOW E&M-EST. PATIENT-LVL III: CPT | Mod: PBBFAC,,,

## 2024-09-10 PROCEDURE — 93005 ELECTROCARDIOGRAM TRACING: CPT

## 2024-09-10 PROCEDURE — 93010 ELECTROCARDIOGRAM REPORT: CPT | Mod: ,,, | Performed by: INTERNAL MEDICINE

## 2024-09-10 NOTE — ASSESSMENT & PLAN NOTE
Known risk factors for perioperative complications: None    Difficulty with intubation is not anticipated.    Cardiac Risk Estimation: Based on the Revised Cardiac Risk index, patient is a Class risk I with a 3.9 % risk of a major cardiac event in a low risk procedure.    1.) Preoperative workup as follows: chest x-ray, ECG, hemoglobin, hematocrit, electrolytes, creatinine, glucose.  2.) Change in medication regimen before surgery: discontinue ASA 6 days before surgery, discontinue NSAIDs 5 days before surgery, hold Metformin 24 hours prior to surgery. Hold all vitamins/supplements for 7 days prior to surgery, with the exception of Potassium and Iron supplementation. Hold semaglutide/tirzepatide for 7 days prior to surgery.   3.) Prophylaxis for cardiac events with perioperative beta-blockers: not indicated.  4.) Invasive hemodynamic monitoring perioperatively: at the discretion of anesthesiologist.  5.) Deep vein thrombosis prophylaxis postoperatively: regimen to be chosen by surgical team.  6.) Surveillance for postoperative MI with ECG immediately postoperatively and on postoperati ve days 1 and 2 AND troponin levels 24 hours postoperatively and on day 4 or hospital discharge (whichever comes first): at the discretion of anesthesiologist.  7.) Current medications which may produce withdrawal symptoms if withheld perioperatively: none  8.) Other measures: Postoperative hypertension management with IV hydralazine until able to take oral medications.  [place] [facility] of co-morbidites.

## 2024-09-10 NOTE — ASSESSMENT & PLAN NOTE
- appears compensated, says it only flairs when she gets a cold  - instructed to bring albuterol inhaler the morning of surgery if needed

## 2024-09-10 NOTE — ASSESSMENT & PLAN NOTE
- take amlodipine and metoprolol the morning of surgery  - hold lisinopril the morning of surgery  - monitor BP perioperatively and treat with PRNs as needed

## 2024-09-10 NOTE — ASSESSMENT & PLAN NOTE
- EKG showed SR with 1st degree AV block on 9/3/24, new finding, not on previous EKGs  - repeat EKG today

## 2024-09-10 NOTE — PRE-PROCEDURE INSTRUCTIONS
To confirm, Surgery is scheduled on 9/16/24. We will call you after 2pm the day before Surgery with your arrival time.    *Please report to the [facility] (1st Floor) located off of [facility] (2nd Entrance/Building on the left, in front of the flag pole).  Address: [address] [place] [address] [place] [address]      !!!INSTRUCTIONS IMPORTANT!!!  DO NOT Eat, Drink, or Smoke after 12 midnight unless instructed otherwise by your Surgeon. OK to brush teeth, no gum, candy or mints!    MORNING OF SURGERY, drink small sip of water with the following medications instructed by Surgery Pre-Admit Provider:  AMLODIPINE  METOPROLOL  DULOXETINE  LEVOTHYROXINE  OMEPRAZOLE  OXYBTYNIN    *Additional Medication Instructions: BRING ALBUTEROL INHALER TO PRE OP DAY OF SURGERY!    Diabetic/Prediabetic Patients: If you take diabetic or weight loss medication, Do NOT take morning of surgery unless instructed by Doctor. Metformin to be stopped 24 hrs prior to surgery. Ozempic/ Mounjaro/ Wegovy/ Trulicity/ Semaglutide or any weight loss injections to be stopped 7 days prior to surgery. DO NOT take long-acting insulin the evening before surgery. Blood sugars will be checked in pre-op by Nurse.    !!!STOP ALL Aspirins, NSAIDS, WEIGHT LOSS INJECTIONS/PILLS, Herbal supplements, & Vitamins 7 DAYS BEFORE SURGERY!!!    ____  Avoid Alcoholic beverages 3 days prior to surgery, as it can thin the blood.  ____  NO Acrylic/fake nails or nail polish worn day of surgery (specifically hand/arm & foot surgeries).  ____  NO powder, lotions, deodorants, oils or cream on body.  ____  Remove all jewelry, piercings, & foreign objects prior to arrival and leave at home.  ____  Remove Dentures, Hearing Aids & Contact Lens prior to surgery.  ____  Bring photo ID and insurance information to hospital (Leave Valuables at Home).  ____  If going home the same day, arrange for a ride home. You will not be able to drive for 24 hrs if Anesthesia was used.    ____  Females (ages 11-60): may need to give a urine sample the morning of surgery; please see Pre op Nurse prior to using the restroom.  ____  Males: Stop ED medications (Viagra, Cialis) 24 hrs prior to surgery.  ____  Wear clean, loose fitting clothing to allow for dressings/ bandages.      Bathing Instructions:    -Shower with anti-bacterial Soap (Hibiclens or Dial) the night before surgery & the morning of surgery!   -Do not use Hibiclens soap on your face or genitals.   -Apply clean clothes after shower.  -Do not shave your face or body 2 days prior to surgery unless instructed otherwise by your Surgeon.  -Do not shave pubic hair 7 days prior to surgery (gyn pt's).    [facility] Visitor/Ride Policy:  Only 2 adults allowed in pre op/recovery area during your procedure. You MUST HAVE A RIDE HOME from a responsible adult that you know and trust. Medical Transport, Uber or Lyft can ONLY be used if patient has a responsible adult to accompany them during ride home.       *Signs and symptoms of Infection Before or After Surgery:               !!!If you experience any fever, chills, nausea/ vomiting, foul odor/ excessive drainage from surgical site, flu-like symptoms, new wounds or cuts, PLEASE CALL THE SURGEON OFFICE at [phone] or SEND MESSAGE THROUGH [employer]!!!       *If you are running late day of surgery, please call the Surgery Dept @ [phone].    *Billing question, please call:  [phone] or [phone]       Thank you,  -[facility] Pre Admit Dept.  [phone] or [phone]  M-F 7:30 am-4:00 pm (Closed Major Holidays)    Additional Tests Scheduled Today:  EKG (4TH Floor) Check in at the !

## 2024-09-10 NOTE — PROGRESS NOTES
Preoperative History and Physical                                                                   Chief Complaint: Preoperative evaluation     History of Present Illness:      Sana Crenshaw is a 73 y.o. female with PMH of HTN, hypothyroidism, arthritis, asthma, cervical radiculopathy, and overactive bladder who presents to the office today for a preoperative consultation at the request of Dr. Harrington who plans on performing ACDF on September 16.     Functional Status:      The patient is not able to climb a flight of stairs. The patient is able to ambulate independently without difficulty. The patient's functional status is not affected by the surgical problem. The patient's functional status is not affected by shortness of breath, chest pain, dyspnea on exertion and fatigue.    MET score greater than 4    Past Medical History:      Past Medical History:   Diagnosis Date    Agminate folliculitis     Arthritis     Asthma     Cervical radiculopathy     Diarrhea     Agmentria    Hypertension     Overactive bladder     Thyroid disease         Past Surgical History:      Past Surgical History:   Procedure Laterality Date    ANKLE FUSION Right     BREAST SURGERY      BUNIONECTOMY Right     CATARACT EXTRACTION W/  INTRAOCULAR LENS IMPLANT      EPIDURAL STEROID INJECTION INTO CERVICAL SPINE N/A 12/28/2021    Procedure: C5/6 IL FLORA with left paramedian approach with RN IV sedation;  Surgeon: Satish Segura MD;  Location: [facility];  Service: Pain Management;  Laterality: N/A;    EYE SURGERY      HYSTERECTOMY      IMPLANTATION OF PERMANENT SACRAL NERVE STIMULATOR N/A 01/08/2021    Procedure: INSERTION, NEUROSTIMULATOR, PERMANENT, SACRAL;  Surgeon: Onofre Fallon MD;  Location: [facility];  Service: Urology;  Laterality: N/A;    INJECTION OF ANESTHETIC AGENT AROUND MEDIAL BRANCH NERVES INNERVATING CERVICAL FACET JOINT Left 04/27/2022    Procedure: Left C4-6 MBB with RN IV  sedation;  Surgeon: Satish Segura MD;  Location: [facility];  Service: Pain Management;  Laterality: Left;    INJECTION OF ANESTHETIC AGENT INTO SACROILIAC JOINT Right 01/12/2022    Procedure: right Sacroiliac Joint Injection with RN IV sedation;  Surgeon: Satish Segura MD;  Location: [facility];  Service: Pain Management;  Laterality: Right;    INJECTION OF JOINT Right 01/12/2022    Procedure: right GT bursa injection with RN IV sedation;  Surgeon: Satish Segura MD;  Location: [facility];  Service: Pain Management;  Laterality: Right;    JOINT REPLACEMENT Bilateral     knees    OOPHORECTOMY      RETINAL DETACHMENT SURGERY      REVISION OF PROCEDURE INVOLVING SACRAL NEUROSTIMULATOR DEVICE N/A 02/23/2021    Procedure: REVISION, NEUROSTIMULATOR, SACRAL;  Surgeon: Onofre Fallon MD;  Location: [facility] OR;  Service: Urology;  Laterality: N/A;    SHOULDER ARTHROSCOPY Right     SHOULDER ARTHROSCOPY W/ ROTATOR CUFF REPAIR Left     x 2    TOTAL REDUCTION MAMMOPLASTY Bilateral 2008    ULNAR TUNNEL RELEASE Right 04/10/2023    Procedure: RELEASE, CUBITAL TUNNEL;  Surgeon: Bhavin Nelson MD;  Location: [facility] OR;  Service: Orthopedics;  Laterality: Right;  right cubital tunnel release        Social History:      Social History     Socioeconomic History    Marital status:    Tobacco Use    Smoking status: Never     Passive exposure: Never    Smokeless tobacco: Never   Substance and Sexual Activity    Alcohol use: Not Currently     Comment: Very rarely    Drug use: Never    Sexual activity: Not Currently     Social Determinants of Health     Financial Resource Strain: Low Risk  (7/7/2024)    Overall Financial Resource Strain (CARDIA)     Difficulty of Paying Living Expenses: Not hard at all   Food Insecurity: No Food Insecurity (7/7/2024)    Hunger Vital Sign     Worried About Running Out of Food in the Last Year: Never true     Ran Out of Food in the Last Year: Never true   Transportation Needs: No  Transportation Needs (1/24/2024)    PRAPARE - Transportation     Lack of Transportation (Medical): No     Lack of Transportation (Non-Medical): No   Physical Activity: Unknown (7/7/2024)    Exercise Vital Sign     Days of Exercise per Week: Patient declined     Minutes of Exercise per Session: 0 min   Stress: Patient Declined (7/7/2024)    [employer] [place] [employer] - Occupational Stress Questionnaire     Feeling of Stress : Patient declined   Housing Stability: Low Risk  (1/24/2024)    Housing Stability Vital Sign     Unable to Pay for Housing in the Last Year: No     Number of Places Lived in the Last Year: 1     Unstable Housing in the Last Year: No        Family History:      Family History   Problem Relation Name Age of Onset    Hypertension Mother      Kidney disease Mother      Heart disease Father      Heart attack Father  80    No Known Problems Sister      No Known Problems Sister      No Known Problems Sister      Breast cancer Maternal Aunt      No Known Problems Maternal Grandmother      Cancer Maternal Grandfather      No Known Problems Paternal Grandmother      No Known Problems Paternal Grandfather      No Known Problems Brother      No Known Problems Brother      No Known Problems Brother      No Known Problems Brother         Allergies:      Review of patient's allergies indicates:   Allergen Reactions    Augmentin [amoxicillin-pot clavulanate]      diarrhea    Gabapentin Nausea And Vomiting     Causes vertigo        Medications:      Current Outpatient Medications   Medication Sig    albuterol (PROVENTIL/VENTOLIN HFA) 90 mcg/actuation inhaler Inhale 2 puffs into the lungs every 6 (six) hours as needed for Wheezing. Dispense with spacer.    amLODIPine (NORVASC) 5 MG tablet TAKE 1 TABLET EVERY DAY    aspirin (ECOTRIN) 81 MG EC tablet Take 81 mg by mouth once daily.    DULoxetine (CYMBALTA) 30 MG capsule TAKE 1 CAPSULE EVERY DAY    levothyroxine (SYNTHROID) 50 MCG tablet TAKE 1 TABLET  EVERY DAY    lisinopriL (PRINIVIL,ZESTRIL) 20 MG tablet TAKE 1 TABLET ONE TIME DAILY (DOSE CHANGE)    metoprolol succinate (TOPROL-XL) 100 MG 24 hr tablet Take 1 tablet (100 mg total) by mouth once daily.    omeprazole (PRILOSEC) 20 MG capsule Take 1 capsule (20 mg total) by mouth 2 (two) times daily.    oxybutynin (DITROPAN) 5 MG Tab Take 5 mg by mouth 2 (two) times daily.    rOPINIRole (REQUIP) 2 MG tablet Take 1 tablet (2 mg total) by mouth every evening.     No current facility-administered medications for this visit.     Facility-Administered Medications Ordered in Other Visits   Medication    chlorhexidine 0.12 % solution 10 mL    lactated ringers infusion    lactated ringers infusion       Vitals:      Vitals:    09/10/24 1303   BP: (!) 151/81   Pulse: 66   Temp: 97.9 °F (36.6 °C)       Review of Systems:        Constitutional: Negative for fever, chills, weight loss, malaise/fatigue and diaphoresis.   HENT: Negative for hearing loss, ear pain, nosebleeds, congestion, sore throat, neck pain, tinnitus and ear discharge.    Eyes: Negative for blurred vision, double vision, photophobia, pain, discharge and redness.   Respiratory: Negative for cough, hemoptysis, sputum production, shortness of breath, wheezing and stridor.    Cardiovascular: Negative for chest pain, palpitations, orthopnea, claudication, leg swelling and PND.   Gastrointestinal: Negative for heartburn, nausea, vomiting, abdominal pain, diarrhea, constipation, blood in stool and melena.   Genitourinary: Negative for dysuria, urgency, frequency, hematuria and flank pain.   Musculoskeletal: Negative for myalgias, back pain, joint pain and falls.   Skin: Negative for itching and rash.   Neurological: Negative for dizziness, tingling, tremors, sensory change, speech change, focal weakness, seizures, loss of consciousness, weakness and headaches.   Endo/Heme/Allergies: Negative for environmental allergies and polydipsia. Does not bruise/bleed easily.    Psychiatric/Behavioral: Negative for depression, suicidal ideas, hallucinations, memory loss and substance abuse. The patient is not nervous/anxious and does not have insomnia.    All 14 systems reviewed and negative except as noted above.    Physical Exam:      Constitutional: Appears well-developed, well-nourished and in no acute distress.  Patient is oriented to person, place, and time.   Head: Normocephalic and atraumatic. Mucous membranes moist.  Neck: Neck supple no mass.   Cardiovascular: Normal rate and regular rhythm.  S1 S2 appreciated by ascultation.  Pulmonary/Chest: Effort normal and clear to auscultation bilaterally. No respiratory distress.   Abdomen: Soft. Non-tender and non-distended. Bowel sounds are normal.   Neurological: Patient is alert and oriented to person, place and time. Moves all extremities.  Skin: Warm and dry. No lesions.  Extremities: No clubbing, cyanosis or edema.    Laboratory data:      Reviewed and noted in plan where applicable. Please see chart for full laboratory data.    @FYTJPFLZS23(cpk,cpkmb,troponini,mb)@ @GDQPNWNFR41(poctglucose)@     Lab Results   Component Value Date    INR 1.0 09/03/2024       Lab Results   Component Value Date    WBC 6.19 09/03/2024    HGB 13.9 09/03/2024    HCT 42.2 09/03/2024    MCV 91 09/03/2024     09/03/2024       @ISPJXHPIR41(GLU,NA,K,Cl,CO2,BUN,Creatinine,Calcium,MG)@        Sodium   Date Value Ref Range Status   09/03/2024 139 136 - 145 mmol/L Final     Chloride   Date Value Ref Range Status   09/03/2024 108 95 - 110 mmol/L Final     CO2   Date Value Ref Range Status   09/03/2024 22 (L) 23 - 29 mmol/L Final     Glucose   Date Value Ref Range Status   09/03/2024 112 (H) 70 - 110 mg/dL Final     BUN   Date Value Ref Range Status   09/03/2024 18 8 - 23 mg/dL Final     Creatinine   Date Value Ref Range Status   09/03/2024 0.8 0.5 - 1.4 mg/dL Final     Calcium   Date Value Ref Range Status   09/03/2024 9.7 8.7 - 10.5 mg/dL Final     Total  Protein   Date Value Ref Range Status   09/03/2024 6.7 6.0 - 8.4 g/dL Final     Albumin   Date Value Ref Range Status   09/03/2024 3.4 (L) 3.5 - 5.2 g/dL Final     Total Bilirubin   Date Value Ref Range Status   09/03/2024 0.4 0.1 - 1.0 mg/dL Final     Comment:     For infants and newborns, interpretation of results should be based  on gestational age, weight and in agreement with clinical  observations.    Premature Infant recommended reference ranges:  Up to 24 hours.............<8.0 mg/dL  Up to 48 hours............<12.0 mg/dL  3-5 days..................<15.0 mg/dL  6-29 days.................<15.0 mg/dL       Alkaline Phosphatase   Date Value Ref Range Status   09/03/2024 102 55 - 135 U/L Final     AST   Date Value Ref Range Status   09/03/2024 24 10 - 40 U/L Final     ALT   Date Value Ref Range Status   09/03/2024 18 10 - 44 U/L Final     Anion Gap   Date Value Ref Range Status   09/03/2024 9 8 - 16 mmol/L Final     eGFR   Date Value Ref Range Status   09/03/2024 >60.0 >60 mL/min/1.73 m^2 Final           Predictors of intubation difficulty:       Morbid obesity? yes - BMI 38.25   Anatomically abnormal facies? no   Prominent incisors? no   Receding mandible? no   Short, thick neck? no   Neck range of motion: normal   Dentition: Edentulous upper/lower denture plates  Based on the Modified Mallampati, patient is a mallampati score: II (hard and soft palate, upper portion of tonsils anduvula visible)    Cardiographics:      ECG:  SR with PACs, otherwise normal  Echocardiogram: not indicated    Imaging:      Chest x-ray: normal and reviewed by myself    Assessment and Plan:      Cervical radiculopathy  Known risk factors for perioperative complications: None    Difficulty with intubation is not anticipated.    Cardiac Risk Estimation: Based on the Revised Cardiac Risk index, patient is a Class risk I with a 3.9 % risk of a major cardiac event in a low risk procedure.    1.) Preoperative workup as follows: chest  x-ray, ECG, hemoglobin, hematocrit, electrolytes, creatinine, glucose.  2.) Change in medication regimen before surgery: discontinue ASA 6 days before surgery, discontinue NSAIDs 5 days before surgery, hold Metformin 24 hours prior to surgery. Hold all vitamins/supplements for 7 days prior to surgery, with the exception of Potassium and Iron supplementation. Hold semaglutide/tirzepatide for 7 days prior to surgery.   3.) Prophylaxis for cardiac events with perioperative beta-blockers: not indicated.  4.) Invasive hemodynamic monitoring perioperatively: at the discretion of anesthesiologist.  5.) Deep vein thrombosis prophylaxis postoperatively: regimen to be chosen by surgical team.  6.) Surveillance for postoperative MI with ECG immediately postoperatively and on postoperati ve days 1 and 2 AND troponin levels 24 hours postoperatively and on day 4 or hospital discharge (whichever comes first): at the discretion of anesthesiologist.  7.) Current medications which may produce withdrawal symptoms if withheld perioperatively: none  8.) Other measures: Postoperative hypertension management with IV hydralazine until able to take oral medications.  Consultation with [facility] for in-hospital postoperative management of co-morbidites.    Asthma  - appears compensated, says it only flairs when she gets a cold  - instructed to bring albuterol inhaler the morning of surgery if needed    Hypertension  - take amlodipine and metoprolol the morning of surgery  - hold lisinopril the morning of surgery  - monitor BP perioperatively and treat with PRNs as needed    Urge and stress incontinence  - ok to take oxybutynin the morning of surgery    Hypothyroidism  - take levothyroxine the morning of surgery    Gastroesophageal reflux disease without esophagitis  - take omeprazole the morning of surgery    Abnormal finding on EKG  - EKG showed SR with 1st degree AV block on 9/3/24, new finding, not on previous EKGs  - repeat EKG  today        Electronically signed by Ebony Traore MSN, FNP-C on 9/10/2024 at 1:24 PM

## 2024-09-10 NOTE — DISCHARGE INSTRUCTIONS
Pre op instructions reviewed with patient face to face during Clinic Visit with Provider, verbalized understanding.    To confirm, Surgery is scheduled on 9/16/24. We will call you after 2pm the day before Surgery with your arrival time.    *Please report to the [facility] Lobby (1st Floor) located off of [facility] (2nd Entrance/Building on the left, in front of the flag pole).  Address: [address] [place] [address] [place] [address]      !!!INSTRUCTIONS IMPORTANT!!!  DO NOT Eat, Drink, or Smoke after 12 midnight unless instructed otherwise by your Surgeon. OK to brush teeth, no gum, candy or mints!    MORNING OF SURGERY, drink small sip of water with the following medications instructed by Surgery Pre-Admit Provider:  AMLODIPINE  METOPROLOL  DULOXETINE  LEVOTHYROXINE  OMEPRAZOLE  OXYBTYNIN    *Additional Medication Instructions: BRING ALBUTEROL INHALER TO PRE OP DAY OF SURGERY!    Diabetic/Prediabetic Patients: If you take diabetic or weight loss medication, Do NOT take morning of surgery unless instructed by Doctor. Metformin to be stopped 24 hrs prior to surgery. Ozempic/ Mounjaro/ Wegovy/ Trulicity/ Semaglutide or any weight loss injections to be stopped 7 days prior to surgery. DO NOT take long-acting insulin the evening before surgery. Blood sugars will be checked in pre-op by Nurse.    !!!STOP ALL Aspirins, NSAIDS, WEIGHT LOSS INJECTIONS/PILLS, Herbal supplements, & Vitamins 7 DAYS BEFORE SURGERY!!!    ____  Avoid Alcoholic beverages 3 days prior to surgery, as it can thin the blood.  ____  NO Acrylic/fake nails or nail polish worn day of surgery (specifically hand/arm & foot surgeries).  ____  NO powder, lotions, deodorants, oils or cream on body.  ____  Remove all jewelry, piercings, & foreign objects prior to arrival and leave at home.  ____  Remove Dentures, Hearing Aids & Contact Lens prior to surgery.  ____  Bring photo ID and insurance information to hospital (Leave Valuables at Home).  ____   If going home the same day, arrange for a ride home. You will not be able to drive for 24 hrs if Anesthesia was used.   ____  Females (ages 11-60): may need to give a urine sample the morning of surgery; please see Pre op Nurse prior to using the restroom.  ____  Males: Stop ED medications (Viagra, Cialis) 24 hrs prior to surgery.  ____  Wear clean, loose fitting clothing to allow for dressings/ bandages.      Bathing Instructions:    -Shower with anti-bacterial Soap (Hibiclens or Dial) the night before surgery & the morning of surgery!   -Do not use Hibiclens soap on your face or genitals.   -Apply clean clothes after shower.  -Do not shave your face or body 2 days prior to surgery unless instructed otherwise by your Surgeon.  -Do not shave pubic hair 7 days prior to surgery (gyn pt's).    [facility] Visitor/Ride Policy:  Only 2 adults allowed in pre op/recovery area during your procedure. You MUST HAVE A RIDE HOME from a responsible adult that you know and trust. Medical Transport, Uber or Lyft can ONLY be used if patient has a responsible adult to accompany them during ride home.       *Signs and symptoms of Infection Before or After Surgery:               !!!If you experience any fever, chills, nausea/ vomiting, foul odor/ excessive drainage from surgical site, flu-like symptoms, new wounds or cuts, PLEASE CALL THE SURGEON OFFICE at [phone] or SEND MESSAGE THROUGH [employer] PORTAL!!!       *If you are running late day of surgery, please call the Surgery Dept @ [phone].    *Billing question, please call:  [phone] or [phone]       Thank you,  -[facility] Pre Admit Dept.  [phone] or [phone]  M-F 7:30 am-4:00 pm (Closed Major Holidays)    Additional Tests Scheduled Today:  EKG (4TH Floor) Check in at the !

## 2024-09-13 ENCOUNTER — TELEPHONE (OUTPATIENT)
Dept: PREADMISSION TESTING | Facility: HOSPITAL | Age: 74
End: 2024-09-13
Payer: MEDICARE

## 2024-09-13 NOTE — TELEPHONE ENCOUNTER
Called and spoke with patient about the following:     Please arrive to [facility] at 9:15AM on 9/16/2024 for your scheduled procedure.  Address: [address] [place] [address] [place] [address] (2nd Building on left, 1st Floor Lobby)  >>>NO eating or drinking after midnight unless instructed otherwise by your Surgeon<<<    Thank you,  -[facility] Pre Admit Testing Dept.  Mon-Fri 7:30 am - 4 pm [phone]

## 2024-09-16 ENCOUNTER — ANESTHESIA EVENT (OUTPATIENT)
Dept: SURGERY | Facility: HOSPITAL | Age: 74
End: 2024-09-16
Payer: MEDICARE

## 2024-09-16 ENCOUNTER — ANESTHESIA (OUTPATIENT)
Dept: SURGERY | Facility: HOSPITAL | Age: 74
End: 2024-09-16
Payer: MEDICARE

## 2024-09-16 ENCOUNTER — HOSPITAL ENCOUNTER (OUTPATIENT)
Facility: HOSPITAL | Age: 74
Discharge: HOME | End: 2024-09-17
Attending: NEUROLOGICAL SURGERY | Admitting: NEUROLOGICAL SURGERY
Payer: MEDICARE

## 2024-09-16 DIAGNOSIS — M54.12 CERVICAL RADICULOPATHY: Primary | ICD-10-CM

## 2024-09-16 PROCEDURE — C1729 CATH, DRAINAGE: HCPCS | Performed by: NEUROLOGICAL SURGERY

## 2024-09-16 PROCEDURE — 99900035 HC TECH TIME PER 15 MIN (STAT)

## 2024-09-16 PROCEDURE — 36000710: Performed by: NEUROLOGICAL SURGERY

## 2024-09-16 PROCEDURE — 63600175 PHARM REV CODE 636 W HCPCS: Performed by: NEUROLOGICAL SURGERY

## 2024-09-16 PROCEDURE — 37000008 HC ANESTHESIA 1ST 15 MINUTES: Performed by: NEUROLOGICAL SURGERY

## 2024-09-16 PROCEDURE — 25000003 PHARM REV CODE 250: Performed by: PHYSICIAN ASSISTANT

## 2024-09-16 PROCEDURE — 71000039 HC RECOVERY, EACH ADD'L HOUR: Performed by: NEUROLOGICAL SURGERY

## 2024-09-16 PROCEDURE — 27800903 OPTIME MED/SURG SUP & DEVICES OTHER IMPLANTS: Performed by: NEUROLOGICAL SURGERY

## 2024-09-16 PROCEDURE — C1713 ANCHOR/SCREW BN/BN,TIS/BN: HCPCS | Performed by: NEUROLOGICAL SURGERY

## 2024-09-16 PROCEDURE — 63600175 PHARM REV CODE 636 W HCPCS: Performed by: ANESTHESIOLOGY

## 2024-09-16 PROCEDURE — 25000003 PHARM REV CODE 250: Performed by: CLINIC/CENTER

## 2024-09-16 PROCEDURE — 63600175 PHARM REV CODE 636 W HCPCS: Performed by: PHYSICIAN ASSISTANT

## 2024-09-16 PROCEDURE — 94799 UNLISTED PULMONARY SVC/PX: CPT | Mod: XB

## 2024-09-16 PROCEDURE — 27201423 OPTIME MED/SURG SUP & DEVICES STERILE SUPPLY: Performed by: NEUROLOGICAL SURGERY

## 2024-09-16 PROCEDURE — 71000033 HC RECOVERY, INTIAL HOUR: Performed by: NEUROLOGICAL SURGERY

## 2024-09-16 PROCEDURE — 36000711: Performed by: NEUROLOGICAL SURGERY

## 2024-09-16 PROCEDURE — 63600175 PHARM REV CODE 636 W HCPCS: Performed by: CLINIC/CENTER

## 2024-09-16 PROCEDURE — 37000009 HC ANESTHESIA EA ADD 15 MINS: Performed by: NEUROLOGICAL SURGERY

## 2024-09-16 DEVICE — CAGE 5030741 ANATOMIC PTC 14X11X7MM
Type: IMPLANTABLE DEVICE | Site: SPINE CERVICAL | Status: FUNCTIONAL
Brand: ANATOMIC PEEK PTC CERVICAL FUSION SYSTEM

## 2024-09-16 DEVICE — IMPLANTABLE DEVICE: Type: IMPLANTABLE DEVICE | Site: SPINE CERVICAL | Status: FUNCTIONAL

## 2024-09-16 DEVICE — SCREW ATLANTIS VA 4.0X15MM: Type: IMPLANTABLE DEVICE | Site: SPINE CERVICAL | Status: FUNCTIONAL

## 2024-09-16 DEVICE — PLATE ATLANTIS ANT CERV 37.5MM: Type: IMPLANTABLE DEVICE | Site: SPINE CERVICAL | Status: FUNCTIONAL

## 2024-09-16 DEVICE — SCREW ATLANTIS ACP SD 4.0X16MM: Type: IMPLANTABLE DEVICE | Site: SPINE CERVICAL | Status: FUNCTIONAL

## 2024-09-16 RX ORDER — HYDROMORPHONE HYDROCHLORIDE 1 MG/ML
2 INJECTION, SOLUTION INTRAMUSCULAR; INTRAVENOUS; SUBCUTANEOUS
Status: DISCONTINUED | OUTPATIENT
Start: 2024-09-16 | End: 2024-09-17 | Stop reason: HOSPADM

## 2024-09-16 RX ORDER — ONDANSETRON 8 MG/1
8 TABLET, ORALLY DISINTEGRATING ORAL EVERY 6 HOURS PRN
Status: DISCONTINUED | OUTPATIENT
Start: 2024-09-16 | End: 2024-09-17 | Stop reason: HOSPADM

## 2024-09-16 RX ORDER — PHENYLEPHRINE HYDROCHLORIDE 10 MG/ML
INJECTION INTRAVENOUS
Status: DISCONTINUED | OUTPATIENT
Start: 2024-09-16 | End: 2024-09-16

## 2024-09-16 RX ORDER — EPHEDRINE SULFATE 50 MG/ML
INJECTION, SOLUTION INTRAVENOUS
Status: DISCONTINUED | OUTPATIENT
Start: 2024-09-16 | End: 2024-09-16

## 2024-09-16 RX ORDER — LEVOTHYROXINE SODIUM 50 UG/1
50 TABLET ORAL
Status: DISCONTINUED | OUTPATIENT
Start: 2024-09-17 | End: 2024-09-17 | Stop reason: HOSPADM

## 2024-09-16 RX ORDER — PROCHLORPERAZINE EDISYLATE 5 MG/ML
5 INJECTION INTRAMUSCULAR; INTRAVENOUS EVERY 6 HOURS PRN
Status: DISCONTINUED | OUTPATIENT
Start: 2024-09-16 | End: 2024-09-17 | Stop reason: HOSPADM

## 2024-09-16 RX ORDER — KETOROLAC TROMETHAMINE 30 MG/ML
15 INJECTION, SOLUTION INTRAMUSCULAR; INTRAVENOUS EVERY 8 HOURS PRN
Status: DISCONTINUED | OUTPATIENT
Start: 2024-09-16 | End: 2024-09-16 | Stop reason: HOSPADM

## 2024-09-16 RX ORDER — KETOROLAC TROMETHAMINE 30 MG/ML
INJECTION, SOLUTION INTRAMUSCULAR; INTRAVENOUS
Status: DISCONTINUED | OUTPATIENT
Start: 2024-09-16 | End: 2024-09-16

## 2024-09-16 RX ORDER — AMLODIPINE BESYLATE 5 MG/1
5 TABLET ORAL DAILY
Status: DISCONTINUED | OUTPATIENT
Start: 2024-09-17 | End: 2024-09-17 | Stop reason: HOSPADM

## 2024-09-16 RX ORDER — PROPOFOL 10 MG/ML
VIAL (ML) INTRAVENOUS CONTINUOUS PRN
Status: DISCONTINUED | OUTPATIENT
Start: 2024-09-16 | End: 2024-09-16

## 2024-09-16 RX ORDER — FENTANYL CITRATE 50 UG/ML
INJECTION, SOLUTION INTRAMUSCULAR; INTRAVENOUS
Status: DISCONTINUED | OUTPATIENT
Start: 2024-09-16 | End: 2024-09-16

## 2024-09-16 RX ORDER — HYDROMORPHONE HYDROCHLORIDE 1 MG/ML
0.2 INJECTION, SOLUTION INTRAMUSCULAR; INTRAVENOUS; SUBCUTANEOUS EVERY 5 MIN PRN
Status: DISCONTINUED | OUTPATIENT
Start: 2024-09-16 | End: 2024-09-16 | Stop reason: HOSPADM

## 2024-09-16 RX ORDER — OXYCODONE HYDROCHLORIDE 5 MG/1
15 TABLET ORAL EVERY 4 HOURS PRN
Status: DISCONTINUED | OUTPATIENT
Start: 2024-09-16 | End: 2024-09-17 | Stop reason: HOSPADM

## 2024-09-16 RX ORDER — DIAZEPAM 5 MG/1
5 TABLET ORAL EVERY 6 HOURS PRN
Status: DISCONTINUED | OUTPATIENT
Start: 2024-09-16 | End: 2024-09-17 | Stop reason: HOSPADM

## 2024-09-16 RX ORDER — PANTOPRAZOLE SODIUM 40 MG/1
40 TABLET, DELAYED RELEASE ORAL DAILY
Status: DISCONTINUED | OUTPATIENT
Start: 2024-09-17 | End: 2024-09-17 | Stop reason: HOSPADM

## 2024-09-16 RX ORDER — LISINOPRIL 20 MG/1
20 TABLET ORAL DAILY
Status: DISCONTINUED | OUTPATIENT
Start: 2024-09-17 | End: 2024-09-17 | Stop reason: HOSPADM

## 2024-09-16 RX ORDER — MIDAZOLAM HYDROCHLORIDE 1 MG/ML
INJECTION INTRAMUSCULAR; INTRAVENOUS
Status: DISCONTINUED | OUTPATIENT
Start: 2024-09-16 | End: 2024-09-16

## 2024-09-16 RX ORDER — AMOXICILLIN 250 MG
2 CAPSULE ORAL NIGHTLY PRN
Status: DISCONTINUED | OUTPATIENT
Start: 2024-09-16 | End: 2024-09-17 | Stop reason: HOSPADM

## 2024-09-16 RX ORDER — DULOXETIN HYDROCHLORIDE 30 MG/1
30 CAPSULE, DELAYED RELEASE ORAL DAILY
Status: DISCONTINUED | OUTPATIENT
Start: 2024-09-17 | End: 2024-09-17 | Stop reason: HOSPADM

## 2024-09-16 RX ORDER — HYDROMORPHONE HYDROCHLORIDE 1 MG/ML
1 INJECTION, SOLUTION INTRAMUSCULAR; INTRAVENOUS; SUBCUTANEOUS
Status: DISCONTINUED | OUTPATIENT
Start: 2024-09-16 | End: 2024-09-17 | Stop reason: HOSPADM

## 2024-09-16 RX ORDER — ROPINIROLE 1 MG/1
2 TABLET, FILM COATED ORAL NIGHTLY
Status: DISCONTINUED | OUTPATIENT
Start: 2024-09-16 | End: 2024-09-17 | Stop reason: HOSPADM

## 2024-09-16 RX ORDER — ACETAMINOPHEN 325 MG/1
650 TABLET ORAL EVERY 6 HOURS PRN
Status: DISCONTINUED | OUTPATIENT
Start: 2024-09-16 | End: 2024-09-17 | Stop reason: HOSPADM

## 2024-09-16 RX ORDER — BISACODYL 10 MG/1
10 SUPPOSITORY RECTAL DAILY
Status: DISCONTINUED | OUTPATIENT
Start: 2024-09-17 | End: 2024-09-17 | Stop reason: HOSPADM

## 2024-09-16 RX ORDER — OXYBUTYNIN CHLORIDE 5 MG/1
5 TABLET ORAL 2 TIMES DAILY
Status: DISCONTINUED | OUTPATIENT
Start: 2024-09-16 | End: 2024-09-17 | Stop reason: HOSPADM

## 2024-09-16 RX ORDER — ONDANSETRON HYDROCHLORIDE 2 MG/ML
INJECTION, SOLUTION INTRAVENOUS
Status: DISCONTINUED | OUTPATIENT
Start: 2024-09-16 | End: 2024-09-16

## 2024-09-16 RX ORDER — ALBUTEROL SULFATE 0.83 MG/ML
2.5 SOLUTION RESPIRATORY (INHALATION) EVERY 6 HOURS PRN
Status: DISCONTINUED | OUTPATIENT
Start: 2024-09-16 | End: 2024-09-17 | Stop reason: HOSPADM

## 2024-09-16 RX ORDER — VANCOMYCIN HYDROCHLORIDE 1 G/20ML
INJECTION, POWDER, LYOPHILIZED, FOR SOLUTION INTRAVENOUS
Status: DISCONTINUED | OUTPATIENT
Start: 2024-09-16 | End: 2024-09-16 | Stop reason: HOSPADM

## 2024-09-16 RX ORDER — PROPOFOL 10 MG/ML
VIAL (ML) INTRAVENOUS
Status: DISCONTINUED | OUTPATIENT
Start: 2024-09-16 | End: 2024-09-16

## 2024-09-16 RX ORDER — ROCURONIUM BROMIDE 10 MG/ML
INJECTION, SOLUTION INTRAVENOUS
Status: DISCONTINUED | OUTPATIENT
Start: 2024-09-16 | End: 2024-09-16

## 2024-09-16 RX ORDER — SUCCINYLCHOLINE CHLORIDE 20 MG/ML
INJECTION INTRAMUSCULAR; INTRAVENOUS
Status: DISCONTINUED | OUTPATIENT
Start: 2024-09-16 | End: 2024-09-16

## 2024-09-16 RX ORDER — OXYCODONE AND ACETAMINOPHEN 5; 325 MG/1; MG/1
1 TABLET ORAL EVERY 4 HOURS PRN
Status: DISCONTINUED | OUTPATIENT
Start: 2024-09-16 | End: 2024-09-17 | Stop reason: HOSPADM

## 2024-09-16 RX ORDER — ALUMINUM HYDROXIDE, MAGNESIUM HYDROXIDE, AND SIMETHICONE 1200; 120; 1200 MG/30ML; MG/30ML; MG/30ML
30 SUSPENSION ORAL EVERY 4 HOURS PRN
Status: DISCONTINUED | OUTPATIENT
Start: 2024-09-16 | End: 2024-09-17 | Stop reason: HOSPADM

## 2024-09-16 RX ORDER — SODIUM CHLORIDE 9 MG/ML
INJECTION, SOLUTION INTRAVENOUS CONTINUOUS
Status: DISCONTINUED | OUTPATIENT
Start: 2024-09-16 | End: 2024-09-17 | Stop reason: HOSPADM

## 2024-09-16 RX ORDER — HYDROCODONE BITARTRATE AND ACETAMINOPHEN 10; 325 MG/1; MG/1
1 TABLET ORAL EVERY 4 HOURS PRN
Status: DISCONTINUED | OUTPATIENT
Start: 2024-09-16 | End: 2024-09-17 | Stop reason: HOSPADM

## 2024-09-16 RX ORDER — DIPHENHYDRAMINE HCL 25 MG
50 CAPSULE ORAL EVERY 6 HOURS PRN
Status: DISCONTINUED | OUTPATIENT
Start: 2024-09-16 | End: 2024-09-17 | Stop reason: HOSPADM

## 2024-09-16 RX ORDER — DOCUSATE SODIUM 100 MG/1
100 CAPSULE, LIQUID FILLED ORAL DAILY
Status: DISCONTINUED | OUTPATIENT
Start: 2024-09-17 | End: 2024-09-17 | Stop reason: HOSPADM

## 2024-09-16 RX ORDER — DEXAMETHASONE SODIUM PHOSPHATE 4 MG/ML
INJECTION, SOLUTION INTRA-ARTICULAR; INTRALESIONAL; INTRAMUSCULAR; INTRAVENOUS; SOFT TISSUE
Status: DISCONTINUED | OUTPATIENT
Start: 2024-09-16 | End: 2024-09-16

## 2024-09-16 RX ORDER — LIDOCAINE HYDROCHLORIDE 20 MG/ML
INJECTION INTRAVENOUS
Status: DISCONTINUED | OUTPATIENT
Start: 2024-09-16 | End: 2024-09-16

## 2024-09-16 RX ORDER — OXYCODONE AND ACETAMINOPHEN 5; 325 MG/1; MG/1
1 TABLET ORAL
Status: DISCONTINUED | OUTPATIENT
Start: 2024-09-16 | End: 2024-09-16 | Stop reason: HOSPADM

## 2024-09-16 RX ORDER — ALBUTEROL SULFATE 90 UG/1
2 INHALANT RESPIRATORY (INHALATION) EVERY 6 HOURS PRN
Status: DISCONTINUED | OUTPATIENT
Start: 2024-09-16 | End: 2024-09-16 | Stop reason: CLARIF

## 2024-09-16 RX ORDER — METOPROLOL SUCCINATE 50 MG/1
100 TABLET, EXTENDED RELEASE ORAL DAILY
Status: DISCONTINUED | OUTPATIENT
Start: 2024-09-17 | End: 2024-09-17 | Stop reason: HOSPADM

## 2024-09-16 RX ORDER — ONDANSETRON HYDROCHLORIDE 2 MG/ML
4 INJECTION, SOLUTION INTRAVENOUS DAILY PRN
Status: DISCONTINUED | OUTPATIENT
Start: 2024-09-16 | End: 2024-09-16 | Stop reason: HOSPADM

## 2024-09-16 RX ADMIN — PHENYLEPHRINE HYDROCHLORIDE 200 MCG: 10 INJECTION INTRAVENOUS at 10:09

## 2024-09-16 RX ADMIN — LIDOCAINE HYDROCHLORIDE 100 MG: 20 INJECTION INTRAVENOUS at 10:09

## 2024-09-16 RX ADMIN — PHENYLEPHRINE HYDROCHLORIDE 50 MCG: 10 INJECTION INTRAVENOUS at 12:09

## 2024-09-16 RX ADMIN — SODIUM CHLORIDE, SODIUM LACTATE, POTASSIUM CHLORIDE, AND CALCIUM CHLORIDE: 600; 310; 30; 20 INJECTION, SOLUTION INTRAVENOUS at 10:09

## 2024-09-16 RX ADMIN — ROCURONIUM BROMIDE 5 MG: 10 INJECTION, SOLUTION INTRAVENOUS at 10:09

## 2024-09-16 RX ADMIN — SUCCINYLCHOLINE CHLORIDE 80 MG: 20 INJECTION, SOLUTION INTRAMUSCULAR; INTRAVENOUS; PARENTERAL at 10:09

## 2024-09-16 RX ADMIN — OXYBUTYNIN CHLORIDE 5 MG: 5 TABLET ORAL at 09:09

## 2024-09-16 RX ADMIN — EPHEDRINE SULFATE 10 MG: 50 INJECTION INTRAVENOUS at 01:09

## 2024-09-16 RX ADMIN — EPHEDRINE SULFATE 10 MG: 50 INJECTION INTRAVENOUS at 11:09

## 2024-09-16 RX ADMIN — REMIFENTANIL HYDROCHLORIDE 0.22 MCG/KG/MIN: 1 INJECTION, POWDER, LYOPHILIZED, FOR SOLUTION INTRAVENOUS at 10:09

## 2024-09-16 RX ADMIN — FENTANYL CITRATE 100 MCG: 50 INJECTION, SOLUTION INTRAMUSCULAR; INTRAVENOUS at 10:09

## 2024-09-16 RX ADMIN — PHENYLEPHRINE HYDROCHLORIDE 200 MCG: 10 INJECTION INTRAVENOUS at 11:09

## 2024-09-16 RX ADMIN — HYDROMORPHONE HYDROCHLORIDE 0.2 MG: 1 INJECTION, SOLUTION INTRAMUSCULAR; INTRAVENOUS; SUBCUTANEOUS at 04:09

## 2024-09-16 RX ADMIN — SODIUM CHLORIDE: 9 INJECTION, SOLUTION INTRAVENOUS at 05:09

## 2024-09-16 RX ADMIN — DEXAMETHASONE SODIUM PHOSPHATE 4 MG: 4 INJECTION, SOLUTION INTRA-ARTICULAR; INTRALESIONAL; INTRAMUSCULAR; INTRAVENOUS; SOFT TISSUE at 01:09

## 2024-09-16 RX ADMIN — EPHEDRINE SULFATE 5 MG: 50 INJECTION INTRAVENOUS at 12:09

## 2024-09-16 RX ADMIN — ONDANSETRON 4 MG: 2 INJECTION INTRAMUSCULAR; INTRAVENOUS at 01:09

## 2024-09-16 RX ADMIN — PHENYLEPHRINE HYDROCHLORIDE 100 MCG: 10 INJECTION INTRAVENOUS at 11:09

## 2024-09-16 RX ADMIN — VANCOMYCIN HYDROCHLORIDE 1000 MG: 1 INJECTION, POWDER, LYOPHILIZED, FOR SOLUTION INTRAVENOUS at 11:09

## 2024-09-16 RX ADMIN — MIDAZOLAM HYDROCHLORIDE 2 MG: 1 INJECTION, SOLUTION INTRAMUSCULAR; INTRAVENOUS at 10:09

## 2024-09-16 RX ADMIN — EPHEDRINE SULFATE 5 MG: 50 INJECTION INTRAVENOUS at 11:09

## 2024-09-16 RX ADMIN — PROPOFOL 50 MCG/KG/MIN: 10 INJECTION, EMULSION INTRAVENOUS at 10:09

## 2024-09-16 RX ADMIN — KETOROLAC TROMETHAMINE 30 MG: 30 INJECTION, SOLUTION INTRAMUSCULAR; INTRAVENOUS at 01:09

## 2024-09-16 RX ADMIN — PROPOFOL 150 MG: 10 INJECTION, EMULSION INTRAVENOUS at 10:09

## 2024-09-16 RX ADMIN — VANCOMYCIN HYDROCHLORIDE 1000 MG: 1 INJECTION, POWDER, LYOPHILIZED, FOR SOLUTION INTRAVENOUS at 10:09

## 2024-09-16 RX ADMIN — EPHEDRINE SULFATE 10 MG: 50 INJECTION INTRAVENOUS at 12:09

## 2024-09-16 RX ADMIN — OXYCODONE HYDROCHLORIDE 15 MG: 5 TABLET ORAL at 09:09

## 2024-09-16 RX ADMIN — ROPINIROLE HYDROCHLORIDE 2 MG: 1 TABLET, FILM COATED ORAL at 09:09

## 2024-09-16 RX ADMIN — HYDROMORPHONE HYDROCHLORIDE 0.2 MG: 1 INJECTION, SOLUTION INTRAMUSCULAR; INTRAVENOUS; SUBCUTANEOUS at 02:09

## 2024-09-16 NOTE — H&P
Subjective:      Patient ID: Sana Crenshaw is a 73 y.o. female.    Chief Complaint: No chief complaint on file.    Patient here for follow-up  She has a new MRI cervical spine review  Symptoms are essentially unchanged from previous    Previous notes  Patient here for follow up cervical spine   She has a new MR to review   Neck pain 8/10   Radiates through the L > R   Ambulates unassisted   Last PT was 3 mo ago   FLORA with PM with no lasting relief   No weakness N/T         Review of Systems   Constitutional:  Negative for activity change, appetite change and chills.   HENT:  Negative for congestion and ear pain.    Eyes:  Negative for photophobia, redness and visual disturbance.   Respiratory:  Negative for apnea and chest tightness.    Cardiovascular:  Negative for chest pain.   Gastrointestinal:  Negative for abdominal distention and abdominal pain.   Endocrine: Negative for cold intolerance.   Genitourinary:  Negative for difficulty urinating.   Musculoskeletal:  Positive for myalgias, neck pain and neck stiffness. Negative for arthralgias.   Skin:  Negative for color change.   Allergic/Immunologic: Negative for environmental allergies.   Neurological:  Positive for weakness and numbness. Negative for dizziness.   Hematological:  Negative for adenopathy. Does not bruise/bleed easily.   Psychiatric/Behavioral:  Negative for agitation, behavioral problems and confusion.          Objective:       Physical Exam:  Nursing note and vitals reviewed.    Constitutional: She appears well-nourished. No distress.     Eyes: EOM are normal.     Cardiovascular: Normal rate.     Psych/Behavior: She is alert. She is oriented to person, place, and time. She has a normal mood and affect.     Musculoskeletal:        Neck: Range of motion is limited. There is tenderness. Muscle strength is 5/5.        Right Upper Extremities: There is no tenderness. Muscle strength is 5/5.        Left Upper Extremities: There is no tenderness.  Muscle strength is 5/5.     Neurological:        Sensory: There is no sensory deficit in the trunk. There is no sensory deficit in the extremities.        Cranial nerves: Cranial nerve(s) II, III, IV, V, VI, VII, VIII, IX, X, XI and XII are intact.     General    Nursing note and vitals reviewed.  Constitutional: She is oriented to person, place, and time. She appears well-nourished. No distress.   Eyes: EOM are normal.   Cardiovascular:  Normal rate.            Neurological: She is alert and oriented to person, place, and time.   Psychiatric: She has a normal mood and affect.             Ortho Exam     MR cervical   Multilevel degenerative changes of the cervical spine are most pronounced at C5-C6 with moderate spinal canal stenosis and moderate bilateral neural foraminal stenosis.  Progressed spinal canal stenosis and Modic type 1 changes as compared to the prior.     Multilevel degenerative changes of the cervical spine are otherwise similar to the prior with mild spinal canal stenosis mild-to-moderate neural foraminal stenosis at C3-C4, C4-C5, C6-C7 and C7-T1.         Assessment:     No diagnosis found.    Plan:     There are no diagnoses linked to this encounter.  Patient with progress degenerative cervical spine disease with advanced stenosis and foraminal stenosis contributing to her symptoms at C5-6 and to a lesser degree C6-7  I have offered her an anterior cervical diskectomy and fusion from C5-C7  Decompress the thecal sac as well as to decompres the foramen bilaterally    The patient was informed of all benefits and potential risk of the operation including but not limited to:  Pain, infection, bleeding, coma, paralysis, death.  Cerebrospinal fluid leak, failure of any instrumentation, the need for additional procedures in the future. No guarantee was given that this procedure would alleviate all of the symptoms.    Consents signed today in the office    Will call with surgical date once cleared  clinically      Thank you for the referral   Please call with any questions    Donovan Harrington MD  Neurosurgery     Disclaimer: This note was prepared using a voice recognition system and is likely to have sound alike errors within the text.

## 2024-09-16 NOTE — ANESTHESIA PROCEDURE NOTES
Intubation    Date/Time: 9/16/2024 10:26 AM    Performed by: French Webb III, CRNA  Authorized by: Franky Cooney II, MD    Intubation:     Induction:  Intravenous    Intubated:  Postinduction    Mask Ventilation:  Not attempted    Attempts:  1    Attempted By:  CRNA    Method of Intubation:  Video laryngoscopy    Blade:  Ac 4    Laryngeal View Grade: Grade I - full view of cords      Difficult Airway Encountered?: No      Complications:  None    Airway Device:  Oral endotracheal tube    Airway Device Size:  7.0    Style/Cuff Inflation:  Cuffed (inflated to minimal occlusive pressure)    Tube secured:  22    Secured at:  The lips    Placement Verified By:  Capnometry    Complicating Factors:  None    Findings Post-Intubation:  BS equal bilateral and atraumatic/condition of teeth unchanged

## 2024-09-16 NOTE — ANESTHESIA PREPROCEDURE EVALUATION
09/16/2024  Sana Crenshaw is a 73 y.o., female.    Patient Active Problem List   Diagnosis    Urge and stress incontinence    Urge incontinence    Cervical radiculopathy    Greater trochanteric bursitis of right hip    Sacroiliitis    Cervical spondylosis    Class 2 severe obesity due to excess calories with serious comorbidity and body mass index (BMI) of 38.0 to 38.9 in adult    Prediabetes    Osteopenia    Hypothyroidism    Hypertension    Cubital tunnel syndrome    Asthma    Pain    Weakness    Antalgic gait    Gastroesophageal reflux disease without esophagitis    Abnormal finding on EKG     Past Surgical History:   Procedure Laterality Date    ANKLE FUSION Right     BREAST SURGERY      BUNIONECTOMY Right     CATARACT EXTRACTION W/  INTRAOCULAR LENS IMPLANT      EPIDURAL STEROID INJECTION INTO CERVICAL SPINE N/A 12/28/2021    Procedure: C5/6 IL FLORA with left paramedian approach with RN IV sedation;  Surgeon: Satish Segura MD;  Location: [facility];  Service: Pain Management;  Laterality: N/A;    EYE SURGERY      HYSTERECTOMY      IMPLANTATION OF PERMANENT SACRAL NERVE STIMULATOR N/A 01/08/2021    Procedure: INSERTION, NEUROSTIMULATOR, PERMANENT, SACRAL;  Surgeon: Onofre Fallon MD;  Location: [facility] OR;  Service: Urology;  Laterality: N/A;    INJECTION OF ANESTHETIC AGENT AROUND MEDIAL BRANCH NERVES INNERVATING CERVICAL FACET JOINT Left 04/27/2022    Procedure: Left C4-6 MBB with RN IV sedation;  Surgeon: Satish Segura MD;  Location: [facility];  Service: Pain Management;  Laterality: Left;    INJECTION OF ANESTHETIC AGENT INTO SACROILIAC JOINT Right 01/12/2022    Procedure: right Sacroiliac Joint Injection with RN IV sedation;  Surgeon: Satish Segura MD;  Location: [facility];  Service: Pain Management;  Laterality: Right;    INJECTION OF JOINT Right 01/12/2022    Procedure: right GT  bursa injection with RN IV sedation;  Surgeon: Satish Segura MD;  [facility];  Service: Pain Management;  Laterality: Right;    JOINT REPLACEMENT Bilateral     knees    OOPHORECTOMY      RETINAL DETACHMENT SURGERY      REVISION OF PROCEDURE INVOLVING SACRAL NEUROSTIMULATOR DEVICE N/A 02/23/2021    Procedure: REVISION, NEUROSTIMULATOR, SACRAL;  Surgeon: Onofre Fallon MD;  Location: [facility] OR;  Service: Urology;  Laterality: N/A;    SHOULDER ARTHROSCOPY Right     SHOULDER ARTHROSCOPY W/ ROTATOR CUFF REPAIR Left     x 2    TOTAL REDUCTION MAMMOPLASTY Bilateral 2008    ULNAR TUNNEL RELEASE Right 04/10/2023    Procedure: RELEASE, CUBITAL TUNNEL;  Surgeon: Bhavin Nelson MD;  Location: [facility] OR;  Service: Orthopedics;  Laterality: Right;  right cubital tunnel release       Pre-op Assessment    I have reviewed the Patient Summary Reports.    I have reviewed the NPO Status.   I have reviewed the Medications.     Review of Systems  Anesthesia Hx:  No problems with previous Anesthesia                Social:  Non-Smoker       Hematology/Oncology:  Hematology Normal                                     Cardiovascular:     Hypertension              ECG has been reviewed.                          Pulmonary:    Asthma                    Renal/:  Renal/ Normal                 Hepatic/GI:     GERD             Musculoskeletal:  Arthritis               Neurological:  Neurology Normal          Cervical radiculopathy                            Endocrine:   Hypothyroidism  Prediabetes      Obesity / BMI > 30      Physical Exam  General: Well nourished    Airway:  Mallampati: I   Mouth Opening: Normal  TM Distance: Normal  Neck ROM: Normal ROM    Dental:  Edentulous        Anesthesia Plan  Type of Anesthesia, risks & benefits discussed:    Anesthesia Type: Gen ETT  Intra-op Monitoring Plan: Standard ASA Monitors  Post Op Pain Control Plan: multimodal analgesia  Induction:  IV  Airway Plan: ,  Post-Induction  Informed Consent: Informed consent signed with the Patient and all parties understand the risks and agree with anesthesia plan.  All questions answered.   ASA Score: 3    Ready For Surgery From Anesthesia Perspective.     .      Chemistry        Component Value Date/Time     09/03/2024 1003    K 4.1 09/03/2024 1003     09/03/2024 1003    CO2 22 (L) 09/03/2024 1003    BUN 18 09/03/2024 1003    CREATININE 0.8 09/03/2024 1003     (H) 09/03/2024 1003        Component Value Date/Time    CALCIUM 9.7 09/03/2024 1003    ALKPHOS 102 09/03/2024 1003    AST 24 09/03/2024 1003    ALT 18 09/03/2024 1003    BILITOT 0.4 09/03/2024 1003    ESTGFRAFRICA >60.0 06/10/2022 1138    EGFRNONAA >60.0 06/10/2022 1138        Lab Results   Component Value Date    WBC 6.19 09/03/2024    HGB 13.9 09/03/2024    HCT 42.2 09/03/2024    MCV 91 09/03/2024     09/03/2024         Sinus rhythm with Premature atrial complexes   Otherwise normal ECG   When compared with ECG of 03-SEP-2024 10:06,   Premature atrial complexes are now Present   Confirmed by FRANCA CARTWRIGHT MD ([id number]) on 9/10/2024 3:03:33 PM

## 2024-09-16 NOTE — BRIEF OP NOTE
[facility]    History and Physical - Hospitalist Service       Date of Admission:  1/26/2021     Assessment & Plan   Shaan Espinoza is a 85 year old male with a history of ulcerative colitis, hypothyroidism and multiple previous SBOs/ileus who is admitted to the hospitalist service with a small bowel obstruction.    SBO  - Patient presented with abdominal pain and vomiting for 1 day.  CT shows moderate mid small bowel obstruction.  - NPO, MIVF, Antiemetics and pain medication PRN  - General surgery consult  - NG tube if he starts vomiting again    Chronic medical conditions  UC: Managed on mesalamine per his report.  He follows with [facility].  Not likely contributing to his SBO.  Hypothyroidism: Continue Synthroid when verified.    Diet: N.p.o.  DVT Prophylaxis: Enoxaparin (Lovenox) SQ  Najera Catheter: No  Code Status: DNR/DNI    Disposition Plan   Expected discharge:   Anticipate 2+ night hospitalization.    Alcon Starr MD  [facility]    ______________________________________________________________________    Chief Complaint   Abdominal pain    History of Present Illness   Shaan Espinoza is a 85 year old male with a history of ulcerative colitis, hypothyroidism and multiple previous SBOs/ileus who presented to the ED for evaluation of abdominal pain.  Patient reports that he had the onset of abdominal pain which was generalized and of moderate severity yesterday evening.  He says that today he was vomiting all day almost every hour.  Because his symptoms did not fully, he presented here to the ED.  CT of the abdomen/pelvis showed a moderate grade mid small bowel obstruction with uncertain etiology possibly adhesions.  Patient was given IV fluids and pain/nausea medicine.  He says he has not been passing gas today.  He had a regular for him stool yesterday.  No other symptoms.  No fevers, chills, cough, chest pain or shortness of breath.    Review of Systems    The 10 point  [facility] - Surgery (Hospital)  Brief Operative Note    SUMMARY     Surgery Date: 9/16/2024     Surgeons and Role:     * Donovan Harrington MD - Primary    Assistant: Angie Aguilar PA-C    Pre-op Diagnosis:  Cervical radiculopathy [M54.12]    Post-op Diagnosis:  Post-Op Diagnosis Codes:     * Cervical radiculopathy [M54.12]    Procedure(s) (LRB):  DISCECTOMY, SPINE, CERVICAL, ANTERIOR APPROACH, WITH FUSION (N/A)  INSERTION,ALLOGRAFT,BONE,INTO SPINE (N/A)    ACDF C5-7     Anesthesia: General    Implants:  Implant Name Type Inv. Item Serial No.  Lot No. LRB No. Used Action   [id number]  PUTTY BONE GRAFTPM DBM [facility] [id number] [employer] [place]   1 Implanted   Spacer 7mm x 14mm x 11mm    [employer] 11RL N/A 1 Implanted   Anatomic Peek PTC Cervical Fusion System Spacer 5mm x 14mm x 11mm    [employer] 20HS N/A 1 Implanted   PLATE ATLANTIS ANT CERV 37.5MM - [id number]  PLATE ATLANTIS ANT CERV 37.5MM  [employer] [place]  N/A 1 Implanted   SCREW ATLANTIS ACP SD 4.0X16MM - [id number]  SCREW ATLANTIS ACP SD 4.0X16MM  [employer] [place]  N/A 3 Implanted   SCREW ATLANTIS VA 4.0X15MM - [id number]  SCREW ATLANTIS VA 4.0X15MM  [employer] [place]  N/A 2 Implanted   JAY. 4.0 X 17 SD RYAN    [employer]  N/A 1 Implanted       Operative Findings: Degenerative disc  disease C5-7     Estimated Blood Loss: * No values recorded between 9/16/2024 11:10 AM and 9/16/2024  2:09 PM *    Estimated Blood Loss has been documented.         Specimens:   Specimen (24h ago, onward)      None            [id number]       Review of Systems is negative other than noted in the HPI or here.     Physical Exam   BP (!) 178/91   Pulse 98   Temp 98.2  F (36.8  C) (Oral)   Resp 20   Wt 79.4 kg (175 lb)   SpO2 99%   BMI 25.84 kg/m         General: No acute distress.    HEENT: No scleral icterus. Oropharynx moist.     Neck: Supple.    Pulmonary: Normal work of breathing. Clear to auscultation bilaterally.    Cardiovascular: Regular rate and rhythm without murmur or extra heart sounds.    Abdomen: Soft and non-tender.  Moderate generalized tenderness.    Extremities: No peripheral edema. No clubbing or cyanosis.     Neurologic: Awake, alert, appropriate.    Skin: Warm and dry.    Psychiatric: Normal affect and mood.     Data   Data reviewed today: I have reviewed all labs and imaging results.    Recent Labs   Lab 01/26/21 2024   WBC 11.4*   HGB 16.0   MCV 98         POTASSIUM 3.9   CHLORIDE 101   CO2 27   BUN 15   CR 0.84   ANIONGAP 8   RISSA 9.3   *   ALBUMIN 3.9   PROTTOTAL 7.6   BILITOTAL 1.3   ALKPHOS 82   ALT 17   AST 22   LIPASE 102     Recent Results (from the past 24 hour(s))   CT Abdomen Pelvis w Contrast    Narrative    EXAM: CT ABDOMEN PELVIS W CONTRAST  LOCATION: [facility]  DATE/TIME: 1/26/2021 8:49 PM    INDICATION: Nausea/vomiting  COMPARISON: 08/11/2018  TECHNIQUE: CT scan of the abdomen and pelvis was performed following injection of IV contrast. Multiplanar reformats were obtained. Dose reduction techniques were used.  CONTRAST: 88mL Isovue-370    FINDINGS:   LOWER CHEST: Minimal dependent atelectasis.    HEPATOBILIARY: Presumed focal fatty infiltration left lobe liver unchanged.    PANCREAS: Normal.    SPLEEN: Normal.    ADRENAL GLANDS: Normal.    KIDNEYS/BLADDER: Normal.    BOWEL: Numerous moderately dilated fluid-filled loops of proximal and mid small bowel are present consistent with moderate grade small bowel obstruction. There is fecalization of luminal contents near the site of  obstruction. No obstructing lesion   evident, likely due to adhesions. No definite site of inflammatory wall thickening. No free perforation.    LYMPH NODES: Normal.    VASCULATURE: Unremarkable.    PELVIC ORGANS: Normal.    MUSCULOSKELETAL: Degenerative changes of spine. No suspicious lesion.      Impression    IMPRESSION:   1.  Moderate grade mid small bowel obstruction, etiology uncertain but likely adhesions.       Past Medical History    I have reviewed this patient's medical history and updated it with pertinent information if needed.   Past Medical History:   Diagnosis Date     Colitis      Ileus (H)      Neuropathy         Past Surgical History    I have reviewed this patient's surgical history and updated it with pertinent information if needed.  Past Surgical History:   Procedure Laterality Date     COLONOSCOPY N/A 2018    Procedure: COMBINED COLONOSCOPY, SINGLE OR MULTIPLE BIOPSY/POLYPECTOMY BY BIOPSY;  Colonoscopy cold forceps biopsies;  Surgeon: Carlos Whaley MD;  Location:  GI        Social History    I have reviewed this patient's social history and updated it with pertinent information if needed.  Social History     Tobacco Use     Smoking status: Former Smoker     Quit date: 10/4/1962     Years since quittin.3     Smokeless tobacco: Never Used   Substance Use Topics     Alcohol use: Yes     Drug use: Not on file          Family History    I have reviewed this patient's family history and updated it with pertinent information if needed.   Not pertinent to current condition.      Prior to Admission Medications    Prior to Admission Medications   Prescriptions Last Dose Informant Patient Reported? Taking?   Pregabalin (LYRICA PO)  Self Yes No   Sig: Take 200 mg by mouth 2 times daily as needed    calcium carbonate (OS-RISSA 500 MG [place]. CA) 500 MG tablet   Yes No   Sig: Take 500 mg by mouth daily    glucosamine-chondroitin 500-400 MG CAPS   Yes No   Sig: Take 1 capsule by mouth daily    latanoprost (XALATAN) 0.005 % ophthalmic solution   Yes No   Sig: Place 1 drop into both eyes At Bedtime   mesalamine (PENTASA) 500 MG CPCR CR capsule   Yes No   Sig: Take 1,000 mg by mouth At Bedtime   mesalamine (PENTASA) 500 MG CPCR CR capsule   Yes No   Sig: Take 1,500 mg by mouth every morning      Facility-Administered Medications: None        Allergies    No Known Allergies

## 2024-09-16 NOTE — TRANSFER OF CARE
"Anesthesia Transfer of Care Note    Patient: Sana Crenshaw    Procedure(s) Performed: Procedure(s) (LRB):  DISCECTOMY, SPINE, CERVICAL, ANTERIOR APPROACH, WITH FUSION (N/A)  INSERTION,ALLOGRAFT,BONE,INTO SPINE (N/A)    Anesthesia Type: other: and general TIVA    Transport from OR: Transported from OR on room air with adequate spontaneous ventilation    Post pain: adequate analgesia    Post assessment: no apparent anesthetic complications and tolerated procedure well    Post vital signs: stable    Level of consciousness: awake    Nausea/Vomiting: no nausea/vomiting    Complications: none    Transfer of care protocol was followed      Last vitals: Visit Vitals  BP (!) 180/77 (BP Location: Right arm, Patient Position: Sitting)   Pulse 73   Temp 36.7 °C (98.1 °F) (Temporal)   Resp 18   Ht 5' 2" (1.575 m)   Wt 94.5 kg (208 lb 5.4 oz)   SpO2 99%   Breastfeeding No   BMI 38.10 kg/m²     "

## 2024-09-17 VITALS
BODY MASS INDEX: 38.7 KG/M2 | OXYGEN SATURATION: 97 % | SYSTOLIC BLOOD PRESSURE: 129 MMHG | RESPIRATION RATE: 20 BRPM | HEIGHT: 62 IN | DIASTOLIC BLOOD PRESSURE: 61 MMHG | WEIGHT: 210.31 LBS | TEMPERATURE: 98 F | HEART RATE: 67 BPM

## 2024-09-17 PROBLEM — E66.01 CLASS 2 SEVERE OBESITY DUE TO EXCESS CALORIES WITH SERIOUS COMORBIDITY AND BODY MASS INDEX (BMI) OF 38.0 TO 38.9 IN ADULT: Chronic | Status: ACTIVE | Noted: 2022-06-09

## 2024-09-17 PROBLEM — E03.9 HYPOTHYROIDISM: Chronic | Status: ACTIVE | Noted: 2022-07-26

## 2024-09-17 PROBLEM — I10 HYPERTENSION: Chronic | Status: ACTIVE | Noted: 2022-07-26

## 2024-09-17 PROBLEM — K21.9 GASTROESOPHAGEAL REFLUX DISEASE WITHOUT ESOPHAGITIS: Chronic | Status: ACTIVE | Noted: 2024-09-10

## 2024-09-17 PROBLEM — N39.46 URGE AND STRESS INCONTINENCE: Chronic | Status: ACTIVE | Noted: 2021-01-08

## 2024-09-17 PROBLEM — J45.909 ASTHMA: Chronic | Status: ACTIVE | Noted: 2023-04-04

## 2024-09-17 PROBLEM — E66.812 CLASS 2 SEVERE OBESITY DUE TO EXCESS CALORIES WITH SERIOUS COMORBIDITY AND BODY MASS INDEX (BMI) OF 38.0 TO 38.9 IN ADULT: Chronic | Status: ACTIVE | Noted: 2022-06-09

## 2024-09-17 LAB
ALBUMIN SERPL BCP-MCNC: 3.1 G/DL (ref 3.5–5.2)
ALP SERPL-CCNC: 92 U/L (ref 55–135)
ALT SERPL W/O P-5'-P-CCNC: 18 U/L (ref 10–44)
ANION GAP SERPL CALC-SCNC: 8 MMOL/L (ref 8–16)
AST SERPL-CCNC: 24 U/L (ref 10–40)
BASOPHILS # BLD AUTO: 0.01 K/UL (ref 0–0.2)
BASOPHILS NFR BLD: 0.2 % (ref 0–1.9)
BILIRUB SERPL-MCNC: 0.5 MG/DL (ref 0.1–1)
BUN SERPL-MCNC: 17 MG/DL (ref 8–23)
CALCIUM SERPL-MCNC: 8.8 MG/DL (ref 8.7–10.5)
CHLORIDE SERPL-SCNC: 106 MMOL/L (ref 95–110)
CO2 SERPL-SCNC: 23 MMOL/L (ref 23–29)
CREAT SERPL-MCNC: 0.7 MG/DL (ref 0.5–1.4)
DIFFERENTIAL METHOD BLD: ABNORMAL
EOSINOPHIL # BLD AUTO: 0 K/UL (ref 0–0.5)
EOSINOPHIL NFR BLD: 0 % (ref 0–8)
ERYTHROCYTE [DISTWIDTH] IN BLOOD BY AUTOMATED COUNT: 12.4 % (ref 11.5–14.5)
EST. GFR  (NO RACE VARIABLE): >60 ML/MIN/1.73 M^2
GLUCOSE SERPL-MCNC: 132 MG/DL (ref 70–110)
HCT VFR BLD AUTO: 40.5 % (ref 37–48.5)
HGB BLD-MCNC: 13.1 G/DL (ref 12–16)
IMM GRANULOCYTES # BLD AUTO: 0.02 K/UL (ref 0–0.04)
IMM GRANULOCYTES NFR BLD AUTO: 0.3 % (ref 0–0.5)
LYMPHOCYTES # BLD AUTO: 1 K/UL (ref 1–4.8)
LYMPHOCYTES NFR BLD: 14.4 % (ref 18–48)
MCH RBC QN AUTO: 29.7 PG (ref 27–31)
MCHC RBC AUTO-ENTMCNC: 32.3 G/DL (ref 32–36)
MCV RBC AUTO: 92 FL (ref 82–98)
MONOCYTES # BLD AUTO: 0.3 K/UL (ref 0.3–1)
MONOCYTES NFR BLD: 4.5 % (ref 4–15)
NEUTROPHILS # BLD AUTO: 5.3 K/UL (ref 1.8–7.7)
NEUTROPHILS NFR BLD: 80.6 % (ref 38–73)
NRBC BLD-RTO: 0 /100 WBC
PLATELET # BLD AUTO: 184 K/UL (ref 150–450)
PMV BLD AUTO: 10.9 FL (ref 9.2–12.9)
POTASSIUM SERPL-SCNC: 3.9 MMOL/L (ref 3.5–5.1)
PROT SERPL-MCNC: 6.2 G/DL (ref 6–8.4)
RBC # BLD AUTO: 4.41 M/UL (ref 4–5.4)
SODIUM SERPL-SCNC: 137 MMOL/L (ref 136–145)
WBC # BLD AUTO: 6.62 K/UL (ref 3.9–12.7)

## 2024-09-17 PROCEDURE — 85025 COMPLETE CBC W/AUTO DIFF WBC: CPT | Performed by: HOSPITALIST

## 2024-09-17 PROCEDURE — 97162 PT EVAL MOD COMPLEX 30 MIN: CPT

## 2024-09-17 PROCEDURE — 25000003 PHARM REV CODE 250: Performed by: PHYSICIAN ASSISTANT

## 2024-09-17 PROCEDURE — 94799 UNLISTED PULMONARY SVC/PX: CPT

## 2024-09-17 PROCEDURE — 97530 THERAPEUTIC ACTIVITIES: CPT

## 2024-09-17 PROCEDURE — 80053 COMPREHEN METABOLIC PANEL: CPT | Performed by: HOSPITALIST

## 2024-09-17 PROCEDURE — 36415 COLL VENOUS BLD VENIPUNCTURE: CPT | Performed by: HOSPITALIST

## 2024-09-17 PROCEDURE — 97166 OT EVAL MOD COMPLEX 45 MIN: CPT

## 2024-09-17 PROCEDURE — 94799 UNLISTED PULMONARY SVC/PX: CPT | Mod: XB

## 2024-09-17 RX ORDER — METHYLPREDNISOLONE 4 MG/1
TABLET ORAL
Qty: 21 EACH | Refills: 0 | Status: SHIPPED | OUTPATIENT
Start: 2024-09-17 | End: 2024-10-08

## 2024-09-17 RX ORDER — OXYCODONE AND ACETAMINOPHEN 10; 325 MG/1; MG/1
1 TABLET ORAL EVERY 4 HOURS PRN
Qty: 42 TABLET | Refills: 0 | Status: SHIPPED | OUTPATIENT
Start: 2024-09-17 | End: 2024-09-24

## 2024-09-17 RX ORDER — DULOXETIN HYDROCHLORIDE 30 MG/1
30 CAPSULE, DELAYED RELEASE ORAL DAILY
Qty: 30 CAPSULE | Refills: 11 | Status: SHIPPED | OUTPATIENT
Start: 2024-09-18 | End: 2025-09-18

## 2024-09-17 RX ORDER — TIZANIDINE 4 MG/1
4 TABLET ORAL EVERY 6 HOURS PRN
Qty: 30 TABLET | Refills: 1 | Status: SHIPPED | OUTPATIENT
Start: 2024-09-17 | End: 2024-09-27

## 2024-09-17 RX ADMIN — OXYCODONE HYDROCHLORIDE 15 MG: 5 TABLET ORAL at 08:09

## 2024-09-17 RX ADMIN — THERA TABS 1 TABLET: TAB at 08:09

## 2024-09-17 RX ADMIN — OXYCODONE HYDROCHLORIDE 15 MG: 5 TABLET ORAL at 04:09

## 2024-09-17 RX ADMIN — LEVOTHYROXINE SODIUM 50 MCG: 50 TABLET ORAL at 05:09

## 2024-09-17 RX ADMIN — OXYBUTYNIN CHLORIDE 5 MG: 5 TABLET ORAL at 08:09

## 2024-09-17 RX ADMIN — DULOXETINE HYDROCHLORIDE 30 MG: 30 CAPSULE, DELAYED RELEASE ORAL at 08:09

## 2024-09-17 RX ADMIN — AMLODIPINE BESYLATE 5 MG: 5 TABLET ORAL at 08:09

## 2024-09-17 RX ADMIN — LISINOPRIL 20 MG: 20 TABLET ORAL at 08:09

## 2024-09-17 RX ADMIN — DOCUSATE SODIUM 100 MG: 100 CAPSULE, LIQUID FILLED ORAL at 08:09

## 2024-09-17 RX ADMIN — METOPROLOL SUCCINATE 100 MG: 50 TABLET, EXTENDED RELEASE ORAL at 08:09

## 2024-09-17 RX ADMIN — PANTOPRAZOLE SODIUM 40 MG: 40 TABLET, DELAYED RELEASE ORAL at 08:09

## 2024-09-17 NOTE — PT/OT/SLP EVAL
Physical Therapy Evaluation    Patient Name:  Sana Crenshaw   MRN:  [id number]    Recommendations:     Discharge Recommendations: Low Intensity Therapy   Discharge Equipment Recommendations: walker, rolling   Barriers to discharge: None    Assessment:     Sana Crenshaw is a 73 y.o. female admitted with a medical diagnosis of Cervical radiculopathy.  She presents with the following impairments/functional limitations: weakness, impaired endurance, impaired self care skills, impaired functional mobility, gait instability, impaired balance, pain, decreased lower extremity function, impaired sensation, decreased coordination, decreased ROM, edema .    Rehab Prognosis: Good; patient would benefit from acute skilled PT services to address these deficits and reach maximum level of function.    Recent Surgery: Procedure(s) (LRB):  DISCECTOMY, SPINE, CERVICAL, ANTERIOR APPROACH, WITH FUSION (N/A)  INSERTION,ALLOGRAFT,BONE,INTO SPINE (N/A) 1 Day Post-Op    Plan:     During this hospitalization, patient to be seen 3 x/week to address the identified rehab impairments via therapeutic activities, gait training, therapeutic exercises and progress toward the following goals:    Plan of Care Expires:  10/01/24    Subjective     Chief Complaint: NECK PAIN   Patient/Family Comments/goals: INC MOBILITY   Pain/Comfort:  Pain Rating 1: 4/10  Location 1: neck  Pain Rating Post-Intervention 1: 4/10    Patients cultural, spiritual, Jewish conflicts given the current situation:      Living Environment:  PT LIVES AT HOME WITH GRANDDAUGHTER AND GREAT GRANDCHILDREN IN A ONE STORY HOME WITH NO STEPS TO ENTER   Prior to admission, patients level of function was MOD I WITH FURNITURE AMBULATION AND USED HOVER ROUND FOR LONG DISTANCES .  Equipment used at home: other (see comments) (HOVER ROUND).  DME owned (not currently used): none.  Upon discharge, patient will have assistance from FAMILY .    Objective:     Communicated with  NURSE  RAUL AND [facility] CHART REVIEW  prior to session.  Patient found supine with peripheral IV, hemovac  upon PT entry to room.    General Precautions: Standard, fall  Orthopedic Precautions:spinal precautions   Braces: Aspen collar  Respiratory Status: Room air    Exams:  Cognitive Exam:  Patient is oriented to Person, Place, Time, and Situation  Postural Exam:  Patient presented with the following abnormalities:    -       Rounded shoulders  -       Forward head  RLE ROM: WFL  RLE Strength: WFL  LLE ROM: LIMITED  LLE Strength: LIMITED    Functional Mobility:  Bed Mobility:     Rolling Left:  stand by assistance  Scooting: stand by assistance  Supine to Sit: stand by assistance  Transfers:     Sit to Stand:  contact guard assistance with rolling walker  Bed to Chair: contact guard assistance with  rolling walker  using  Stand Pivot  Gait: PT GT TRAINED X 200' WITH RW AND CGA WITH ANKLE BRACE AND ASPEN COLLAR ON       AM-PAC 6 CLICK MOBILITY  Total Score:19       Treatment & Education:  GT. BELT AND SHOES DONNED PRIOR TO OOB MOBILITY.  PT EDUCATED ON SPINAL PRECAUTIONS NO BENDING, LIFTING AND TWISTING.  PT EDUCATED ON USE OF AND ADJUSTMENT OF ASPEN COLLAR.  PT TO BATHROOM WITH RW AND CGA FOR T/F ON AND OFF. PT RETURNED TO  AFTER GT ASSESSMENT AND T/F TO CHAIR WITH RW AND CGA. PT LEFT SEATED WITH ALL NEEDS MET AND CALL BELL IN REACH.        Patient left up in chair with all lines intact and call button in reach.    GOALS:   Multidisciplinary Problems       Physical Therapy Goals          Problem: Physical Therapy    Goal Priority Disciplines Outcome Goal Variances Interventions   Physical Therapy Goal     PT, PT/OT      Description: LTG: 10/1/24  1. PT WILL COMPLETE BED MOBILITY REMI  2. PT WILL STAND T/F TO CHAIR MOD I WITH RW AND BRACES ON   3. PT WILL GT TRAIN X 250' WITH RW REMI AND ASPEN COLLAR ON.  4. PT WILL INC [facility] SCORE BY 2 POINTS TO PROGRESS GROSS FUNC MOBILITY.                          History:     Past  Medical History:   Diagnosis Date    Agminate folliculitis     Arthritis     Asthma     Cervical radiculopathy     Diarrhea     Agmentria    Hypertension     Overactive bladder     Thyroid disease        Past Surgical History:   Procedure Laterality Date    ANKLE FUSION Right     ANTERIOR CERVICAL DISCECTOMY W/ FUSION N/A 9/16/2024    Procedure: DISCECTOMY, SPINE, CERVICAL, ANTERIOR APPROACH, WITH FUSION;  Surgeon: Donovan Harrington MD;  Location: [facility] OR;  Service: Neurosurgery;  Laterality: N/A;  cervical 5-6, 6-7    BREAST SURGERY      BUNIONECTOMY Right     CATARACT EXTRACTION W/  INTRAOCULAR LENS IMPLANT      EPIDURAL STEROID INJECTION INTO CERVICAL SPINE N/A 12/28/2021    Procedure: C5/6 IL FLORA with left paramedian approach with RN IV sedation;  Surgeon: Satish Segura MD;  Location: [facility]IN [facility];  Service: Pain Management;  Laterality: N/A;    EYE SURGERY      HYSTERECTOMY      IMPLANTATION OF PERMANENT SACRAL NERVE STIMULATOR N/A 01/08/2021    Procedure: INSERTION, NEUROSTIMULATOR, PERMANENT, SACRAL;  Surgeon: Onofre Fallon MD;  [email or web address];  Service: Urology;  Laterality: N/A;    INJECTION OF ANESTHETIC AGENT AROUND MEDIAL BRANCH NERVES INNERVATING CERVICAL FACET JOINT Left 04/27/2022    Procedure: Left C4-6 MBB with RN IV sedation;  Surgeon: Satish Segura MD;  Location: [facility];  Service: Pain Management;  Laterality: Left;    INJECTION OF ANESTHETIC AGENT INTO SACROILIAC JOINT Right 01/12/2022    Procedure: right Sacroiliac Joint Injection with RN IV sedation;  Surgeon: Satish Segura MD;  Location: [facility];  Service: Pain Management;  Laterality: Right;    INJECTION OF JOINT Right 01/12/2022    Procedure: right GT bursa injection with RN IV sedation;  Surgeon: Satish Segura MD;  Location: [facility]IN [facility]GT;  Service: Pain Management;  Laterality: Right;    INSERTION,ALLOGRAFT,BONE,INTO SPINE N/A 9/16/2024    Procedure: INSERTION,ALLOGRAFT,BONE,INTO SPINE;  Surgeon: Donovan Harrington  MD MARYCARMEN;  Location: [facility] OR;  Service: Neurosurgery;  Laterality: N/A;    JOINT REPLACEMENT Bilateral     knees    OOPHORECTOMY      RETINAL DETACHMENT SURGERY      REVISION OF PROCEDURE INVOLVING SACRAL NEUROSTIMULATOR DEVICE N/A 02/23/2021    Procedure: REVISION, NEUROSTIMULATOR, SACRAL;  Surgeon: Onofre Fallon MD;  Location: [facility] OR;  Service: Urology;  Laterality: N/A;    SHOULDER ARTHROSCOPY Right     SHOULDER ARTHROSCOPY W/ ROTATOR CUFF REPAIR Left     x 2    TOTAL REDUCTION MAMMOPLASTY Bilateral 2008    ULNAR TUNNEL RELEASE Right 04/10/2023    Procedure: RELEASE, CUBITAL TUNNEL;  Surgeon: Bhavin Nelson MD;  Location: [facility] OR;  Service: Orthopedics;  Laterality: Right;  right cubital tunnel release       Time Tracking:     PT Received On: 09/17/24  PT Start Time: 0910     PT Stop Time: 0948  PT Total Time (min): 38 min     Billable Minutes: Evaluation 15 and Therapeutic Activity 23 09/17/2024

## 2024-09-17 NOTE — ASSESSMENT & PLAN NOTE
Chronic, controlled. Latest blood pressure and vitals reviewed-     Temp:  [97.7 °F (36.5 °C)-98.3 °F (36.8 °C)]   Pulse:  [54-73]   Resp:  []   BP: ()/(53-77)   SpO2:  [93 %-99 %] .   Home meds for hypertension were reviewed and noted below.   Hypertension Medications               amLODIPine (NORVASC) 5 MG tablet TAKE 1 TABLET EVERY DAY    lisinopriL (PRINIVIL,ZESTRIL) 20 MG tablet TAKE 1 TABLET ONE TIME DAILY (DOSE CHANGE)    metoprolol succinate (TOPROL-XL) 100 MG 24 hr tablet Take 1 tablet (100 mg total) by mouth once daily.     While in the hospital, will manage blood pressure as follows; Continue home antihypertensive regimen, titrate as needed, and optimize pain control.    Will utilize p.r.n. blood pressure medication only if patient's blood pressure greater than 160/100 and she develops symptoms such as worsening chest pain or shortness of breath.

## 2024-09-17 NOTE — PLAN OF CARE
[facility] - Med Surg  Discharge Final Note    Primary Care Provider: Stuart Gage MD    Expected Discharge Date: 9/17/2024    Final Discharge Note (most recent)       Final Note - 09/17/24 1155          Final Note    Assessment Type Final Discharge Note     Anticipated Discharge Disposition Home or Self Care     Hospital Resources/Appts/Education Provided Appointments scheduled and added to AVS

## 2024-09-17 NOTE — PROGRESS NOTES
[facility] [place] Medicine  Progress Note    Patient Name: Sana Crenshaw  MRN: [id number]  Patient Class: OP- Outpatient Recovery   Admission Date: 9/16/2024  Length of Stay: 0 days  Attending Physician: Donovan Harrington MD  Primary Care Provider: Stuart Gage MD        Subjective:     Principal Problem:Cervical radiculopathy        HPI:  Sana Crenshaw is a 73 y.o. female with a PMH  has a past medical history of Agminate folliculitis, Arthritis, Asthma, Cervical radiculopathy, Diarrhea, Hypertension, Overactive bladder, and Thyroid disease. who was seen postoperatively lying in bed in moderate distress upon entering room earlier tonight.  Patient s/p ACDF of C5-C7 by Dr. Harrington earlier today and tolerated procedure well. She is currently complaining of postoperative neck pain rated 4/10 in severity and is well controlled with current pain medications.  Patient advised pain medications are ordered as needed and will need to inform nurse if she requires additional pain medication and that her medications will be adjusted accordingly.  Patient informed of reasoning for hospital medicine consultation by surgical team and agrees with treatment plan moving forward.  Patient also complaining of bilateral numbness/tingling in her fingertips which is unchanged from baseline prior to surgery. All questions answered. Hospital Medicine consulted to help aid in medical management while undergoing postoperative recovery with surgical team continuing to follow as primary.     PCP: Stuart Gage      Overview/Hospital Course:  s/p ACDF of C5-C7 by Dr. Harrington on 9/16/24  Hospital medicine consulted for medical management     Appeared hemodynamically stable, afebrile, no leukocytosis   Likely discharge today per neurosurgery   PT OT evaluated and recommended low intensity therapy/home health    Interval History:     Hemodynamically stable   Afebrile, no leukocytosis   BUN/creatinine stable   No electrolyte  abnormalities noted   PT OT evaluated and recommended low intensity therapy   Plans for discharge likely today per primary team/neurosurgery    Review of Systems  Objective:     Vital Signs (Most Recent):  Temp: 97.9 °F (36.6 °C) (09/17/24 1211)  Pulse: (!) 59 (09/17/24 1211)  Resp: 18 (09/17/24 1211)  BP: 115/61 (09/17/24 1211)  SpO2: 96 % (09/17/24 1211) Vital Signs (24h Range):  Temp:  [97.6 °F (36.4 °C)-98.6 °F (37 °C)] 97.9 °F (36.6 °C)  Pulse:  [54-73] 59  Resp:  [] 18  SpO2:  [93 %-98 %] 96 %  BP: ()/(53-75) 115/61     Weight: 95.4 kg (210 lb 5.1 oz)  Body mass index is 38.47 kg/m².    Intake/Output Summary (Last 24 hours) at 9/17/2024 1221  Last data filed at 9/17/2024 0605  Gross per 24 hour   Intake 777.34 ml   Output 75 ml   Net 702.34 ml         Physical Exam      Constitutional:         Appearance: Normal appearance. She is obese. She is not ill-appearing, toxic-appearing or diaphoretic.   HENT:      Head: Normocephalic and atraumatic.      Right Ear: External ear normal.      Left Ear: External ear normal.      Nose: Nose normal. No congestion or rhinorrhea.      Mouth/Throat:      Mouth: Mucous membranes are moist.      Pharynx: Oropharynx is clear. No oropharyngeal exudate or posterior oropharyngeal erythema.   Eyes:      General: No scleral icterus.     Extraocular Movements: Extraocular movements intact.      Conjunctiva/sclera: Conjunctivae normal.      Pupils: Pupils are equal, round, and reactive to light.   Neck:      Vascular: No carotid bruit.      Comments: Limited range of motion secondary to recent surgery and C-collar in place.  TTP along surgical incision site without evidence of bleeding, drainage, signs of infection noted.  Cardiovascular:      Rate and Rhythm: Normal rate and regular rhythm.      Pulses: Normal pulses.      Heart sounds: Normal heart sounds. No murmur heard.     No friction rub. No gallop.   Pulmonary:      Effort: Pulmonary effort is normal. No  respiratory distress.      Breath sounds: Normal breath sounds. No stridor. No wheezing, rhonchi or rales.   Chest:      Chest wall: No tenderness.   Abdominal:      General: Abdomen is flat. Bowel sounds are normal. There is no distension.      Palpations: Abdomen is soft. There is no mass.      Tenderness: There is no abdominal tenderness. There is no right CVA tenderness, left CVA tenderness, guarding or rebound.      Hernia: No hernia is present.   Musculoskeletal:         General: No swelling, tenderness, deformity or signs of injury. Normal range of motion.      Cervical back: No rigidity or tenderness.      Right lower leg: No edema.      Left lower leg: No edema.   Lymphadenopathy:      Cervical: No cervical adenopathy.   Skin:     General: Skin is warm and dry.      Capillary Refill: Capillary refill takes less than 2 seconds.      Coloration: Skin is not jaundiced or pale.      Findings: No bruising, erythema, lesion or rash.   Neurological:      General: No focal deficit present.      Mental Status: She is alert and oriented to person, place, and time. Mental status is at baseline.      Cranial Nerves: No cranial nerve deficit.      Sensory: No sensory deficit.      Motor: No weakness.      Coordination: Coordination normal.      Gait: Gait normal.      Deep Tendon Reflexes: Reflexes normal.   Psychiatric:         Mood and Affect: Mood normal.         Behavior: Behavior normal.         Thought Content: Thought content normal.         Judgment: Judgment normal.          Significant Labs: All pertinent labs within the past 24 hours have been reviewed.  CBC:   Recent Labs   Lab 09/17/24  0549   WBC 6.62   HGB 13.1   HCT 40.5        CMP:   Recent Labs   Lab 09/17/24  0549      K 3.9      CO2 23   *   BUN 17   CREATININE 0.7   CALCIUM 8.8   PROT 6.2   ALBUMIN 3.1*   BILITOT 0.5   ALKPHOS 92   AST 24   ALT 18   ANIONGAP 8       Significant Imaging:          Assessment/Plan:      *  Cervical radiculopathy  Patient s/p ACDF of C5-C7 by Dr. Harrington earlier today.  Patient tolerated procedure well.  Patient remains hemodynamically stable with postoperative labs pending.  Hospital Medicine consulted by surgical team to help aid in postoperative medical management.  Plan:  -Continue current pain regimen, titrate as needed  -Bowel regimen  -Diet and activity per surgery  -Wound care   -Continue splint/brace per surgery   -IVFs prn  -Antiemetics prn  -Tylenol as needed for fever   -PT/OT   -Remaining care per surgical team      Gastroesophageal reflux disease without esophagitis  Chronic. Stable. Currently asymptomatic. Home medications include PPI/Antacids as needed.  Plan:  -Continue PPI/Antacids as needed       Asthma  Patient with history of Asthma not presently in acute exacerbation and is without signs/symptoms of distress.   Plan:  -Continue home medications, titrate as needed  -Titrate oxygen requirements as needed  -Incentive spirometry   -Monitor pulse oximetry  -Rupert prn      Hypertension  Chronic, controlled. Latest blood pressure and vitals reviewed-     Temp:  [97.7 °F (36.5 °C)-98.3 °F (36.8 °C)]   Pulse:  [54-73]   Resp:  []   BP: ()/(53-77)   SpO2:  [93 %-99 %] .   Home meds for hypertension were reviewed and noted below.   Hypertension Medications               amLODIPine (NORVASC) 5 MG tablet TAKE 1 TABLET EVERY DAY    lisinopriL (PRINIVIL,ZESTRIL) 20 MG tablet TAKE 1 TABLET ONE TIME DAILY (DOSE CHANGE)    metoprolol succinate (TOPROL-XL) 100 MG 24 hr tablet Take 1 tablet (100 mg total) by mouth once daily.     While in the hospital, will manage blood pressure as follows; Continue home antihypertensive regimen, titrate as needed, and optimize pain control.    Will utilize p.r.n. blood pressure medication only if patient's blood pressure greater than 160/100 and she develops symptoms such as worsening chest pain or shortness of breath.      Hypothyroidism  Patient has  chronic hypothyroidism. TFTs reviewed-   Lab Results   Component Value Date    TSH 1.851 04/08/2024   Plan:  -Will continue chronic levothyroxine and adjust for and clinical changes      Class 2 severe obesity due to excess calories with serious comorbidity and body mass index (BMI) of 38.0 to 38.9 in adult  Body mass index is 38.47 kg/m². Morbid obesity complicates all aspects of disease management from diagnostic modalities to treatment. Weight loss encouraged and health benefits explained to patient.       Urge and stress incontinence  Chronic.  Stable.  Plan:   -continue home medications        VTE Risk Mitigation (From admission, onward)           Ordered     IP VTE HIGH RISK PATIENT  Once         09/16/24 1726     Place sequential compression device  Until discontinued         09/16/24 1726                    Discharge Planning   JACKLYN: 9/17/2024     Code Status: Prior   Is the patient medically ready for discharge?:     Reason for patient still in hospital (select all that apply): likely discharge today per primary team/ neurosurgery   Discharge Plan A: Home with family                  Martinyazmin Lillian Lima MD  Department of Hospital Medicine   [facility] - Med Surg

## 2024-09-17 NOTE — OP NOTE
Operative Note       Surgery Date: 9/16/2024     Surgeon: Donovan Harrington MD (Neurosurgery)    Assistant: Angie Aguilar PA-C    Implants:    PEEK Interbody Cage  Medtronic [place] Plate    Pre-op Diagnosis:  Cervical radiculopathy [M54.12]    Post-op Diagnosis: same    Anesthesia: GETA    Technical Procedures Used:       Cervical diskectomy and central decompression with bilateral foraminotomy  C5-6  CPT 53040- 62  Arthrodesis, anterior interbody, including disc space preparation, discectomy,  osteophytectomy and decompression of spinal cord and/or nerve roots; cervical  below C2     2. Cervical diskectomy and central decompression with bilateral foraminotomy  C6-7  CPT 23852- 62  Arthrodesis, anterior interbody, including disc space preparation, discectomy,  osteophytectomy and decompression of spinal cord and/or nerve roots; cervical  below C2, each additional interspace. (List separately in addition to code for primary  procedure). Code first (81156)     3. Cervical arthrodesis C5-7 CPT 31194      4. Placement of PEEK  interbody graft C C5-6 and C6-7 CPT 92135     5. Placement of anterior instrumentation plate and screws  C5-7 CPT 77176     6. Use of allograft bone CPT 88895     7. Use of intraoperative neuromonitoring     Complications: No    Estimated Blood Loss: * No values recorded between 9/16/2024 11:10 AM and 9/16/2024  2:09 PM *           Specimens: none    Justification for the operation:     Sana is a 73 y.o. female who presented to the neurosurgery clinic with chronic neck pain without motor weakness of the upper extremities.  She had imaging which showed fusion C4-5, degenerative disc disease with stenosis see C5-6 C6-7.  They had been treated with numerous nonoperative interventions without sustained benefit.  We discussed ACDF at length.  I discussed in detail with the patient the risks, benefits, alternatives, indications, and methods of the procedure. Some of the significant risks specific to this  particular procedure are: persistent pain, cerebrospinal fluid leakage, spinal instability, paralysis, numbness, nerve or spinal cord injury, bowel or bladder or sexual problems, chronic neck pain, need for additional surgery, lack of relief of current symptoms, onset of new symptoms, vascular  injury, stroke, recurrent laryngeal nerve injury, difficulty swallowing, fusion failure, instrumentation failure      Additional risks include: infection, bleeding, blood clots, heart attack, allergic reactions, pneumonia and other risks.  These risks can lead to death or permanent total disability.  Additional complications are also possible. Anesthesia also involves serious risks including, most importantly, a risk of reaction to medications, which can result in death or permanent total disability.      All questions were answered about the procedure, alternatives and risks. No guarantees or assurances were made about the results of the procedure.      Findings:  Cervical stenosis foraminal stenosis bilaterally C5-6 and C6-7    Procedure in Detail:      The patient was placed supine after induction of a general anesthetic.  The neck was extended and prepped and draped in standard fashion.  A transverse incision was made to the lateral portion of the left neck.  Dissection was carried down to the platysma.  Using sharp dissection plane was created medial to the border of the sternocleidomastoid making sure the carotid artery was lateral to the exposure.  The longus colli was identified.  A needle was used to reinaldo the disc of C5-6 and C6-7 which was confirmed with fluoroscopy.  Next the retractors were placed without difficulty     Once the retractors were placed the distraction pins were placed at the C5, C6, and C7 vertebral bodies.  At C 5-6 diskectomy was performed with a 11. Blade.   An upgoing curette was used to undermined the PLL and the Kerrisons were used to perform the decompression out to the foramen bilaterally  to remove any further progression. There was a bilateral disc protrusion causing severe spinal canal stenosis at this level.  This was carefully dissected using an upgoing curette an attempt to be removed with a Kerrison.  There was no obvious drainage of CSF noted.  A blunt hook was used to feel out into the foramen bilaterally make sure that bilaterally the foramen were free of any remaining compression.  Next the endplates were scraped clean of any remaining disc material.  \    Diskectomy was performed in the same fashion for levels C6 - C7 where there was noted stenosis to the foramen bilaterally with a partially calcified posterior longitudinal ligament at this level.         he trial spacer was placed to the interspace at each of these levels.  At the C5-6 space a 7 mm Medtronic peek interbody cage was placed.  At C6-7 a 5 mm peek interbody cage was placed   A Medtronic Atlantis plate was placed under fluoroscopy spanning each of these segments without difficulty.    This was secured into place with 6 screws which were then locked into their final position. The wound was irrigated with bacitracin and betadine solution and hemostasis was of confirmed.  1 g of vancomycin powder was placed to the surgical cavity.  A hemovac drain was needed/placed in the surgical cavity. The platysma was then closed with 3-0 Vicryl followed by Derma bond Prineo for the skin.        Motor and SSEP readings were stable following the procedure.     Sponge, needle and instrument counts were all accounted for at the end of the case x 2. The patient tolerated the procedure well and was transferred to the recovery room in a stable condition.        Disposition: Overnight observation.            Condition: Good    Attestation:  I performed the entire operation

## 2024-09-17 NOTE — HPI
Sana Crenshaw is a 73 y.o. female with a PMH  has a past medical history of Agminate folliculitis, Arthritis, Asthma, Cervical radiculopathy, Diarrhea, Hypertension, Overactive bladder, and Thyroid disease. who was seen postoperatively lying in bed in moderate distress upon entering room earlier tonight.  Patient s/p ACDF of C5-C7 by Dr. Harrington earlier today and tolerated procedure well. She is currently complaining of postoperative neck pain rated 4/10 in severity and is well controlled with current pain medications.  Patient advised pain medications are ordered as needed and will need to inform nurse if she requires additional pain medication and that her medications will be adjusted accordingly.  Patient informed of reasoning for hospital medicine consultation by surgical team and agrees with treatment plan moving forward.  Patient also complaining of bilateral numbness/tingling in her fingertips which is unchanged from baseline prior to surgery. All questions answered. Hospital Medicine consulted to help aid in medical management while undergoing postoperative recovery with surgical team continuing to follow as primary.     PCP: Stuart Gage

## 2024-09-17 NOTE — PLAN OF CARE
See eval for details. Pt displayed deficits with functional mobility/ transfers, deficits with adl's skills also decrease b ue strength/endurance. Recommendation: LOW INTENSITY

## 2024-09-17 NOTE — ASSESSMENT & PLAN NOTE
Body mass index is 38.47 kg/m². Morbid obesity complicates all aspects of disease management from diagnostic modalities to treatment. Weight loss encouraged and health benefits explained to patient.

## 2024-09-17 NOTE — ANESTHESIA POSTPROCEDURE EVALUATION
Anesthesia Post Evaluation    Patient: Sana Crenshaw    Procedure(s) Performed: Procedure(s) (LRB):  DISCECTOMY, SPINE, CERVICAL, ANTERIOR APPROACH, WITH FUSION (N/A)  INSERTION,ALLOGRAFT,BONE,INTO SPINE (N/A)    Final Anesthesia Type: general      Patient location during evaluation: PACU  Patient participation: Yes- Able to Participate  Level of consciousness: awake and alert  Post-procedure vital signs: reviewed and stable  Pain management: adequate  Airway patency: patent  MERRY mitigation strategies: Verification of full reversal of neuromuscular block  PONV status at discharge: No PONV  Anesthetic complications: no      Cardiovascular status: hemodynamically stable  Respiratory status: spontaneous ventilation  Hydration status: euvolemic  Follow-up not needed.              Vitals Value Taken Time   /72 09/17/24 0402   Temp 37 °C (98.6 °F) 09/17/24 0402   Pulse 56 09/17/24 0402   Resp 180 09/17/24 0414   SpO2 97 % 09/17/24 0402         Event Time   Out of Recovery 17:09:42         Pain/Edenilson Score: Pain Rating Prior to Med Admin: 8 (9/17/2024  4:14 AM)  Pain Rating Post Med Admin: 4 (9/16/2024  4:35 PM)  Edenilson Score: 10 (9/16/2024  5:00 PM)

## 2024-09-17 NOTE — SUBJECTIVE & OBJECTIVE
Past Medical History:   Diagnosis Date    Agminate folliculitis     Arthritis     Asthma     Cervical radiculopathy     Diarrhea     Agmentria    Hypertension     Overactive bladder     Thyroid disease        Past Surgical History:   Procedure Laterality Date    ANKLE FUSION Right     BREAST SURGERY      BUNIONECTOMY Right     CATARACT EXTRACTION W/  INTRAOCULAR LENS IMPLANT      EPIDURAL STEROID INJECTION INTO CERVICAL SPINE N/A 12/28/2021    Procedure: C5/6 IL FLORA with left paramedian approach with RN IV sedation;  Surgeon: Satish Segura MD;  Location: [facility];  Service: Pain Management;  Laterality: N/A;    EYE SURGERY      HYSTERECTOMY      IMPLANTATION OF PERMANENT SACRAL NERVE STIMULATOR N/A 01/08/2021    Procedure: INSERTION, NEUROSTIMULATOR, PERMANENT, SACRAL;  Surgeon: Onofre Fallon MD;  Location: [facility] OR;  Service: Urology;  Laterality: N/A;    INJECTION OF ANESTHETIC AGENT AROUND MEDIAL BRANCH NERVES INNERVATING CERVICAL FACET JOINT Left 04/27/2022    Procedure: Left C4-6 MBB with RN IV sedation;  Surgeon: Satish Segura MD;  Location: [facility];  Service: Pain Management;  Laterality: Left;    INJECTION OF ANESTHETIC AGENT INTO SACROILIAC JOINT Right 01/12/2022    Procedure: right Sacroiliac Joint Injection with RN IV sedation;  Surgeon: Satish Segura MD;  Location: [facility];  Service: Pain Management;  Laterality: Right;    INJECTION OF JOINT Right 01/12/2022    Procedure: right GT bursa injection with RN IV sedation;  Surgeon: Satish Segura MD;  Location: [facility];  Service: Pain Management;  Laterality: Right;    JOINT REPLACEMENT Bilateral     knees    OOPHORECTOMY      RETINAL DETACHMENT SURGERY      REVISION OF PROCEDURE INVOLVING SACRAL NEUROSTIMULATOR DEVICE N/A 02/23/2021    Procedure: REVISION, NEUROSTIMULATOR, SACRAL;  Surgeon: Onofre Fallon MD;  Location: [facility] OR;  Service: Urology;  Laterality: N/A;    SHOULDER ARTHROSCOPY Right     SHOULDER ARTHROSCOPY W/  ROTATOR CUFF REPAIR Left     x 2    TOTAL REDUCTION MAMMOPLASTY Bilateral 2008    ULNAR TUNNEL RELEASE Right 04/10/2023    Procedure: RELEASE, CUBITAL TUNNEL;  Surgeon: Bhavin Nelson MD;  Location: [facility];  Service: Orthopedics;  Laterality: Right;  right cubital tunnel release       Review of patient's allergies indicates:   Allergen Reactions    Augmentin [amoxicillin-pot clavulanate]      diarrhea    Gabapentin Nausea And Vomiting     Causes vertigo        Current Facility-Administered Medications on File Prior to Encounter   Medication    chlorhexidine 0.12 % solution 10 mL    lactated ringers infusion    lactated ringers infusion     Current Outpatient Medications on File Prior to Encounter   Medication Sig    amLODIPine (NORVASC) 5 MG tablet TAKE 1 TABLET EVERY DAY    DULoxetine (CYMBALTA) 30 MG capsule TAKE 1 CAPSULE EVERY DAY    levothyroxine (SYNTHROID) 50 MCG tablet TAKE 1 TABLET EVERY DAY    lisinopriL (PRINIVIL,ZESTRIL) 20 MG tablet TAKE 1 TABLET ONE TIME DAILY (DOSE CHANGE)    metoprolol succinate (TOPROL-XL) 100 MG 24 hr tablet Take 1 tablet (100 mg total) by mouth once daily.    omeprazole (PRILOSEC) 20 MG capsule Take 1 capsule (20 mg total) by mouth 2 (two) times daily.    oxybutynin (DITROPAN) 5 MG Tab Take 5 mg by mouth 2 (two) times daily.    rOPINIRole (REQUIP) 2 MG tablet Take 1 tablet (2 mg total) by mouth every evening.    albuterol (PROVENTIL/VENTOLIN HFA) 90 mcg/actuation inhaler Inhale 2 puffs into the lungs every 6 (six) hours as needed for Wheezing. Dispense with spacer.    aspirin (ECOTRIN) 81 MG EC tablet Take 81 mg by mouth once daily.     Family History       Problem Relation (Age of Onset)    Breast cancer Maternal Aunt    Cancer Maternal Grandfather    Heart attack Father (80)    Heart disease Father    Hypertension Mother    Kidney disease Mother    No Known Problems Sister, Sister, Sister, Maternal Grandmother, Paternal Grandmother, Paternal Grandfather, Brother,  Brother, Brother, Brother          Tobacco Use    Smoking status: Never     Passive exposure: Never    Smokeless tobacco: Never   Substance and Sexual Activity    Alcohol use: Not Currently     Comment: Very rarely    Drug use: Never    Sexual activity: Not Currently     Review of Systems   All other systems reviewed and are negative.    Objective:     Vital Signs (Most Recent):  Temp: 97.7 °F (36.5 °C) (09/17/24 0030)  Pulse: (!) 54 (09/17/24 0030)  Resp: (!) 180 (09/17/24 0414)  BP: (!) 98/53 (09/17/24 0030)  SpO2: (!) 94 % (09/17/24 0030) Vital Signs (24h Range):  Temp:  [97.7 °F (36.5 °C)-98.3 °F (36.8 °C)] 97.7 °F (36.5 °C)  Pulse:  [54-73] 54  Resp:  [] 180  SpO2:  [93 %-99 %] 94 %  BP: ()/(53-77) 98/53     Weight: 95.4 kg (210 lb 5.1 oz)  Body mass index is 38.47 kg/m².     Physical Exam  Vitals reviewed.   Constitutional:       General: She is in acute distress.      Appearance: Normal appearance. She is obese. She is not ill-appearing, toxic-appearing or diaphoretic.   HENT:      Head: Normocephalic and atraumatic.      Right Ear: External ear normal.      Left Ear: External ear normal.      Nose: Nose normal. No congestion or rhinorrhea.      Mouth/Throat:      Mouth: Mucous membranes are moist.      Pharynx: Oropharynx is clear. No oropharyngeal exudate or posterior oropharyngeal erythema.   Eyes:      General: No scleral icterus.     Extraocular Movements: Extraocular movements intact.      Conjunctiva/sclera: Conjunctivae normal.      Pupils: Pupils are equal, round, and reactive to light.   Neck:      Vascular: No carotid bruit.      Comments: Limited range of motion secondary to recent surgery and C-collar in place.  TTP along surgical incision site without evidence of bleeding, drainage, signs of infection noted.  Cardiovascular:      Rate and Rhythm: Normal rate and regular rhythm.      Pulses: Normal pulses.      Heart sounds: Normal heart sounds. No murmur heard.     No friction rub.  No gallop.   Pulmonary:      Effort: Pulmonary effort is normal. No respiratory distress.      Breath sounds: Normal breath sounds. No stridor. No wheezing, rhonchi or rales.   Chest:      Chest wall: No tenderness.   Abdominal:      General: Abdomen is flat. Bowel sounds are normal. There is no distension.      Palpations: Abdomen is soft. There is no mass.      Tenderness: There is no abdominal tenderness. There is no right CVA tenderness, left CVA tenderness, guarding or rebound.      Hernia: No hernia is present.   Musculoskeletal:         General: No swelling, tenderness, deformity or signs of injury. Normal range of motion.      Cervical back: No rigidity or tenderness.      Right lower leg: No edema.      Left lower leg: No edema.   Lymphadenopathy:      Cervical: No cervical adenopathy.   Skin:     General: Skin is warm and dry.      Capillary Refill: Capillary refill takes less than 2 seconds.      Coloration: Skin is not jaundiced or pale.      Findings: No bruising, erythema, lesion or rash.   Neurological:      General: No focal deficit present.      Mental Status: She is alert and oriented to person, place, and time. Mental status is at baseline.      Cranial Nerves: No cranial nerve deficit.      Sensory: No sensory deficit.      Motor: No weakness.      Coordination: Coordination normal.      Gait: Gait normal.      Deep Tendon Reflexes: Reflexes normal.   Psychiatric:         Mood and Affect: Mood normal.         Behavior: Behavior normal.         Thought Content: Thought content normal.         Judgment: Judgment normal.          Significant Labs: All pertinent labs within the past 24 hours have been reviewed.    Significant Imaging: I have reviewed all pertinent imaging results/findings within the past 24 hours.    LABS:  No results found for this or any previous visit (from the past 24 hour(s)).    RADIOLOGY  X-Ray Cervical Spine AP And Lateral    Result Date: 9/16/2024  EXAMINATION: XR CERVICAL  SPINE AP LATERAL CLINICAL HISTORY: intra op; COMPARISON: None     FINDINGS/ Intraoperative fluoroscopic images were obtained.    Total fluoro time was 1.6 min, 9.4 mGy and 2 fluoroscopic images were obtained.  See operative report. Electronically signed by: Catarino Castellanos MD Date:    09/16/2024 Time:    15:07    [facility]UR[facility] [place] Surgery Fluoro Usage    Result Date: 9/16/2024  See OP Notes for results. IMPRESSION: See OP Notes for results. This procedure was auto-finalized by: Virtual Radiologist    X-Ray Chest PA And Lateral Pre-OP    Result Date: 9/3/2024  EXAM:   XR CHEST PA AND LATERAL PRE-OP CLINICAL HISTORY: Observation for malignancy. COMPARISON: None. TECHNIQUE: PA and lateral views FINDINGS: The lungs are clear.   No pneumothorax. The cardiac silhouette size and contour is within normal limits. Eventration of the posterior left hemidiaphragm.     Negative chest radiograph. Finalized on: 9/3/2024 10:28 AM By:  MARLEY Navas MD, MD [facility]# [id number]      2024-09-03 10:30:03.889    [facility]    CT Cervical Spine Without Contrast    Result Date: 8/19/2024  EXAMINATION: CT CERVICAL SPINE WITHOUT CONTRAST CLINICAL HISTORY: Other cervical disc degeneration, unspecified cervical regionOsteoarthritis, cervical; TECHNIQUE: Axial CT imaging was performed through the cervical spine without intravenous contrast. Multiplanar reformats were reviewed and interpreted. COMPARISON: 06/16/2024 FINDINGS: Vertebral body heights are normal.Minimal retrolisthesis at C4 measuring 2 mm.  Partial osseous fusion of C4 and C5 vertebral bodies and complete osseous fusion of the left posterior elements at this level and at C2-3.  Severe atlantoaxial arthrosis with prominent posterior marginal osteophytes that contribute to bilateral severe neural foraminal narrowing at this level.  Severe bilateral neural foraminal narrowing secondary to facet DJD and degenerative disc disease at C3-4, C4-5, and C5-6.  Severe degenerative disc disease at C5-6 and C6-7 with  uncovertebral joint hypertrophy and endplate sclerosis.     No acute fracture or subluxation. Advanced degenerative disc disease as above. All CT scans at this facility use dose modulation, iterative reconstruction and/or weight based dosing when appropriate to reduce radiation dose to as low as reasonably achievable. Electronically signed by: Erik Navas MD Date:    08/19/2024 Time:    16:14      EKG    MICROBIOLOGY    MDM

## 2024-09-17 NOTE — PLAN OF CARE
Problem: Adult Inpatient Plan of Care  Goal: Plan of Care Review  Outcome: Progressing     Problem: Adult Inpatient Plan of Care  Goal: Patient-Specific Goal (Individualized)  Outcome: Progressing  Flowsheets (Taken 9/17/2024 0320)  Individualized Care Needs: room warm  Anxieties, Fears or Concerns: pain control     Problem: Wound  Goal: Optimal Coping  Outcome: Progressing  Goal: Optimal Functional Ability  Outcome: Progressing  Goal: Absence of Infection Signs and Symptoms  Outcome: Progressing  Goal: Improved Oral Intake  Outcome: Progressing  Goal: Optimal Pain Control and Function  Outcome: Progressing  Goal: Skin Health and Integrity  Outcome: Progressing  Goal: Optimal Wound Healing  Outcome: Progressing     Problem: Fall Injury Risk  Goal: Absence of Fall and Fall-Related Injury  Outcome: Progressing      Discussed POC with pt, verbalized understanding.  Patient remains free from injury.  Safety precautions maintained.   Call light and personal belongings within reach, bed in lowest position with bed wheels locked.   No s/s of acute distress.  Purposeful rounding continued this shift.  Pain controlled per MD order.  IVF infusing.   Diet orders continued, pt diet: regular  Vital signs continued per orders this shift.   Drain care and neurovascular checks continued this shift. Aspen collar in place at all times.   Chart and orders review completed. Pt education about care completed.

## 2024-09-17 NOTE — SUBJECTIVE & OBJECTIVE
Interval History:     Hemodynamically stable   Afebrile, no leukocytosis   BUN/creatinine stable   No electrolyte abnormalities noted   PT OT evaluated and recommended low intensity therapy   Plans for discharge likely today per primary team/neurosurgery    Review of Systems  Objective:     Vital Signs (Most Recent):  Temp: 97.9 °F (36.6 °C) (09/17/24 1211)  Pulse: (!) 59 (09/17/24 1211)  Resp: 18 (09/17/24 1211)  BP: 115/61 (09/17/24 1211)  SpO2: 96 % (09/17/24 1211) Vital Signs (24h Range):  Temp:  [97.6 °F (36.4 °C)-98.6 °F (37 °C)] 97.9 °F (36.6 °C)  Pulse:  [54-73] 59  Resp:  [] 18  SpO2:  [93 %-98 %] 96 %  BP: ()/(53-75) 115/61     Weight: 95.4 kg (210 lb 5.1 oz)  Body mass index is 38.47 kg/m².    Intake/Output Summary (Last 24 hours) at 9/17/2024 1221  Last data filed at 9/17/2024 0605  Gross per 24 hour   Intake 777.34 ml   Output 75 ml   Net 702.34 ml         Physical Exam      Constitutional:         Appearance: Normal appearance. She is obese. She is not ill-appearing, toxic-appearing or diaphoretic.   HENT:      Head: Normocephalic and atraumatic.      Right Ear: External ear normal.      Left Ear: External ear normal.      Nose: Nose normal. No congestion or rhinorrhea.      Mouth/Throat:      Mouth: Mucous membranes are moist.      Pharynx: Oropharynx is clear. No oropharyngeal exudate or posterior oropharyngeal erythema.   Eyes:      General: No scleral icterus.     Extraocular Movements: Extraocular movements intact.      Conjunctiva/sclera: Conjunctivae normal.      Pupils: Pupils are equal, round, and reactive to light.   Neck:      Vascular: No carotid bruit.      Comments: Limited range of motion secondary to recent surgery and C-collar in place.  TTP along surgical incision site without evidence of bleeding, drainage, signs of infection noted.  Cardiovascular:      Rate and Rhythm: Normal rate and regular rhythm.      Pulses: Normal pulses.      Heart sounds: Normal heart sounds. No  murmur heard.     No friction rub. No gallop.   Pulmonary:      Effort: Pulmonary effort is normal. No respiratory distress.      Breath sounds: Normal breath sounds. No stridor. No wheezing, rhonchi or rales.   Chest:      Chest wall: No tenderness.   Abdominal:      General: Abdomen is flat. Bowel sounds are normal. There is no distension.      Palpations: Abdomen is soft. There is no mass.      Tenderness: There is no abdominal tenderness. There is no right CVA tenderness, left CVA tenderness, guarding or rebound.      Hernia: No hernia is present.   Musculoskeletal:         General: No swelling, tenderness, deformity or signs of injury. Normal range of motion.      Cervical back: No rigidity or tenderness.      Right lower leg: No edema.      Left lower leg: No edema.   Lymphadenopathy:      Cervical: No cervical adenopathy.   Skin:     General: Skin is warm and dry.      Capillary Refill: Capillary refill takes less than 2 seconds.      Coloration: Skin is not jaundiced or pale.      Findings: No bruising, erythema, lesion or rash.   Neurological:      General: No focal deficit present.      Mental Status: She is alert and oriented to person, place, and time. Mental status is at baseline.      Cranial Nerves: No cranial nerve deficit.      Sensory: No sensory deficit.      Motor: No weakness.      Coordination: Coordination normal.      Gait: Gait normal.      Deep Tendon Reflexes: Reflexes normal.   Psychiatric:         Mood and Affect: Mood normal.         Behavior: Behavior normal.         Thought Content: Thought content normal.         Judgment: Judgment normal.          Significant Labs: All pertinent labs within the past 24 hours have been reviewed.  CBC:   Recent Labs   Lab 09/17/24  0549   WBC 6.62   HGB 13.1   HCT 40.5        CMP:   Recent Labs   Lab 09/17/24  0549      K 3.9      CO2 23   *   BUN 17   CREATININE 0.7   CALCIUM 8.8   PROT 6.2   ALBUMIN 3.1*   BILITOT 0.5    ALKPHOS 92   AST 24   ALT 18   ANIONGAP 8       Significant Imaging:

## 2024-09-17 NOTE — ASSESSMENT & PLAN NOTE
Patient s/p ACDF of C5-C7 by Dr. Harrington earlier today.  Patient tolerated procedure well.  Patient remains hemodynamically stable with postoperative labs pending.  Hospital Medicine consulted by surgical team to help aid in postoperative medical management.  Plan:  -Continue current pain regimen, titrate as needed  -Bowel regimen  -Diet and activity per surgery  -Wound care   -Continue splint/brace per surgery   -IVFs prn  -Antiemetics prn  -Tylenol as needed for fever   -PT/OT   -Remaining care per surgical team

## 2024-09-17 NOTE — HOSPITAL COURSE
s/p ACDF of C5-C7 by Dr. Harrington on 9/16/24  Hospital medicine consulted for medical management     Appeared hemodynamically stable, afebrile, no leukocytosis   Likely discharge today per neurosurgery   PT OT evaluated and recommended low intensity therapy/home health   18

## 2024-09-17 NOTE — PLAN OF CARE
[facility] - Med Surg  Discharge Assessment    Primary Care Provider: Stuart Gage MD     Discharge Assessment (most recent)       BRIEF DISCHARGE ASSESSMENT - 09/17/24 1125          Discharge Planning    Assessment Type Discharge Planning Brief Assessment     Resource/Environmental Concerns none     Support Systems Other (Comment)   grandchildren    Equipment Currently Used at Home other (see comments)   hover round    Patient/Family Anticipates Transition to home     Patient/Family Anticipated Services at Transition none     DME Needed Upon Discharge  none     Discharge Plan A Home with family                   Pt lives with her granddaughter and family.

## 2024-09-17 NOTE — CONSULTS
[facility] Surg  Hospital Medicine  Consult Note    Patient Name: Sana Crenshaw  MRN: [id number]  Admission Date: 9/16/2024  Hospital Length of Stay: 0 days  Attending Physician: Donovan Harrington MD   Primary Care Provider: Stuart Gage MD           Patient information was obtained from patient, past medical records, and ER records.     Inpatient consult to Hospitalist  Consult performed by: Darian Wang MD  Consult ordered by: Julia Aguilar PA-C        Subjective:     Principal Problem: Cervical radiculopathy    Chief Complaint: No chief complaint on file.       HPI: Sana Crenshaw is a 73 y.o. female with a PMH  has a past medical history of Agminate folliculitis, Arthritis, Asthma, Cervical radiculopathy, Diarrhea, Hypertension, Overactive bladder, and Thyroid disease. who was seen postoperatively lying in bed in moderate distress upon entering room earlier tonight.  Patient s/p ACDF of C5-C7 by Dr. Harrington earlier today and tolerated procedure well. She is currently complaining of postoperative neck pain rated 4/10 in severity and is well controlled with current pain medications.  Patient advised pain medications are ordered as needed and will need to inform nurse if she requires additional pain medication and that her medications will be adjusted accordingly.  Patient informed of reasoning for hospital medicine consultation by surgical team and agrees with treatment plan moving forward.  Patient also complaining of bilateral numbness/tingling in her fingertips which is unchanged from baseline prior to surgery. All questions answered. Hospital Medicine consulted to help aid in medical management while undergoing postoperative recovery with surgical team continuing to follow as primary.     PCP: Stuart Gage      Past Medical History:   Diagnosis Date    Agminate folliculitis     Arthritis     Asthma     Cervical radiculopathy     Diarrhea     Agmentria    Hypertension     Overactive  bladder     Thyroid disease        Past Surgical History:   Procedure Laterality Date    ANKLE FUSION Right     BREAST SURGERY      BUNIONECTOMY Right     CATARACT EXTRACTION W/  INTRAOCULAR LENS IMPLANT      EPIDURAL STEROID INJECTION INTO CERVICAL SPINE N/A 12/28/2021    Procedure: C5/6 IL FLORA with left paramedian approach with RN IV sedation;  Surgeon: Satish Segura MD;  Location: [facility];  Service: Pain Management;  Laterality: N/A;    EYE SURGERY      HYSTERECTOMY      IMPLANTATION OF PERMANENT SACRAL NERVE STIMULATOR N/A 01/08/2021    Procedure: INSERTION, NEUROSTIMULATOR, PERMANENT, SACRAL;  Surgeon: Onofre Fallon MD;  Location: [facility] OR;  Service: Urology;  Laterality: N/A;    INJECTION OF ANESTHETIC AGENT AROUND MEDIAL BRANCH NERVES INNERVATING CERVICAL FACET JOINT Left 04/27/2022    Procedure: Left C4-6 MBB with RN IV sedation;  Surgeon: Satish Segura MD;  Location: [facility];  Service: Pain Management;  Laterality: Left;    INJECTION OF ANESTHETIC AGENT INTO SACROILIAC JOINT Right 01/12/2022    Procedure: right Sacroiliac Joint Injection with RN IV sedation;  Surgeon: Satish Segura MD;  Location: [facility];  Service: Pain Management;  Laterality: Right;    INJECTION OF JOINT Right 01/12/2022    Procedure: right GT bursa injection with RN IV sedation;  Surgeon: Satish Segura MD;  Location: [facility];  Service: Pain Management;  Laterality: Right;    JOINT REPLACEMENT Bilateral     knees    OOPHORECTOMY      RETINAL DETACHMENT SURGERY      REVISION OF PROCEDURE INVOLVING SACRAL NEUROSTIMULATOR DEVICE N/A 02/23/2021    Procedure: REVISION, NEUROSTIMULATOR, SACRAL;  Surgeon: Onofre Fallon MD;  Location: [facility] OR;  Service: Urology;  Laterality: N/A;    SHOULDER ARTHROSCOPY Right     SHOULDER ARTHROSCOPY W/ ROTATOR CUFF REPAIR Left     x 2    TOTAL REDUCTION MAMMOPLASTY Bilateral 2008    ULNAR TUNNEL RELEASE Right 04/10/2023    Procedure: RELEASE, CUBITAL TUNNEL;  Surgeon: Bhavin  Tarun Nelson MD;  Location: [facility] OR;  Service: Orthopedics;  Laterality: Right;  right cubital tunnel release       Review of patient's allergies indicates:   Allergen Reactions    Augmentin [amoxicillin-pot clavulanate]      diarrhea    Gabapentin Nausea And Vomiting     Causes vertigo        Current Facility-Administered Medications on File Prior to Encounter   Medication    chlorhexidine 0.12 % solution 10 mL    lactated ringers infusion    lactated ringers infusion     Current Outpatient Medications on File Prior to Encounter   Medication Sig    amLODIPine (NORVASC) 5 MG tablet TAKE 1 TABLET EVERY DAY    DULoxetine (CYMBALTA) 30 MG capsule TAKE 1 CAPSULE EVERY DAY    levothyroxine (SYNTHROID) 50 MCG tablet TAKE 1 TABLET EVERY DAY    lisinopriL (PRINIVIL,ZESTRIL) 20 MG tablet TAKE 1 TABLET ONE TIME DAILY (DOSE CHANGE)    metoprolol succinate (TOPROL-XL) 100 MG 24 hr tablet Take 1 tablet (100 mg total) by mouth once daily.    omeprazole (PRILOSEC) 20 MG capsule Take 1 capsule (20 mg total) by mouth 2 (two) times daily.    oxybutynin (DITROPAN) 5 MG Tab Take 5 mg by mouth 2 (two) times daily.    rOPINIRole (REQUIP) 2 MG tablet Take 1 tablet (2 mg total) by mouth every evening.    albuterol (PROVENTIL/VENTOLIN HFA) 90 mcg/actuation inhaler Inhale 2 puffs into the lungs every 6 (six) hours as needed for Wheezing. Dispense with spacer.    aspirin (ECOTRIN) 81 MG EC tablet Take 81 mg by mouth once daily.     Family History       Problem Relation (Age of Onset)    Breast cancer Maternal Aunt    Cancer Maternal Grandfather    Heart attack Father (80)    Heart disease Father    Hypertension Mother    Kidney disease Mother    No Known Problems Sister, Sister, Sister, Maternal Grandmother, Paternal Grandmother, Paternal Grandfather, Brother, Brother, Brother, Brother          Tobacco Use    Smoking status: Never     Passive exposure: Never    Smokeless tobacco: Never   Substance and Sexual Activity    Alcohol use: Not  Currently     Comment: Very rarely    Drug use: Never    Sexual activity: Not Currently     Review of Systems   All other systems reviewed and are negative.    Objective:     Vital Signs (Most Recent):  Temp: 97.7 °F (36.5 °C) (09/17/24 0030)  Pulse: (!) 54 (09/17/24 0030)  Resp: (!) 180 (09/17/24 0414)  BP: (!) 98/53 (09/17/24 0030)  SpO2: (!) 94 % (09/17/24 0030) Vital Signs (24h Range):  Temp:  [97.7 °F (36.5 °C)-98.3 °F (36.8 °C)] 97.7 °F (36.5 °C)  Pulse:  [54-73] 54  Resp:  [] 180  SpO2:  [93 %-99 %] 94 %  BP: ()/(53-77) 98/53     Weight: 95.4 kg (210 lb 5.1 oz)  Body mass index is 38.47 kg/m².     Physical Exam  Vitals reviewed.   Constitutional:       General: She is in acute distress.      Appearance: Normal appearance. She is obese. She is not ill-appearing, toxic-appearing or diaphoretic.   HENT:      Head: Normocephalic and atraumatic.      Right Ear: External ear normal.      Left Ear: External ear normal.      Nose: Nose normal. No congestion or rhinorrhea.      Mouth/Throat:      Mouth: Mucous membranes are moist.      Pharynx: Oropharynx is clear. No oropharyngeal exudate or posterior oropharyngeal erythema.   Eyes:      General: No scleral icterus.     Extraocular Movements: Extraocular movements intact.      Conjunctiva/sclera: Conjunctivae normal.      Pupils: Pupils are equal, round, and reactive to light.   Neck:      Vascular: No carotid bruit.      Comments: Limited range of motion secondary to recent surgery and C-collar in place.  TTP along surgical incision site without evidence of bleeding, drainage, signs of infection noted.  Cardiovascular:      Rate and Rhythm: Normal rate and regular rhythm.      Pulses: Normal pulses.      Heart sounds: Normal heart sounds. No murmur heard.     No friction rub. No gallop.   Pulmonary:      Effort: Pulmonary effort is normal. No respiratory distress.      Breath sounds: Normal breath sounds. No stridor. No wheezing, rhonchi or rales.    Chest:      Chest wall: No tenderness.   Abdominal:      General: Abdomen is flat. Bowel sounds are normal. There is no distension.      Palpations: Abdomen is soft. There is no mass.      Tenderness: There is no abdominal tenderness. There is no right CVA tenderness, left CVA tenderness, guarding or rebound.      Hernia: No hernia is present.   Musculoskeletal:         General: No swelling, tenderness, deformity or signs of injury. Normal range of motion.      Cervical back: No rigidity or tenderness.      Right lower leg: No edema.      Left lower leg: No edema.   Lymphadenopathy:      Cervical: No cervical adenopathy.   Skin:     General: Skin is warm and dry.      Capillary Refill: Capillary refill takes less than 2 seconds.      Coloration: Skin is not jaundiced or pale.      Findings: No bruising, erythema, lesion or rash.   Neurological:      General: No focal deficit present.      Mental Status: She is alert and oriented to person, place, and time. Mental status is at baseline.      Cranial Nerves: No cranial nerve deficit.      Sensory: No sensory deficit.      Motor: No weakness.      Coordination: Coordination normal.      Gait: Gait normal.      Deep Tendon Reflexes: Reflexes normal.   Psychiatric:         Mood and Affect: Mood normal.         Behavior: Behavior normal.         Thought Content: Thought content normal.         Judgment: Judgment normal.          Significant Labs: All pertinent labs within the past 24 hours have been reviewed.    Significant Imaging: I have reviewed all pertinent imaging results/findings within the past 24 hours.    LABS:  No results found for this or any previous visit (from the past 24 hour(s)).    RADIOLOGY  X-Ray Cervical Spine AP And Lateral    Result Date: 9/16/2024  EXAMINATION: XR CERVICAL SPINE AP LATERAL CLINICAL HISTORY: intra op; COMPARISON: None     FINDINGS/ Intraoperative fluoroscopic images were obtained.    Total fluoro time was 1.6 min, 9.4 mGy and 2  fluoroscopic images were obtained.  See operative report. Electronically signed by: Catarino Castellanos MD Date:    09/16/2024 Time:    15:07    SURG [place] Surgery Fluoro Usage    Result Date: 9/16/2024  See OP Notes for results. IMPRESSION: See OP Notes for results. This procedure was auto-finalized by: Virtual Radiologist    X-Ray Chest PA And Lateral Pre-OP    Result Date: 9/3/2024  EXAM:   XR CHEST PA AND LATERAL PRE-OP CLINICAL HISTORY: Observation for malignancy. COMPARISON: None. TECHNIQUE: PA and lateral views FINDINGS: The lungs are clear.   No pneumothorax. The cardiac silhouette size and contour is within normal limits. Eventration of the posterior left hemidiaphragm.     Negative chest radiograph. Finalized on: 9/3/2024 10:28 AM By:  MARLEY Navas MD, MD [facility]# [id number]      2024-09-03 10:30:03.889    [facility]    CT Cervical Spine Without Contrast    Result Date: 8/19/2024  EXAMINATION: CT CERVICAL SPINE WITHOUT CONTRAST CLINICAL HISTORY: Other cervical disc degeneration, unspecified cervical regionOsteoarthritis, cervical; TECHNIQUE: Axial CT imaging was performed through the cervical spine without intravenous contrast. Multiplanar reformats were reviewed and interpreted. COMPARISON: 06/16/2024 FINDINGS: Vertebral body heights are normal.Minimal retrolisthesis at C4 measuring 2 mm.  Partial osseous fusion of C4 and C5 vertebral bodies and complete osseous fusion of the left posterior elements at this level and at C2-3.  Severe atlantoaxial arthrosis with prominent posterior marginal osteophytes that contribute to bilateral severe neural foraminal narrowing at this level.  Severe bilateral neural foraminal narrowing secondary to facet DJD and degenerative disc disease at C3-4, C4-5, and C5-6.  Severe degenerative disc disease at C5-6 and C6-7 with uncovertebral joint hypertrophy and endplate sclerosis.     No acute fracture or subluxation. Advanced degenerative disc disease as above. All CT scans at this facility use  dose modulation, iterative reconstruction and/or weight based dosing when appropriate to reduce radiation dose to as low as reasonably achievable. Electronically signed by: Erik Navas MD Date:    08/19/2024 Time:    16:14      EKG    MICROBIOLOGY    MDM    Assessment/Plan:     * Cervical radiculopathy  Patient s/p ACDF of C5-C7 by Dr. Harrington earlier today.  Patient tolerated procedure well.  Patient remains hemodynamically stable with postoperative labs pending.  Hospital Medicine consulted by surgical team to help aid in postoperative medical management.  Plan:  -Continue current pain regimen, titrate as needed  -Bowel regimen  -Diet and activity per surgery  -Wound care   -Continue splint/brace per surgery   -IVFs prn  -Antiemetics prn  -Tylenol as needed for fever   -PT/OT   -Remaining care per surgical team      Hypertension  Chronic, controlled. Latest blood pressure and vitals reviewed-     Temp:  [97.7 °F (36.5 °C)-98.3 °F (36.8 °C)]   Pulse:  [54-73]   Resp:  []   BP: ()/(53-77)   SpO2:  [93 %-99 %] .   Home meds for hypertension were reviewed and noted below.   Hypertension Medications               amLODIPine (NORVASC) 5 MG tablet TAKE 1 TABLET EVERY DAY    lisinopriL (PRINIVIL,ZESTRIL) 20 MG tablet TAKE 1 TABLET ONE TIME DAILY (DOSE CHANGE)    metoprolol succinate (TOPROL-XL) 100 MG 24 hr tablet Take 1 tablet (100 mg total) by mouth once daily.     While in the hospital, will manage blood pressure as follows; Continue home antihypertensive regimen, titrate as needed, and optimize pain control.    Will utilize p.r.n. blood pressure medication only if patient's blood pressure greater than 160/100 and she develops symptoms such as worsening chest pain or shortness of breath.      Hypothyroidism  Patient has chronic hypothyroidism. TFTs reviewed-   Lab Results   Component Value Date    TSH 1.851 04/08/2024   Plan:  -Will continue chronic levothyroxine and adjust for and clinical  changes      Asthma  Patient with history of Asthma not presently in acute exacerbation and is without signs/symptoms of distress.   Plan:  -Continue home medications, titrate as needed  -Titrate oxygen requirements as needed  -Incentive spirometry   -Monitor pulse oximetry  -Duonebs prn      Urge and stress incontinence  Chronic.  Stable.  Plan:   -continue home medications      Gastroesophageal reflux disease without esophagitis  Chronic. Stable. Currently asymptomatic. Home medications include PPI/Antacids as needed.  Plan:  -Continue PPI/Antacids as needed       Class 2 severe obesity due to excess calories with serious comorbidity and body mass index (BMI) of 38.0 to 38.9 in adult  Body mass index is 38.47 kg/m². Morbid obesity complicates all aspects of disease management from diagnostic modalities to treatment. Weight loss encouraged and health benefits explained to patient.         VTE Risk Mitigation (From admission, onward)           Ordered     IP VTE HIGH RISK PATIENT  Once         09/16/24 1726     Place sequential compression device  Until discontinued         09/16/24 1726                  //Core Measures   -DVT proph: SCDs, withholding anticoagulation 24 hrs post-op, anticoagulation per surgical team   -Code status: Full    -Surrogate: none provided      Components of this note were documented using a voice recognition system and are subject to errors not corrected at the time the document was proof read. Please contact the author for any clarifications.       Thank you for your consult. I will follow-up with patient. Please contact us if you have any additional questions.      Darian Wang MD  Department of Hospital Medicine   [facility] - Med Surg

## 2024-09-17 NOTE — ASSESSMENT & PLAN NOTE
Patient has chronic hypothyroidism. TFTs reviewed-   Lab Results   Component Value Date    TSH 1.851 04/08/2024   Plan:  -Will continue chronic levothyroxine and adjust for and clinical changes

## 2024-09-17 NOTE — ASSESSMENT & PLAN NOTE
Patient with history of Asthma not presently in acute exacerbation and is without signs/symptoms of distress.   Plan:  -Continue home medications, titrate as needed  -Titrate oxygen requirements as needed  -Incentive spirometry   -Monitor pulse oximetry  -Rupert gossn

## 2024-09-17 NOTE — PT/OT/SLP EVAL
Occupational Therapy Evaluation and Treatment    Name: Sana Crenshaw  MRN: [id number]  Admitting Diagnosis: Cervical radiculopathy  Recent Surgery: Procedure(s) (LRB):  DISCECTOMY, SPINE, CERVICAL, ANTERIOR APPROACH, WITH FUSION (N/A)  INSERTION,ALLOGRAFT,BONE,INTO SPINE (N/A) 1 Day Post-Op    Recommendations:     Discharge Recommendations: No Therapy Indicated  Level of Assistance Recommended: 24 hours light assistance  Discharge Equipment Recommendations: walker, rolling  Barriers to discharge:      Assessment:     Sana Crenshaw is a 73 y.o. female with a medical diagnosis of Cervical radiculopathy. She presents with performance deficits affecting function including weakness, impaired functional mobility, decreased safety awareness, impaired endurance, gait instability, pain, impaired balance, impaired self care skills, decreased lower extremity function, decreased ROM.     Rehab Prognosis: Good; patient would benefit from acute OT services to address these deficits and reach maximum level of function.    Plan:     Patient to be seen 2 x/week to address the above listed problems via self-care/home management, therapeutic activities, therapeutic exercises  Plan of Care Expires: 09/17/24  Plan of Care Reviewed with: patient    Subjective     Chief Complaint: debility and generalized weakness  Patient Comments/Goals:   Pain/Comfort:  Pain Rating 1: 4/10  Location 1: neck    Patients cultural, spiritual, Lutheran conflicts given the current situation:      Social History:  Living Environment: Patient lives alone in a single story home with number of outside stair(s): 0  Prior Level of Function: Prior to admission, patient was modified independent  Roles and Routines: Patient was driving and not working prior to admission.  Equipment Used at Home:  (hover around)  DME owned (not currently used): rolling walker  Assistance Upon Discharge: significant other    Objective:     Communicated with nurse and epic chart  review prior to session. Patient found up in chair with peripheral IV, hemovac upon OT entry to room.    General Precautions: Standard, fall   Orthopedic Precautions: spinal precautions   Braces: Aspen collar    Respiratory Status: Room air    Occupational Performance    Functional Mobility/Transfers:  Sit <> Stand Transfer with stand by assistance with hand-held assist  Bed <> Chair Transfer using Step Transfer technique with minimum assistance with hand-held assist  Functional Mobility: pt ambulated 10 feet with min a     Activities of Daily Living:  Upper Body Dressing: maximal assistance  Lower Body Dressing: mod a     Cognitive/Visual Perceptual:  Cognitive/Psychosocial Skills:    -     Oriented to: Person, Place, Time, Situation  -     Follows Commands/attention: Follows multistep  commands  -     Communication: clear/fluent  -     Memory: No Deficits noted  -     Safety awareness/insight to disability: impaired    Physical Exam:  Upper Extremity Range of Motion:     -       Right Upper Extremity: limited plof  -       Left Upper Extremity: limited plof  Upper Extremity Strength:    -       Right Upper Extremity: mmt:+1 to -2/5 ghrossly  -       Left Upper Extremity: mmt: +1 to -2/5 grossly   Strength:    -       Right Upper Extremity: mmt: 3/5 grossly  -       Left Upper Extremity: mmt:3/5 grossly    AMPAC 6 Click ADL:  AMPAC Total Score: 20    Treatment & Education:  Educated patient on importance of increased tolerance to upright position and direct impact on CV endurance and strength. Patient encouraged to sit up in chair/ EOB, for a minimum of 2 consecutive hours including for all meals.. Encouraged patient to perform AROM TE to BUE throughout the day within all available planes of motion. Re enforced importance of utilizing call light to meet needs in room and not attempt to get up without staff assistance. Patient verbalized understanding and agreed to comply.       Patient clear to stand pivot  transfer with RN/PCT, assist xmin a with step>pivot tranfers with rolling walker .    Patient left up in chair with all lines intact, call button in reach, RN notified, and chair alarm on.    GOALS:   Multidisciplinary Problems       Occupational Therapy Goals          Problem: Occupational Therapy    Goal Priority Disciplines Outcome Interventions   Occupational Therapy Goal     OT, PT/OT     Description: O.T. GOALS TO BE MET BY 11-1-24  S WITH LE DRESSING  S WITH UE DRESSING  S WITH TOILET TRANSFERS  PT WILL TOLERATE 1 SET X 10 REPS B UE ROM EXERCISE IN ALL AVAILABLE PLANES AND RANGES                       History:     Past Medical History:   Diagnosis Date    Agminate folliculitis     Arthritis     Asthma     Cervical radiculopathy     Diarrhea     Agmentria    Hypertension     Overactive bladder     Thyroid disease          Past Surgical History:   Procedure Laterality Date    ANKLE FUSION Right     ANTERIOR CERVICAL DISCECTOMY W/ FUSION N/A 9/16/2024    Procedure: DISCECTOMY, SPINE, CERVICAL, ANTERIOR APPROACH, WITH FUSION;  Surgeon: Donovan Harrington MD;  Location: [facility] OR;  Service: Neurosurgery;  Laterality: N/A;  cervical 5-6, 6-7    BREAST SURGERY      BUNIONECTOMY Right     CATARACT EXTRACTION W/  INTRAOCULAR LENS IMPLANT      EPIDURAL STEROID INJECTION INTO CERVICAL SPINE N/A 12/28/2021    Procedure: C5/6 IL FLORA with left paramedian approach with RN IV sedation;  Surgeon: Satish Segura MD;  [facility];  Service: Pain Management;  Laterality: N/A;    EYE SURGERY      HYSTERECTOMY      IMPLANTATION OF PERMANENT SACRAL NERVE STIMULATOR N/A 01/08/2021    Procedure: INSERTION, NEUROSTIMULATOR, PERMANENT, SACRAL;  Surgeon: Onorfe Fallon MD;  Location: [facility] OR;  Service: Urology;  Laterality: N/A;    INJECTION OF ANESTHETIC AGENT AROUND MEDIAL BRANCH NERVES INNERVATING CERVICAL FACET JOINT Left 04/27/2022    Procedure: Left C4-6 MBB with RN IV sedation;  Surgeon: Satish Segura MD;  Location:  [facility];  Service: Pain Management;  Laterality: Left;    INJECTION OF ANESTHETIC AGENT INTO SACROILIAC JOINT Right 01/12/2022    Procedure: right Sacroiliac Joint Injection with RN IV sedation;  Surgeon: Satish Segura MD;  Location: [facility];  Service: Pain Management;  Laterality: Right;    INJECTION OF JOINT Right 01/12/2022    Procedure: right GT bursa injection with RN IV sedation;  Surgeon: Satish Segura MD;  Location: [facility];  Service: Pain Management;  Laterality: Right;    INSERTION,ALLOGRAFT,BONE,INTO SPINE N/A 9/16/2024    Procedure: INSERTION,ALLOGRAFT,BONE,INTO SPINE;  Surgeon: Donovan Harrington MD;  Location: [facility] OR;  Service: Neurosurgery;  Laterality: N/A;    JOINT REPLACEMENT Bilateral     knees    OOPHORECTOMY      RETINAL DETACHMENT SURGERY      REVISION OF PROCEDURE INVOLVING SACRAL NEUROSTIMULATOR DEVICE N/A 02/23/2021    Procedure: REVISION, NEUROSTIMULATOR, SACRAL;  Surgeon: Onofre Fallon MD;  Location: [facility] OR;  Service: Urology;  Laterality: N/A;    SHOULDER ARTHROSCOPY Right     SHOULDER ARTHROSCOPY W/ ROTATOR CUFF REPAIR Left     x 2    TOTAL REDUCTION MAMMOPLASTY Bilateral 2008    ULNAR TUNNEL RELEASE Right 04/10/2023    Procedure: RELEASE, CUBITAL TUNNEL;  Surgeon: Bhavin Nelson MD;  Location: [facility];  Service: Orthopedics;  Laterality: Right;  right cubital tunnel release       Time Tracking:     OT Date of Treatment: 09/17/24  OT Start Time: 0955  OT Stop Time: 1025  OT Total Time (min): 30 min    Billable Minutes: Evaluation 15 minutes and Therapeutic Activity 15 minutes     9/17/2024

## 2024-09-18 NOTE — DISCHARGE SUMMARY
Neurosurgery discharge summary      ACDF  Justification for the operation:      Sana is a 73 y.o. female who presented to the neurosurgery clinic with chronic neck pain without motor weakness of the upper extremities.  She had imaging which showed fusion C4-5, degenerative disc disease with stenosis see C5-6 C6-7.  They had been treated with numerous nonoperative interventions without sustained benefit.  We discussed ACDF at length.  I discussed in detail with the patient the risks, benefits, alternatives, indications, and methods of the procedure. Some of the significant risks specific to this particular procedure are: persistent pain, cerebrospinal fluid leakage, spinal instability, paralysis, numbness, nerve or spinal cord injury, bowel or bladder or sexual problems, chronic neck pain, need for additional surgery, lack of relief of current symptoms, onset of new symptoms, vascular  injury, stroke, recurrent laryngeal nerve injury, difficulty swallowing, fusion failure, instrumentation failure                      Additional risks include: infection, bleeding, blood clots, heart attack, allergic reactions, pneumonia and other risks.  These risks can lead to death or permanent total disability.  Additional complications are also possible. Anesthesia also involves serious risks including, most importantly, a risk of reaction to medications, which can result in death or permanent total disability.       All questions were answered about the procedure, alternatives and risks. No guarantees or assurances were made about the results of the procedure.    Patient is Sana Crenshaw who is a 73-year-old female who presented to same-day surgery on 9/16 to undergo ACDF with Dr. Harrington.  She underwent general anesthesia.  She tolerated procedure well and was extubated immediately postop.  She was taken to the PACU for close neuro monitoring and postanesthesia care.  She was then taken to [facility] for inpatient monitoring of  Hemovac drain, IV antibiotics, as well as PTOT, neuro checks.  She did well with therapy.  No issues swallowing, walking,  or performing ADLs.  She was cleared to discharge home postop day 1.              C5-7 ACDF.                     Sana is a 73-year-old female who is postop day 1 C5-7 ACDF.  No acute events overnight.  She states her neck pain and arm pain well controlled.  No acute weakness or BB dysfunction.  I removed her Hemovac drain.            I reviewed x-rays which show instrumentation in good position.             Discussed wound care activity restrictions at length with the patient she voiced understanding.        Will discharge home with pain medication.      I advised on signs and symptoms that prompt urgent medical attention.          Constitutional/ General: Awake, alert, oriented X 3. Sitting upright in No acute distress. Well developed/well nourished    Neck: trachea midline. cervical collar in place. Incision is CDI no erythema, edema, or drainage noted;     Respiratory: No increased work of breathing on room air. Chest expansion equal and symmetric.    Neuro: AAO X3 speech is fluent and appripriate CN II-XII grossly intact throughout. EOMI. Face symmetric tongue midline. Shoulder shrug equal and intact BL. Follows commands and moves all extremities antigravity. BL shoulder abduction weakness 2/2 rotator cuff injuries that are chronic  Extremities:No cyanosis clubbing or edema. Ambulating without issues.           Postop day 1 ACDF.      Discharge home today      Attestation:  Julia LABOY PA-C have obtained HPI, performed Physical Examination on the above patient, reviewed the pertinent labs, tests, imaging, other relevant data and recorded my findings in this clinic note.

## 2024-09-23 ENCOUNTER — PATIENT MESSAGE (OUTPATIENT)
Dept: NEUROSURGERY | Facility: CLINIC | Age: 74
End: 2024-09-23
Payer: OTHER GOVERNMENT

## 2024-10-01 ENCOUNTER — OFFICE VISIT (OUTPATIENT)
Dept: NEUROSURGERY | Facility: CLINIC | Age: 74
End: 2024-10-01
Payer: OTHER GOVERNMENT

## 2024-10-01 VITALS
SYSTOLIC BLOOD PRESSURE: 146 MMHG | WEIGHT: 209.44 LBS | BODY MASS INDEX: 38.54 KG/M2 | DIASTOLIC BLOOD PRESSURE: 70 MMHG | HEIGHT: 62 IN

## 2024-10-01 DIAGNOSIS — M43.22 FUSION OF SPINE, CERVICAL REGION: Primary | ICD-10-CM

## 2024-10-01 PROCEDURE — 99999 PR PBB SHADOW E&M-EST. PATIENT-LVL III: CPT | Mod: PBBFAC,,, | Performed by: NEUROLOGICAL SURGERY

## 2024-10-01 PROCEDURE — 99024 POSTOP FOLLOW-UP VISIT: CPT | Mod: S$GLB,,, | Performed by: NEUROLOGICAL SURGERY

## 2024-10-01 PROCEDURE — 99213 OFFICE O/P EST LOW 20 MIN: CPT | Mod: PBBFAC | Performed by: NEUROLOGICAL SURGERY

## 2024-10-03 DIAGNOSIS — Z71.89 COMPLEX CARE COORDINATION: ICD-10-CM

## 2024-10-08 ENCOUNTER — OFFICE VISIT (OUTPATIENT)
Dept: FAMILY MEDICINE | Facility: CLINIC | Age: 74
End: 2024-10-08
Payer: OTHER GOVERNMENT

## 2024-10-08 VITALS
OXYGEN SATURATION: 98 % | SYSTOLIC BLOOD PRESSURE: 126 MMHG | HEIGHT: 62 IN | DIASTOLIC BLOOD PRESSURE: 60 MMHG | WEIGHT: 209.44 LBS | TEMPERATURE: 98 F | HEART RATE: 73 BPM | BODY MASS INDEX: 38.54 KG/M2

## 2024-10-08 DIAGNOSIS — Z74.09 LIMITED MOBILITY: ICD-10-CM

## 2024-10-08 DIAGNOSIS — E66.01 SEVERE OBESITY WITH BODY MASS INDEX (BMI) OF 36.0 TO 36.9 WITH SERIOUS COMORBIDITY: ICD-10-CM

## 2024-10-08 DIAGNOSIS — E03.9 HYPOTHYROIDISM, UNSPECIFIED TYPE: ICD-10-CM

## 2024-10-08 DIAGNOSIS — R73.03 PREDIABETES: ICD-10-CM

## 2024-10-08 DIAGNOSIS — I10 ESSENTIAL HYPERTENSION: ICD-10-CM

## 2024-10-08 DIAGNOSIS — M54.12 CERVICAL RADICULOPATHY: Primary | ICD-10-CM

## 2024-10-08 DIAGNOSIS — M85.80 OSTEOPENIA, UNSPECIFIED LOCATION: ICD-10-CM

## 2024-10-08 PROCEDURE — 99999 PR PBB SHADOW E&M-EST. PATIENT-LVL IV: CPT | Mod: PBBFAC,,, | Performed by: FAMILY MEDICINE

## 2024-10-08 PROCEDURE — 99214 OFFICE O/P EST MOD 30 MIN: CPT | Mod: PBBFAC,PO | Performed by: FAMILY MEDICINE

## 2024-10-08 PROCEDURE — G2211 COMPLEX E/M VISIT ADD ON: HCPCS | Mod: S$PBB,,, | Performed by: FAMILY MEDICINE

## 2024-10-08 PROCEDURE — 99214 OFFICE O/P EST MOD 30 MIN: CPT | Mod: S$PBB,,, | Performed by: FAMILY MEDICINE

## 2024-10-08 NOTE — PROGRESS NOTES
"Subjective:       Patient ID: Sana Crenshaw is a 73 y.o. female.    Chief Complaint: Follow-up      HPI Comments:       Current Outpatient Medications:     amLODIPine (NORVASC) 5 MG tablet, TAKE 1 TABLET EVERY DAY, Disp: 90 tablet, Rfl: 3    DULoxetine (CYMBALTA) 30 MG capsule, Take 1 capsule (30 mg total) by mouth once daily., Disp: 30 capsule, Rfl: 11    levothyroxine (SYNTHROID) 50 MCG tablet, TAKE 1 TABLET EVERY DAY, Disp: 90 tablet, Rfl: 2    lisinopriL (PRINIVIL,ZESTRIL) 20 MG tablet, TAKE 1 TABLET ONE TIME DAILY (DOSE CHANGE), Disp: 90 tablet, Rfl: 3    methylPREDNISolone (MEDROL DOSEPACK) 4 mg tablet, use as directed, Disp: 21 each, Rfl: 0    metoprolol succinate (TOPROL-XL) 100 MG 24 hr tablet, Take 1 tablet (100 mg total) by mouth once daily., Disp: 90 tablet, Rfl: 3    omeprazole (PRILOSEC) 20 MG capsule, Take 1 capsule (20 mg total) by mouth 2 (two) times daily., Disp: 180 capsule, Rfl: 3    oxybutynin (DITROPAN) 5 MG Tab, Take 5 mg by mouth 2 (two) times daily., Disp: , Rfl:     rOPINIRole (REQUIP) 2 MG tablet, Take 1 tablet (2 mg total) by mouth every evening., Disp: 90 tablet, Rfl: 1    albuterol (PROVENTIL/VENTOLIN HFA) 90 mcg/actuation inhaler, Inhale 2 puffs into the lungs every 6 (six) hours as needed for Wheezing. Dispense with spacer., Disp: 18 g, Rfl: 3  No current facility-administered medications for this visit.    Facility-Administered Medications Ordered in Other Visits:     chlorhexidine 0.12 % solution 10 mL, 10 mL, Mouth/Throat, On Call Procedure, ClarkScarlet PA-C    lactated ringers infusion, , Intravenous, Continuous, Franky Hartman MD, Last Rate: 20 mL/hr at 01/08/21 0731, New Bag at 09/16/24 1014    lactated ringers infusion, , Intravenous, Continuous, Estevan Rodriguez MD, Stopped at 02/23/21 1645      Eight week follow-up.  She is now status post neck surgery.  Wears external "bone stimulator, soft collar.  Postop check went well.  Neck pain much better    GERD is " "doing well.  Asthma stable.    Uses her Handy to get around outside of the home.  It inside she is able to get around without assistive device    Bladder control fairly good with medication    Due for DEXA scan.  Will order    Follow-up  Associated symptoms include arthralgias and neck pain. Pertinent negatives include no chest pain, headaches, joint swelling, vomiting or weakness.     Review of Systems   Constitutional:  Negative for activity change and unexpected weight change.   HENT:  Negative for hearing loss, rhinorrhea and trouble swallowing.    Eyes:  Negative for discharge and visual disturbance.   Respiratory:  Negative for chest tightness and wheezing.    Cardiovascular:  Negative for chest pain and palpitations.   Gastrointestinal:  Negative for blood in stool, constipation, diarrhea and vomiting.   Endocrine: Negative for polydipsia and polyuria.   Genitourinary:  Negative for difficulty urinating, dysuria, hematuria and menstrual problem.   Musculoskeletal:  Positive for arthralgias and neck pain. Negative for joint swelling.   Neurological:  Negative for weakness and headaches.   Psychiatric/Behavioral:  Negative for confusion and dysphoric mood.        Objective:      Vitals:    10/08/24 0921   BP: (!) 118/58   Pulse: 73   Temp: 98 °F (36.7 °C)   SpO2: 98%   Weight: 95 kg (209 lb 7 oz)   Height: 5' 2" (1.575 m)   PainSc: 0-No pain     Physical Exam  Vitals and nursing note reviewed.   Constitutional:       General: She is not in acute distress.     Appearance: She is well-developed. She is not diaphoretic.      Comments: Appears comfortable with soft neck collar   HENT:      Head: Normocephalic.   Neck:      Thyroid: No thyromegaly.   Cardiovascular:      Rate and Rhythm: Normal rate and regular rhythm.      Heart sounds: Normal heart sounds. No murmur heard.  Pulmonary:      Effort: Pulmonary effort is normal.      Breath sounds: Normal breath sounds. No wheezing or rales.   Abdominal:      " General: There is no distension.      Palpations: Abdomen is soft.   Musculoskeletal:      Cervical back: Neck supple.   Lymphadenopathy:      Cervical: No cervical adenopathy.   Skin:     General: Skin is warm and dry.   Neurological:      Mental Status: She is alert and oriented to person, place, and time.   Psychiatric:         Mood and Affect: Mood normal.         Behavior: Behavior normal.         Thought Content: Thought content normal.         Judgment: Judgment normal.         Assessment:       1. Cervical radiculopathy    2. Limited mobility    3. Essential hypertension    4. Prediabetes    5. Severe obesity with body mass index (BMI) of 36.0 to 36.9 with serious comorbidity    6. Hypothyroidism, unspecified type    7. Osteopenia, unspecified location        Plan:   Cervical radiculopathy  Comments:  Says diskectomy and arthrodesis    Limited mobility  Comments:  Has a motorized scooter for outside use.  Maintains full mobility without assistive device at home    Essential hypertension  Comments:  Controlled.  Follow-up 6 months    Prediabetes  Comments:  A1c 5.8    Severe obesity with body mass index (BMI) of 36.0 to 36.9 with serious comorbidity  Comments:  No change in weight    Hypothyroidism, unspecified type  Comments:  Euthyroid on current dose    Osteopenia, unspecified location  Comments:  Bone density test ordered  Orders:  -     DXA Bone Density Axial Skeleton 1 or more sites; Future; Expected date: 10/08/2024

## 2024-10-11 DIAGNOSIS — I10 ESSENTIAL HYPERTENSION: ICD-10-CM

## 2024-10-11 RX ORDER — AMLODIPINE BESYLATE 5 MG/1
5 TABLET ORAL
Qty: 90 TABLET | Refills: 3 | Status: SHIPPED | OUTPATIENT
Start: 2024-10-11

## 2024-10-11 NOTE — TELEPHONE ENCOUNTER
Refill Decision Note   Sana Crenshaw  is requesting a refill authorization.  Brief Assessment and Rationale for Refill:  Approve     Medication Therapy Plan:        Comments:     Note composed:7:32 AM 10/11/2024

## 2024-10-11 NOTE — TELEPHONE ENCOUNTER
No care due was identified.  [facility] Embedded Care Due Messages. Reference number: [id number].   10/11/2024 12:54:01 AM CDT

## 2024-10-14 ENCOUNTER — APPOINTMENT (OUTPATIENT)
Dept: RADIOLOGY | Facility: HOSPITAL | Age: 74
End: 2024-10-14
Attending: FAMILY MEDICINE
Payer: MEDICARE

## 2024-10-14 PROCEDURE — 77080 DXA BONE DENSITY AXIAL: CPT | Mod: 26,,, | Performed by: RADIOLOGY

## 2024-10-14 PROCEDURE — 77080 DXA BONE DENSITY AXIAL: CPT | Mod: TC

## 2024-10-27 RX ORDER — OMEPRAZOLE 20 MG/1
20 CAPSULE, DELAYED RELEASE ORAL 2 TIMES DAILY
Qty: 180 CAPSULE | Refills: 3 | Status: SHIPPED | OUTPATIENT
Start: 2024-10-27

## 2024-10-29 ENCOUNTER — PATIENT MESSAGE (OUTPATIENT)
Dept: SURGERY | Facility: HOSPITAL | Age: 74
End: 2024-10-29
Payer: MEDICARE

## 2024-11-05 ENCOUNTER — OFFICE VISIT (OUTPATIENT)
Dept: NEUROSURGERY | Facility: CLINIC | Age: 74
End: 2024-11-05
Payer: MEDICARE

## 2024-11-05 ENCOUNTER — PATIENT MESSAGE (OUTPATIENT)
Dept: NEUROSURGERY | Facility: CLINIC | Age: 74
End: 2024-11-05

## 2024-11-05 ENCOUNTER — HOSPITAL ENCOUNTER (OUTPATIENT)
Dept: RADIOLOGY | Facility: HOSPITAL | Age: 74
Discharge: HOME | End: 2024-11-05
Attending: NEUROLOGICAL SURGERY
Payer: MEDICARE

## 2024-11-05 VITALS — HEART RATE: 70 BPM | SYSTOLIC BLOOD PRESSURE: 138 MMHG | DIASTOLIC BLOOD PRESSURE: 82 MMHG

## 2024-11-05 DIAGNOSIS — M43.22 FUSION OF SPINE, CERVICAL REGION: ICD-10-CM

## 2024-11-05 DIAGNOSIS — M43.22 FUSION OF SPINE, CERVICAL REGION: Primary | ICD-10-CM

## 2024-11-05 PROCEDURE — 3075F SYST BP GE 130 - 139MM HG: CPT | Mod: CPTII,S$GLB,, | Performed by: PHYSICIAN ASSISTANT

## 2024-11-05 PROCEDURE — 4010F ACE/ARB THERAPY RXD/TAKEN: CPT | Mod: CPTII,S$GLB,, | Performed by: PHYSICIAN ASSISTANT

## 2024-11-05 PROCEDURE — 1101F PT FALLS ASSESS-DOCD LE1/YR: CPT | Mod: CPTII,S$GLB,, | Performed by: PHYSICIAN ASSISTANT

## 2024-11-05 PROCEDURE — 1125F AMNT PAIN NOTED PAIN PRSNT: CPT | Mod: CPTII,S$GLB,, | Performed by: PHYSICIAN ASSISTANT

## 2024-11-05 PROCEDURE — 3044F HG A1C LEVEL LT 7.0%: CPT | Mod: CPTII,S$GLB,, | Performed by: PHYSICIAN ASSISTANT

## 2024-11-05 PROCEDURE — 72040 X-RAY EXAM NECK SPINE 2-3 VW: CPT | Mod: 26,,, | Performed by: RADIOLOGY

## 2024-11-05 PROCEDURE — 99024 POSTOP FOLLOW-UP VISIT: CPT | Mod: S$GLB,,, | Performed by: PHYSICIAN ASSISTANT

## 2024-11-05 PROCEDURE — 3079F DIAST BP 80-89 MM HG: CPT | Mod: CPTII,S$GLB,, | Performed by: PHYSICIAN ASSISTANT

## 2024-11-05 PROCEDURE — 3288F FALL RISK ASSESSMENT DOCD: CPT | Mod: CPTII,S$GLB,, | Performed by: PHYSICIAN ASSISTANT

## 2024-11-05 PROCEDURE — 1159F MED LIST DOCD IN RCRD: CPT | Mod: CPTII,S$GLB,, | Performed by: PHYSICIAN ASSISTANT

## 2024-11-05 PROCEDURE — 72040 X-RAY EXAM NECK SPINE 2-3 VW: CPT | Mod: TC

## 2024-11-05 PROCEDURE — 99999 PR PBB SHADOW E&M-EST. PATIENT-LVL III: CPT | Mod: PBBFAC,,, | Performed by: PHYSICIAN ASSISTANT

## 2024-11-05 NOTE — PROGRESS NOTES
Patient presents for her 6 week post op visit acdf. No new issues . 2/10 pain no weakness in hands. Pt doing well. Denies acute weakness no BB dysfunction.           Pertinent positive and negative ROS documented above in HPI, all other systems reviewed and found to be negative.           Constitutional/ General: Awake, alert, oriented X 3. Sitting upright in No acute distress. Well developed/well nourished    Neck: trachea midline. Sof cervical collar in place. Incision is CDI n    Respiratory: No increased work of breathing on room air. Chest expansion equal and symmetric.    Neuro: AAO X3 speech is fluent and appripriate CN II-XII grossly intact throughout. EOMI. Face symmetric tongue midline. Shoulder shrug equal and intact BL. Follows commands and moves all extremities antigravity with exception of BL shoulder abduction 2/2 chronic BL ROTATOR cuff injuries. Hand grasp good strength  Extremities:No cyanosis clubbing or edema. Ambulating without issues.         A/P:      Pt is post op acdf 6 weeks now. Doing well. Neuro stable. I reviewed x rays showing C5-7 acdf with instrumentation in appropriate position.       Follow up in 6 weeks with x rays      Attestation:  IJulia PA-C have obtained HPI, performed Physical Examination on the above patient, reviewed the pertinent labs, tests, imaging, other relevant data and recorded my findings in this clinic note.

## 2024-12-02 RX ORDER — ROPINIROLE 2 MG/1
2 TABLET, FILM COATED ORAL NIGHTLY
Qty: 90 TABLET | Refills: 3 | Status: SHIPPED | OUTPATIENT
Start: 2024-12-02

## 2024-12-02 NOTE — TELEPHONE ENCOUNTER
No care due was identified.  [facility] Embedded Care Due Messages. Reference number: [id number].   12/02/2024 1:15:59 AM CST

## 2024-12-03 NOTE — TELEPHONE ENCOUNTER
Refill Decision Note   Sana Crenshaw  is requesting a refill authorization.  Brief Assessment and Rationale for Refill:  Approve     Medication Therapy Plan:         Comments:     Note composed:6:03 PM 12/02/2024

## 2024-12-17 ENCOUNTER — HOSPITAL ENCOUNTER (OUTPATIENT)
Dept: RADIOLOGY | Facility: HOSPITAL | Age: 74
Discharge: HOME | End: 2024-12-17
Attending: PHYSICIAN ASSISTANT
Payer: MEDICARE

## 2024-12-17 ENCOUNTER — OFFICE VISIT (OUTPATIENT)
Dept: NEUROSURGERY | Facility: CLINIC | Age: 74
End: 2024-12-17
Payer: MEDICARE

## 2024-12-17 VITALS
SYSTOLIC BLOOD PRESSURE: 158 MMHG | HEART RATE: 69 BPM | DIASTOLIC BLOOD PRESSURE: 88 MMHG | BODY MASS INDEX: 38.47 KG/M2 | WEIGHT: 210.31 LBS

## 2024-12-17 DIAGNOSIS — M43.22 FUSION OF SPINE, CERVICAL REGION: Primary | ICD-10-CM

## 2024-12-17 DIAGNOSIS — M43.22 FUSION OF SPINE, CERVICAL REGION: ICD-10-CM

## 2024-12-17 PROCEDURE — 72040 X-RAY EXAM NECK SPINE 2-3 VW: CPT | Mod: TC

## 2024-12-17 PROCEDURE — 1159F MED LIST DOCD IN RCRD: CPT | Mod: CPTII,S$GLB,, | Performed by: PHYSICIAN ASSISTANT

## 2024-12-17 PROCEDURE — 3079F DIAST BP 80-89 MM HG: CPT | Mod: CPTII,S$GLB,, | Performed by: PHYSICIAN ASSISTANT

## 2024-12-17 PROCEDURE — 4010F ACE/ARB THERAPY RXD/TAKEN: CPT | Mod: CPTII,S$GLB,, | Performed by: PHYSICIAN ASSISTANT

## 2024-12-17 PROCEDURE — 99999 PR PBB SHADOW E&M-EST. PATIENT-LVL III: CPT | Mod: PBBFAC,,, | Performed by: PHYSICIAN ASSISTANT

## 2024-12-17 PROCEDURE — 3077F SYST BP >= 140 MM HG: CPT | Mod: CPTII,S$GLB,, | Performed by: PHYSICIAN ASSISTANT

## 2024-12-17 PROCEDURE — 72040 X-RAY EXAM NECK SPINE 2-3 VW: CPT | Mod: 26,,, | Performed by: RADIOLOGY

## 2024-12-17 PROCEDURE — 3044F HG A1C LEVEL LT 7.0%: CPT | Mod: CPTII,S$GLB,, | Performed by: PHYSICIAN ASSISTANT

## 2024-12-17 PROCEDURE — 99024 POSTOP FOLLOW-UP VISIT: CPT | Mod: S$GLB,,, | Performed by: PHYSICIAN ASSISTANT

## 2024-12-17 PROCEDURE — 1126F AMNT PAIN NOTED NONE PRSNT: CPT | Mod: CPTII,S$GLB,, | Performed by: PHYSICIAN ASSISTANT

## 2024-12-17 NOTE — PROGRESS NOTES
Pt is here for 12wk post op visit post ACDF.   Xrays completed today.  Denies Pain  Denies Pain With ROM. Has some stiffness with R cervical rotation. Denies numbness & tingling in hands.   P!= 3/10.    Previous Note:   Patient presents for her 6 week post op visit acdf. No new issues . 2/10 pain no weakness in hands. Pt doing well. Denies acute weakness no BB dysfunction.           Pertinent positive and negative ROS documented above in HPI, all other systems reviewed and found to be negative.           Constitutional/ General: Awake, alert, oriented X 3. Sitting upright in No acute distress. Well developed/well nourished    Neck: trachea midline. Sof cervical collar in place. Incision is CDI n    Respiratory: No increased work of breathing on room air. Chest expansion equal and symmetric.    Neuro: AAO X3 speech is fluent and appripriate CN II-XII grossly intact throughout. EOMI. Face symmetric tongue midline. Shoulder shrug equal and intact BL. Follows commands and moves all extremities antigravity with exception of BL shoulder abduction 2/2 chronic BL ROTATOR cuff injuries. Hand grasp good strength  Extremities:No cyanosis clubbing or edema. Ambulating without issues.         A/P:      Pt is post op acdf  12 weeks now. Doing well. Neuro stable. I reviewed x rays showing C5-7 acdf with instrumentation in appropriate position.     X rays reveiwed with the patient      Follow up in 6 weeks with x rays      Attestation:  Julia LABOY PA-C have obtained HPI, performed Physical Examination on the above patient, reviewed the pertinent labs, tests, imaging, other relevant data and recorded my findings in this clinic note.      This note was produced using dictation software of voice recognition technology, and some typographical errors may be present.

## 2024-12-17 NOTE — PROGRESS NOTES
Subjective:      Patient ID: Sana Crenshaw is a 74 y.o. female.    Chief Complaint: Degenerative disc disease, cervical and Post-op Evaluation (Pt reports for p/o 1 since ACDF 09/16/24; Pt reports no new symptoms since sx)      Postop Status post ACDF   Doing well without any issues  Mild soreness to neck rated 2/10  Symptoms down upper extremity have resolved  Decreasing pain medication usage otherwise pleased with the results      Review of Systems      Objective:       Neurosurgery Physical Exam  Ortho/SPM Exam  Ortho Exam    Trach midline  Incision CDI no redness swelling or drainage  Moves all extremities well without any weakness  Sensation intact        I  reviewed all pertinent imaging regarding this case.  Assessment:     1. Fusion of spine, cervical region      Plan:     Fusion of spine, cervical region  -     X-Ray Cervical Spine 2 or 3 Views; Future; Expected date: 10/01/2024  -     Back/Cervical Brace For Home Use    Transition from hard to soft cervical collar  Doing well status post ACDF  Follow-up as scheduled with AP and lateral cervical spine x-rays    Thank you for the referral   Please call with any questions    Donovan Harrington MD  Neurosurgery     Disclaimer: This note was prepared using a voice recognition system and is likely to have sound alike errors within the text.

## 2025-01-13 DIAGNOSIS — I10 ESSENTIAL HYPERTENSION: ICD-10-CM

## 2025-01-13 RX ORDER — ALBUTEROL SULFATE 90 UG/1
2 INHALANT RESPIRATORY (INHALATION) EVERY 6 HOURS PRN
Qty: 18 G | Refills: 3 | Status: SHIPPED | OUTPATIENT
Start: 2025-01-13 | End: 2026-01-13

## 2025-01-13 RX ORDER — OMEPRAZOLE 20 MG/1
20 CAPSULE, DELAYED RELEASE ORAL 2 TIMES DAILY
Qty: 180 CAPSULE | Refills: 2 | Status: SHIPPED | OUTPATIENT
Start: 2025-01-13

## 2025-01-13 RX ORDER — ROPINIROLE 2 MG/1
2 TABLET, FILM COATED ORAL NIGHTLY
Qty: 90 TABLET | Refills: 2 | Status: SHIPPED | OUTPATIENT
Start: 2025-01-13

## 2025-01-13 NOTE — TELEPHONE ENCOUNTER
Care Due:                  Date            Visit Type   Department     Provider  --------------------------------------------------------------------------------                                MYCHART                              FOLLOWUP/OF  [facility] INTERNAL  Last Visit: 10-      FICE VISIT   MEDICINE       Stuart Gage                              EP -                              PRIMARY      [facility] INTERNAL  Next Visit: 04-      CARE ([facility])   MEDICINE       Stuart Gage                                                            Last  Test          Frequency    Reason                     Performed    Due Date  --------------------------------------------------------------------------------    TSH.........  12 months..  levothyroxine............  04- 04-    [facility] Embedded Care Due Messages. Reference number: [id number].   1/13/2025 4:30:14 PM CST

## 2025-01-14 RX ORDER — METOPROLOL SUCCINATE 100 MG/1
100 TABLET, EXTENDED RELEASE ORAL DAILY
Qty: 90 TABLET | Refills: 2 | Status: SHIPPED | OUTPATIENT
Start: 2025-01-14

## 2025-01-14 RX ORDER — AMLODIPINE BESYLATE 5 MG/1
5 TABLET ORAL DAILY
Qty: 90 TABLET | Refills: 3 | Status: SHIPPED | OUTPATIENT
Start: 2025-01-14

## 2025-01-14 RX ORDER — LEVOTHYROXINE SODIUM 50 UG/1
50 TABLET ORAL
Qty: 90 TABLET | Refills: 0 | Status: SHIPPED | OUTPATIENT
Start: 2025-01-14

## 2025-01-14 RX ORDER — LISINOPRIL 20 MG/1
20 TABLET ORAL DAILY
Qty: 90 TABLET | Refills: 2 | Status: SHIPPED | OUTPATIENT
Start: 2025-01-14

## 2025-01-14 NOTE — TELEPHONE ENCOUNTER
No care due was identified.  [facility] Embedded Care Due Messages. Reference number: [id number].   1/13/2025 4:30:41 PM CST  
Please see the attached refill request.  
Refill Routing Note   Medication(s) are not appropriate for processing by [facility] for the following reason(s):        Required vitals abnormal    [facility] action(s):  Defer  Approve             Appointments  past 12m or future 3m with PCP    Date Provider   Last Visit   10/8/2024 Stuart Gage MD   Next Visit   4/8/2025 Stuart Gage MD   ED visits in past 90 days: 0        Note composed:10:25 PM 01/13/2025               
1

## 2025-01-14 NOTE — TELEPHONE ENCOUNTER
No care due was identified.  [facility] Embedded Care Due Messages. Reference number: [id number].   1/14/2025 8:14:37 AM CST

## 2025-01-14 NOTE — TELEPHONE ENCOUNTER
Refill Decision Note   Sana Crenshaw  is requesting a refill authorization.  Brief Assessment and Rationale for Refill:  Approve     Medication Therapy Plan:         Comments:     Note composed:9:12 AM 01/14/2025

## 2025-01-14 NOTE — TELEPHONE ENCOUNTER
No care due was identified.  [facility] Embedded Care Due Messages. Reference number: [id number].   1/14/2025 8:13:06 AM CST

## 2025-01-14 NOTE — TELEPHONE ENCOUNTER
Refill Routing Note   Medication(s) are not appropriate for processing by [facility] for the following reason(s):        Required vitals abnormal    [facility] action(s):  Defer               Appointments  past 12m or future 3m with PCP    Date Provider   Last Visit   10/8/2024 Stuart Gage MD   Next Visit   1/14/2025 Stuart Gage MD   ED visits in past 90 days: 0        Note composed:9:08 AM 01/14/2025            Propranolol Counseling:  I discussed with the patient the risks of propranolol including but not limited to low heart rate, low blood pressure, low blood sugar, restlessness and increased cold sensitivity. They should call the office if they experience any of these side effects.

## 2025-01-14 NOTE — TELEPHONE ENCOUNTER
Provider Staff:  Action required for this patient     Please see care gap opportunities below in Care Due Message.    Thanks!  [facility]     Appointments      Date Provider   Last Visit   10/8/2024 Stuart Gage MD   Next Visit   1/13/2025 Stuart Gage MD      Refill Decision Note   Sana Crenshaw  is requesting a refill authorization.  Brief Assessment and Rationale for Refill:  Approve     Medication Therapy Plan:         Comments:     Note composed:10:22 PM 01/13/2025

## 2025-02-05 ENCOUNTER — HOSPITAL ENCOUNTER (OUTPATIENT)
Dept: RADIOLOGY | Facility: HOSPITAL | Age: 75
Discharge: HOME | End: 2025-02-05
Attending: FAMILY MEDICINE
Payer: MEDICARE

## 2025-02-05 DIAGNOSIS — Z12.31 ENCOUNTER FOR SCREENING MAMMOGRAM FOR BREAST CANCER: ICD-10-CM

## 2025-02-05 PROCEDURE — 77063 BREAST TOMOSYNTHESIS BI: CPT | Mod: 26,,, | Performed by: RADIOLOGY

## 2025-02-05 PROCEDURE — 77067 SCR MAMMO BI INCL CAD: CPT | Mod: TC

## 2025-02-05 PROCEDURE — 77067 SCR MAMMO BI INCL CAD: CPT | Mod: 26,,, | Performed by: RADIOLOGY

## 2025-02-18 PROBLEM — Q05.9 SPINA BIFIDA, UNSPECIFIED HYDROCEPHALUS PRESENCE, UNSPECIFIED SPINAL REGION: Status: ACTIVE | Noted: 2025-02-18

## 2025-02-19 ENCOUNTER — LAB VISIT (OUTPATIENT)
Dept: LAB | Facility: HOSPITAL | Age: 75
End: 2025-02-19
Attending: FAMILY MEDICINE
Payer: MEDICARE

## 2025-02-19 ENCOUNTER — OFFICE VISIT (OUTPATIENT)
Dept: FAMILY MEDICINE | Facility: CLINIC | Age: 75
End: 2025-02-19
Payer: MEDICARE

## 2025-02-19 VITALS
HEART RATE: 90 BPM | TEMPERATURE: 97 F | WEIGHT: 213.31 LBS | BODY MASS INDEX: 39.26 KG/M2 | OXYGEN SATURATION: 97 % | HEIGHT: 62 IN | DIASTOLIC BLOOD PRESSURE: 80 MMHG | SYSTOLIC BLOOD PRESSURE: 132 MMHG

## 2025-02-19 DIAGNOSIS — E66.01 SEVERE OBESITY WITH BODY MASS INDEX (BMI) OF 36.0 TO 36.9 WITH SERIOUS COMORBIDITY: ICD-10-CM

## 2025-02-19 DIAGNOSIS — R73.03 PREDIABETES: ICD-10-CM

## 2025-02-19 DIAGNOSIS — J01.90 ACUTE NON-RECURRENT SINUSITIS, UNSPECIFIED LOCATION: Primary | ICD-10-CM

## 2025-02-19 DIAGNOSIS — E03.9 HYPOTHYROIDISM, UNSPECIFIED TYPE: ICD-10-CM

## 2025-02-19 DIAGNOSIS — M85.80 OSTEOPENIA, UNSPECIFIED LOCATION: ICD-10-CM

## 2025-02-19 DIAGNOSIS — I10 ESSENTIAL HYPERTENSION: ICD-10-CM

## 2025-02-19 DIAGNOSIS — M50.30 DEGENERATIVE DISC DISEASE, CERVICAL: ICD-10-CM

## 2025-02-19 DIAGNOSIS — Q05.9 SPINA BIFIDA, UNSPECIFIED HYDROCEPHALUS PRESENCE, UNSPECIFIED SPINAL REGION: ICD-10-CM

## 2025-02-19 LAB
ESTIMATED AVG GLUCOSE: 126 MG/DL (ref 68–131)
HBA1C MFR BLD: 6 % (ref 4–5.6)
TSH SERPL DL<=0.005 MIU/L-ACNC: 1.61 UIU/ML (ref 0.4–4)

## 2025-02-19 PROCEDURE — 83036 HEMOGLOBIN GLYCOSYLATED A1C: CPT | Performed by: FAMILY MEDICINE

## 2025-02-19 PROCEDURE — 36415 COLL VENOUS BLD VENIPUNCTURE: CPT | Mod: PO | Performed by: FAMILY MEDICINE

## 2025-02-19 PROCEDURE — 84443 ASSAY THYROID STIM HORMONE: CPT | Performed by: FAMILY MEDICINE

## 2025-02-19 RX ORDER — CLOTRIMAZOLE AND BETAMETHASONE DIPROPIONATE 10; .64 MG/G; MG/G
CREAM TOPICAL 2 TIMES DAILY
Qty: 45 G | Refills: 1 | Status: SHIPPED | OUTPATIENT
Start: 2025-02-19

## 2025-02-19 RX ORDER — DOXYCYCLINE 100 MG/1
100 CAPSULE ORAL 2 TIMES DAILY
Qty: 20 CAPSULE | Refills: 0 | Status: SHIPPED | OUTPATIENT
Start: 2025-02-19

## 2025-02-19 RX ORDER — PROMETHAZINE HYDROCHLORIDE AND DEXTROMETHORPHAN HYDROBROMIDE 6.25; 15 MG/5ML; MG/5ML
5 SYRUP ORAL 3 TIMES DAILY PRN
Qty: 118 ML | Refills: 0 | Status: SHIPPED | OUTPATIENT
Start: 2025-02-19 | End: 2025-03-01

## 2025-02-19 NOTE — PROGRESS NOTES
"Subjective:       Patient ID: Sana Crenshaw is a 74 y.o. female.    Chief Complaint: No chief complaint on file.      HPI Comments:     Current Medications[1]      Last seen by me about 4 months ago.  He has been removing soon back to her childhood home of [facility].  Sister nearby    Complains of a 10 day history of nasal and sinus congestion with green nasal discharge.  Some chills but no fever.  No headache or body aches.  Recently started coughing.  No wheezing or shortness a breath.  Taking Mucinex.    Had spina bifida has a child.  Declined surgery at that time.  Thinks it she occasionally has symptoms related to that such as some weakness in her right leg.    DEXA scan actually improved in his now considered normal, was previously osteopenic.  Recommend she continue her calcium and vitamin-D          Review of Systems   Constitutional:  Positive for chills. Negative for activity change, appetite change and fever.   HENT:  Positive for congestion and rhinorrhea. Negative for sore throat.    Respiratory:  Positive for cough. Negative for shortness of breath.    Cardiovascular:  Negative for chest pain.   Gastrointestinal:  Negative for abdominal pain, diarrhea and nausea.   Genitourinary:  Negative for difficulty urinating.   Musculoskeletal:  Negative for arthralgias and myalgias.   Neurological:  Negative for dizziness and headaches.       Objective:      Vitals:    02/19/25 0841   BP: 132/80   Pulse: 90   Temp: 97 °F (36.1 °C)   TempSrc: Tympanic   SpO2: 97%   Weight: 96.7 kg (213 lb 4.7 oz)   Height: 5' 2" (1.575 m)     Physical Exam  Vitals and nursing note reviewed.   Constitutional:       General: She is not in acute distress.     Appearance: She is well-developed. She is not diaphoretic.   HENT:      Head: Normocephalic.      Nose: Mucosal edema and rhinorrhea present.      Mouth/Throat:      Pharynx: Postnasal drip present. No pharyngeal swelling, oropharyngeal exudate or posterior oropharyngeal " erythema.   Neck:      Thyroid: No thyromegaly.   Cardiovascular:      Rate and Rhythm: Normal rate and regular rhythm.      Heart sounds: Normal heart sounds. No murmur heard.  Pulmonary:      Effort: Pulmonary effort is normal.      Breath sounds: Normal breath sounds. No wheezing or rales.   Abdominal:      General: There is no distension.      Palpations: Abdomen is soft.   Musculoskeletal:      Cervical back: Neck supple.   Lymphadenopathy:      Cervical: No cervical adenopathy.   Skin:     General: Skin is warm and dry.   Neurological:      Mental Status: She is alert and oriented to person, place, and time.   Psychiatric:         Behavior: Behavior normal.         Thought Content: Thought content normal.         Judgment: Judgment normal.         Assessment:       1. Acute non-recurrent sinusitis, unspecified location    2. Spina bifida, unspecified hydrocephalus presence, unspecified spinal region    3. Severe obesity with body mass index (BMI) of 36.0 to 36.9 with serious comorbidity    4. Essential hypertension    5. Prediabetes    6. Hypothyroidism, unspecified type    7. Degenerative disc disease, cervical    8. Osteopenia, unspecified location        Plan:   Acute non-recurrent sinusitis, unspecified location  Comments:  Doxycycline.    Spina bifida, unspecified hydrocephalus presence, unspecified spinal region  Comments:  Childhood diagnosis.  Never had treatment.  Minimally symptomatic    Severe obesity with body mass index (BMI) of 36.0 to 36.9 with serious comorbidity  Comments:  Weight stable    Essential hypertension  Comments:  Controlled..  Continue metoprolol, lisinopril, amlodipine    Prediabetes  Comments:  A1c today  Orders:  -     Hemoglobin A1C; Future; Expected date: 02/19/2025    Hypothyroidism, unspecified type  Comments:  TSH today.  Continue levothyroxine  Orders:  -     TSH; Future; Expected date: 02/19/2025    Degenerative disc disease, cervical  Comments:  Much better after  surgery.  Still on Cymbalta    Osteopenia, unspecified location  Comments:  DEXA now normal.  Continue calcium and vitamin-D supplementation    Other orders  -     clotrimazole-betamethasone 1-0.05% (LOTRISONE) cream; Apply topically 2 (two) times daily.  Dispense: 45 g; Refill: 1  -     doxycycline (MONODOX) 100 MG capsule; Take 1 capsule (100 mg total) by mouth 2 (two) times daily.  Dispense: 20 capsule; Refill: 0  -     promethazine-dextromethorphan (PROMETHAZINE-DM) 6.25-15 mg/5 mL Syrp; Take 5 mLs by mouth 3 (three) times daily as needed.  Dispense: 118 mL; Refill: 0                 [1]   Current Outpatient Medications:     albuterol (PROVENTIL/VENTOLIN HFA) 90 mcg/actuation inhaler, Inhale 2 puffs into the lungs every 6 (six) hours as needed for Wheezing. Dispense with spacer., Disp: 18 g, Rfl: 3    amLODIPine (NORVASC) 5 MG tablet, Take 1 tablet (5 mg total) by mouth once daily., Disp: 90 tablet, Rfl: 3    DULoxetine (CYMBALTA) 30 MG capsule, Take 1 capsule (30 mg total) by mouth once daily., Disp: 30 capsule, Rfl: 11    levothyroxine (SYNTHROID) 50 MCG tablet, Take 1 tablet (50 mcg total) by mouth before breakfast., Disp: 90 tablet, Rfl: 0    lisinopriL (PRINIVIL,ZESTRIL) 20 MG tablet, Take 1 tablet (20 mg total) by mouth once daily., Disp: 90 tablet, Rfl: 2    metoprolol succinate (TOPROL-XL) 100 MG 24 hr tablet, Take 1 tablet (100 mg total) by mouth once daily., Disp: 90 tablet, Rfl: 2    omeprazole (PRILOSEC) 20 MG capsule, Take 1 capsule (20 mg total) by mouth 2 (two) times daily., Disp: 180 capsule, Rfl: 2    oxybutynin (DITROPAN) 5 MG Tab, Take 5 mg by mouth 2 (two) times daily., Disp: , Rfl:     rOPINIRole (REQUIP) 2 MG tablet, Take 1 tablet (2 mg total) by mouth every evening., Disp: 90 tablet, Rfl: 2    clotrimazole-betamethasone 1-0.05% (LOTRISONE) cream, Apply topically 2 (two) times daily., Disp: 45 g, Rfl: 1    doxycycline (MONODOX) 100 MG capsule, Take 1 capsule (100 mg total) by mouth 2 (two)  times daily., Disp: 20 capsule, Rfl: 0    promethazine-dextromethorphan (PROMETHAZINE-DM) 6.25-15 mg/5 mL Syrp, Take 5 mLs by mouth 3 (three) times daily as needed., Disp: 118 mL, Rfl: 0  No current facility-administered medications for this visit.    Facility-Administered Medications Ordered in Other Visits:     chlorhexidine 0.12 % solution 10 mL, 10 mL, Mouth/Throat, On Call Procedure, Scarlet Cooney PA-C    lactated ringers infusion, , Intravenous, Continuous, Franky Hartman MD, Last Rate: 20 mL/hr at 01/08/21 0731, New Bag at 09/16/24 1014    lactated ringers infusion, , Intravenous, Continuous, Estevan Rodriguez MD, Stopped at 02/23/21 2678

## 2025-03-13 NOTE — ANESTHESIA POSTPROCEDURE EVALUATION
Anesthesia Post Evaluation    Patient: Sana Crenshaw    Procedure(s) Performed: Procedure(s) (LRB):  RELEASE, CUBITAL TUNNEL (Right)    Final Anesthesia Type: general      Patient location during evaluation: PACU  Patient participation: Yes- Able to Participate  Level of consciousness: awake  Post-procedure vital signs: reviewed and stable  Pain management: adequate  Airway patency: patent    PONV status at discharge: No PONV  Anesthetic complications: no      Cardiovascular status: stable  Respiratory status: unassisted  Hydration status: euvolemic  Follow-up not needed.          Vitals Value Taken Time   /69 04/10/23 0811   Temp 36.5 °C (97.7 °F) 04/10/23 0757   Pulse 61 04/10/23 0812   Resp 18 04/10/23 0812   SpO2 92 % 04/10/23 0812   Vitals shown include unvalidated device data.      No case tracking events are documented in the log.      Pain/Edenilson Score: Edenilson Score: 9 (4/10/2023  7:57 AM)        
Adult

## 2025-03-17 RX ORDER — LEVOTHYROXINE SODIUM 50 UG/1
50 TABLET ORAL
Qty: 90 TABLET | Refills: 3 | Status: SHIPPED | OUTPATIENT
Start: 2025-03-17

## 2025-03-17 NOTE — TELEPHONE ENCOUNTER
Called mom pt has been coughing and runny nose. Apt scheduled for today.    No care due was identified.  [facility] Embedded Care Due Messages. Reference number: [id number].   3/17/2025 4:26:56 AM CDT

## 2025-03-17 NOTE — TELEPHONE ENCOUNTER
Refill Decision Note   Sana Crenshaw  is requesting a refill authorization.  Brief Assessment and Rationale for Refill:  Approve     Medication Therapy Plan:        Comments:     Note composed:6:24 PM 03/17/2025

## 2025-05-27 NOTE — PROGRESS NOTES
Subjective:       Patient ID: Sana Crenshaw is a 72 y.o. female.    Chief Complaint: Otalgia and Cough      HPI Comments:       Current Outpatient Medications:     albuterol (PROVENTIL/VENTOLIN HFA) 90 mcg/actuation inhaler, Inhale 2 puffs into the lungs every 6 (six) hours as needed for Wheezing. Dispense with spacer., Disp: 18 g, Rfl: 3    amLODIPine (NORVASC) 5 MG tablet, TAKE 1 TABLET BY MOUTH ONCE DAILY (Patient taking differently: every evening.), Disp: 90 tablet, Rfl: 3    aspirin (ECOTRIN) 81 MG EC tablet, Take 81 mg by mouth once daily., Disp: , Rfl:     DULoxetine (CYMBALTA) 30 MG capsule, Take 1 capsule (30 mg total) by mouth once daily., Disp: 90 capsule, Rfl: 1    levothyroxine (SYNTHROID) 50 MCG tablet, TAKE 1 TABLET BY MOUTH  BEFORE BREAKFAST, Disp: 90 tablet, Rfl: 3    lisinopriL (PRINIVIL,ZESTRIL) 20 MG tablet, Take 1 tablet (20 mg total) by mouth once daily., Disp: 90 tablet, Rfl: 3    metoprolol succinate (TOPROL-XL) 100 MG 24 hr tablet, Take 1 tablet (100 mg total) by mouth once daily., Disp: 90 tablet, Rfl: 3    omeprazole (PRILOSEC) 20 MG capsule, TAKE 1 CAPSULE BY MOUTH  TWICE DAILY, Disp: 180 capsule, Rfl: 3    oxybutynin (DITROPAN-XL) 10 MG 24 hr tablet, Take 10 mg by mouth once daily., Disp: , Rfl:     rOPINIRole (REQUIP) 1 MG tablet, TAKE 1 TABLET BY MOUTH IN  THE EVENING, Disp: 90 tablet, Rfl: 3    tiZANidine (ZANAFLEX) 4 MG tablet, Take 1 tablet (4 mg total) by mouth every 8 (eight) hours as needed (muscle spasms)., Disp: 270 tablet, Rfl: 1    azithromycin (Z-DILSHAD) 250 MG tablet, Take 2 tablets by mouth on day 1; Take 1 tablet by mouth on days 2-5, Disp: 6 tablet, Rfl: 0    promethazine-dextromethorphan (PROMETHAZINE-DM) 6.25-15 mg/5 mL Syrp, Take 5 mLs by mouth 3 (three) times daily as needed., Disp: 118 mL, Rfl: 0  No current facility-administered medications for this visit.    Facility-Administered Medications Ordered in Other Visits:     chlorhexidine 0.12 % solution 10 mL, 10 mL,  "Mouth/Throat, On Call Procedure, Scarlet Cooney PA-C    lactated ringers infusion, , Intravenous, Continuous, Franky Hartman MD, Last Rate: 20 mL/hr at 01/08/21 0731, New Bag at 01/08/21 0925    lactated ringers infusion, , Intravenous, Continuous, Estevan Rodriguez MD, Stopped at 02/23/21 1645      One-week history of URI symptoms followed by cough with green sputum.  She is having chills and body aches.  Sore throat and wheezing.  Ear pain.    Taking over-the-counter cough syrup and albuterol.      No home testing.  Recent sick exposures      Review of Systems   Constitutional:  Positive for activity change, appetite change and chills. Negative for fever.   HENT:  Positive for congestion, ear pain, rhinorrhea, sinus pressure and sore throat.    Respiratory:  Positive for cough and wheezing. Negative for shortness of breath.    Cardiovascular:  Negative for chest pain.   Gastrointestinal:  Negative for abdominal pain, diarrhea and nausea.   Genitourinary:  Negative for difficulty urinating.   Musculoskeletal:  Positive for myalgias. Negative for arthralgias.   Neurological:  Negative for dizziness and headaches.       Objective:      Vitals:    10/31/23 1027   BP: 130/64   Pulse: 70   Temp: 98.1 °F (36.7 °C)   TempSrc: Tympanic   SpO2: 95%   Weight: 96.7 kg (213 lb 4.7 oz)   Height: 5' 2" (1.575 m)   PainSc: 0-No pain     Physical Exam  Vitals and nursing note reviewed.   Constitutional:       General: She is not in acute distress.     Appearance: She is well-developed. She is ill-appearing. She is not diaphoretic.   HENT:      Head: Normocephalic.      Right Ear: Tympanic membrane is erythematous.      Left Ear: Tympanic membrane is injected.      Nose: Mucosal edema and rhinorrhea present.      Mouth/Throat:      Pharynx: Pharyngeal swelling present. No oropharyngeal exudate or posterior oropharyngeal erythema.   Neck:      Thyroid: No thyromegaly.   Cardiovascular:      Rate and Rhythm: Normal rate and " regular rhythm.      Heart sounds: Normal heart sounds. No murmur heard.  Pulmonary:      Effort: Pulmonary effort is normal. Tachypnea present. No accessory muscle usage or respiratory distress.      Breath sounds: Rhonchi present. No wheezing or rales.   Abdominal:      General: There is no distension.      Palpations: Abdomen is soft.   Musculoskeletal:      Cervical back: Neck supple.   Lymphadenopathy:      Cervical: No cervical adenopathy.   Skin:     General: Skin is warm and dry.   Neurological:      Mental Status: She is alert and oriented to person, place, and time.   Psychiatric:         Behavior: Behavior normal.         Thought Content: Thought content normal.         Judgment: Judgment normal.         Assessment:       1. Acute bronchitis, unspecified organism    2. Limited mobility    3. Essential hypertension    4. Prediabetes    5. Severe obesity with body mass index (BMI) of 36.0 to 36.9 with serious comorbidity    6. Hypothyroidism, unspecified type        Plan:   Acute bronchitis, unspecified organism  Comments:  Z-Dilshad.  Fluids and rest.  Expectorants.  Follow-up parameters given  Orders:  -     POCT Influenza A/B  -     POCT COVID-19 Rapid Screening    Limited mobility  Comments:  No recent falls    Essential hypertension  Comments:  Controlled    Prediabetes  Comments:  A1c 6.0    Severe obesity with body mass index (BMI) of 36.0 to 36.9 with serious comorbidity  Comments:  Weight unchanged    Hypothyroidism, unspecified type  Comments:  Euthyroid on current dose    Other orders  -     azithromycin (Z-DILSHAD) 250 MG tablet; Take 2 tablets by mouth on day 1; Take 1 tablet by mouth on days 2-5  Dispense: 6 tablet; Refill: 0  -     promethazine-dextromethorphan (PROMETHAZINE-DM) 6.25-15 mg/5 mL Syrp; Take 5 mLs by mouth 3 (three) times daily as needed.  Dispense: 118 mL; Refill: 0             [Negative] : Heme/Lymph [Chest Discomfort] : no chest discomfort [Lower Ext Edema] : no extremity edema [Leg Claudication] : no intermittent leg claudication [Palpitations] : no palpitations [Syncope] : no syncope [Cough] : no cough [FreeTextEntry5] : (+) SOB on Exertion [FreeTextEntry6] : (+) SOB on Exertion

## (undated) DEVICE — GAUZE SPONGE 4X4 12PLY

## (undated) DEVICE — PACK BASIC SETUP SC BR

## (undated) DEVICE — DRESSING XEROFORM FOIL PK 1X8

## (undated) DEVICE — NDL SPINAL 20GX3.5 HUB

## (undated) DEVICE — MARKER SKIN RULER STERILE

## (undated) DEVICE — DRESSING CHG FOAM 4MM 1IN

## (undated) DEVICE — APPLICATOR CHLORAPREP ORN 26ML

## (undated) DEVICE — POSITIONER HEAD DONUT 9IN FOAM

## (undated) DEVICE — GOWN POLY REINF BRTH SLV XL

## (undated) DEVICE — GLOVE BIOGEL SZ 8 1/2

## (undated) DEVICE — KIT SURGIFLO HEMOSTATIC MATRIX

## (undated) DEVICE — PAD ABD 8X10 STERILE

## (undated) DEVICE — COVER LIGHT HANDLE 80/CA

## (undated) DEVICE — SYR ONLY LUER LOCK 20CC

## (undated) DEVICE — ALCOHOL 70% ISOP RUBBING 4OZ

## (undated) DEVICE — REMOVER LOTION

## (undated) DEVICE — KIT EVACUATOR 3-SPRING 1/8 DRN

## (undated) DEVICE — GLOVE SURGICAL LATEX SZ 6.5

## (undated) DEVICE — ELECTRODE REM PLYHSV RETURN 9

## (undated) DEVICE — DEV-O-LOOPS MAXI RED

## (undated) DEVICE — TOWEL OR DISP STRL BLUE 4/PK

## (undated) DEVICE — GOWN NONREINF SET-IN SLV 2XL

## (undated) DEVICE — DRAPE INCISE IOBAN 2 23X17IN

## (undated) DEVICE — DRESSING SURGICAL 1/2X1/2

## (undated) DEVICE — DRAPE MOBILE C-ARM

## (undated) DEVICE — UNDERGLOVE BIOGEL PI SZ 6.5 LF

## (undated) DEVICE — DRAPE HAND STERILE

## (undated) DEVICE — SYR 10CC LUER LOCK

## (undated) DEVICE — SCRUB HIBICLENS 4% CHG 4OZ

## (undated) DEVICE — BANDAGE ELASTIC 3X5 VELCRO ST

## (undated) DEVICE — COVER CAMERA OPERATING ROOM

## (undated) DEVICE — IMMOBILIZER SHOULDER MEDIUM

## (undated) DEVICE — PIN DISTRACTION 12MM
Type: IMPLANTABLE DEVICE | Site: SPINE CERVICAL | Status: NON-FUNCTIONAL
Removed: 2024-09-16

## (undated) DEVICE — MANIFOLD 4 PORT

## (undated) DEVICE — SUT VICRYL 4-0 ANTIBACT

## (undated) DEVICE — SUT MONOCRYL 4.0 PS2 CP496G

## (undated) DEVICE — UNDERGLOVES BIOGEL PI SIZE 7.5

## (undated) DEVICE — TOOL MR8 MATCH HEAD 14CM 3MM

## (undated) DEVICE — ADHESIVE MASTISOL VIAL 48/BX

## (undated) DEVICE — PAD CAST SPECIALIST STRL 3

## (undated) DEVICE — SOL 9P NACL IRR PIC IL

## (undated) DEVICE — TUBING MEDI-VAC 20FT .25IN

## (undated) DEVICE — GAUZE SPONGE PEANUT STRL

## (undated) DEVICE — BANDAGE ESMARK ELASTIC ST 4X9

## (undated) DEVICE — BLADE EZ CLEAN 2.5IN MODIFIED

## (undated) DEVICE — SPONGE GAUZE 16PLY 4X4

## (undated) DEVICE — DRAPE THREE-QTR REINF 53X77IN

## (undated) DEVICE — TOURNIQUET SB QC DP 18X4IN

## (undated) DEVICE — DRAPE OPMI STERILE

## (undated) DEVICE — ALCOHOL 70% ANTISEPTIC ISO 4OZ

## (undated) DEVICE — SUT 4-0 ETHILON 18 PS-2

## (undated) DEVICE — POUCH INSTRUMENT 2 POCKET

## (undated) DEVICE — COLLAR VISTA AD UNIV REPL PADS

## (undated) DEVICE — HALTER DELUXE DISCARD HEAD

## (undated) DEVICE — NDL SAFETY 25G X 1.5 ECLIPSE

## (undated) DEVICE — DRAPE THYROID SOFT STERILE

## (undated) DEVICE — UNDERGLOVES BIOGEL PI SIZE 8.5

## (undated) DEVICE — UNDERGLOVES BIOGEL PI SZ 7 LF

## (undated) DEVICE — SUPPORT ULNA NERVE PROTECTOR

## (undated) DEVICE — SPONGE COTTON TRAY 4X4IN

## (undated) DEVICE — DRESSING TRANS 4X10 TEGADERM

## (undated) DEVICE — HEADREST ROUND DISP FOAM 9IN

## (undated) DEVICE — SUT VICRYL PLUS 0 CT1 18IN

## (undated) DEVICE — GLOVE SURGICAL LATEX SZ 7

## (undated) DEVICE — DRAPE U SPLIT SHEET 54X76IN

## (undated) DEVICE — TRAY CATH 1-LYR URIMTR 16FR

## (undated) DEVICE — NDL ECLIPSE SAF REG 25GX1.5IN

## (undated) DEVICE — CORD BIPOLAR 12 FOOT

## (undated) DEVICE — ADHESIVE DERMABOND ADVANCED